# Patient Record
Sex: MALE | Race: WHITE | NOT HISPANIC OR LATINO
[De-identification: names, ages, dates, MRNs, and addresses within clinical notes are randomized per-mention and may not be internally consistent; named-entity substitution may affect disease eponyms.]

---

## 2017-01-31 ENCOUNTER — APPOINTMENT (OUTPATIENT)
Dept: CARDIOLOGY | Facility: CLINIC | Age: 82
End: 2017-01-31

## 2017-02-03 ENCOUNTER — APPOINTMENT (OUTPATIENT)
Dept: CARDIOLOGY | Facility: CLINIC | Age: 82
End: 2017-02-03

## 2017-02-03 VITALS
SYSTOLIC BLOOD PRESSURE: 154 MMHG | HEIGHT: 70 IN | BODY MASS INDEX: 29.92 KG/M2 | WEIGHT: 209 LBS | DIASTOLIC BLOOD PRESSURE: 74 MMHG | HEART RATE: 51 BPM | OXYGEN SATURATION: 94 %

## 2017-02-15 ENCOUNTER — INPATIENT (INPATIENT)
Facility: HOSPITAL | Age: 82
LOS: 1 days | Discharge: HOME | End: 2017-02-17
Attending: INTERNAL MEDICINE | Admitting: INTERNAL MEDICINE

## 2017-02-23 ENCOUNTER — APPOINTMENT (OUTPATIENT)
Dept: CARDIOLOGY | Facility: CLINIC | Age: 82
End: 2017-02-23

## 2017-02-23 VITALS — HEIGHT: 70 IN | WEIGHT: 209 LBS | BODY MASS INDEX: 29.92 KG/M2

## 2017-03-03 ENCOUNTER — APPOINTMENT (OUTPATIENT)
Dept: CARDIOLOGY | Facility: CLINIC | Age: 82
End: 2017-03-03

## 2017-03-03 VITALS
HEART RATE: 44 BPM | HEIGHT: 70 IN | SYSTOLIC BLOOD PRESSURE: 119 MMHG | DIASTOLIC BLOOD PRESSURE: 61 MMHG | OXYGEN SATURATION: 95 % | WEIGHT: 209 LBS | BODY MASS INDEX: 29.92 KG/M2

## 2017-03-03 RX ORDER — METOPROLOL SUCCINATE 25 MG/1
25 TABLET, EXTENDED RELEASE ORAL DAILY
Refills: 0 | Status: DISCONTINUED | COMMUNITY
End: 2017-03-03

## 2017-04-04 ENCOUNTER — APPOINTMENT (OUTPATIENT)
Dept: CARDIOLOGY | Facility: CLINIC | Age: 82
End: 2017-04-04

## 2017-04-04 VITALS — HEART RATE: 66 BPM | DIASTOLIC BLOOD PRESSURE: 70 MMHG | SYSTOLIC BLOOD PRESSURE: 104 MMHG

## 2017-04-04 VITALS — HEIGHT: 70 IN | BODY MASS INDEX: 29.49 KG/M2 | WEIGHT: 206 LBS

## 2017-04-14 ENCOUNTER — APPOINTMENT (OUTPATIENT)
Dept: CARDIOLOGY | Facility: CLINIC | Age: 82
End: 2017-04-14

## 2017-04-14 VITALS
HEART RATE: 73 BPM | SYSTOLIC BLOOD PRESSURE: 117 MMHG | OXYGEN SATURATION: 96 % | BODY MASS INDEX: 29.49 KG/M2 | HEIGHT: 70 IN | WEIGHT: 206 LBS | DIASTOLIC BLOOD PRESSURE: 65 MMHG

## 2017-05-13 ENCOUNTER — APPOINTMENT (OUTPATIENT)
Dept: CARDIOLOGY | Facility: CLINIC | Age: 82
End: 2017-05-13

## 2017-06-27 DIAGNOSIS — Z87.891 PERSONAL HISTORY OF NICOTINE DEPENDENCE: ICD-10-CM

## 2017-06-27 DIAGNOSIS — Z79.01 LONG TERM (CURRENT) USE OF ANTICOAGULANTS: ICD-10-CM

## 2017-06-27 DIAGNOSIS — Z95.1 PRESENCE OF AORTOCORONARY BYPASS GRAFT: ICD-10-CM

## 2017-06-27 DIAGNOSIS — I12.9 HYPERTENSIVE CHRONIC KIDNEY DISEASE WITH STAGE 1 THROUGH STAGE 4 CHRONIC KIDNEY DISEASE, OR UNSPECIFIED CHRONIC KIDNEY DISEASE: ICD-10-CM

## 2017-06-27 DIAGNOSIS — Z85.51 PERSONAL HISTORY OF MALIGNANT NEOPLASM OF BLADDER: ICD-10-CM

## 2017-06-27 DIAGNOSIS — N18.9 CHRONIC KIDNEY DISEASE, UNSPECIFIED: ICD-10-CM

## 2017-06-27 DIAGNOSIS — I48.92 UNSPECIFIED ATRIAL FLUTTER: ICD-10-CM

## 2017-06-27 DIAGNOSIS — I25.10 ATHEROSCLEROTIC HEART DISEASE OF NATIVE CORONARY ARTERY WITHOUT ANGINA PECTORIS: ICD-10-CM

## 2017-06-27 DIAGNOSIS — E78.5 HYPERLIPIDEMIA, UNSPECIFIED: ICD-10-CM

## 2017-06-27 DIAGNOSIS — N40.0 BENIGN PROSTATIC HYPERPLASIA WITHOUT LOWER URINARY TRACT SYMPTOMS: ICD-10-CM

## 2017-09-08 ENCOUNTER — APPOINTMENT (OUTPATIENT)
Dept: CARDIOLOGY | Facility: CLINIC | Age: 82
End: 2017-09-08

## 2017-09-08 VITALS
SYSTOLIC BLOOD PRESSURE: 135 MMHG | HEIGHT: 70 IN | BODY MASS INDEX: 29.49 KG/M2 | WEIGHT: 206 LBS | OXYGEN SATURATION: 93 % | DIASTOLIC BLOOD PRESSURE: 67 MMHG | HEART RATE: 56 BPM

## 2017-09-15 ENCOUNTER — APPOINTMENT (OUTPATIENT)
Dept: CARDIOLOGY | Facility: CLINIC | Age: 82
End: 2017-09-15

## 2017-09-15 VITALS — WEIGHT: 202 LBS | BODY MASS INDEX: 28.92 KG/M2 | HEIGHT: 70 IN

## 2017-09-15 VITALS — SYSTOLIC BLOOD PRESSURE: 140 MMHG | HEART RATE: 54 BPM | DIASTOLIC BLOOD PRESSURE: 60 MMHG

## 2017-10-12 ENCOUNTER — RX RENEWAL (OUTPATIENT)
Age: 82
End: 2017-10-12

## 2017-12-05 ENCOUNTER — RX RENEWAL (OUTPATIENT)
Age: 82
End: 2017-12-05

## 2018-01-09 ENCOUNTER — APPOINTMENT (OUTPATIENT)
Dept: CARDIOLOGY | Facility: CLINIC | Age: 83
End: 2018-01-09

## 2018-01-09 VITALS
BODY MASS INDEX: 29.35 KG/M2 | OXYGEN SATURATION: 97 % | SYSTOLIC BLOOD PRESSURE: 122 MMHG | HEART RATE: 55 BPM | DIASTOLIC BLOOD PRESSURE: 64 MMHG | HEIGHT: 70 IN | WEIGHT: 205 LBS

## 2018-01-26 ENCOUNTER — RESULT CHARGE (OUTPATIENT)
Age: 83
End: 2018-01-26

## 2018-01-26 ENCOUNTER — APPOINTMENT (OUTPATIENT)
Dept: CARDIOLOGY | Facility: CLINIC | Age: 83
End: 2018-01-26

## 2018-01-26 VITALS — BODY MASS INDEX: 32.64 KG/M2 | WEIGHT: 228 LBS | HEIGHT: 70 IN

## 2018-01-26 VITALS — DIASTOLIC BLOOD PRESSURE: 64 MMHG | SYSTOLIC BLOOD PRESSURE: 110 MMHG

## 2018-04-12 ENCOUNTER — APPOINTMENT (OUTPATIENT)
Dept: CARDIOLOGY | Facility: CLINIC | Age: 83
End: 2018-04-12

## 2018-04-12 VITALS
WEIGHT: 204 LBS | SYSTOLIC BLOOD PRESSURE: 120 MMHG | HEIGHT: 70 IN | DIASTOLIC BLOOD PRESSURE: 66 MMHG | BODY MASS INDEX: 29.2 KG/M2 | HEART RATE: 56 BPM

## 2018-06-08 ENCOUNTER — APPOINTMENT (OUTPATIENT)
Dept: CARDIOLOGY | Facility: CLINIC | Age: 83
End: 2018-06-08

## 2018-06-20 ENCOUNTER — RX RENEWAL (OUTPATIENT)
Age: 83
End: 2018-06-20

## 2018-06-21 ENCOUNTER — RX RENEWAL (OUTPATIENT)
Age: 83
End: 2018-06-21

## 2018-08-08 ENCOUNTER — RX RENEWAL (OUTPATIENT)
Age: 83
End: 2018-08-08

## 2018-08-21 ENCOUNTER — APPOINTMENT (OUTPATIENT)
Dept: CARDIOLOGY | Facility: CLINIC | Age: 83
End: 2018-08-21

## 2018-08-21 VITALS
WEIGHT: 205 LBS | HEIGHT: 70 IN | DIASTOLIC BLOOD PRESSURE: 50 MMHG | HEART RATE: 55 BPM | SYSTOLIC BLOOD PRESSURE: 120 MMHG | BODY MASS INDEX: 29.35 KG/M2

## 2018-10-05 ENCOUNTER — APPOINTMENT (OUTPATIENT)
Dept: CARDIOLOGY | Facility: CLINIC | Age: 83
End: 2018-10-05

## 2018-10-05 VITALS
HEART RATE: 50 BPM | HEIGHT: 70 IN | DIASTOLIC BLOOD PRESSURE: 60 MMHG | SYSTOLIC BLOOD PRESSURE: 138 MMHG | BODY MASS INDEX: 29.35 KG/M2 | WEIGHT: 205 LBS | OXYGEN SATURATION: 95 %

## 2018-10-05 RX ORDER — AMIODARONE HYDROCHLORIDE 200 MG/1
200 TABLET ORAL
Refills: 6 | Status: DISCONTINUED | COMMUNITY
Start: 2017-02-23 | End: 2018-10-05

## 2018-11-06 ENCOUNTER — OUTPATIENT (OUTPATIENT)
Dept: OUTPATIENT SERVICES | Facility: HOSPITAL | Age: 83
LOS: 1 days | Discharge: HOME | End: 2018-11-06

## 2018-11-06 DIAGNOSIS — M54.16 RADICULOPATHY, LUMBAR REGION: ICD-10-CM

## 2018-11-06 DIAGNOSIS — M48.061 SPINAL STENOSIS, LUMBAR REGION WITHOUT NEUROGENIC CLAUDICATION: ICD-10-CM

## 2019-01-11 ENCOUNTER — APPOINTMENT (OUTPATIENT)
Dept: CARDIOLOGY | Facility: CLINIC | Age: 84
End: 2019-01-11

## 2019-01-11 VITALS — DIASTOLIC BLOOD PRESSURE: 58 MMHG | SYSTOLIC BLOOD PRESSURE: 124 MMHG

## 2019-01-11 VITALS — BODY MASS INDEX: 29.41 KG/M2 | WEIGHT: 205 LBS

## 2019-01-11 NOTE — HISTORY OF PRESENT ILLNESS
[FreeTextEntry1] : Patient with history of CAD, PCI, HTN, PAF s/p DCCV  2011 CHADVASC 5 and now on amiodarone 2016. Patient had been doing well. No recurrence. No AF . Pt had one episode of AFL 2/17 that spontaneously converted to NSR and no further recurrence.  metoprolol was stopped and HR improved . No SOB ro SMITH  \par \par Patient dc/o balance problem and leg weakness. Amio was stopped - balance did not get better.\par No palpitations, CP, syncope\par \par EKG 65 bpm \par \par Holter - No AF; min 38 bpm; max 93 bpm

## 2019-01-11 NOTE — ASSESSMENT
[FreeTextEntry1] : Patient has history of atrial fibrillation on amiodarone. Continue anticoagulation.  Patient reports no bleeding episodes. \par \par \par pt is asymptomatic; bradycardic improved (may be SSS, tacy-pratik sydrome)\par - con AC\par - Avoid  AVN blocking\par \par HTN - cont meds

## 2019-01-11 NOTE — PHYSICAL EXAM
[General Appearance - Well Developed] : well developed [Normal Appearance] : normal appearance [Well Groomed] : well groomed [General Appearance - Well Nourished] : well nourished [No Deformities] : no deformities [General Appearance - In No Acute Distress] : no acute distress [Normal Conjunctiva] : the conjunctiva exhibited no abnormalities [Eyelids - No Xanthelasma] : the eyelids demonstrated no xanthelasmas [Normal Oral Mucosa] : normal oral mucosa [No Oral Pallor] : no oral pallor [No Oral Cyanosis] : no oral cyanosis [Normal Jugular Venous A Waves Present] : normal jugular venous A waves present [Normal Jugular Venous V Waves Present] : normal jugular venous V waves present [No Jugular Venous Amin A Waves] : no jugular venous amin A waves [Respiration, Rhythm And Depth] : normal respiratory rhythm and effort [Exaggerated Use Of Accessory Muscles For Inspiration] : no accessory muscle use [Auscultation Breath Sounds / Voice Sounds] : lungs were clear to auscultation bilaterally [Heart Rate And Rhythm] : heart rate and rhythm were normal [Heart Sounds] : normal S1 and S2 [Murmurs] : no murmurs present [Abdomen Soft] : soft [Abdomen Tenderness] : non-tender [Abdomen Mass (___ Cm)] : no abdominal mass palpated [Abnormal Walk] : normal gait [Gait - Sufficient For Exercise Testing] : the gait was sufficient for exercise testing [Nail Clubbing] : no clubbing of the fingernails [Cyanosis, Localized] : no localized cyanosis [Petechial Hemorrhages (___cm)] : no petechial hemorrhages [] : no ischemic changes

## 2019-01-18 ENCOUNTER — APPOINTMENT (OUTPATIENT)
Dept: CARDIOLOGY | Facility: CLINIC | Age: 84
End: 2019-01-18

## 2019-01-18 VITALS
DIASTOLIC BLOOD PRESSURE: 64 MMHG | SYSTOLIC BLOOD PRESSURE: 126 MMHG | BODY MASS INDEX: 29.35 KG/M2 | HEART RATE: 62 BPM | WEIGHT: 205 LBS | HEIGHT: 70 IN

## 2019-01-18 DIAGNOSIS — K27.9 PEPTIC ULCER, SITE UNSPECIFIED, UNSPECIFIED AS ACUTE OR CHRONIC, W/OUT HEMORRHAGE OR PERFORATION: ICD-10-CM

## 2019-01-18 DIAGNOSIS — Z00.00 ENCOUNTER FOR GENERAL ADULT MEDICAL EXAMINATION W/OUT ABNORMAL FINDINGS: ICD-10-CM

## 2019-01-18 NOTE — REASON FOR VISIT
[Follow-Up - Clinic] : a clinic follow-up of [Atrial Fibrillation] : atrial fibrillation [Coronary Artery Disease] : coronary artery disease [Hyperlipidemia] : hyperlipidemia [Hypertension] : hypertension

## 2019-01-18 NOTE — ASSESSMENT
[FreeTextEntry1] : The patient had a mechanical fall. He has known spinal stenosis and neuropathy . He is takig low dose Amio and has been seen by pulmonary and opthalmology . Amio had been stopped by EP . HR is now in the 60's. He has not had recurrence of AF AFL

## 2019-01-18 NOTE — HISTORY OF PRESENT ILLNESS
[FreeTextEntry1] : The patient has had no AF . Off of Amio and neurological symptoms have not changed.

## 2019-01-18 NOTE — PHYSICAL EXAM
[General Appearance - Well Developed] : well developed [Normal Appearance] : normal appearance [Well Groomed] : well groomed [General Appearance - Well Nourished] : well nourished [No Deformities] : no deformities [General Appearance - In No Acute Distress] : no acute distress [Normal Conjunctiva] : the conjunctiva exhibited no abnormalities [Eyelids - No Xanthelasma] : the eyelids demonstrated no xanthelasmas [Normal Oral Mucosa] : normal oral mucosa [No Oral Pallor] : no oral pallor [No Oral Cyanosis] : no oral cyanosis [Respiration, Rhythm And Depth] : normal respiratory rhythm and effort [Exaggerated Use Of Accessory Muscles For Inspiration] : no accessory muscle use [Auscultation Breath Sounds / Voice Sounds] : lungs were clear to auscultation bilaterally [Abdomen Soft] : soft [Abdomen Tenderness] : non-tender [Abdomen Mass (___ Cm)] : no abdominal mass palpated [Abnormal Walk] : normal gait [Gait - Sufficient For Exercise Testing] : the gait was sufficient for exercise testing [Nail Clubbing] : no clubbing of the fingernails [Cyanosis, Localized] : no localized cyanosis [Petechial Hemorrhages (___cm)] : no petechial hemorrhages [Skin Color & Pigmentation] : normal skin color and pigmentation [] : no rash [No Venous Stasis] : no venous stasis [Skin Lesions] : no skin lesions [No Skin Ulcers] : no skin ulcer [No Xanthoma] : no  xanthoma was observed [Oriented To Time, Place, And Person] : oriented to person, place, and time [Affect] : the affect was normal [Mood] : the mood was normal [No Anxiety] : not feeling anxious [Normal Rate] : normal [Rhythm Regular] : regular [II] : a grade 2 [1+] : left 1+ [No Pitting Edema] : no pitting edema present [FreeTextEntry1] : No JVD or bruits.  [Rt] : no varicose veins of the right leg [Lt] : no varicose veins of the left leg

## 2019-01-18 NOTE — REVIEW OF SYSTEMS
[Joint Pain] : joint pain [Joint Swelling] : joint swelling [Change In Color Of Skin] : change in skin color [Negative] : Heme/Lymph [Joint Stiffness] : no joint stiffness

## 2019-03-17 ENCOUNTER — RX RENEWAL (OUTPATIENT)
Age: 84
End: 2019-03-17

## 2019-04-01 ENCOUNTER — APPOINTMENT (OUTPATIENT)
Dept: CARDIOLOGY | Facility: CLINIC | Age: 84
End: 2019-04-01
Payer: MEDICARE

## 2019-04-01 PROCEDURE — 93880 EXTRACRANIAL BILAT STUDY: CPT

## 2019-04-09 ENCOUNTER — MEDICATION RENEWAL (OUTPATIENT)
Age: 84
End: 2019-04-09

## 2019-06-18 ENCOUNTER — APPOINTMENT (OUTPATIENT)
Dept: CARDIOLOGY | Facility: CLINIC | Age: 84
End: 2019-06-18
Payer: MEDICARE

## 2019-06-18 VITALS
SYSTOLIC BLOOD PRESSURE: 120 MMHG | DIASTOLIC BLOOD PRESSURE: 76 MMHG | HEIGHT: 70 IN | WEIGHT: 201 LBS | BODY MASS INDEX: 28.77 KG/M2 | HEART RATE: 83 BPM

## 2019-06-18 PROCEDURE — 93000 ELECTROCARDIOGRAM COMPLETE: CPT

## 2019-06-18 PROCEDURE — 99214 OFFICE O/P EST MOD 30 MIN: CPT

## 2019-06-18 NOTE — PHYSICAL EXAM
[General Appearance - Well Developed] : well developed [Normal Appearance] : normal appearance [Well Groomed] : well groomed [General Appearance - Well Nourished] : well nourished [General Appearance - In No Acute Distress] : no acute distress [No Deformities] : no deformities [Normal Conjunctiva] : the conjunctiva exhibited no abnormalities [Normal Oral Mucosa] : normal oral mucosa [Eyelids - No Xanthelasma] : the eyelids demonstrated no xanthelasmas [No Oral Cyanosis] : no oral cyanosis [No Oral Pallor] : no oral pallor [FreeTextEntry1] : No JVD or bruits.  [Respiration, Rhythm And Depth] : normal respiratory rhythm and effort [Abdomen Soft] : soft [Exaggerated Use Of Accessory Muscles For Inspiration] : no accessory muscle use [Auscultation Breath Sounds / Voice Sounds] : lungs were clear to auscultation bilaterally [Abdomen Tenderness] : non-tender [Abdomen Mass (___ Cm)] : no abdominal mass palpated [Abnormal Walk] : normal gait [Gait - Sufficient For Exercise Testing] : the gait was sufficient for exercise testing [Nail Clubbing] : no clubbing of the fingernails [Petechial Hemorrhages (___cm)] : no petechial hemorrhages [Cyanosis, Localized] : no localized cyanosis [] : no rash [Skin Color & Pigmentation] : normal skin color and pigmentation [No Skin Ulcers] : no skin ulcer [No Venous Stasis] : no venous stasis [Skin Lesions] : no skin lesions [No Xanthoma] : no  xanthoma was observed [Oriented To Time, Place, And Person] : oriented to person, place, and time [Affect] : the affect was normal [Mood] : the mood was normal [No Anxiety] : not feeling anxious [Normal Rate] : normal [Rhythm Regular] : regular [II] : a grade 2 [1+] : left 1+ [No Pitting Edema] : no pitting edema present [Rt] : no varicose veins of the right leg [Lt] : no varicose veins of the left leg

## 2019-06-18 NOTE — REVIEW OF SYSTEMS
[Joint Pain] : joint pain [Joint Swelling] : joint swelling [Joint Stiffness] : no joint stiffness [Change In Color Of Skin] : change in skin color [Negative] : Heme/Lymph

## 2019-06-18 NOTE — HISTORY OF PRESENT ILLNESS
[FreeTextEntry1] : The patient still has to walk with a walker. He has been off of Amio. Thought to have a neuropathy from his back issues . He is back in AF with rate control VR . . No shortness of breath and his exercise tolerance has been unchanged

## 2019-06-18 NOTE — ASSESSMENT
[FreeTextEntry1] : The patient is in AF with controlled VR at rest . The patient has had no SOB or chest pain . He does have a neuropathy from his his lower back, not thouht to be from AMio. He has had an enlarged LA in the past . His rate is controlled without being on AV huber blocking meds. This is indicative of AV huber disease as well.

## 2019-08-08 ENCOUNTER — APPOINTMENT (OUTPATIENT)
Dept: CARDIOLOGY | Facility: CLINIC | Age: 84
End: 2019-08-08
Payer: MEDICARE

## 2019-08-08 PROCEDURE — 93306 TTE W/DOPPLER COMPLETE: CPT

## 2019-08-08 PROCEDURE — 93224 XTRNL ECG REC UP TO 48 HRS: CPT

## 2019-09-29 ENCOUNTER — RX RENEWAL (OUTPATIENT)
Age: 84
End: 2019-09-29

## 2019-10-11 ENCOUNTER — APPOINTMENT (OUTPATIENT)
Dept: CARDIOLOGY | Facility: CLINIC | Age: 84
End: 2019-10-11
Payer: MEDICARE

## 2019-10-11 ENCOUNTER — APPOINTMENT (OUTPATIENT)
Dept: CARDIOLOGY | Facility: CLINIC | Age: 84
End: 2019-10-11

## 2019-10-11 VITALS
BODY MASS INDEX: 28.27 KG/M2 | WEIGHT: 197 LBS | HEART RATE: 86 BPM | SYSTOLIC BLOOD PRESSURE: 120 MMHG | DIASTOLIC BLOOD PRESSURE: 66 MMHG

## 2019-10-11 PROCEDURE — 93000 ELECTROCARDIOGRAM COMPLETE: CPT

## 2019-10-11 PROCEDURE — 99214 OFFICE O/P EST MOD 30 MIN: CPT

## 2019-10-11 RX ORDER — UBIDECARENONE/VIT E ACET 100MG-5
1000 CAPSULE ORAL
Refills: 0 | Status: ACTIVE | COMMUNITY

## 2019-10-11 NOTE — PHYSICAL EXAM
[General Appearance - Well Developed] : well developed [Normal Appearance] : normal appearance [Well Groomed] : well groomed [General Appearance - Well Nourished] : well nourished [No Deformities] : no deformities [General Appearance - In No Acute Distress] : no acute distress [Normal Conjunctiva] : the conjunctiva exhibited no abnormalities [Normal Oral Mucosa] : normal oral mucosa [Eyelids - No Xanthelasma] : the eyelids demonstrated no xanthelasmas [No Oral Cyanosis] : no oral cyanosis [No Oral Pallor] : no oral pallor [Normal Jugular Venous A Waves Present] : normal jugular venous A waves present [No Jugular Venous Amin A Waves] : no jugular venous amin A waves [Normal Jugular Venous V Waves Present] : normal jugular venous V waves present [Heart Rate And Rhythm] : heart rate and rhythm were normal [Auscultation Breath Sounds / Voice Sounds] : lungs were clear to auscultation bilaterally [Exaggerated Use Of Accessory Muscles For Inspiration] : no accessory muscle use [Respiration, Rhythm And Depth] : normal respiratory rhythm and effort [Heart Sounds] : normal S1 and S2 [Murmurs] : no murmurs present [Abdomen Tenderness] : non-tender [Abdomen Soft] : soft [Abdomen Mass (___ Cm)] : no abdominal mass palpated [Abnormal Walk] : normal gait [Gait - Sufficient For Exercise Testing] : the gait was sufficient for exercise testing [Nail Clubbing] : no clubbing of the fingernails [Petechial Hemorrhages (___cm)] : no petechial hemorrhages [Cyanosis, Localized] : no localized cyanosis [] : no ischemic changes

## 2019-10-11 NOTE — HISTORY OF PRESENT ILLNESS
[FreeTextEntry1] : Patient with history of CAD, PCI, HTN, PAF s/p DCCV  2011 CHADVASC 5 and now on amiodarone 2016. Patient had been doing well. No recurrence. No AF . Pt had one episode of AFL 2/17 that spontaneously converted to NSR and no further recurrence.  metoprolol was stopped and HR improved . No SOB ro SMITH  \par \par Patient dc/o balance problem and leg weakness. Amio was stopped - balance did not get better.\par \par Patient went back to AF but No palpitations, CP, syncope\par \par EKG AF 86 bpm\par \par Holter - No AF; min 38 bpm; max 93 bpm

## 2019-10-11 NOTE — ASSESSMENT
[FreeTextEntry1] : Patient has history of atrial fibrillation on amiodarone. Continue anticoagulation.  Patient reports no bleeding episodes. \par \par \par pt is asymptomatic; in AF now (may be SSS, tacy-pratik sydrome)\par - con AC\par - pt is rate controlled currently. No further meds required at this time\par \par HTN - cont meds

## 2019-12-04 ENCOUNTER — APPOINTMENT (OUTPATIENT)
Dept: CARDIOLOGY | Facility: CLINIC | Age: 84
End: 2019-12-04
Payer: MEDICARE

## 2019-12-04 VITALS
WEIGHT: 198 LBS | HEIGHT: 70 IN | BODY MASS INDEX: 28.35 KG/M2 | SYSTOLIC BLOOD PRESSURE: 120 MMHG | DIASTOLIC BLOOD PRESSURE: 76 MMHG | HEART RATE: 75 BPM

## 2019-12-04 PROCEDURE — 93000 ELECTROCARDIOGRAM COMPLETE: CPT

## 2019-12-04 PROCEDURE — 99214 OFFICE O/P EST MOD 30 MIN: CPT

## 2019-12-04 NOTE — REVIEW OF SYSTEMS
[Joint Swelling] : joint swelling [Joint Pain] : joint pain [Joint Stiffness] : no joint stiffness [Change In Color Of Skin] : change in skin color [Negative] : Heme/Lymph

## 2019-12-04 NOTE — ASSESSMENT
[FreeTextEntry1] : The patient has AF which is now thought to be permanent. He is not having symptoms as a result of this . His LA size is enlarged. His ventricular rate is controlled without AV huber blocking medications . This is c/w AV huber disease. VR is controlled on holter

## 2019-12-04 NOTE — PHYSICAL EXAM
[General Appearance - Well Developed] : well developed [Normal Appearance] : normal appearance [Well Groomed] : well groomed [General Appearance - Well Nourished] : well nourished [No Deformities] : no deformities [General Appearance - In No Acute Distress] : no acute distress [Normal Conjunctiva] : the conjunctiva exhibited no abnormalities [Eyelids - No Xanthelasma] : the eyelids demonstrated no xanthelasmas [Normal Oral Mucosa] : normal oral mucosa [No Oral Pallor] : no oral pallor [No Oral Cyanosis] : no oral cyanosis [FreeTextEntry1] : No JVD or bruits.  [Respiration, Rhythm And Depth] : normal respiratory rhythm and effort [Exaggerated Use Of Accessory Muscles For Inspiration] : no accessory muscle use [Auscultation Breath Sounds / Voice Sounds] : lungs were clear to auscultation bilaterally [Abdomen Soft] : soft [Abdomen Tenderness] : non-tender [Abdomen Mass (___ Cm)] : no abdominal mass palpated [Nail Clubbing] : no clubbing of the fingernails [Abnormal Walk] : normal gait [Gait - Sufficient For Exercise Testing] : the gait was sufficient for exercise testing [Petechial Hemorrhages (___cm)] : no petechial hemorrhages [Cyanosis, Localized] : no localized cyanosis [] : no rash [Skin Color & Pigmentation] : normal skin color and pigmentation [No Venous Stasis] : no venous stasis [Skin Lesions] : no skin lesions [No Skin Ulcers] : no skin ulcer [No Xanthoma] : no  xanthoma was observed [Oriented To Time, Place, And Person] : oriented to person, place, and time [Affect] : the affect was normal [Mood] : the mood was normal [No Anxiety] : not feeling anxious [Normal Rate] : normal [Rhythm Regular] : regular [II] : a grade 2 [1+] : left 1+ [Lt] : no varicose veins of the left leg [No Pitting Edema] : no pitting edema present [Rt] : no varicose veins of the right leg

## 2019-12-04 NOTE — HISTORY OF PRESENT ILLNESS
[FreeTextEntry1] : The patient is using a walker for balance issues . He has persisent AF and ventriular response is controlled. He has a neuropathy as well fro his lower back.

## 2020-03-25 ENCOUNTER — APPOINTMENT (OUTPATIENT)
Dept: CARDIOLOGY | Facility: CLINIC | Age: 85
End: 2020-03-25

## 2020-03-27 ENCOUNTER — APPOINTMENT (OUTPATIENT)
Dept: CARDIOLOGY | Facility: CLINIC | Age: 85
End: 2020-03-27

## 2020-07-23 ENCOUNTER — APPOINTMENT (OUTPATIENT)
Dept: CARDIOLOGY | Facility: CLINIC | Age: 85
End: 2020-07-23
Payer: MEDICARE

## 2020-07-23 VITALS
SYSTOLIC BLOOD PRESSURE: 122 MMHG | WEIGHT: 200 LBS | TEMPERATURE: 97.7 F | DIASTOLIC BLOOD PRESSURE: 60 MMHG | BODY MASS INDEX: 28.7 KG/M2

## 2020-07-23 DIAGNOSIS — M54.2 CERVICALGIA: ICD-10-CM

## 2020-07-23 PROCEDURE — 93000 ELECTROCARDIOGRAM COMPLETE: CPT

## 2020-07-23 PROCEDURE — 99214 OFFICE O/P EST MOD 30 MIN: CPT

## 2020-07-23 NOTE — HISTORY OF PRESENT ILLNESS
[FreeTextEntry1] : The patient is using a walker for balance issues . He has persistent AF and ventricular response is controlled. He has a neuropathy as well fro his lower back. .The patient has not had chest pain or SOB .

## 2020-07-23 NOTE — ASSESSMENT
[FreeTextEntry1] : The patient has AF which is now thought to be permanent. He is not having symptoms as a result of this . His LA size is enlarged. His ventricular rate is controlled without AV huber blocking medications . This is c/w AV huber disease. VR is controlled on holter. He hs been feeling well. No chest pain

## 2020-07-23 NOTE — REASON FOR VISIT
[Follow-Up - Clinic] : a clinic follow-up of [Atrial Fibrillation] : atrial fibrillation [Hyperlipidemia] : hyperlipidemia [Coronary Artery Disease] : coronary artery disease [Hypertension] : hypertension

## 2020-07-23 NOTE — REVIEW OF SYSTEMS
[Joint Pain] : joint pain [Joint Stiffness] : no joint stiffness [Joint Swelling] : joint swelling [Change In Color Of Skin] : change in skin color [Negative] : Endocrine

## 2020-07-23 NOTE — PHYSICAL EXAM
[General Appearance - Well Developed] : well developed [Normal Appearance] : normal appearance [Well Groomed] : well groomed [General Appearance - In No Acute Distress] : no acute distress [General Appearance - Well Nourished] : well nourished [No Deformities] : no deformities [Eyelids - No Xanthelasma] : the eyelids demonstrated no xanthelasmas [Normal Conjunctiva] : the conjunctiva exhibited no abnormalities [Normal Oral Mucosa] : normal oral mucosa [FreeTextEntry1] : No JVD or bruits.  [No Oral Pallor] : no oral pallor [No Oral Cyanosis] : no oral cyanosis [Respiration, Rhythm And Depth] : normal respiratory rhythm and effort [Exaggerated Use Of Accessory Muscles For Inspiration] : no accessory muscle use [Auscultation Breath Sounds / Voice Sounds] : lungs were clear to auscultation bilaterally [Abdomen Soft] : soft [Abnormal Walk] : normal gait [Abdomen Tenderness] : non-tender [Abdomen Mass (___ Cm)] : no abdominal mass palpated [Gait - Sufficient For Exercise Testing] : the gait was sufficient for exercise testing [Nail Clubbing] : no clubbing of the fingernails [Cyanosis, Localized] : no localized cyanosis [Petechial Hemorrhages (___cm)] : no petechial hemorrhages [Skin Color & Pigmentation] : normal skin color and pigmentation [] : no rash [No Venous Stasis] : no venous stasis [Skin Lesions] : no skin lesions [No Skin Ulcers] : no skin ulcer [No Xanthoma] : no  xanthoma was observed [Mood] : the mood was normal [Oriented To Time, Place, And Person] : oriented to person, place, and time [Affect] : the affect was normal [Normal Rate] : normal [No Anxiety] : not feeling anxious [Rhythm Regular] : regular [II] : a grade 2 [No Pitting Edema] : no pitting edema present [1+] : right 1+ [Lt] : no varicose veins of the left leg [Rt] : no varicose veins of the right leg

## 2020-12-03 ENCOUNTER — APPOINTMENT (OUTPATIENT)
Dept: CARDIOLOGY | Facility: CLINIC | Age: 85
End: 2020-12-03
Payer: MEDICARE

## 2020-12-03 PROCEDURE — 93306 TTE W/DOPPLER COMPLETE: CPT

## 2020-12-03 PROCEDURE — 99072 ADDL SUPL MATRL&STAF TM PHE: CPT

## 2020-12-10 ENCOUNTER — APPOINTMENT (OUTPATIENT)
Dept: CARDIOLOGY | Facility: CLINIC | Age: 85
End: 2020-12-10
Payer: MEDICARE

## 2020-12-10 VITALS — HEIGHT: 70 IN | TEMPERATURE: 97.2 F | WEIGHT: 159 LBS | BODY MASS INDEX: 22.76 KG/M2

## 2020-12-10 VITALS
BODY MASS INDEX: 28.06 KG/M2 | HEART RATE: 69 BPM | SYSTOLIC BLOOD PRESSURE: 120 MMHG | TEMPERATURE: 97.3 F | HEIGHT: 70 IN | DIASTOLIC BLOOD PRESSURE: 60 MMHG | WEIGHT: 196 LBS

## 2020-12-10 PROCEDURE — 99214 OFFICE O/P EST MOD 30 MIN: CPT

## 2020-12-10 PROCEDURE — 93000 ELECTROCARDIOGRAM COMPLETE: CPT

## 2020-12-10 PROCEDURE — 99072 ADDL SUPL MATRL&STAF TM PHE: CPT

## 2020-12-10 NOTE — ASSESSMENT
[FreeTextEntry1] : The patient has AF which is now thought to be permanent. He is not having symptoms as a result of this . His LA size is enlarged. His ventricular rate is controlled without AV huber blocking medications . This is c/w AV huber disease. VR is controlled on holter. He has been feeling well. No chest pain Echo shows normal LV systolic function . AS remains mild .

## 2021-03-06 ENCOUNTER — INPATIENT (INPATIENT)
Facility: HOSPITAL | Age: 86
LOS: 10 days | Discharge: HOME HEALTH | End: 2021-03-17
Attending: INTERNAL MEDICINE | Admitting: INTERNAL MEDICINE
Payer: MEDICARE

## 2021-03-06 VITALS
HEART RATE: 114 BPM | SYSTOLIC BLOOD PRESSURE: 110 MMHG | OXYGEN SATURATION: 100 % | WEIGHT: 190.04 LBS | DIASTOLIC BLOOD PRESSURE: 59 MMHG | TEMPERATURE: 98 F | HEIGHT: 70 IN | RESPIRATION RATE: 20 BRPM

## 2021-03-06 DIAGNOSIS — Z95.5 PRESENCE OF CORONARY ANGIOPLASTY IMPLANT AND GRAFT: Chronic | ICD-10-CM

## 2021-03-06 LAB
ABO RH CONFIRMATION: SIGNIFICANT CHANGE UP
ALBUMIN SERPL ELPH-MCNC: 3.7 G/DL — SIGNIFICANT CHANGE UP (ref 3.5–5.2)
ALP SERPL-CCNC: 44 U/L — SIGNIFICANT CHANGE UP (ref 30–115)
ALT FLD-CCNC: 14 U/L — SIGNIFICANT CHANGE UP (ref 0–41)
ANION GAP SERPL CALC-SCNC: 15 MMOL/L — HIGH (ref 7–14)
APPEARANCE UR: CLEAR — SIGNIFICANT CHANGE UP
APTT BLD: 49.9 SEC — HIGH (ref 27–39.2)
AST SERPL-CCNC: 16 U/L — SIGNIFICANT CHANGE UP (ref 0–41)
BASOPHILS # BLD AUTO: 0.03 K/UL — SIGNIFICANT CHANGE UP (ref 0–0.2)
BASOPHILS NFR BLD AUTO: 0.2 % — SIGNIFICANT CHANGE UP (ref 0–1)
BILIRUB SERPL-MCNC: 0.4 MG/DL — SIGNIFICANT CHANGE UP (ref 0.2–1.2)
BILIRUB UR-MCNC: NEGATIVE — SIGNIFICANT CHANGE UP
BLD GP AB SCN SERPL QL: SIGNIFICANT CHANGE UP
BUN SERPL-MCNC: 112 MG/DL — CRITICAL HIGH (ref 10–20)
CALCIUM SERPL-MCNC: 8.5 MG/DL — SIGNIFICANT CHANGE UP (ref 8.5–10.1)
CHLORIDE SERPL-SCNC: 111 MMOL/L — HIGH (ref 98–110)
CO2 SERPL-SCNC: 16 MMOL/L — LOW (ref 17–32)
COLOR SPEC: SIGNIFICANT CHANGE UP
CREAT SERPL-MCNC: 2.3 MG/DL — HIGH (ref 0.7–1.5)
DIFF PNL FLD: NEGATIVE — SIGNIFICANT CHANGE UP
EOSINOPHIL # BLD AUTO: 0 K/UL — SIGNIFICANT CHANGE UP (ref 0–0.7)
EOSINOPHIL NFR BLD AUTO: 0 % — SIGNIFICANT CHANGE UP (ref 0–8)
ETHANOL SERPL-MCNC: <10 MG/DL — SIGNIFICANT CHANGE UP
GLUCOSE SERPL-MCNC: 141 MG/DL — HIGH (ref 70–99)
GLUCOSE UR QL: NEGATIVE — SIGNIFICANT CHANGE UP
HCT VFR BLD CALC: 19.3 % — LOW (ref 42–52)
HCT VFR BLD CALC: 22.3 % — LOW (ref 42–52)
HGB BLD-MCNC: 6.5 G/DL — CRITICAL LOW (ref 14–18)
HGB BLD-MCNC: 7.8 G/DL — LOW (ref 14–18)
IMM GRANULOCYTES NFR BLD AUTO: 1 % — HIGH (ref 0.1–0.3)
INR BLD: 7.77 RATIO — CRITICAL HIGH (ref 0.65–1.3)
KETONES UR-MCNC: NEGATIVE — SIGNIFICANT CHANGE UP
LACTATE SERPL-SCNC: 2.7 MMOL/L — HIGH (ref 0.7–2)
LEUKOCYTE ESTERASE UR-ACNC: NEGATIVE — SIGNIFICANT CHANGE UP
LIDOCAIN IGE QN: 23 U/L — SIGNIFICANT CHANGE UP (ref 7–60)
LYMPHOCYTES # BLD AUTO: 0.55 K/UL — LOW (ref 1.2–3.4)
LYMPHOCYTES # BLD AUTO: 3.8 % — LOW (ref 20.5–51.1)
MAGNESIUM SERPL-MCNC: 2.1 MG/DL — SIGNIFICANT CHANGE UP (ref 1.8–2.4)
MCHC RBC-ENTMCNC: 29.3 PG — SIGNIFICANT CHANGE UP (ref 27–31)
MCHC RBC-ENTMCNC: 30.4 PG — SIGNIFICANT CHANGE UP (ref 27–31)
MCHC RBC-ENTMCNC: 33.7 G/DL — SIGNIFICANT CHANGE UP (ref 32–37)
MCHC RBC-ENTMCNC: 35 G/DL — SIGNIFICANT CHANGE UP (ref 32–37)
MCV RBC AUTO: 86.8 FL — SIGNIFICANT CHANGE UP (ref 80–94)
MCV RBC AUTO: 86.9 FL — SIGNIFICANT CHANGE UP (ref 80–94)
MONOCYTES # BLD AUTO: 0.94 K/UL — HIGH (ref 0.1–0.6)
MONOCYTES NFR BLD AUTO: 6.5 % — SIGNIFICANT CHANGE UP (ref 1.7–9.3)
NEUTROPHILS # BLD AUTO: 12.76 K/UL — HIGH (ref 1.4–6.5)
NEUTROPHILS NFR BLD AUTO: 88.5 % — HIGH (ref 42.2–75.2)
NITRITE UR-MCNC: NEGATIVE — SIGNIFICANT CHANGE UP
NRBC # BLD: 0 /100 WBCS — SIGNIFICANT CHANGE UP (ref 0–0)
NRBC # BLD: 0 /100 WBCS — SIGNIFICANT CHANGE UP (ref 0–0)
NT-PROBNP SERPL-SCNC: 783 PG/ML — HIGH (ref 0–300)
PH UR: 5.5 — SIGNIFICANT CHANGE UP (ref 5–8)
PLATELET # BLD AUTO: 201 K/UL — SIGNIFICANT CHANGE UP (ref 130–400)
PLATELET # BLD AUTO: 214 K/UL — SIGNIFICANT CHANGE UP (ref 130–400)
POTASSIUM SERPL-MCNC: 4.6 MMOL/L — SIGNIFICANT CHANGE UP (ref 3.5–5)
POTASSIUM SERPL-SCNC: 4.6 MMOL/L — SIGNIFICANT CHANGE UP (ref 3.5–5)
PROT SERPL-MCNC: 5.3 G/DL — LOW (ref 6–8)
PROT UR-MCNC: NEGATIVE — SIGNIFICANT CHANGE UP
PROTHROM AB SERPL-ACNC: >40 SEC — HIGH (ref 9.95–12.87)
RBC # BLD: 2.22 M/UL — LOW (ref 4.7–6.1)
RBC # BLD: 2.57 M/UL — LOW (ref 4.7–6.1)
RBC # FLD: 15.8 % — HIGH (ref 11.5–14.5)
RBC # FLD: 15.8 % — HIGH (ref 11.5–14.5)
SARS-COV-2 RNA SPEC QL NAA+PROBE: SIGNIFICANT CHANGE UP
SODIUM SERPL-SCNC: 142 MMOL/L — SIGNIFICANT CHANGE UP (ref 135–146)
SP GR SPEC: 1.03 — SIGNIFICANT CHANGE UP (ref 1.01–1.03)
TROPONIN T SERPL-MCNC: <0.01 NG/ML — SIGNIFICANT CHANGE UP
UROBILINOGEN FLD QL: SIGNIFICANT CHANGE UP
WBC # BLD: 14.42 K/UL — HIGH (ref 4.8–10.8)
WBC # BLD: 14.87 K/UL — HIGH (ref 4.8–10.8)
WBC # FLD AUTO: 14.42 K/UL — HIGH (ref 4.8–10.8)
WBC # FLD AUTO: 14.87 K/UL — HIGH (ref 4.8–10.8)

## 2021-03-06 PROCEDURE — 72170 X-RAY EXAM OF PELVIS: CPT | Mod: 26

## 2021-03-06 PROCEDURE — 74177 CT ABD & PELVIS W/CONTRAST: CPT | Mod: 26

## 2021-03-06 PROCEDURE — 71260 CT THORAX DX C+: CPT | Mod: 26

## 2021-03-06 PROCEDURE — 99285 EMERGENCY DEPT VISIT HI MDM: CPT

## 2021-03-06 PROCEDURE — 93010 ELECTROCARDIOGRAM REPORT: CPT | Mod: 76

## 2021-03-06 PROCEDURE — 71045 X-RAY EXAM CHEST 1 VIEW: CPT | Mod: 26

## 2021-03-06 PROCEDURE — 72125 CT NECK SPINE W/O DYE: CPT | Mod: 26

## 2021-03-06 PROCEDURE — 99223 1ST HOSP IP/OBS HIGH 75: CPT

## 2021-03-06 PROCEDURE — 70450 CT HEAD/BRAIN W/O DYE: CPT | Mod: 26

## 2021-03-06 RX ORDER — PANTOPRAZOLE SODIUM 20 MG/1
40 TABLET, DELAYED RELEASE ORAL EVERY 12 HOURS
Refills: 0 | Status: DISCONTINUED | OUTPATIENT
Start: 2021-03-06 | End: 2021-03-07

## 2021-03-06 RX ORDER — TIZANIDINE 4 MG/1
0 TABLET ORAL
Qty: 0 | Refills: 1 | DISCHARGE

## 2021-03-06 RX ORDER — PHYTONADIONE (VIT K1) 5 MG
2.5 TABLET ORAL ONCE
Refills: 0 | Status: COMPLETED | OUTPATIENT
Start: 2021-03-06 | End: 2021-03-06

## 2021-03-06 RX ORDER — FINASTERIDE 5 MG/1
5 TABLET, FILM COATED ORAL DAILY
Refills: 0 | Status: DISCONTINUED | OUTPATIENT
Start: 2021-03-06 | End: 2021-03-17

## 2021-03-06 RX ORDER — TAMSULOSIN HYDROCHLORIDE 0.4 MG/1
0 CAPSULE ORAL
Qty: 0 | Refills: 0 | DISCHARGE

## 2021-03-06 RX ORDER — SODIUM CHLORIDE 9 MG/ML
1000 INJECTION INTRAMUSCULAR; INTRAVENOUS; SUBCUTANEOUS ONCE
Refills: 0 | Status: COMPLETED | OUTPATIENT
Start: 2021-03-06 | End: 2021-03-06

## 2021-03-06 RX ORDER — NITROFURANTOIN MACROCRYSTAL 50 MG
0 CAPSULE ORAL
Qty: 0 | Refills: 0 | DISCHARGE

## 2021-03-06 RX ORDER — FINASTERIDE 5 MG/1
0 TABLET, FILM COATED ORAL
Qty: 0 | Refills: 3 | DISCHARGE

## 2021-03-06 RX ORDER — WARFARIN SODIUM 2.5 MG/1
0 TABLET ORAL
Qty: 0 | Refills: 0 | DISCHARGE

## 2021-03-06 RX ORDER — TIZANIDINE 4 MG/1
0 TABLET ORAL
Qty: 0 | Refills: 0 | DISCHARGE

## 2021-03-06 RX ORDER — TAMSULOSIN HYDROCHLORIDE 0.4 MG/1
0.4 CAPSULE ORAL AT BEDTIME
Refills: 0 | Status: DISCONTINUED | OUTPATIENT
Start: 2021-03-06 | End: 2021-03-06

## 2021-03-06 RX ORDER — CHOLECALCIFEROL (VITAMIN D3) 125 MCG
1000 CAPSULE ORAL DAILY
Refills: 0 | Status: DISCONTINUED | OUTPATIENT
Start: 2021-03-06 | End: 2021-03-17

## 2021-03-06 RX ADMIN — PANTOPRAZOLE SODIUM 40 MILLIGRAM(S): 20 TABLET, DELAYED RELEASE ORAL at 21:42

## 2021-03-06 RX ADMIN — SODIUM CHLORIDE 1000 MILLILITER(S): 9 INJECTION INTRAMUSCULAR; INTRAVENOUS; SUBCUTANEOUS at 13:05

## 2021-03-06 RX ADMIN — Medication 2.5 MILLIGRAM(S): at 21:41

## 2021-03-06 NOTE — ED PROVIDER NOTE - IV ALTEPLASE ADMIN OUTSIDE HIDDEN
Drug overdose, intentional self-harm, initial encounter Drug overdose, intentional self-harm, initial encounter show

## 2021-03-06 NOTE — H&P ADULT - NSHPPHYSICALEXAM_GEN_ALL_CORE
Vital Signs Last 24 Hrs  T(C): 36.7 (06 Mar 2021 15:42), Max: 36.7 (06 Mar 2021 15:42)  T(F): 98 (06 Mar 2021 15:42), Max: 98 (06 Mar 2021 15:42)  HR: 85 (06 Mar 2021 15:42) (85 - 120)  BP: 117/56 (06 Mar 2021 15:42) (110/59 - 126/63)  RR: 20 (06 Mar 2021 11:49) (20 - 20)  SpO2: 100% (06 Mar 2021 11:49) (100% - 100%)    PHYSICAL EXAM:  GENERAL: The patient is a well-developed, well-nourished in no apparent distress. AOX3.  HEENT: NCAT   NECK: Supple. No lymphadenopathy or thyromegaly.  LUNGS: No respiratory distress or accessory muscle use. CTAB  HEART: RRR, S1 S2 Audible  ABDOMEN: Soft, nontender, and nondistended.  No gaurding or rigidity.  No hepatosplenomegaly was noted.  EXTREMITIES: Without any cyanosis, clubbing, rash, lesions or edema.

## 2021-03-06 NOTE — CONSULT NOTE ADULT - SUBJECTIVE AND OBJECTIVE BOX
HPI:  89yo M with PMHx CAD/stents, AFib on coumadin, HTN, HLD, spinal stenosis, prostate CA, Vit D def presents for eval s/p fall this morning. Pt states at 8am was getting out of bed to go to bathroom, felt lightheaded and fell forwards, landed on chest. Pt remembers events, denies LOC. Currently is not c/o any pain. Since fall, pt states still feeling lightheaded. Also noted SMITH that began yesterday, pt states normally can ambulate with rollator to the store and ambulates without assistance in the home, but yesterday felt out of breath after 10 steps. Patient also notes that several days ago, he thinks he "pulled something" on his left side, did not fall at the time, has a large bruise on his left flank/ abdomen. INR last wk was 3.0, take 5mg coumadin daily. Denies orthopnea. Denies fever, headache, visual changes CP, cough, palpitations, nausea, vomiting, diarrhea, abd pain, leg swelling, neck pain, back pain. Endorses this morning having 1 episode of black stool and once in the ER in the camode. Pt states has had both doses of covid vaccine. In ER trauma pan scan negative, Xrays negative for traumatic injusry. Noted to have elevated INR with no bleeding, elevated LA and wbc. Admitted for further workup and eval.  (06 Mar 2021 17:41)      PAST MEDICAL & SURGICAL HISTORY  Afib    Spinal stenosis at L4-L5 level    H/O heart artery stent        FAMILY HISTORY:  FAMILY HISTORY:      SOCIAL HISTORY:  []smoker  []Alcohol  []Drug    ALLERGIES:  No Known Allergies      MEDICATIONS:  MEDICATIONS  (STANDING):  cholecalciferol 1000 Unit(s) Oral daily  finasteride 5 milliGRAM(s) Oral daily  pantoprazole  Injectable 40 milliGRAM(s) IV Push every 12 hours    MEDICATIONS  (PRN):      HOME MEDICATIONS:  Home Medications:  FINASTERIDE 5 MG TABLET:  (06 Mar 2021 17:50)  hydroCHLOROthiazide 25 mg oral tablet: 1 tab(s) orally once a day (06 Mar 2021 18:47)  losartan 100 mg oral tablet: 1 tab(s) orally once a day (06 Mar 2021 18:46)  NIFEdipine 60 mg oral tablet, extended release: 1 tab(s) orally once a day (06 Mar 2021 18:47)  Plavix 75 mg oral tablet: 1 tab(s) orally once a day (06 Mar 2021 18:46)  PRAVASTATIN 40MG TABLETS: TAKE 1 TABLET BY MOUTH EVERY DAY AT BEDTIME (06 Mar 2021 17:50)  TAMSULOSIN 0.4MG CAPSULES:  (06 Mar 2021 17:50)  Vitamin D3 1000 intl units (25 mcg) oral tablet: 1 tab(s) orally once a day (06 Mar 2021 18:47)  WARFARIN SODIUM 5 MG TABLET:  (06 Mar 2021 17:50)      VITALS:   T(F): 96.3 (03-06 @ 19:16), Max: 98 (03-06 @ 15:42)  HR: 101 (03-06 @ 19:16) (85 - 120)  BP: 128/65 (03-06 @ 19:16) (110/59 - 128/65)  BP(mean): --  RR: 18 (03-06 @ 19:16) (18 - 20)  SpO2: 99% (03-06 @ 19:49) (99% - 100%)    I&O's Summary      REVIEW OF SYSTEMS:  CONSTITUTIONAL: No weakness, fevers or chills  EYES: No visual changes  ENT: No vertigo or throat pain . (+) dizzines  NECK: No pain or stiffness  RESPIRATORY: No cough, wheezing, hemoptysis; No shortness of breath  CARDIOVASCULAR: No chest pain or palpitations  GASTROINTESTINAL: No abdominal or epigastric pain. No nausea, vomiting, or hematemesis; No diarrhea or constipation. (+) melena, no hematochezia.  GENITOURINARY: No dysuria, frequency or hematuria  NEUROLOGICAL: No numbness, (+) weakness  SKIN: No itching, no rashes  MSK: No pain    PHYSICAL EXAM:  NEURO: patient is awake , alert and oriented  GEN: Not in acute distress  NECK: no thyroid enlargement, no JVD  LUNGS: Clear to auscultation bilaterally   CARDIOVASCULAR: S1/S2 present, irregular, no murmurs or rubs, no carotid bruits,  + PP bilaterally  ABD: Soft, non-tender, non-distended, +BS  EXT: No FLORECITA  SKIN: large left hip area ecchymosis    LABS:                        6.5    14.87 )-----------( 201      ( 06 Mar 2021 21:07 )             19.3     03-06    142  |  111<H>  |  112<HH>  ----------------------------<  141<H>  4.6   |  16<L>  |  2.3<H>    Ca    8.5      06 Mar 2021 11:30  Mg     2.1     03-06    TPro  5.3<L>  /  Alb  3.7  /  TBili  0.4  /  DBili  x   /  AST  16  /  ALT  14  /  AlkPhos  44  03-06    PT/INR - ( 06 Mar 2021 11:30 )   PT: >40.00 sec;   INR: 7.77 ratio         PTT - ( 06 Mar 2021 11:30 )  PTT:49.9 sec  Lactate, Blood: 2.7 mmol/L <H> (03-06-21 @ 11:30)  Troponin T, Serum: <0.01 ng/mL (03-06-21 @ 11:30)    CARDIAC MARKERS ( 06 Mar 2021 11:30 )  x     / <0.01 ng/mL / x     / x     / x      Troponin trend:    Serum Pro-Brain Natriuretic Peptide: 783 pg/mL (03-06-21 @ 11:30)    RADIOLOGY:  -CXR: no acute pathology  -TTE 2017: Normal EF  -CCTA:  -STRESS TEST 2015: no ischemia  -CATHETERIZATION 2009: PCI to prox LAD    ECG:  AF with RVR to 110bpm    TELEMETRY EVENTS:

## 2021-03-06 NOTE — ED ADULT NURSE NOTE - CHIEF COMPLAINT QUOTE
BIBA from home s/p fall at 8 AM this morning. as per pt, he felt "dizzy", and fell down in his house. denies head injury, denies LOC. takes coumadin.   pt states he fell "onto his front side". pt reports he has been feeling shortness of breath on exertion since yesterday. denies fever/cough. states he has received both vaccines for COVID-19 in February. history of 4 stents placed

## 2021-03-06 NOTE — ED PROVIDER NOTE - PROGRESS NOTE DETAILS
shanon - attempted to contact wife at number listed, no answer FAST negative except finding of luq cystic structure, pt states is chronic, has been told has cyst there in the past shanon - attempted to contact wife, Mónica, at number listed to obtain additional history including PMHx and meds, no answer. Pt does not remember his son's number Most recent labs in [facility] is from 2017, Creatinine 1.8 -> 2.3 today, Hb 14 -> 7.8 today. Aside from left flank ecchymosis, no evidence of bleeding, pt denies BRBPR/ melena. Unclear how acute vs chronic these changes are. INR of 7.77 noted, no active bleeding, CTs negative for traumatic injury, reversal not indicated at present. spoke with patient's son, Kp ([phone]) to update on pt's condition Double O-Z Plasty Text: The defect edges were debeveled with a #15 scalpel blade.  Given the location of the defect, shape of the defect and the proximity to free margins a Double O-Z plasty (double transposition flap) was deemed most appropriate.  Using a sterile surgical marker, the appropriate transposition flaps were drawn incorporating the defect and placing the expected incisions within the relaxed skin tension lines where possible. The area thus outlined was incised deep to adipose tissue with a #15 scalpel blade.  The skin margins were undermined to an appropriate distance in all directions utilizing iris scissors.  Hemostasis was achieved with electrocautery.  The flaps were then transposed into place, one clockwise and the other counterclockwise, and anchored with interrupted buried subcutaneous sutures.

## 2021-03-06 NOTE — H&P ADULT - ATTENDING COMMENTS
I saw and evaluated the patient. I have reviewed and agree with the findings and plan of care as documented above in the resident’s note (unless indicated differently in my addendum). Any necessary changes were made in the body of the text.

## 2021-03-06 NOTE — ED ADULT NURSE NOTE - NSIMPLEMENTINTERV_GEN_ALL_ED
Implemented All Fall with Harm Risk Interventions:  [place] to call system. Call bell, personal items and telephone within reach. Instruct patient to call for assistance. Room bathroom lighting operational. Non-slip footwear when patient is off stretcher. Physically safe environment: no spills, clutter or unnecessary equipment. Stretcher in lowest position, wheels locked, appropriate side rails in place. Provide visual cue, wrist band, yellow gown, etc. Monitor gait and stability. Monitor for mental status changes and reorient to person, place, and time. Review medications for side effects contributing to fall risk. Reinforce activity limits and safety measures with patient and family. Provide visual clues: red socks.

## 2021-03-06 NOTE — ED PROVIDER NOTE - NS ED ROS FT
Constitutional: No fever, chills  Eyes:  No visual changes  ENMT:  No neck pain  Cardiac:  No chest pain  Respiratory:  No cough. +SOB  GI:  No nausea, vomiting, diarrhea, abdominal pain  :  No dysuria  MS:  No back pain.  Neuro:  No headache. +lightheadedness  Skin:  No skin rash. +bruising  Endocrine: No history of thyroid disease or diabetes  Except as documented in the HPI,  all other systems are negative

## 2021-03-06 NOTE — ED PROVIDER NOTE - CARE PLAN
Principal Discharge DX:	Near syncope  Secondary Diagnosis:	Fall  Secondary Diagnosis:	Atrial fibrillation with RVR  Secondary Diagnosis:	Supratherapeutic INR

## 2021-03-06 NOTE — CONSULT NOTE ADULT - ASSESSMENT
IMPRESSION:  - Dizziness, fall, no syncope -- likely from symptomatic anemia secondary to acute blood loss  - Supratherapeutic INR to 7.7  - Af with RVR - tachycardia secondary to anemia  - CAD - stable - no ACS no angina - last stent from 2009    PLAN:  - Keep telemetry  - Can hold plavix and coumadin for now  - Transfuse to keep Hg>8  - Will discuss risks and benefits of anticoagulation once source of bleeding is identified and treated; and decide on resuming AC or not IMPRESSION:  - Dizziness, fall, no syncope -- likely from symptomatic anemia secondary to acute blood loss  - Supratherapeutic INR to 7.7  - Af with RVR - tachycardia secondary to anemia  - CAD - stable - no ACS no angina - last stent from 2009    PLAN:  - Keep telemetry  - Can hold plavix and coumadin for now  - Start aspirin 81mg instead of plavix (patient needs to be on 1 antiplatelets agent; risk of stent thrombosis)  - Transfuse to keep Hg>8  - Will discuss risks and benefits of anticoagulation once source of bleeding is identified and treated; and decide on resuming AC or not or changing coumadin to NOAC IMPRESSION:  - Dizziness, fall, no syncope -- likely from symptomatic anemia secondary to acute blood loss  - Supratherapeutic INR to 7.7  - Af with RVR - tachycardia secondary to anemia  - CAD - stable - no ACS no angina - last stent from 2009    PLAN:  - Keep telemetry  - Can hold plavix and coumadin for now  - Start aspirin 81mg instead of plavix (patient needs to be on 1 antiplatelets agent; risk of very late stent thrombosis)  - Transfuse to keep Hg>8  - Will discuss risks and benefits of anticoagulation once source of bleeding is identified and treated; and decide on resuming AC or not or changing coumadin to NOAC

## 2021-03-06 NOTE — H&P ADULT - NSHPLABSRESULTS_GEN_ALL_CORE
66 year old here for preventive physical     Labs from her work screening reviewed. Nothing troublesome at all    MH:  1. Osteoporosis    2. Hyperthyroidism s/p treatment with methimazole  3. Hyperparathyroidism  4. Diverticular change and colon polyp in  Colonoscopy- GI advised recheck in 5 yrs  5. Bilateral cataracts    ALL: Augmentin     FH:  Father,  at 91 of COPD, CHF and C diff, melanoma in his 70's, had treated prostate cancer.   Mother  at 77 of osteoporosis.  Apparently took a lot of steroids for asthma, also had pulmonary fibrosis    Older brother, healthy     SH:  .    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   Twin sons, both with severe autism.    2 to 3 cups of decaf coffee daily.    5 pack year smoking history, quit in her mid 30s.    Very rare alcohol.      Walks for exercise, also gardening     Current Outpatient Medications   Medication Sig   • cholecalciferol (VITAMIN D3) 1000 UNITS tablet Take 1,000 Units by mouth daily.      ROS:   H: no chronic or severe headaches  E: has cataracts  E: no hearing problems, no ear pain  N: no nasal congestion, no runny nose  T: no sore throat  R: no cough, no shortness of breath  CV: no chest discomfort, has palpitations when she is anxious (such as at colonoscopy)  Gl: no abdominal pain, no nausea/vomiting, no heartburn, no diarrhea   : no dysuria, 1x nocturia, no trouble urinating  Endo: no polydipsia/polyuria  MS: no low back pain unless she over works, right hip joint pain  N: no syncope, no numbness or tingling  Const: weight is down slightly, no fevers/chills, denies fatigue, sleeps ~7 hours per night, no trouble with sleep   Depression Screening:  Over the past 2 weeks, has patient felt down, depressed or hopeless? No  Over the past 2 weeks, has patient felt little interest or pleasure in doing things? No  On the basis of the above screen, the following is initiated:  Normal screen, continue to monitor for symptoms over time        EXAM:  Blood pressure 120/80, pulse 52, weight 63 kg (139 lb). Body mass index is 19.12 kg/m².  Gen: not in physical distress  Head: NC/AT, no temporal wasting  Eyes: sclera anicteric, noninjected, PERRL, EOMI, right cataract noted  ENT: nares patent, right canal normal, right TM clear, left canal normal, left TM clear, posterior oropharynx clear, moist mucus membranes  Neck: supple, carotid pulses 2+ bilaterally, no bruits  Resp: effort unlabored, clear to auscultation bilaterally  CV: regular rate and rhythm, normal S1 and S2, no murmur  Abd: nondistended, soft, nontender, normal active bowel sounds  Ext: no pedal edema bilaterally  Psych: A & O x 3, euthymic mood and affect, good insight and judgement  Neuro: normal gait, CN 2-12 intact, DTR's 2+ throughout         A/P     # Normal preventive exam     #  Weight loss: stabilized. She is happy with her current weight     #  Anxiety state:  Resolved    # right cataract: see Dr. Batres     # Osteoporosis: Continue Prolia per Endocrinology      # Preventive: colonoscopy done 6/2019. mammogram done 8/2018    Electronically signed by: Lm Garcia MD PhD    7.8    14.42 )-----------( 214      ( 06 Mar 2021 11:30 )             22.3   03-06    142  |  111<H>  |  112<HH>  ----------------------------<  141<H>  4.6   |  16<L>  |  2.3<H>    Ca    8.5      06 Mar 2021 11:30  Mg     2.1     03-06    TPro  5.3<L>  /  Alb  3.7  /  TBili  0.4  /  DBili  x   /  AST  16  /  ALT  14  /  AlkPhos  44  03-06    c< from: CT Abdomen and Pelvis w/ IV Cont (03.06.21 @ 13:59) >      FINDINGS:    CHEST:    LUNGS, PLEURA, AIRWAYS: No lobar consolidation, mass, effusion, or pneumothorax. No evidence of central endobronchial obstruction. No bronchiectasis or honeycombing. Centrilobular emphysema.    THORACIC NODES: No mediastinal, hilar, supraclavicular, or axillary lymphadenopathy.    MEDIASTINUM/GREAT VESSELS: No pericardial effusion. Heart size is within normal limits. The aorta and main pulmonary artery are of normal caliber. Aortic and coronary artery calcifications.    ABDOMEN/PELVIS:    HEPATOBILIARY: No evidence of liver injury. Gallbladder unremarkable. Subcentimeter right hepatic lobe hypodensity, too small to characterize.    SPLEEN: Splenic 5.6 cm cystic lesion not fully characterize.    PANCREAS: Unremarkable.    ADRENAL GLANDS: Unremarkable.    KIDNEYS: Symmetric pattern of renal enhancement. No hydronephrosis bilaterally.    ABDOMINOPELVIC NODES: No lymphadenopathy.    PELVIC ORGANS: Multiple urinary bladder diverticula measuring up to 1.3 cm. Mild diffuse thickening of posterior urinary bladder wall; correlate for chronic urinary bladder outlet obstruction. Limited evaluation of prostate gland on CT.    PERITONEUM/MESENTERY/BOWEL: No bowel obstruction. No ascites or pneumoperitoneum.    BONES/SOFT TISSUES: Degenerative change of thoracolumbar spine. Osteopenia. No evidence of acute osseous abnormality.    OTHER: Vascular calcifications.      IMPRESSION:    1. No evidence of acute traumatic intrathoracic or intra-abdominal pathology.    2. Multiple urinary bladder diverticula measuring up to 1.3 cm. Mild diffuse thickening of posterior urinary bladder wall; correlate for chronic urinary bladder outlet obstruction.              AXEL KAUFMAN MD; Attending Radiologist  This document has been electronically signed. Mar  6 2021  3:11PM    < end of copied text >    < from: CT Head No Cont (03.06.21 @ 13:58) >    IMPRESSION:    CT HEAD:  No acute intracranial findings.    Mild chronic microvascular ischemic changes.    CT CERVICAL SPINE:  No acute fracture or dislocation.    Multilevel cervical spondylosis and spondylolisthesis.        < end of copied text >    < from: Xray Pelvis AP only (03.06.21 @ 12:53) >    IMPRESSION:    No acute fracture or dislocation.      < end of copied text >    < from: Xray Chest 1 View AP/PA (03.06.21 @ 12:53) >    Impression:    No acute abnormality on frontal chest radiograph. 7.8    14.42 )-----------( 214      ( 06 Mar 2021 11:30 )             22.3   03-06    142  |  111<H>  |  112<HH>  ----------------------------<  141<H>  4.6   |  16<L>  |  2.3<H>    Ca    8.5      06 Mar 2021 11:30  Mg     2.1     03-06    TPro  5.3<L>  /  Alb  3.7  /  TBili  0.4  /  DBili  x   /  AST  16  /  ALT  14  /  AlkPhos  44  03-06    c< from: CT Abdomen and Pelvis w/ IV Cont (03.06.21 @ 13:59) >      FINDINGS:    CHEST:    LUNGS, PLEURA, AIRWAYS: No lobar consolidation, mass, effusion, or pneumothorax. No evidence of central endobronchial obstruction. No bronchiectasis or honeycombing. Centrilobular emphysema.    THORACIC NODES: No mediastinal, hilar, supraclavicular, or axillary lymphadenopathy.    MEDIASTINUM/GREAT VESSELS: No pericardial effusion. Heart size is within normal limits. The aorta and main pulmonary artery are of normal caliber. Aortic and coronary artery calcifications.    ABDOMEN/PELVIS:    HEPATOBILIARY: No evidence of liver injury. Gallbladder unremarkable. Subcentimeter right hepatic lobe hypodensity, too small to characterize.    SPLEEN: Splenic 5.6 cm cystic lesion not fully characterize.    PANCREAS: Unremarkable.    ADRENAL GLANDS: Unremarkable.    KIDNEYS: Symmetric pattern of renal enhancement. No hydronephrosis bilaterally.    ABDOMINOPELVIC NODES: No lymphadenopathy.    PELVIC ORGANS: Multiple urinary bladder diverticula measuring up to 1.3 cm. Mild diffuse thickening of posterior urinary bladder wall; correlate for chronic urinary bladder outlet obstruction. Limited evaluation of prostate gland on CT.    PERITONEUM/MESENTERY/BOWEL: No bowel obstruction. No ascites or pneumoperitoneum.    BONES/SOFT TISSUES: Degenerative change of thoracolumbar spine. Osteopenia. No evidence of acute osseous abnormality.    OTHER: Vascular calcifications.      IMPRESSION:    1. No evidence of acute traumatic intrathoracic or intra-abdominal pathology.    2. Multiple urinary bladder diverticula measuring up to 1.3 cm. Mild diffuse thickening of posterior urinary bladder wall; correlate for chronic urinary bladder outlet obstruction.    JOYCE KAUFMAN MD; Attending Radiologist  This document has been electronically signed. Mar  6 2021  3:11PM    < end of copied text >    < from: CT Head No Cont (03.06.21 @ 13:58) >    IMPRESSION:    CT HEAD:  No acute intracranial findings.    Mild chronic microvascular ischemic changes.    CT CERVICAL SPINE:  No acute fracture or dislocation.    Multilevel cervical spondylosis and spondylolisthesis.        < end of copied text >    < from: Xray Pelvis AP only (03.06.21 @ 12:53) >    IMPRESSION:    No acute fracture or dislocation.      < end of copied text >    < from: Xray Chest 1 View AP/PA (03.06.21 @ 12:53) >    Impression:    No acute abnormality on frontal chest radiograph.    EKG: A-fib w/ RVR

## 2021-03-06 NOTE — H&P ADULT - ASSESSMENT
A/P: 89yo M with PMHx CAD/stents, AFib on coumadin, HTN, HLD, spinal stenosis, presents for eval s/p fall this morning, presyncope and elevated INR      # Fall secondary to vasovagal vs pre syncope - admit to tele. obtain 2D echo, TSH, serial EKG. Check orthostatics. PT rehab. Hold tamsulosin in case of vasovagal event. avoid bb. Tizanidine anticholingeric effect? hold for now.     # Elevated INR due to Coumadin Toxicity - hold coumadin. Daily INR. Vit k not indicated as no active bleeding identified.    # Anemia, hb 7.8, in 2015 was 14. no signs of bleeding, possible due CKD. Will send FOBT r/o GIB. IF any sign of bleeding will give vit K.     # LEYDI on CKD4 - Cr 2.3, noted in [facility] to be 1.7-2.0 in 2015. Possibly new baseline due to progression, will need PMD records.  check urine lytes    # Leukocytosis - no sign of infection, no fever, cough, urinary sx's. Rpt in AM, likely reactive stress induced. s/p 1Liter in ER, avoid volume overload for now     # LA 2.7, likely dehydration, rpt in AM s/p 1L NS in ER    # Afib on Coumadin - not on rate control, monitor HR for now as rate is controlled. hold coumadin  as above.     # HTN - BP wnl not on any meds. monitor    # HLD - Lipitor for pravastatin     # H/o spinal stenosis - PT eval. op f.u     # DVT PPx - SCDs, hold all chemical ppx until INR wnl     # Activity - Increase as Tolerated w/ assist      # Dispo - Patient to be discharged when medically optimized.    # Code Status - FULL           A/P: 89yo M with PMHx CAD/stents, AFib on coumadin, HTN, HLD, spinal stenosis, presents for eval s/p fall this morning, presyncope and elevated INR now with melena?     # Fall secondary to vasovagal vs presyncope vs blood loss anemia due GIB? less likely neuro event  - admit to tele.   - obtain 2D echo, TSH, serial EKG.   - Check orthostatics.   - Hold tamsulosin in case of vasovagal event. avoid bb. hold antihypertensives  - check FOBT. GI eval. Serial CBC. 2 18g IV. Protonix 40gmg IV q12.   - PT rehab.     # Elevated INR due to Coumadin Toxicity - hold coumadin. Daily INR. Vit k 2.5 PO given possible acute blood loss anemia     # Anemia, hb 7.8, in 2015 was 14.  possible due CKD but given hb 7.8 will treat as possible GIB for now. Will send FOBT r/o GIB.     # LEYDI on CKD4 - Cr 2.3, noted in [facility] to be 1.7-2.0 in 2015. Possibly new baseline due to progression vs pre renal due to GIB? will need PMD records.  check urine lytes. C/s renal Dr. Lee    # Leukocytosis - no sign of infection, no fever, cough, urinary sx's. Rpt in AM, likely reactive stress induced.     # LA 2.7, likely dehydration, rpt in AM s/p 1L NS in ER, s/p 1Liter in ER, avoid volume overload for now     # Afib on Coumadin - not on rate control, monitor HR for now as rate is controlled. hold coumadin  as above.     # CAD s/p PCI w/ 3 stents - Hold Plavix. C/s Dr. eHrman.     # Prostate CA - c/w finasteride. hold tamsulosin until orthostatic negative.     # HTN - BP wnl. Hold Losartan 100mg, HCTZ 25mg. Hold Nifidipine 60mg.     # HLD - Lipitor for pravastatin     # H/o spinal stenosis - PT eval. op f.u     # DVT PPx - SCDs, hold all chemical ppx until INR wnl     # Activity - Increase as Tolerated w/ assist      # Dispo - Patient to be discharged when medically optimized.    # Code Status - FULL           A/P: 89yo M with PMHx CAD/stents, AFib on coumadin, HTN, HLD, spinal stenosis, presents for eval s/p fall this morning, presyncope and elevated INR now with melena?     # Fall secondary vasovagal symptomatic blood loss anemia due GIB?   # less likely cardiac event causing pre syncope, less likely neuro event  - admit to tele.   - obtain 2D echo, TSH, serial EKG.   - Check orthostatics.   - Hold tamsulosin. avoid bb. hold antihypertensives  - check FOBT. GI eval. Serial CBC. 2 18g IV. Protonix 40gmg IV q12. 1pRBC given hb 7.8, high suspicion of bleed with melena, elevated BUN and INR>7.0    # Elevated INR due to Coumadin Toxicity - hold coumadin. Daily INR. Vit k 2.5 PO given possible acute blood loss anemia. May rpt vit k dominick pending INR    # Anemia, hb 7.8, in 2015 was 14.  possible due CKD but given hb 7.8 will treat as possible GIB for now. Will send FOBT r/o GIB.     # LEYDI on CKD4 - Cr 2.3, noted in [facility] to be 1.7-2.0 in 2015. Possibly new baseline due to progression vs pre renal due to GIB? will need PMD records.  check urine lytes. C/s renal Dr. Lee. Should improve with 1L NS and pRBC.     # Leukocytosis - no sign of infection, no fever, cough, urinary sx's. Rpt in AM, likely reactive stress induced.     # LA 2.7, likely dehydration, rpt in AM s/p 1L NS in ER, s/p 1Liter in ER, avoid volume overload for now     # Afib on Coumadin - not on rate control, monitor HR for now as rate is controlled. hold coumadin  as above.     # CAD s/p PCI w/ 3 stents - Hold Plavix. C/s Dr. Herman.     # Prostate CA - c/w finasteride. hold tamsulosin until orthostatic negative.     # HTN - BP wnl. Hold Losartan 100mg, HCTZ 25mg. Hold Nifidipine 60mg.     # HLD - Lipitor for pravastatin     # H/o spinal stenosis - PT eval. op f.u     # DVT PPx - SCDs, hold all chemical ppx until INR wnl     # Activity - Increase as Tolerated w/ assist      # Dispo - Patient to be discharged when medically optimized.    # Code Status - FULL           A/P: 87yo M with PMHx CAD/stents, AFib on coumadin, HTN, HLD, spinal stenosis, presents for eval s/p fall this morning, presyncope and elevated INR now with melena?     # Fall secondary vasovagal symptomatic blood loss anemia due GIB?   # less likely cardiac event causing pre syncope, less likely neuro event  - admit to tele.   - obtain 2D echo, TSH, serial EKG.   - Check orthostatics.   - Hold tamsulosin. avoid bb. hold antihypertensives  - check FOBT. GI eval. Serial CBC. 2 18g IV. Protonix 40gmg IV q12. 1pRBC given hb 7.8, high suspicion of bleed with melena, elevated BUN and INR>7.0    # Elevated INR due to Coumadin Toxicity - hold coumadin. Daily INR. Vit k 2.5 PO given possible acute blood loss anemia. May rpt vit k dominick pending INR    # Anemia, hb 7.8, in 2015 was 14.  possible due CKD but given hb 7.8 will treat as possible GIB for now. Will send FOBT r/o GIB.     # LEYDI on CKD4 - Cr 2.3, noted in [facility] to be 1.7-2.0 in 2015. Possibly new baseline due to progression vs pre renal due to GIB? will need PMD records.  check urine lytes. C/s renal Dr. Lee. Should improve with 1L NS and pRBC.     # Leukocytosis - no sign of infection, no fever, cough, urinary sx's. Rpt in AM, likely reactive stress induced.     # LA 2.7, likely dehydration, rpt in AM s/p 1L NS in ER, s/p 1Liter in ER, avoid volume overload for now     # Afib on Coumadin - not on rate control, monitor HR for now as rate is controlled. hold coumadin  as above.     # CAD s/p PCI w/ 3 stents - Hold Plavix. C/s Dr. Herman.     # Prostate CA - c/w finasteride. hold tamsulosin until orthostatic negative.     # HTN - BP wnl. Hold Losartan 100mg, HCTZ 25mg. Hold Nifidipine 60mg.     # HLD - Lipitor for pravastatin     # H/o spinal stenosis - PT eval. op f.u     # Vit d defeciency - c/w supplement     # DVT PPx - SCDs, hold all chemical ppx until INR wnl     # Activity - Increase as Tolerated w/ assist      # Dispo - Patient to be discharged when medically optimized.    # Code Status - FULL

## 2021-03-06 NOTE — ED PROVIDER NOTE - OBJECTIVE STATEMENT
89yo M with PMHx CAD/stents, AFib on coumadin, HTN, HLD, presents for eval s/p fall. 89yo M with PMHx CAD/stents, AFib on coumadin, HTN, HLD, spinal stenosis, presents for eval s/p fall this morning. Pt states at 8am was getting out of bed to go to bathroom, felt lightheaded and fell forwards, landed on chest. Pt remembers events, denies LOC. Currently is not c/o any pain. Since fall, pt states still feeling lightheaded. Also noted SMITH that began yesterday, pt states normally can ambulate with rollator to the store and ambulates without assistance in the home, but yesterday felt out of breath after 10 steps. Patient also notes that several days ago, he thinks he "pulled something" on his left side, did not fall at the time, has a large bruise on his left flank/ abdomen. Denies orthopnea. Denies fever, headache, visual changes CP, cough, palpitations, nausea, vomiting, diarrhea, abd pain, leg swelling, neck pain, back pain. Pt states has had both doses of covid vaccine.

## 2021-03-06 NOTE — ED PROVIDER NOTE - CLINICAL SUMMARY MEDICAL DECISION MAKING FREE TEXT BOX
89yo M with PMHx CAD/stents, AFib on coumadin, HTN, HLD, presents for eval s/p fall and near syncope. Also endorsing SMITH since yesterday. On arrival in Afib RVR, improved with fluids only, no rate control required in ED. FAST negative. CTs negative for traumatic injury. Found to have supratherapeutic INR. Pt states still lightheaded/ unsteady. Will admit for continued monitoring.

## 2021-03-06 NOTE — ED ADULT NURSE NOTE - OBJECTIVE STATEMENT
88 year old male s/p fall at home, states he felt dizzy prior to fall. states he felt forward denies hitting his head or loc. patient states he hit his chest. denies any pain. patient states hes been short of breath on exertion for one day 88 year old male s/p fall at home, states he felt dizzy and weak prior to fall. states he felt forward denies hitting his head or loc. patient states he hit his chest. denies any pain. patient states he's been short of breath on exertion for one day. States sob is better with sitting or laying down. On arrival patient in Afib , known hx of Afib.

## 2021-03-06 NOTE — H&P ADULT - HISTORY OF PRESENT ILLNESS
87yo M with PMHx CAD/stents, AFib on coumadin, HTN, HLD, spinal stenosis, presents for eval s/p fall this morning. Pt states at 8am was getting out of bed to go to bathroom, felt lightheaded and fell forwards, landed on chest. Pt remembers events, denies LOC. Currently is not c/o any pain. Since fall, pt states still feeling lightheaded. Also noted SMITH that began yesterday, pt states normally can ambulate with rollator to the store and ambulates without assistance in the home, but yesterday felt out of breath after 10 steps. Patient also notes that several days ago, he thinks he "pulled something" on his left side, did not fall at the time, has a large bruise on his left flank/ abdomen. Denies orthopnea. Denies fever, headache, visual changes CP, cough, palpitations, nausea, vomiting, diarrhea, abd pain, leg swelling, neck pain, back pain. Pt states has had both doses of covid vaccine. In ER trauma pan scan negative, Xrays negative for traumatic injusry. Noted to have elevated INR with no bleeding, elevated LA and wbc. Admitted for further workup and eval.  87yo M with PMHx CAD/stents, AFib on coumadin, HTN, HLD, spinal stenosis, prostate CA, Vit D def presents for eval s/p fall this morning. Pt states at 8am was getting out of bed to go to bathroom, felt lightheaded and fell forwards, landed on chest. Pt remembers events, denies LOC. Currently is not c/o any pain. Since fall, pt states still feeling lightheaded. Also noted SMITH that began yesterday, pt states normally can ambulate with rollator to the store and ambulates without assistance in the home, but yesterday felt out of breath after 10 steps. Patient also notes that several days ago, he thinks he "pulled something" on his left side, did not fall at the time, has a large bruise on his left flank/ abdomen. INR last wk was 3.0, take 5mg coumadin daily. Denies orthopnea. Denies fever, headache, visual changes CP, cough, palpitations, nausea, vomiting, diarrhea, abd pain, leg swelling, neck pain, back pain. Endorses this morning having 1 episode of black stool and once in the ER in the camode. Pt states has had both doses of covid vaccine. In ER trauma pan scan negative, Xrays negative for traumatic injusry. Noted to have elevated INR with no bleeding, elevated LA and wbc. Admitted for further workup and eval.

## 2021-03-06 NOTE — ED PROVIDER NOTE - PHYSICAL EXAMINATION
Vital Signs: I have reviewed the initial vital signs.  Constitutional: WDWN in nad.  HEAD: No signs of basilar skull fracture.  Integumentary: Large left flank ecchymosis, appears subacute   EYES: No periorbial swelling/ecchymoses. PERRL, EOM intact.   ENT: MMM. No septal hematoma. No mastoid ecchymoses.  NECK: Supple, non-tender, no spinous tenderness to neck. No palpable shelves or step-offs.  BACK: No spinous tenderness. No palpable shelves or step-offs.  Cardiovascular: RRR, radial pulses 2/4 b/l. No pain to palpation to chest wall.  Respiratory: BS present b/l, ctabl, no wheezing or crackles, good air exchange, good resp effort and excursion, no accessory muscle use, no stridor.   Gastrointestinal: Soft, nd, nt  Musculoskeletal: FROM, brisk cap refill  Neurologic: AAOx3. GCS 15. Speech clear and coherent. Answering questions appropriately. Face symmetric, no facial droop. Strength 5/5 x4, no drift. No gross FND.

## 2021-03-07 LAB
ALBUMIN SERPL ELPH-MCNC: 3.3 G/DL — LOW (ref 3.5–5.2)
ALP SERPL-CCNC: 29 U/L — LOW (ref 30–115)
ALT FLD-CCNC: 14 U/L — SIGNIFICANT CHANGE UP (ref 0–41)
ANION GAP SERPL CALC-SCNC: 11 MMOL/L — SIGNIFICANT CHANGE UP (ref 7–14)
APTT BLD: 46.2 SEC — HIGH (ref 27–39.2)
AST SERPL-CCNC: 23 U/L — SIGNIFICANT CHANGE UP (ref 0–41)
BASOPHILS # BLD AUTO: 0.03 K/UL — SIGNIFICANT CHANGE UP (ref 0–0.2)
BASOPHILS # BLD AUTO: 0.03 K/UL — SIGNIFICANT CHANGE UP (ref 0–0.2)
BASOPHILS NFR BLD AUTO: 0.2 % — SIGNIFICANT CHANGE UP (ref 0–1)
BASOPHILS NFR BLD AUTO: 0.2 % — SIGNIFICANT CHANGE UP (ref 0–1)
BILIRUB SERPL-MCNC: 0.5 MG/DL — SIGNIFICANT CHANGE UP (ref 0.2–1.2)
BUN SERPL-MCNC: 102 MG/DL — CRITICAL HIGH (ref 10–20)
CALCIUM SERPL-MCNC: 8.2 MG/DL — LOW (ref 8.5–10.1)
CHLORIDE SERPL-SCNC: 110 MMOL/L — SIGNIFICANT CHANGE UP (ref 98–110)
CO2 SERPL-SCNC: 17 MMOL/L — SIGNIFICANT CHANGE UP (ref 17–32)
CREAT SERPL-MCNC: 2 MG/DL — HIGH (ref 0.7–1.5)
EOSINOPHIL # BLD AUTO: 0 K/UL — SIGNIFICANT CHANGE UP (ref 0–0.7)
EOSINOPHIL # BLD AUTO: 0.01 K/UL — SIGNIFICANT CHANGE UP (ref 0–0.7)
EOSINOPHIL NFR BLD AUTO: 0 % — SIGNIFICANT CHANGE UP (ref 0–8)
EOSINOPHIL NFR BLD AUTO: 0.1 % — SIGNIFICANT CHANGE UP (ref 0–8)
GLUCOSE SERPL-MCNC: 132 MG/DL — HIGH (ref 70–99)
HCT VFR BLD CALC: 20 % — LOW (ref 42–52)
HCT VFR BLD CALC: 22.4 % — LOW (ref 42–52)
HGB BLD-MCNC: 6.7 G/DL — CRITICAL LOW (ref 14–18)
HGB BLD-MCNC: 7.4 G/DL — LOW (ref 14–18)
IMM GRANULOCYTES NFR BLD AUTO: 1.2 % — HIGH (ref 0.1–0.3)
IMM GRANULOCYTES NFR BLD AUTO: 2.9 % — HIGH (ref 0.1–0.3)
INR BLD: 6.47 RATIO — CRITICAL HIGH (ref 0.65–1.3)
LACTATE SERPL-SCNC: 2.6 MMOL/L — HIGH (ref 0.7–2)
LYMPHOCYTES # BLD AUTO: 0.7 K/UL — LOW (ref 1.2–3.4)
LYMPHOCYTES # BLD AUTO: 0.7 K/UL — LOW (ref 1.2–3.4)
LYMPHOCYTES # BLD AUTO: 4.6 % — LOW (ref 20.5–51.1)
LYMPHOCYTES # BLD AUTO: 5 % — LOW (ref 20.5–51.1)
MAGNESIUM SERPL-MCNC: 2.1 MG/DL — SIGNIFICANT CHANGE UP (ref 1.8–2.4)
MCHC RBC-ENTMCNC: 29.6 PG — SIGNIFICANT CHANGE UP (ref 27–31)
MCHC RBC-ENTMCNC: 29.8 PG — SIGNIFICANT CHANGE UP (ref 27–31)
MCHC RBC-ENTMCNC: 33 G/DL — SIGNIFICANT CHANGE UP (ref 32–37)
MCHC RBC-ENTMCNC: 33.5 G/DL — SIGNIFICANT CHANGE UP (ref 32–37)
MCV RBC AUTO: 88.5 FL — SIGNIFICANT CHANGE UP (ref 80–94)
MCV RBC AUTO: 90.3 FL — SIGNIFICANT CHANGE UP (ref 80–94)
MONOCYTES # BLD AUTO: 0.92 K/UL — HIGH (ref 0.1–0.6)
MONOCYTES # BLD AUTO: 0.95 K/UL — HIGH (ref 0.1–0.6)
MONOCYTES NFR BLD AUTO: 6.2 % — SIGNIFICANT CHANGE UP (ref 1.7–9.3)
MONOCYTES NFR BLD AUTO: 6.6 % — SIGNIFICANT CHANGE UP (ref 1.7–9.3)
NEUTROPHILS # BLD AUTO: 12.14 K/UL — HIGH (ref 1.4–6.5)
NEUTROPHILS # BLD AUTO: 13.13 K/UL — HIGH (ref 1.4–6.5)
NEUTROPHILS NFR BLD AUTO: 86.1 % — HIGH (ref 42.2–75.2)
NEUTROPHILS NFR BLD AUTO: 86.9 % — HIGH (ref 42.2–75.2)
NRBC # BLD: 0 /100 WBCS — SIGNIFICANT CHANGE UP (ref 0–0)
NRBC # BLD: 0 /100 WBCS — SIGNIFICANT CHANGE UP (ref 0–0)
PHOSPHATE SERPL-MCNC: 2.6 MG/DL — SIGNIFICANT CHANGE UP (ref 2.1–4.9)
PLATELET # BLD AUTO: 184 K/UL — SIGNIFICANT CHANGE UP (ref 130–400)
PLATELET # BLD AUTO: 194 K/UL — SIGNIFICANT CHANGE UP (ref 130–400)
POTASSIUM SERPL-MCNC: 4.6 MMOL/L — SIGNIFICANT CHANGE UP (ref 3.5–5)
POTASSIUM SERPL-SCNC: 4.6 MMOL/L — SIGNIFICANT CHANGE UP (ref 3.5–5)
PROT SERPL-MCNC: 4.8 G/DL — LOW (ref 6–8)
PROTHROM AB SERPL-ACNC: >40 SEC — HIGH (ref 9.95–12.87)
RBC # BLD: 2.26 M/UL — LOW (ref 4.7–6.1)
RBC # BLD: 2.48 M/UL — LOW (ref 4.7–6.1)
RBC # FLD: 15.9 % — HIGH (ref 11.5–14.5)
RBC # FLD: 16.3 % — HIGH (ref 11.5–14.5)
SODIUM SERPL-SCNC: 138 MMOL/L — SIGNIFICANT CHANGE UP (ref 135–146)
TROPONIN T SERPL-MCNC: 0.01 NG/ML — SIGNIFICANT CHANGE UP
WBC # BLD: 13.97 K/UL — HIGH (ref 4.8–10.8)
WBC # BLD: 15.26 K/UL — HIGH (ref 4.8–10.8)
WBC # FLD AUTO: 13.97 K/UL — HIGH (ref 4.8–10.8)
WBC # FLD AUTO: 15.26 K/UL — HIGH (ref 4.8–10.8)

## 2021-03-07 PROCEDURE — 99222 1ST HOSP IP/OBS MODERATE 55: CPT

## 2021-03-07 PROCEDURE — 99233 SBSQ HOSP IP/OBS HIGH 50: CPT

## 2021-03-07 RX ORDER — PANTOPRAZOLE SODIUM 20 MG/1
8 TABLET, DELAYED RELEASE ORAL
Qty: 80 | Refills: 0 | Status: DISCONTINUED | OUTPATIENT
Start: 2021-03-07 | End: 2021-03-10

## 2021-03-07 RX ORDER — PHYTONADIONE (VIT K1) 5 MG
2.5 TABLET ORAL ONCE
Refills: 0 | Status: COMPLETED | OUTPATIENT
Start: 2021-03-07 | End: 2021-03-07

## 2021-03-07 RX ORDER — PHYTONADIONE (VIT K1) 5 MG
5 TABLET ORAL ONCE
Refills: 0 | Status: COMPLETED | OUTPATIENT
Start: 2021-03-07 | End: 2021-03-07

## 2021-03-07 RX ADMIN — Medication 1000 UNIT(S): at 13:37

## 2021-03-07 RX ADMIN — PANTOPRAZOLE SODIUM 40 MILLIGRAM(S): 20 TABLET, DELAYED RELEASE ORAL at 05:19

## 2021-03-07 RX ADMIN — FINASTERIDE 5 MILLIGRAM(S): 5 TABLET, FILM COATED ORAL at 13:38

## 2021-03-07 RX ADMIN — PANTOPRAZOLE SODIUM 10 MG/HR: 20 TABLET, DELAYED RELEASE ORAL at 13:38

## 2021-03-07 RX ADMIN — Medication 2.5 MILLIGRAM(S): at 06:05

## 2021-03-07 RX ADMIN — Medication 101 MILLIGRAM(S): at 15:03

## 2021-03-07 NOTE — CONSULT NOTE ADULT - ASSESSMENT
89yo M with PMHx CAD/stents, AFib on coumadin, HTN, HLD, spinal stenosis, bladder CA ( no chemo or radiation as per patient), Vit D def presents for eval s/p fall this morning. Pt states at 8am was getting out of bed to go to bathroom, felt lightheaded and fell forwards, landed on chest. Pt remembers events, denies LOC. Currently is not c/o any pain. Since fall, pt states still feeling lightheaded.       # Acute drop of hgb:  - Patient had one episode of melanotic stool in ER yesterday  - hgb 7.8--> 6.7 ( received 1 UPRBC) had hgb 14 on 2017  - INR7.7 on admission s/p Vit K 2.5 mg twice  - VS stable  - No abdominal pain    Rec:  - Give 2 UPRBC  - Vit K IV to correct INR  - Monitor CBC  - keep hgb>8  - PPI drip  - cardiac eval noted( hold plavix and AC)--> non-urgent risk stratification for possible EGD next week when INR stabilized  (3) slightly limited

## 2021-03-07 NOTE — CONSULT NOTE ADULT - SUBJECTIVE AND OBJECTIVE BOX
Gastroenterology Consultation:    Patient is a 88y old  Male who presents with a chief complaint of presyncope  elevated INR (07 Mar 2021 02:30)      Admitted on: 03-06-21  HPI:  87yo M with PMHx CAD/stents, AFib on coumadin, HTN, HLD, spinal stenosis, bladder CA ( no chemo or radiation as per patient), Vit D def presents for eval s/p fall this morning. Pt states at 8am was getting out of bed to go to bathroom, felt lightheaded and fell forwards, landed on chest. Pt remembers events, denies LOC. Currently is not c/o any pain. Since fall, pt states still feeling lightheaded. Also noted SMITH that began yesterday, pt states normally can ambulate with rollator to the store and ambulates without assistance in the home, but yesterday felt out of breath after 10 steps. Patient also notes that several days ago, he thinks he "pulled something" on his left side, did not fall at the time, has a large bruise on his left flank/ abdomen. INR last wk was 3.0, take 5mg coumadin daily. Denies orthopnea. Denies fever, headache, visual changes CP, cough, palpitations, nausea, vomiting, diarrhea, abd pain, leg swelling, neck pain, back pain. Endorses this morning having 1 episode of black stool and once in the ER in the camode. Pt states has had both doses of covid vaccine. In ER trauma pan scan negative, Xrays negative for traumatic injusry. Noted to have elevated INR with no bleeding, elevated LA and wbc. Admitted for further workup and eval.       Prior records Reviewed (Y/N): Y  History obtained from person other than patient (Y/N): N    Prior EGD: as per patient , 2 years ago, unremarkable   Prior Colonoscopy: as per patient , 2 years ago, unremarkable       PAST MEDICAL & SURGICAL HISTORY:  Afib    Spinal stenosis at L4-L5 level    H/O heart artery stent        FAMILY HISTORY:  FH: type 2 diabetes  Both parents    FHx: kidney disease  Mother        Social History:  Tobacco: stopped > 60 yrs ago  Alcohol: socially  Drugs: N    Home Medications:  FINASTERIDE 5 MG TABLET:  (06 Mar 2021 17:50)  hydroCHLOROthiazide 25 mg oral tablet: 1 tab(s) orally once a day (06 Mar 2021 18:47)  losartan 100 mg oral tablet: 1 tab(s) orally once a day (06 Mar 2021 18:46)  NIFEdipine 60 mg oral tablet, extended release: 1 tab(s) orally once a day (06 Mar 2021 18:47)  Plavix 75 mg oral tablet: 1 tab(s) orally once a day (06 Mar 2021 18:46)  PRAVASTATIN 40MG TABLETS: TAKE 1 TABLET BY MOUTH EVERY DAY AT BEDTIME (06 Mar 2021 17:50)  TAMSULOSIN 0.4MG CAPSULES:  (06 Mar 2021 17:50)  Vitamin D3 1000 intl units (25 mcg) oral tablet: 1 tab(s) orally once a day (06 Mar 2021 18:47)  WARFARIN SODIUM 5 MG TABLET:  (06 Mar 2021 17:50)    MEDICATIONS  (STANDING):  cholecalciferol 1000 Unit(s) Oral daily  finasteride 5 milliGRAM(s) Oral daily  pantoprazole  Injectable 40 milliGRAM(s) IV Push every 12 hours  phytonadione  IVPB 5 milliGRAM(s) IV Intermittent once    MEDICATIONS  (PRN):      Allergies  No Known Allergies      Review of Systems:   Constitutional:  No Fever, No Chills  ENT/Mouth:  No Hearing Changes,  No Difficulty Swallowing  Eyes:  No Eye Pain, No Vision Changes  Cardiovascular:  No Chest Pain, No Palpitations  Respiratory:  No Cough, No Dyspnea  Gastrointestinal:  As described in HPI  Musculoskeletal:  No Joint Swelling, No Back Pain  Skin:  No Skin Lesions, No Jaundice  Neuro:  No Syncope, No Dizziness  Heme/Lymph:  No Bruising, No Bleeding.          Physical Examination:  T(C): 36.4 (03-07-21 @ 05:41), Max: 36.7 (03-06-21 @ 15:42)  HR: 98 (03-07-21 @ 05:41) (85 - 101)  BP: 109/60 (03-07-21 @ 05:41) (109/60 - 128/65)  RR: 18 (03-07-21 @ 05:41) (18 - 18)  SpO2: 98% (03-07-21 @ 08:09) (98% - 99%)      03-06-21 @ 07:01  -  03-07-21 @ 07:00  --------------------------------------------------------  IN: 427 mL / OUT: 300 mL / NET: 127 mL    03-07-21 @ 07:01  -  03-07-21 @ 12:21  --------------------------------------------------------  IN: 995 mL / OUT: 450 mL / NET: 545 mL        Constitutional: No acute distress.  Eyes:. Conjunctivae are clear, Sclera is non-icteric.  Ears Nose and Throat: The external ears are normal appearing,  Oral mucosa is pink and moist.  Respiratory:  No signs of respiratory distress. Lung sounds are clear bilaterally.  Cardiovascular:  S1 S2, Regular rate and rhythm.  GI: Abdomen is soft, symmetric, and non-tender without distention. There are no visible lesions. Bowel sounds are present and normoactive in all four quadrants. No masses, hepatomegaly, or splenomegaly are noted.   Neuro: No Tremor, No involuntary movements  Skin: No rashes, No Jaundice.          Data: (reviewed by attending)                        6.7    13.97 )-----------( 194      ( 07 Mar 2021 08:17 )             20.0     Hgb Trend:  6.7  03-07-21 @ 08:17  6.5  03-06-21 @ 21:07  7.8  03-06-21 @ 11:30      03-06-21 @ 07:01  -  03-07-21 @ 07:00  --------------------------------------------------------  IN: 307 mL      03-07    138  |  110  |  102<HH>  ----------------------------<  132<H>  4.6   |  17  |  2.0<H>    Ca    8.2<L>      07 Mar 2021 08:17  Phos  2.6     03-07  Mg     2.1     03-07    TPro  4.8<L>  /  Alb  3.3<L>  /  TBili  0.5  /  DBili  x   /  AST  23  /  ALT  14  /  AlkPhos  29<L>  03-07    Liver panel trend:  TBili 0.5   /   AST 23   /   ALT 14   /   AlkP 29   /   Tptn 4.8   /   Alb 3.3    /   DBili --      03-07  TBili 0.4   /   AST 16   /   ALT 14   /   AlkP 44   /   Tptn 5.3   /   Alb 3.7    /   DBili --      03-06      PT/INR - ( 07 Mar 2021 08:17 )   PT: >40.00 sec;   INR: 6.47 ratio         PTT - ( 07 Mar 2021 08:17 )  PTT:46.2 sec        Radiology:(reviewed by attending)  CT Abdomen and Pelvis w/ IV Cont:   EXAM:  CT ABDOMEN AND PELVIS IC        EXAM:  CT CHEST IC            PROCEDURE DATE:  03/06/2021            INTERPRETATION:  CLINICAL HISTORY/REASON FOR EXAM: Trauma to chest, abdomen and pelvis. Fall. Coumadin.    TECHNIQUE: Contiguous axial CT images were obtained from the thoracic inlet to the pubic symphysis following administration of 100 cc Optiray 320 intravenous contrast. 0 cc contrast discarded.  Oral contrast was not administered. Reformatted images in the coronal and sagittal planes were acquired. 3D (MIP) images obtained.    COMPARISON: None.    FINDINGS:    CHEST:    LUNGS, PLEURA, AIRWAYS: No lobar consolidation, mass, effusion, or pneumothorax. No evidence of central endobronchial obstruction. No bronchiectasis or honeycombing. Centrilobular emphysema.    THORACIC NODES: No mediastinal, hilar, supraclavicular, or axillary lymphadenopathy.    MEDIASTINUM/GREAT VESSELS: No pericardial effusion. Heart size is within normal limits. The aorta and main pulmonary artery are of normal caliber. Aortic and coronary artery calcifications.    ABDOMEN/PELVIS:    HEPATOBILIARY: No evidence of liver injury. Gallbladder unremarkable. Subcentimeter right hepatic lobe hypodensity, too small to characterize.    SPLEEN: Splenic 5.6 cm cystic lesion not fully characterize.    PANCREAS: Unremarkable.    ADRENAL GLANDS: Unremarkable.    KIDNEYS: Symmetric pattern of renal enhancement. No hydronephrosis bilaterally.    ABDOMINOPELVIC NODES: No lymphadenopathy.    PELVIC ORGANS: Multiple urinary bladder diverticula measuring up to 1.3 cm. Mild diffuse thickening of posterior urinary bladder wall; correlate for chronic urinary bladder outlet obstruction. Limited evaluation of prostate gland on CT.    PERITONEUM/MESENTERY/BOWEL: No bowel obstruction. No ascites or pneumoperitoneum.    BONES/SOFT TISSUES: Degenerative change of thoracolumbar spine. Osteopenia. No evidence of acute osseous abnormality.    OTHER: Vascular calcifications.      IMPRESSION:    1. No evidence of acute traumatic intrathoracic or intra-abdominal pathology.    2. Multiple urinary bladder diverticula measuring up to 1.3 cm. Mild diffuse thickening of posterior urinary bladder wall; correlate for chronic urinary bladder outlet obstruction.              AXEL KAUFMAN MD; Attending Radiologist  This document has been electronically signed. Mar  6 2021  3:11PM (03-06-21 @ 13:59)

## 2021-03-07 NOTE — PROGRESS NOTE ADULT - ATTENDING COMMENTS
#Fall, in setting of anemia, concern for gib +melena  supratherapeutic inr, s/p vit k 2.5  give another vit k 5  s/p one unit prbc with poor #Fall, in setting of anemia, concern for gib +melena  supratherapeutic inr, s/p vit k 2.5  give another vit k 5  s/p one unit prbc with poor response  give another unit prbc  f/u post transfusion cbc  if further episodes bleeding noted, to give kcentra  f/u gi

## 2021-03-07 NOTE — PROGRESS NOTE ADULT - SUBJECTIVE AND OBJECTIVE BOX
ISHAANERNESTINA ORTEGAAN  88y  Male      Patient is a 88y old  Male who presents with a chief complaint of presyncope  elevated INR (06 Mar 2021 22:42)      INTERVAL HPI/OVERNIGHT EVENTS: No events overnight      REVIEW OF SYSTEMS:  CONSTITUTIONAL: No fever, weight loss, or fatigue  EYES: No eye pain, visual disturbances, or discharge  ENMT:  No difficulty hearing, tinnitus, vertigo; No sinus or throat pain  NECK: No pain or stiffness  BREASTS: No pain, masses, or nipple discharge  RESPIRATORY: No cough, wheezing, chills or hemoptysis; No shortness of breath  CARDIOVASCULAR: No chest pain, palpitations, dizziness, or leg swelling  GASTROINTESTINAL: No abdominal or epigastric pain. No nausea, vomiting, or hematemesis; No diarrhea or constipation. No melena or hematochezia.  GENITOURINARY: No dysuria, frequency, hematuria, or incontinence  NEUROLOGICAL: No headaches, memory loss, loss of strength, numbness, or tremors  SKIN: No itching, burning, rashes, or lesions   LYMPH NODES: No enlarged glands  ENDOCRINE: No heat or cold intolerance; No hair loss  MUSCULOSKELETAL: No joint pain or swelling; No muscle, back, or extremity pain  PSYCHIATRIC: No depression, anxiety, mood swings, or difficulty sleeping  HEME/LYMPH: No easy bruising, or bleeding gums  ALLERY AND IMMUNOLOGIC: No hives or eczema  FAMILY HISTORY:  FH: type 2 diabetes  Both parents    FHx: kidney disease  Mother      T(C): 35.7 (03-06-21 @ 19:16), Max: 36.7 (03-06-21 @ 15:42)  HR: 101 (03-06-21 @ 19:16) (85 - 120)  BP: 128/65 (03-06-21 @ 19:16) (110/59 - 128/65)  RR: 18 (03-06-21 @ 19:16) (18 - 20)  SpO2: 99% (03-06-21 @ 19:49) (99% - 100%)  Wt(kg): --Vital Signs Last 24 Hrs  T(C): 35.7 (06 Mar 2021 19:16), Max: 36.7 (06 Mar 2021 15:42)  T(F): 96.3 (06 Mar 2021 19:16), Max: 98 (06 Mar 2021 15:42)  HR: 101 (06 Mar 2021 19:16) (85 - 120)  BP: 128/65 (06 Mar 2021 19:16) (110/59 - 128/65)  BP(mean): --  RR: 18 (06 Mar 2021 19:16) (18 - 20)  SpO2: 99% (06 Mar 2021 19:49) (99% - 100%)  No Known Allergies      PHYSICAL EXAM:  GENERAL: NAD, well-groomed, well-developed  HEAD:  Atraumatic, Normocephalic  EYES: EOMI, PERRLA, conjunctiva and sclera clear  ENMT: No tonsillar erythema, exudates, or enlargement; Moist mucous membranes, Good dentition, No lesions  NECK: Supple, No JVD, Normal thyroid  NERVOUS SYSTEM:  Alert & Oriented X3, Good concentration; Motor Strength 5/5 B/L upper and lower extremities; DTRs 2+ intact and symmetric  CHEST/LUNG: Clear to percussion bilaterally; No rales, rhonchi, wheezing, or rubs  HEART: Regular rate and rhythm; No murmurs, rubs, or gallops  ABDOMEN: Soft, Nontender, Nondistended; Bowel sounds present  EXTREMITIES:  2+ Peripheral Pulses, No clubbing, cyanosis, or edema  LYMPH: No lymphadenopathy noted  SKIN: No rashes or lesions    Consultant(s) Notes Reviewed:  [x ] YES  [ ] NO  Care Discussed with Consultants/Other Providers [ x] YES  [ ] NO    LABS:      RADIOLOGY & ADDITIONAL TESTS:    Imaging Personally Reviewed:  [ ] YES  [ ] NO  cholecalciferol 1000 Unit(s) Oral daily  finasteride 5 milliGRAM(s) Oral daily  pantoprazole  Injectable 40 milliGRAM(s) IV Push every 12 hours      HEALTH ISSUES - PROBLEM Dx:           TISHA GRANDA  88y  Male      Patient is a 88y old  Male who presents with a chief complaint of presyncope  elevated INR (06 Mar 2021 22:42)      INTERVAL HPI/OVERNIGHT EVENTS: No events overnight  no cp, sob, abd pain, fever  no abd pain, nausea, vomiting, diarrhea    REVIEW OF SYSTEMS:  CONSTITUTIONAL: No fever, weight loss, or fatigue  EYES: No eye pain, visual disturbances, or discharge  ENMT:  No difficulty hearing, tinnitus, vertigo; No sinus or throat pain  NECK: No pain or stiffness  BREASTS: No pain, masses, or nipple discharge  RESPIRATORY: No cough, wheezing, chills or hemoptysis; No shortness of breath  CARDIOVASCULAR: No chest pain, palpitations, dizziness, or leg swelling  GASTROINTESTINAL: No abdominal or epigastric pain. No nausea, vomiting, or hematemesis; No diarrhea or constipation. No melena or hematochezia.  GENITOURINARY: No dysuria, frequency, hematuria, or incontinence  NEUROLOGICAL: No headaches, memory loss, loss of strength, numbness, or tremors  SKIN: No itching, burning, rashes, or lesions   LYMPH NODES: No enlarged glands  ENDOCRINE: No heat or cold intolerance; No hair loss  MUSCULOSKELETAL: No joint pain or swelling; No muscle, back, or extremity pain  PSYCHIATRIC: No depression, anxiety, mood swings, or difficulty sleeping  HEME/LYMPH: No easy bruising, or bleeding gums  ALLERY AND IMMUNOLOGIC: No hives or eczema  FAMILY HISTORY:  FH: type 2 diabetes  Both parents    FHx: kidney disease  Mother      T(C): 35.7 (03-06-21 @ 19:16), Max: 36.7 (03-06-21 @ 15:42)  HR: 101 (03-06-21 @ 19:16) (85 - 120)  BP: 128/65 (03-06-21 @ 19:16) (110/59 - 128/65)  RR: 18 (03-06-21 @ 19:16) (18 - 20)  SpO2: 99% (03-06-21 @ 19:49) (99% - 100%)  Wt(kg): --Vital Signs Last 24 Hrs  T(C): 35.7 (06 Mar 2021 19:16), Max: 36.7 (06 Mar 2021 15:42)  T(F): 96.3 (06 Mar 2021 19:16), Max: 98 (06 Mar 2021 15:42)  HR: 101 (06 Mar 2021 19:16) (85 - 120)  BP: 128/65 (06 Mar 2021 19:16) (110/59 - 128/65)  BP(mean): --  RR: 18 (06 Mar 2021 19:16) (18 - 20)  SpO2: 99% (06 Mar 2021 19:49) (99% - 100%)  No Known Allergies      PHYSICAL EXAM:  GENERAL: NAD, well-groomed, well-developed  HEAD:  Atraumatic, Normocephalic  EYES: EOMI, PERRLA, conjunctiva and sclera clear  ENMT: No tonsillar erythema, exudates, or enlargement; Moist mucous membranes, Good dentition, No lesions  NECK: Supple, No JVD, Normal thyroid  NERVOUS SYSTEM:  Alert & Oriented X3, Good concentration; Motor Strength 5/5 B/L upper and lower extremities; DTRs 2+ intact and symmetric  CHEST/LUNG: Clear to percussion bilaterally; No rales, rhonchi, wheezing, or rubs  HEART: Regular rate and rhythm; No murmurs, rubs, or gallops  ABDOMEN: Soft, Nontender, Nondistended; Bowel sounds present  EXTREMITIES:  2+ Peripheral Pulses, No clubbing, cyanosis, or edema  LYMPH: No lymphadenopathy noted  SKIN: No rashes or lesions    Consultant(s) Notes Reviewed:  [x ] YES  [ ] NO  Care Discussed with Consultants/Other Providers [ x] YES  [ ] NO    LABS:      RADIOLOGY & ADDITIONAL TESTS:    Imaging Personally Reviewed:  [ ] YES  [ ] NO  cholecalciferol 1000 Unit(s) Oral daily  finasteride 5 milliGRAM(s) Oral daily  pantoprazole  Injectable 40 milliGRAM(s) IV Push every 12 hours      HEALTH ISSUES - PROBLEM Dx:

## 2021-03-07 NOTE — PROGRESS NOTE ADULT - ASSESSMENT
89yo M with PMHx CAD/stents, AFib on coumadin, HTN, HLD, spinal stenosis, presents for eval s/p fall this morning, presyncope and elevated INR now with melena?     # Fall secondary vasovagal symptomatic blood loss anemia due GIB?   # less likely cardiac event causing pre syncope, less likely neuro event  - tele monitoring  - f/u 2D echo, TSH, serial EKG  - f/u orthostatics vs  - Hold tamsulosin. avoid bb. hold antihypertensives  - GI eval. Serial CBC. 2 18g IV. Protonix 40gmg IV q12. 1pRBC given hb 7.8->6.5, f/u repeat cbc, high suspicion of bleed with melena, elevated BUN and INR>7.0, send FOBT    # Elevated INR due to Coumadin Toxicity - hold coumadin. Daily INR. Vit k 2.5 PO given possible acute blood loss anemia. May rpt vit k dominick pending INR    # Anemia, hb 7.8, in 2015 was 14.  possible due CKD but given hb 7.8 will treat as possible GIB for now. send FOBT r/o GIB.     # LEYDI on CKD4 - Cr 2.3, noted in [facility] to be 1.7-2.0 in 2015. Possibly new baseline due to progression vs pre renal due to GIB? will need PMD records.  check urine lytes. C/s renal Dr. Lee. Should improve with 1L NS and pRBC.     # Leukocytosis - no sign of infection, no fever, cough, urinary sx's. Rpt in AM, likely reactive stress induced.     # LA 2.7, likely dehydration, rpt in AM s/p 1L NS in ER, s/p 1Liter in ER, avoid volume overload for now     # Afib on Coumadin - not on rate control, monitor HR for now as rate is controlled. hold coumadin  as above.     # CAD s/p PCI w/ 3 stents - Hold Plavix. C/s Dr. Herman.     # Prostate CA - c/w finasteride. hold tamsulosin until orthostatic negative.     # HTN - BP wnl. Hold Losartan 100mg, HCTZ 25mg. Hold Nifidipine 60mg.     # HLD - Lipitor for pravastatin     # H/o spinal stenosis - PT eval. op f.u     # Vit d defeciency - c/w supplement     # DVT PPx - SCDs, hold all chemical ppx until INR wnl     # Activity - Increase as Tolerated w/ assist      # Dispo - Patient to be discharged when medically optimized.    # Code Status - FULL

## 2021-03-08 LAB
ALBUMIN SERPL ELPH-MCNC: 3.5 G/DL — SIGNIFICANT CHANGE UP (ref 3.5–5.2)
ALP SERPL-CCNC: 43 U/L — SIGNIFICANT CHANGE UP (ref 30–115)
ALT FLD-CCNC: 18 U/L — SIGNIFICANT CHANGE UP (ref 0–41)
ANION GAP SERPL CALC-SCNC: 8 MMOL/L — SIGNIFICANT CHANGE UP (ref 7–14)
APTT BLD: 24.1 SEC — LOW (ref 27–39.2)
AST SERPL-CCNC: 21 U/L — SIGNIFICANT CHANGE UP (ref 0–41)
BILIRUB SERPL-MCNC: 0.4 MG/DL — SIGNIFICANT CHANGE UP (ref 0.2–1.2)
BUN SERPL-MCNC: 76 MG/DL — CRITICAL HIGH (ref 10–20)
CALCIUM SERPL-MCNC: 8.9 MG/DL — SIGNIFICANT CHANGE UP (ref 8.5–10.1)
CHLORIDE SERPL-SCNC: 110 MMOL/L — SIGNIFICANT CHANGE UP (ref 98–110)
CO2 SERPL-SCNC: 21 MMOL/L — SIGNIFICANT CHANGE UP (ref 17–32)
CREAT SERPL-MCNC: 1.9 MG/DL — HIGH (ref 0.7–1.5)
GLUCOSE SERPL-MCNC: 116 MG/DL — HIGH (ref 70–99)
HCT VFR BLD CALC: 26.2 % — LOW (ref 42–52)
HGB BLD-MCNC: 8.7 G/DL — LOW (ref 14–18)
INR BLD: 1.05 RATIO — SIGNIFICANT CHANGE UP (ref 0.65–1.3)
INR BLD: 1.1 RATIO — SIGNIFICANT CHANGE UP (ref 0.65–1.3)
MAGNESIUM SERPL-MCNC: 2.3 MG/DL — SIGNIFICANT CHANGE UP (ref 1.8–2.4)
MCHC RBC-ENTMCNC: 29.7 PG — SIGNIFICANT CHANGE UP (ref 27–31)
MCHC RBC-ENTMCNC: 33.2 G/DL — SIGNIFICANT CHANGE UP (ref 32–37)
MCV RBC AUTO: 89.4 FL — SIGNIFICANT CHANGE UP (ref 80–94)
NRBC # BLD: 0 /100 WBCS — SIGNIFICANT CHANGE UP (ref 0–0)
PLATELET # BLD AUTO: 223 K/UL — SIGNIFICANT CHANGE UP (ref 130–400)
POTASSIUM SERPL-MCNC: 4.6 MMOL/L — SIGNIFICANT CHANGE UP (ref 3.5–5)
POTASSIUM SERPL-SCNC: 4.6 MMOL/L — SIGNIFICANT CHANGE UP (ref 3.5–5)
PROT SERPL-MCNC: 5.1 G/DL — LOW (ref 6–8)
PROTHROM AB SERPL-ACNC: 12.1 SEC — SIGNIFICANT CHANGE UP (ref 9.95–12.87)
PROTHROM AB SERPL-ACNC: 12.7 SEC — SIGNIFICANT CHANGE UP (ref 9.95–12.87)
RBC # BLD: 2.93 M/UL — LOW (ref 4.7–6.1)
RBC # FLD: 16.4 % — HIGH (ref 11.5–14.5)
SODIUM SERPL-SCNC: 139 MMOL/L — SIGNIFICANT CHANGE UP (ref 135–146)
TSH SERPL-MCNC: 1.51 UIU/ML — SIGNIFICANT CHANGE UP (ref 0.27–4.2)
WBC # BLD: 18.36 K/UL — HIGH (ref 4.8–10.8)
WBC # FLD AUTO: 18.36 K/UL — HIGH (ref 4.8–10.8)

## 2021-03-08 PROCEDURE — 99233 SBSQ HOSP IP/OBS HIGH 50: CPT

## 2021-03-08 PROCEDURE — 99223 1ST HOSP IP/OBS HIGH 75: CPT

## 2021-03-08 RX ADMIN — FINASTERIDE 5 MILLIGRAM(S): 5 TABLET, FILM COATED ORAL at 11:16

## 2021-03-08 RX ADMIN — Medication 1000 UNIT(S): at 11:16

## 2021-03-08 RX ADMIN — PANTOPRAZOLE SODIUM 10 MG/HR: 20 TABLET, DELAYED RELEASE ORAL at 10:33

## 2021-03-08 RX ADMIN — PANTOPRAZOLE SODIUM 10 MG/HR: 20 TABLET, DELAYED RELEASE ORAL at 21:14

## 2021-03-08 NOTE — PROGRESS NOTE ADULT - ATTENDING COMMENTS
#Anemia, concerning for ugib, +melena  supratherapeutic inr, s/p vit k 7.5 total  s/p 2 units prbc  h/h improved, trend  f/u inr  protonix gtt  f/u gi.

## 2021-03-08 NOTE — CONSULT NOTE ADULT - SUBJECTIVE AND OBJECTIVE BOX
NEPHROLOGY CONSULTATION NOTE    THIS CONSULT IS INCOMPLETE / FULL CONSULT TO FOLLOW    Patient is a 88y Male whom presented to the hospital with     PAST MEDICAL & SURGICAL HISTORY:  Afib    Spinal stenosis at L4-L5 level    H/O heart artery stent      Allergies:  No Known Allergies    Home Medications Reviewed  Hospital Medications:   MEDICATIONS  (STANDING):  cholecalciferol 1000 Unit(s) Oral daily  finasteride 5 milliGRAM(s) Oral daily  pantoprazole Infusion 8 mG/Hr (10 mL/Hr) IV Continuous <Continuous>      SOCIAL HISTORY:  Denies ETOH,Smoking,   FAMILY HISTORY:  FH: type 2 diabetes  Both parents    FHx: kidney disease  Mother          REVIEW OF SYSTEMS:  CONSTITUTIONAL: No weakness, fevers or chills  EYES/ENT: No visual changes;  No vertigo or throat pain   NECK: No pain or stiffness  RESPIRATORY: No cough, wheezing, hemoptysis; No shortness of breath  CARDIOVASCULAR: No chest pain or palpitations.  GASTROINTESTINAL: No abdominal or epigastric pain. No nausea, vomiting, or hematemesis; No diarrhea or constipation. No melena or hematochezia.  GENITOURINARY: No dysuria, frequency, foamy urine, urinary urgency, incontinence or hematuria  NEUROLOGICAL: No numbness or weakness  SKIN: No itching, burning, rashes, or lesions   VASCULAR: No bilateral lower extremity edema.   All other review of systems is negative unless indicated above.    VITALS:  T(F): 96.9 (21 @ 05:20), Max: 97.9 (21 @ 20:51)  HR: 81 (21 @ 05:20)  BP: 90/51 (21 @ 05:20)  RR: 18 (21 @ 05:20)  SpO2: 99% (21 @ 20:15)     @ 07:01  -   @ 07:00  --------------------------------------------------------  IN: 1176 mL / OUT: 1950 mL / NET: -774 mL     07:01  -   @ 09:48  --------------------------------------------------------  IN: 10 mL / OUT: 0 mL / NET: 10 mL            I&O's Detail    07 Mar 2021 07:01  -  08 Mar 2021 07:00  --------------------------------------------------------  IN:    Oral Fluid: 995 mL    Pantoprazole: 180 mL    PRBCs (Packed Red Blood Cells): 1 mL  Total IN: 1176 mL    OUT:    Voided (mL): 1950 mL  Total OUT: 1950 mL    Total NET: -774 mL      08 Mar 2021 07:01  -  08 Mar 2021 09:48  --------------------------------------------------------  IN:    Pantoprazole: 10 mL  Total IN: 10 mL    OUT:  Total OUT: 0 mL    Total NET: 10 mL            PHYSICAL EXAM:  Constitutional: NAD  HEENT: anicteric sclera, oropharynx clear, MMM  Neck: No JVD  Respiratory: CTAB, no wheezes, rales or rhonchi  Cardiovascular: S1, S2, RRR  Gastrointestinal: BS+, soft, NT/ND  Extremities: No cyanosis or clubbing. No peripheral edema  Neurological: A/O x 3, no focal deficits  Psychiatric: Normal mood, normal affect  : No CVA tenderness. No lott.   Skin: No rashes  Vascular Access:    LABS:      138  |  110  |  102<HH>  ----------------------------<  132<H>  4.6   |  17  |  2.0<H>    Ca    8.2<L>      07 Mar 2021 08:17  Phos  2.6     03-07  Mg     2.1     03-07    TPro  4.8<L>  /  Alb  3.3<L>  /  TBili  0.5  /  DBili      /  AST  23  /  ALT  14  /  AlkPhos  29<L>  03-07    Creatinine Trend: 2.0 <--, 2.3 <--                        7.4    15.26 )-----------( 184      ( 07 Mar 2021 16:00 )             22.4     Urine Studies:  Urinalysis Basic - ( 06 Mar 2021 15:28 )    Color: Light Yellow / Appearance: Clear / S.030 / pH:   Gluc:  / Ketone: Negative  / Bili: Negative / Urobili: <2 mg/dL   Blood:  / Protein: Negative / Nitrite: Negative   Leuk Esterase: Negative / RBC:  / WBC    Sq Epi:  / Non Sq Epi:  / Bacteria:                 RADIOLOGY & ADDITIONAL STUDIES:                 NEPHROLOGY CONSULTATION NOTE    87yo M with PMHx CAD/stents, AFib on coumadin, HTN, HLD, spinal stenosis, prostate CA, Vit D def presents for eval s/p fall on 3/6/2020.  Patient states that he stood that morning felt lightheaded and fell to the floor in a sitting position, denied LOC no abdominal pain no diarrhea , no chest pain, has been feeling lightheaded and SOB recently on mild to moderate exertion .  PAtient admitted sp left buttock hematoma and anemia . To note that patient is on coumadin for A fib   Renal called because of history of CKD   Today denied chest pain no SOB no cough no sputum production no focal motor sensory deficit no other events     PAST MEDICAL & SURGICAL HISTORY:  Afib  Spinal stenosis at L4-L5 level  H/O heart artery stent      Allergies:  No Known Allergies    Home Medications Reviewed  Hospital Medications:   MEDICATIONS  (STANDING):  cholecalciferol 1000 Unit(s) Oral daily  finasteride 5 milliGRAM(s) Oral daily  pantoprazole Infusion 8 mG/Hr (10 mL/Hr) IV Continuous <Continuous>      SOCIAL HISTORY:  Denies ETOH,Smoking,   FAMILY HISTORY:  FH: type 2 diabetes  Both parents    FHx: kidney disease  Mother          REVIEW OF SYSTEMS:  All other review of systems is negative unless indicated above.    VITALS:  T(F): 96.9 (21 @ 05:20), Max: 97.9 (21 @ 20:51)  HR: 81 (21 @ 05:20)  BP: 90/51 (21 @ 05:20)  RR: 18 (21 @ 05:20)  SpO2: 99% (21 @ 20:15)     @ 07:01  -   @ 07:00  --------------------------------------------------------  IN: 1176 mL / OUT: 1950 mL / NET: -774 mL     07:01  -   @ 09:48  --------------------------------------------------------  IN: 10 mL / OUT: 0 mL / NET: 10 mL            I&O's Detail    07 Mar 2021 07:  -  08 Mar 2021 07:00  --------------------------------------------------------  IN:    Oral Fluid: 995 mL    Pantoprazole: 180 mL    PRBCs (Packed Red Blood Cells): 1 mL  Total IN: 1176 mL    OUT:    Voided (mL): 1950 mL  Total OUT: 1950 mL    Total NET: -774 mL      08 Mar 2021 07:  -  08 Mar 2021 09:48  --------------------------------------------------------  IN:    Pantoprazole: 10 mL  Total IN: 10 mL    OUT:  Total OUT: 0 mL    Total NET: 10 mL            PHYSICAL EXAM:  Constitutional: NAD  Neck: No JVD  Respiratory: CTAB, no wheezes, rales or rhonchi  Cardiovascular: S1, S2, RRR  Gastrointestinal: BS+, soft, NT/ND  Extremities: No cyanosis or clubbing. No peripheral edema/ left buttock hematoma   :  No lott.   Skin: No rashes  Vascular Access:    LABS:      138  |  110  |  102<HH>  ----------------------------<  132<H>  4.6   |  17  |  2.0<H>    Ca    8.2<L>      07 Mar 2021 08:17  Phos  2.6       Mg     2.1         TPro  4.8<L>  /  Alb  3.3<L>  /  TBili  0.5  /  DBili      /  AST  23  /  ALT  14  /  AlkPhos  29<L>      Creatinine Trend: 2.0 <--, 2.3 <--                        7.4    15.26 )-----------( 184      ( 07 Mar 2021 16:00 )             22.4   Hemoglobin: 7.4 g/dL ( @ 16:00)  Hemoglobin: 6.7 g/dL ( @ 08:17)  Hemoglobin: 6.5 g/dL ( @ 21:07)  Hemoglobin: 7.8 g/dL ( @ 11:30)    Urine Studies:  Urinalysis Basic - ( 06 Mar 2021 15:28 )    Color: Light Yellow / Appearance: Clear / S.030 / pH:   Gluc:  / Ketone: Negative  / Bili: Negative / Urobili: <2 mg/dL   Blood:  / Protein: Negative / Nitrite: Negative   Leuk Esterase: Negative / RBC:  / WBC    Sq Epi:  / Non Sq Epi:  / Bacteria:                 RADIOLOGY & ADDITIONAL STUDIES:

## 2021-03-08 NOTE — PROGRESS NOTE ADULT - SUBJECTIVE AND OBJECTIVE BOX
TISHA GRANDA 88y Male  MRN#: [id number]   Hospital Day: 2d    SUBJECTIVE  Patient is a 88y old Male who presents with a chief complaint of presyncope  elevated INR (08 Mar 2021 09:48)  Currently admitted to medicine with the primary diagnosis of Near syncope      INTERVAL HPI AND OVERNIGHT EVENTS:  Patient was examined and seen at bedside. This morning he is resting comfortably in bed and reports no issues or overnight events.    REVIEW OF SYMPTOMS:  CONSTITUTIONAL: No weakness, fevers or chills; No headaches  EYES: No visual changes, eye pain, or discharge  ENT: No vertigo; No ear pain or change in hearing; No sore throat or difficulty swallowing  NECK: No pain or stiffness  RESPIRATORY: No cough, wheezing, or hemoptysis; No shortness of breath  CARDIOVASCULAR: No chest pain or palpitations  GASTROINTESTINAL: No abdominal or epigastric pain; No nausea, vomiting, or hematemesis; No diarrhea or constipation; No melena or hematochezia  GENITOURINARY: No dysuria, frequency or hematuria  MUSCULOSKELETAL: No joint pain, no muscle pain, no weakness  NEUROLOGICAL: No numbness or weakness  SKIN: No itching or rashes    OBJECTIVE  PAST MEDICAL & SURGICAL HISTORY  Afib    Spinal stenosis at L4-L5 level    H/O heart artery stent      ALLERGIES:  No Known Allergies    MEDICATIONS:  STANDING MEDICATIONS  cholecalciferol 1000 Unit(s) Oral daily  finasteride 5 milliGRAM(s) Oral daily  pantoprazole Infusion 8 mG/Hr IV Continuous <Continuous>    PRN MEDICATIONS      VITAL SIGNS: Last 24 Hours  T(C): 36.1 (08 Mar 2021 05:20), Max: 36.6 (07 Mar 2021 20:51)  T(F): 96.9 (08 Mar 2021 05:20), Max: 97.9 (07 Mar 2021 20:51)  HR: 81 (08 Mar 2021 05:20) (81 - 100)  BP: 90/51 (08 Mar 2021 05:20) (90/51 - 135/67)  BP(mean): --  RR: 18 (08 Mar 2021 05:20) (18 - 19)  SpO2: 99% (07 Mar 2021 20:15) (98% - 99%)    LABS:                        7.4    15.26 )-----------( 184      ( 07 Mar 2021 16:00 )             22.4     03-07    138  |  110  |  102<HH>  ----------------------------<  132<H>  4.6   |  17  |  2.0<H>    Ca    8.2<L>      07 Mar 2021 08:17  Phos  2.6     03-  Mg     2.1     03-    TPro  4.8<L>  /  Alb  3.3<L>  /  TBili  0.5  /  DBili  x   /  AST  23  /  ALT  14  /  AlkPhos  29<L>  03-07    PT/INR - ( 07 Mar 2021 08:17 )   PT: >40.00 sec;   INR: 6.47 ratio         PTT - ( 07 Mar 2021 08:17 )  PTT:46.2 sec  Urinalysis Basic - ( 06 Mar 2021 15:28 )    Color: Light Yellow / Appearance: Clear / S.030 / pH: x  Gluc: x / Ketone: Negative  / Bili: Negative / Urobili: <2 mg/dL   Blood: x / Protein: Negative / Nitrite: Negative   Leuk Esterase: Negative / RBC: x / WBC x   Sq Epi: x / Non Sq Epi: x / Bacteria: x            CARDIAC MARKERS ( 07 Mar 2021 08:17 )  x     / 0.01 ng/mL / x     / x     / x      CARDIAC MARKERS ( 06 Mar 2021 11:30 )  x     / <0.01 ng/mL / x     / x     / x          RADIOLOGY:      PHYSICAL EXAM:  CONSTITUTIONAL: No acute distress, well-developed, well-groomed, AAOx3  HEAD: Atraumatic, normocephalic  EYES: EOM intact, PERRLA, conjunctiva and sclera clear  ENT: Supple, no masses, no thyromegaly, no bruits, no JVD; moist mucous membranes  PULMONARY: Clear to auscultation bilaterally; no wheezes, rales, or rhonchi  CARDIOVASCULAR: Regular rate and rhythm; no murmurs, rubs, or gallops  GASTROINTESTINAL: Soft, non-tender, non-distended; bowel sounds present  MUSCULOSKELETAL: 2+ peripheral pulses; no clubbing, no cyanosis, no edema  NEUROLOGY: non-focal  SKIN: No rashes or lesions; warm and dry     TISHA GRANDA 88y Male  MRN#: [id number]   Hospital Day: 2d    SUBJECTIVE  Patient is a 88y old Male who presents with a chief complaint of presyncope  elevated INR (08 Mar 2021 09:48)  Currently admitted to medicine with the primary diagnosis of Near syncope      INTERVAL HPI AND OVERNIGHT EVENTS:  Patient was examined and seen at bedside. This morning he is resting comfortably in bed and reports no issues or overnight events.  no cp, sob, abd pain, fever  no abd pain, nausea, vomiting, +melena    REVIEW OF SYMPTOMS:  CONSTITUTIONAL: No weakness, fevers or chills; No headaches  EYES: No visual changes, eye pain, or discharge  ENT: No vertigo; No ear pain or change in hearing; No sore throat or difficulty swallowing  NECK: No pain or stiffness  RESPIRATORY: No cough, wheezing, or hemoptysis; No shortness of breath  CARDIOVASCULAR: No chest pain or palpitations  GASTROINTESTINAL: No abdominal or epigastric pain; No nausea, vomiting, or hematemesis; No diarrhea or constipation; No melena or hematochezia  GENITOURINARY: No dysuria, frequency or hematuria  MUSCULOSKELETAL: No joint pain, no muscle pain, no weakness  NEUROLOGICAL: No numbness or weakness  SKIN: No itching or rashes    OBJECTIVE  PAST MEDICAL & SURGICAL HISTORY  Afib    Spinal stenosis at L4-L5 level    H/O heart artery stent      ALLERGIES:  No Known Allergies    MEDICATIONS:  STANDING MEDICATIONS  cholecalciferol 1000 Unit(s) Oral daily  finasteride 5 milliGRAM(s) Oral daily  pantoprazole Infusion 8 mG/Hr IV Continuous <Continuous>    PRN MEDICATIONS      VITAL SIGNS: Last 24 Hours  T(C): 36.1 (08 Mar 2021 05:20), Max: 36.6 (07 Mar 2021 20:51)  T(F): 96.9 (08 Mar 2021 05:20), Max: 97.9 (07 Mar 2021 20:51)  HR: 81 (08 Mar 2021 05:20) (81 - 100)  BP: 90/51 (08 Mar 2021 05:20) (90/51 - 135/67)  BP(mean): --  RR: 18 (08 Mar 2021 05:20) (18 - 19)  SpO2: 99% (07 Mar 2021 20:15) (98% - 99%)    LABS:                        7.4    15.26 )-----------( 184      ( 07 Mar 2021 16:00 )             22.4     03-07    138  |  110  |  102<HH>  ----------------------------<  132<H>  4.6   |  17  |  2.0<H>    Ca    8.2<L>      07 Mar 2021 08:17  Phos  2.6     03-07  Mg     2.1     03-07    TPro  4.8<L>  /  Alb  3.3<L>  /  TBili  0.5  /  DBili  x   /  AST  23  /  ALT  14  /  AlkPhos  29<L>  03-07    PT/INR - ( 07 Mar 2021 08:17 )   PT: >40.00 sec;   INR: 6.47 ratio         PTT - ( 07 Mar 2021 08:17 )  PTT:46.2 sec  Urinalysis Basic - ( 06 Mar 2021 15:28 )    Color: Light Yellow / Appearance: Clear / S.030 / pH: x  Gluc: x / Ketone: Negative  / Bili: Negative / Urobili: <2 mg/dL   Blood: x / Protein: Negative / Nitrite: Negative   Leuk Esterase: Negative / RBC: x / WBC x   Sq Epi: x / Non Sq Epi: x / Bacteria: x            CARDIAC MARKERS ( 07 Mar 2021 08:17 )  x     / 0.01 ng/mL / x     / x     / x      CARDIAC MARKERS ( 06 Mar 2021 11:30 )  x     / <0.01 ng/mL / x     / x     / x          RADIOLOGY:      PHYSICAL EXAM:  CONSTITUTIONAL: No acute distress, well-developed, well-groomed, AAOx3  HEAD: Atraumatic, normocephalic  EYES: EOM intact, PERRLA, conjunctiva and sclera clear  ENT: Supple, no masses, no thyromegaly, no bruits, no JVD; moist mucous membranes  PULMONARY: Clear to auscultation bilaterally; no wheezes, rales, or rhonchi  CARDIOVASCULAR: Regular rate and rhythm; no murmurs, rubs, or gallops  GASTROINTESTINAL: Soft, non-tender, non-distended; bowel sounds present  MUSCULOSKELETAL: 2+ peripheral pulses; no clubbing, no cyanosis, no edema  NEUROLOGY: non-focal  SKIN: No rashes or lesions; warm and dry

## 2021-03-08 NOTE — PHYSICAL THERAPY INITIAL EVALUATION ADULT - SPECIFY REASON(S)
Pt currently has no activity orders. Also, pt still has low high INR (6.47) and low Hb (7.4). Will hold PT for today and will f/u when appropriate.
Pt's current INR is 7.77 and Hgb is 6.5, recommend to hold PT Initial Evaluation.  Will f/u when medically appropriate.

## 2021-03-08 NOTE — PROGRESS NOTE ADULT - ASSESSMENT
89yo M with PMHx CAD/stents, AFib on coumadin, HTN, HLD, spinal stenosis, presents for eval s/p fall this morning, presyncope and elevated INR now with melena?     #Fall likely due to orthostatic hypotension secondary to GIB  - symptomatic blood loss anemia due GIB  - BUN >100  - less likely cardiac event causing pre syncope, less likely neuro event  - tele monitoring  - f/u 2D echo, TSH, serial EKG  - f/u orthostatics   - Hold tamsulosin. avoid bb. hold antihypertensives  - GI eval. Serial CBC. 2 18g IV. Protonix 40gmg IV q12. 1pRBC given hb 7.8->6.5, f/u repeat cbc, high suspicion of bleed with melena, elevated BUN and INR>7.0, send FOBT    # Elevated INR due to Coumadin Toxicity   - hold coumadin. Daily INR.  - s/p Vit k 2x 2.5 + 5 (total 10mg)      # Anemia, normocytic    hb 7.8, in 2015 was 14.  possible due CKD but given hb 7.8 will treat as possible GIB for now. send FOBT r/o GIB.     # LEYDI on CKD4 - Cr 2.3, noted in [facility] to be 1.7-2.0 in 2015. Possibly new baseline due to progression vs pre renal due to GIB? will need PMD records.  check urine lytes. C/s renal Dr. Lee. Should improve with 1L NS and pRBC.     # Leukocytosis   - no sign of infection, no fever, cough, urinary sx's.  - Trend WBC, likely reactive stress induced    #Elevated lactate   - LA 2.7  - likely dehydration  - s/p 1L NS in ER, s/p 1Liter in ER, avoid volume overload for now     # Afib on Coumadin  - not on rate control  - monitor HR for now as rate is controlled  - hold coumadin  as above.     # CAD s/p PCI w/ 3 stents - Hold Plavix. C/s Dr. Herman.     # Prostate CA - c/w finasteride. hold tamsulosin until orthostatic negative.     # HTN   - BP wnl. Hold Losartan 100mg, HCTZ 25mg. Hold Nifidipine 60mg.     # HLD   - Lipitor for pravastatin     # H/o spinal stenosis   - PT eval. op f.u     # Vit d defeciency   - c/w supplement     # DVT PPx - SCDs, hold all chemical ppx until INR wnl     # Activity - Increase as Tolerated w/ assist      # Dispo - Patient to be discharged when medically optimized.    # Code Status - FULL

## 2021-03-08 NOTE — PROGRESS NOTE ADULT - ATTENDING COMMENTS
pt presents for fall, found to be anemic. INR very elevated, reporting melena. Hgb stable.     Rec correct INR. Plan for EGD +/- colonoscopy when INR is corrected.

## 2021-03-08 NOTE — PROGRESS NOTE ADULT - ASSESSMENT
89yo M with PMHx CAD/stents, AFib on coumadin, HTN, HLD, spinal stenosis, bladder CA ( no chemo or radiation as per patient), Vit D def presents for eval s/p fall this morning. Pt states at 8am was getting out of bed to go to bathroom, felt lightheaded and fell forwards, landed on chest. Pt remembers events, denies LOC. Currently is not c/o any pain. Since fall, pt states still feeling lightheaded.       # Acute drop of hgb with melena: UGIB in the context of supratheraputic INR   - Patient had one episode of melanotic stool  yesterday  - hgb 7.8 on admission (had hgb 14 on 2017)--> 6.7 s/p 1UPRBC--> 7.4 received 2UPRBC-->No repeat CBC  - INR 7.7 on admission s/p Vit K 2.5 mg twice --> INR 6.47--> 5mg Vit K--> No repeat INR  - VS stable  - No abdominal pain    Rec:  - Please do STAT CBC/ PT/INR/BMP  - Monitor CBC  - keep hgb>8  - PPI BID IV  - cardiac eval noted( hold plavix and AC)--> non-urgent risk stratification for possible EGD next week when INR stabilized  89yo M with PMHx CAD/stents, AFib on coumadin, HTN, HLD, spinal stenosis, bladder CA ( no chemo or radiation as per patient), Vit D def presents for eval s/p fall this morning. Pt states at 8am was getting out of bed to go to bathroom, felt lightheaded and fell forwards, landed on chest. Pt remembers events, denies LOC. Currently is not c/o any pain. Since fall, pt states still feeling lightheaded.       # Acute drop of hgb with melena: UGIB in the context of supratheraputic INR   - Patient had one episode of melanotic stool  yesterday  - hgb 7.8 on admission (had hgb 14 on 2017)--> 6.7 s/p 1UPRBC--> 7.4 received 2UPRBC-->No repeat CBC  - INR 7.7 on admission s/p Vit K 2.5 mg twice --> INR 6.47--> 5mg Vit K--> No repeat INR  - VS stable  - No abdominal pain    Rec:  - Please do STAT CBC/ PT/INR/BMP  - Monitor CBC  - keep hgb>8  - PPI BID IV  - cardiac eval noted( hold plavix and AC)--> non-urgent risk stratification for possible EGD when INR stabilized

## 2021-03-08 NOTE — PROGRESS NOTE ADULT - SUBJECTIVE AND OBJECTIVE BOX
Gastroenterology progress note:     Patient is a 88y old  Male who presents with a chief complaint of presyncope  elevated INR (08 Mar 2021 09:49)       Admitted on: 03-06-21    We are following the patient for: drop of hgb with melena     Interval History:  Patient had one episode of small amount of melena yesterday, no abdominal pain, can tolerate po intake      PAST MEDICAL & SURGICAL HISTORY:  Afib    Spinal stenosis at L4-L5 level    H/O heart artery stent        MEDICATIONS  (STANDING):  cholecalciferol 1000 Unit(s) Oral daily  finasteride 5 milliGRAM(s) Oral daily  pantoprazole Infusion 8 mG/Hr (10 mL/Hr) IV Continuous <Continuous>    MEDICATIONS  (PRN):      Allergies  No Known Allergies      Review of Systems:   Constitutional: Ne Fever, No chills, No weakness  ENT: No visual changes, No throat pain  Cardiovascular:  No Chest Pain, No Palpitations  Respiratory:  No Cough, No Dyspnea  Gastrointestinal:  As described in HPI  Neurological: No numbness or weakness  Skin: No rash, no itching    Physical Examination:  T(C): 36.1 (03-08-21 @ 05:20), Max: 36.6 (03-07-21 @ 20:51)  HR: 81 (03-08-21 @ 05:20) (81 - 100)  BP: 90/51 (03-08-21 @ 05:20) (90/51 - 135/67)  RR: 18 (03-08-21 @ 05:20) (18 - 19)  SpO2: 99% (03-07-21 @ 20:15) (98% - 99%)      03-07-21 @ 07:01  -  03-08-21 @ 07:00  --------------------------------------------------------  IN: 1176 mL / OUT: 1950 mL / NET: -774 mL    03-08-21 @ 07:01  -  03-08-21 @ 11:59  --------------------------------------------------------  IN: 730 mL / OUT: 0 mL / NET: 730 mL      Constitutional: No acute distress.  Respiratory:  No signs of respiratory distress. Lung sounds are clear bilaterally.  Cardiovascular:  S1 S2, Regular rate and rhythm.  Abdominal: Abdomen is soft, symmetric, and non-tender without distention. There are no visible lesions. Bowel sounds are present and normoactive in all four quadrants. No masses, hepatomegaly, or splenomegaly are noted.   Skin: No rashes, No Jaundice.  Neurology: AAOX3, Non-focal  Skin: No rash, No excoriation  Vascular: No varicose vein, No cyanosis, No edema        Data: (reviewed by attending)                        7.4    15.26 )-----------( 184      ( 07 Mar 2021 16:00 )             22.4     Hgb trend:  7.4  03-07-21 @ 16:00  6.7  03-07-21 @ 08:17  6.5  03-06-21 @ 21:07  7.8  03-06-21 @ 11:30      03-06-21 @ 07:01  -  03-07-21 @ 07:00  --------------------------------------------------------  IN: 307 mL    03-07-21 @ 07:01  -  03-08-21 @ 07:00  --------------------------------------------------------  IN: 1 mL      03-07    138  |  110  |  102<HH>  ----------------------------<  132<H>  4.6   |  17  |  2.0<H>    Ca    8.2<L>      07 Mar 2021 08:17  Phos  2.6     03-07  Mg     2.1     03-07    TPro  4.8<L>  /  Alb  3.3<L>  /  TBili  0.5  /  DBili  x   /  AST  23  /  ALT  14  /  AlkPhos  29<L>  03-07    Liver panel trend:  TBili 0.5   /   AST 23   /   ALT 14   /   AlkP 29   /   Tptn 4.8   /   Alb 3.3    /   DBili --      03-07  TBili 0.4   /   AST 16   /   ALT 14   /   AlkP 44   /   Tptn 5.3   /   Alb 3.7    /   DBili --      03-06      PT/INR - ( 07 Mar 2021 08:17 )   PT: >40.00 sec;   INR: 6.47 ratio         PTT - ( 07 Mar 2021 08:17 )  PTT:46.2 sec

## 2021-03-08 NOTE — CONSULT NOTE ADULT - ASSESSMENT
87yo M with PMHx CAD/stents, AFib on coumadin, HTN, HLD, spinal stenosis, prostate CA, Vit D def presents for eval s/p fall on 3/6/2020 with LEYDI on CKD    ·	CKD / unknown baseline ( will call OP office for baseline ) sp fall with buttock hematoma with anemia acute   ·	creat slightly better  ·	check UA and urine lytes   ·	BP noted / no anti hypertensives   ·	Hb noted / check Fe studies / GI on case/ off anticoagulation   ·	start sodium bicarb 650 po q8h  ·	no need for sono    will follow

## 2021-03-09 LAB
ALBUMIN SERPL ELPH-MCNC: 3 G/DL — LOW (ref 3.5–5.2)
ALBUMIN SERPL ELPH-MCNC: 3.4 G/DL — LOW (ref 3.5–5.2)
ALP SERPL-CCNC: 38 U/L — SIGNIFICANT CHANGE UP (ref 30–115)
ALP SERPL-CCNC: 42 U/L — SIGNIFICANT CHANGE UP (ref 30–115)
ALT FLD-CCNC: 16 U/L — SIGNIFICANT CHANGE UP (ref 0–41)
ALT FLD-CCNC: 30 U/L — SIGNIFICANT CHANGE UP (ref 0–41)
ANION GAP SERPL CALC-SCNC: 8 MMOL/L — SIGNIFICANT CHANGE UP (ref 7–14)
ANION GAP SERPL CALC-SCNC: 9 MMOL/L — SIGNIFICANT CHANGE UP (ref 7–14)
APTT BLD: 24.9 SEC — LOW (ref 27–39.2)
AST SERPL-CCNC: 17 U/L — SIGNIFICANT CHANGE UP (ref 0–41)
AST SERPL-CCNC: 30 U/L — SIGNIFICANT CHANGE UP (ref 0–41)
BILIRUB SERPL-MCNC: 0.6 MG/DL — SIGNIFICANT CHANGE UP (ref 0.2–1.2)
BILIRUB SERPL-MCNC: 1 MG/DL — SIGNIFICANT CHANGE UP (ref 0.2–1.2)
BUN SERPL-MCNC: 47 MG/DL — HIGH (ref 10–20)
BUN SERPL-MCNC: 63 MG/DL — CRITICAL HIGH (ref 10–20)
CALCIUM SERPL-MCNC: 8.2 MG/DL — LOW (ref 8.5–10.1)
CALCIUM SERPL-MCNC: 8.6 MG/DL — SIGNIFICANT CHANGE UP (ref 8.5–10.1)
CHLORIDE SERPL-SCNC: 107 MMOL/L — SIGNIFICANT CHANGE UP (ref 98–110)
CHLORIDE SERPL-SCNC: 115 MMOL/L — HIGH (ref 98–110)
CO2 SERPL-SCNC: 21 MMOL/L — SIGNIFICANT CHANGE UP (ref 17–32)
CO2 SERPL-SCNC: 23 MMOL/L — SIGNIFICANT CHANGE UP (ref 17–32)
CREAT SERPL-MCNC: 1.5 MG/DL — SIGNIFICANT CHANGE UP (ref 0.7–1.5)
CREAT SERPL-MCNC: 1.9 MG/DL — HIGH (ref 0.7–1.5)
GLUCOSE SERPL-MCNC: 101 MG/DL — HIGH (ref 70–99)
GLUCOSE SERPL-MCNC: 114 MG/DL — HIGH (ref 70–99)
HCT VFR BLD CALC: 22.6 % — LOW (ref 42–52)
HCT VFR BLD CALC: 26.4 % — LOW (ref 42–52)
HGB BLD-MCNC: 7.6 G/DL — LOW (ref 14–18)
HGB BLD-MCNC: 8.8 G/DL — LOW (ref 14–18)
INR BLD: 0.98 RATIO — SIGNIFICANT CHANGE UP (ref 0.65–1.3)
INR BLD: 1.08 RATIO — SIGNIFICANT CHANGE UP (ref 0.65–1.3)
LACTATE SERPL-SCNC: 1.1 MMOL/L — SIGNIFICANT CHANGE UP (ref 0.7–2)
MAGNESIUM SERPL-MCNC: 2 MG/DL — SIGNIFICANT CHANGE UP (ref 1.8–2.4)
MAGNESIUM SERPL-MCNC: 2.2 MG/DL — SIGNIFICANT CHANGE UP (ref 1.8–2.4)
MCHC RBC-ENTMCNC: 29.9 PG — SIGNIFICANT CHANGE UP (ref 27–31)
MCHC RBC-ENTMCNC: 30.1 PG — SIGNIFICANT CHANGE UP (ref 27–31)
MCHC RBC-ENTMCNC: 33.3 G/DL — SIGNIFICANT CHANGE UP (ref 32–37)
MCHC RBC-ENTMCNC: 33.6 G/DL — SIGNIFICANT CHANGE UP (ref 32–37)
MCV RBC AUTO: 89 FL — SIGNIFICANT CHANGE UP (ref 80–94)
MCV RBC AUTO: 90.4 FL — SIGNIFICANT CHANGE UP (ref 80–94)
NRBC # BLD: 0 /100 WBCS — SIGNIFICANT CHANGE UP (ref 0–0)
NRBC # BLD: 1 /100 WBCS — HIGH (ref 0–0)
PLATELET # BLD AUTO: 213 K/UL — SIGNIFICANT CHANGE UP (ref 130–400)
PLATELET # BLD AUTO: 233 K/UL — SIGNIFICANT CHANGE UP (ref 130–400)
POTASSIUM SERPL-MCNC: 4.1 MMOL/L — SIGNIFICANT CHANGE UP (ref 3.5–5)
POTASSIUM SERPL-MCNC: 4.3 MMOL/L — SIGNIFICANT CHANGE UP (ref 3.5–5)
POTASSIUM SERPL-SCNC: 4.1 MMOL/L — SIGNIFICANT CHANGE UP (ref 3.5–5)
POTASSIUM SERPL-SCNC: 4.3 MMOL/L — SIGNIFICANT CHANGE UP (ref 3.5–5)
PROT SERPL-MCNC: 4.4 G/DL — LOW (ref 6–8)
PROT SERPL-MCNC: 4.9 G/DL — LOW (ref 6–8)
PROTHROM AB SERPL-ACNC: 11.3 SEC — SIGNIFICANT CHANGE UP (ref 9.95–12.87)
PROTHROM AB SERPL-ACNC: 12.4 SEC — SIGNIFICANT CHANGE UP (ref 9.95–12.87)
RBC # BLD: 2.54 M/UL — LOW (ref 4.7–6.1)
RBC # BLD: 2.92 M/UL — LOW (ref 4.7–6.1)
RBC # FLD: 16 % — HIGH (ref 11.5–14.5)
RBC # FLD: 16.3 % — HIGH (ref 11.5–14.5)
SARS-COV-2 RNA SPEC QL NAA+PROBE: SIGNIFICANT CHANGE UP
SODIUM SERPL-SCNC: 139 MMOL/L — SIGNIFICANT CHANGE UP (ref 135–146)
SODIUM SERPL-SCNC: 144 MMOL/L — SIGNIFICANT CHANGE UP (ref 135–146)
WBC # BLD: 12.77 K/UL — HIGH (ref 4.8–10.8)
WBC # BLD: 14.36 K/UL — HIGH (ref 4.8–10.8)
WBC # FLD AUTO: 12.77 K/UL — HIGH (ref 4.8–10.8)
WBC # FLD AUTO: 14.36 K/UL — HIGH (ref 4.8–10.8)

## 2021-03-09 PROCEDURE — 99233 SBSQ HOSP IP/OBS HIGH 50: CPT

## 2021-03-09 PROCEDURE — 99232 SBSQ HOSP IP/OBS MODERATE 35: CPT

## 2021-03-09 RX ORDER — SOD SULF/SODIUM/NAHCO3/KCL/PEG
4000 SOLUTION, RECONSTITUTED, ORAL ORAL ONCE
Refills: 0 | Status: COMPLETED | OUTPATIENT
Start: 2021-03-09 | End: 2021-03-09

## 2021-03-09 RX ADMIN — Medication 4000 MILLILITER(S): at 15:15

## 2021-03-09 RX ADMIN — FINASTERIDE 5 MILLIGRAM(S): 5 TABLET, FILM COATED ORAL at 12:46

## 2021-03-09 RX ADMIN — Medication 20 MILLIGRAM(S): at 21:44

## 2021-03-09 RX ADMIN — Medication 1000 UNIT(S): at 12:46

## 2021-03-09 NOTE — PHYSICAL THERAPY INITIAL EVALUATION ADULT - PERTINENT HX OF CURRENT PROBLEM, REHAB EVAL
87yo M with PMHx CAD/stents, AFib on coumadin, HTN, HLD, spinal stenosis, prostate CA, Vit D def presents for eval s/p fall

## 2021-03-09 NOTE — PROGRESS NOTE ADULT - SUBJECTIVE AND OBJECTIVE BOX
CHIEF COMPLAINT:  Patient is a 88y old  Male who presents with a chief complaint of presyncope  elevated INR (09 Mar 2021 13:25)      INTERVAL HISTORY/OVERNIGHT EVENTS:  sp 3 prbc since admission  Hg dropping still 7.6 today from 8.7 yesterday  (from 6.7 on admission)  planned for egd/colono tomorrow pending cardiac clearance.   plavix and coumadin still on hold  no cp but still nagel when ambulating today (lungs clear, no edema, likely frm symptomatic anemia)  priro to current status,  ambulates with rolator, mets > 4.     ======================  MEDICATIONS:  bisacodyl 20 milliGRAM(s) Oral at bedtime  cholecalciferol 1000 Unit(s) Oral daily  finasteride 5 milliGRAM(s) Oral daily    DRIPS:  pantoprazole Infusion 8 mG/Hr (10 mL/Hr) IV Continuous <Continuous>    PRN:       ======================  PHYSICAL EXAMINATION:  GEN:  nad.   HEENT:  eomi. ncat  PULM:  b/l bs.  clear.  no wheezing. no crackles or rales.   CARD: regular. s1, s2.  no murmurs.   ABD: +bs. ntnd  EXT:  no new rashes.  no pitting edema b/l .   NEURO:  no new focal deficits.   ======================  OBJECTIVE:        VS:  T(F): 96.7 (03-09 @ 12:26), Max: 99.2 (03-08 @ 20:33)  HR: 85 (03-09 @ 12:26) (85 - 90)  BP: 113/58 (03-09 @ 12:26) (112/72 - 113/58)  RR: 18 (03-09 @ 12:26) (18 - 18)  SpO2: 99% (03-09 @ 08:12) (97% - 99%)  CVP(mm Hg): --  CO: --  CI: --  PA: --  PCWP: --    I/O:      03-06 @ 07:01  -  03-07 @ 07:00  --------------------------------------------------------  IN: 427 mL / OUT: 300 mL / NET: 127 mL    03-07 @ 07:01  -  03-08 @ 07:00  --------------------------------------------------------  IN: 1176 mL / OUT: 1950 mL / NET: -774 mL    03-08 @ 07:01  -  03-09 @ 07:00  --------------------------------------------------------  IN: 930 mL / OUT: 0 mL / NET: 930 mL    03-09 @ 07:01  -  03-09 @ 15:55  --------------------------------------------------------  IN: 30 mL / OUT: 1100 mL / NET: -1070 mL        Weight trend:  Weight (kg): 86.2 (03-06)    ======================    LABS:                          7.6    12.77 )-----------( 213      ( 09 Mar 2021 06:06 )             22.6     03-09    144  |  115<H>  |  63<HH>  ----------------------------<  101<H>  4.1   |  21  |  1.9<H>    Ca    8.6      09 Mar 2021 06:06  Mg     2.2     03-09    TPro  4.4<L>  /  Alb  3.0<L>  /  TBili  0.6  /  DBili  x   /  AST  17  /  ALT  16  /  AlkPhos  38  03-09    LIVER FUNCTIONS - ( 09 Mar 2021 06:06 )  Alb: 3.0 g/dL / Pro: 4.4 g/dL / ALK PHOS: 38 U/L / ALT: 16 U/L / AST: 17 U/L / GGT: x           PT/INR - ( 09 Mar 2021 11:30 )   PT: 11.30 sec;   INR: 0.98 ratio         PTT - ( 08 Mar 2021 12:47 )  PTT:24.1 sec        Serum Pro-Brain Natriuretic Peptide: 783 pg/mL (03-06)        Cultures:

## 2021-03-09 NOTE — PHYSICAL THERAPY INITIAL EVALUATION ADULT - LIVES WITH, PROFILE
in 2 family Home. Pt lives on 2nd floor, has no steps to enter and 1 flight of stairs to go up to his floor, that has stair lift./spouse

## 2021-03-09 NOTE — PROGRESS NOTE ADULT - ASSESSMENT
87yo M with PMHx CAD/stents, AFib on coumadin, HTN, HLD, spinal stenosis, presents for eval s/p fall this morning, presyncope and elevated INR now with melena?     #Fall likely due to orthostatic hypotension secondary to GIB  - symptomatic blood loss anemia due GIB  - BUN >100  - less likely cardiac event causing pre syncope, less likely neuro event  - tele monitoring  - f/u 2D echo, TSH, serial EKG  - f/u orthostatics   - Hold tamsulosin. avoid bb. hold antihypertensives  - GI eval. Serial CBC. 2 18g IV. Protonix 40gmg IV q12. 1pRBC given hb 7.8->6.5, f/u repeat cbc, high suspicion of bleed with melena, elevated BUN and INR>7.0, send FOBT    # Elevated INR due to Coumadin Toxicity   - hold coumadin. Daily INR.  - s/p Vit k 2x 2.5 + 5 (total 10mg)      # Anemia, normocytic    hb 7.8, in 2015 was 14.  possible due CKD but given hb 7.8 will treat as possible GIB for now. send FOBT r/o GIB.     # LEYDI on CKD4 - Cr 2.3, noted in [facility] to be 1.7-2.0 in 2015. Possibly new baseline due to progression vs pre renal due to GIB? will need PMD records.  check urine lytes. C/s renal Dr. Lee. Should improve with 1L NS and pRBC.     # Leukocytosis   - no sign of infection, no fever, cough, urinary sx's.  - Trend WBC, likely reactive stress induced    #Elevated lactate   - LA 2.7  - likely dehydration  - s/p 1L NS in ER, s/p 1Liter in ER, avoid volume overload for now     # Afib on Coumadin  - not on rate control  - monitor HR for now as rate is controlled  - hold coumadin  as above.     # CAD s/p PCI w/ 3 stents - Hold Plavix. C/s Dr. Herman.     # Prostate CA - c/w finasteride. hold tamsulosin until orthostatic negative.     # HTN   - BP wnl. Hold Losartan 100mg, HCTZ 25mg. Hold Nifidipine 60mg.     # HLD   - Lipitor for pravastatin     # H/o spinal stenosis   - PT eval. op f.u     # Vit d defeciency   - c/w supplement     # DVT PPx - SCDs, hold all chemical ppx until INR wnl     # Activity - Increase as Tolerated w/ assist      # Dispo - Patient to be discharged when medically optimized.    # Code Status - FULL   87yo M with PMHx CAD/stents, AFib on coumadin, HTN, HLD, spinal stenosis, presents for eval s/p fall this morning, presyncope and elevated INR now with melena?     #Fall likely due to orthostatic hypotension secondary to GIB  - Hg 8.7-->7.6   - symptomatic blood loss anemia due GIB  - BUN >100  - less likely cardiac event causing pre syncope, less likely neuro event  - dc tele monitoring  - f/u 2D echo, TSH, serial EKG  - f/u orthostatics   - Hold tamsulosin. avoid bb. hold antihypertensives  - GI eval:     - Monitor CBC    - keep hgb>8    - PPI BID IV    - cardiac eval noted( hold plavix and AC)--> non-urgent risk stratification for possible EGD next week when INR stabilized     # Elevated INR due to Coumadin Toxicity   - INR 1.05  - s/p Vit k 2x 2.5 + 5 (total 10mg)      # Anemia, normocytic   - hg dropping   - hb 7.8, in 2015 was 14  - possibly d/t CKD but given hb 7.8 will treat as possible GIB for now  - GI following pt    # LEYDI on CKD4   - Cr 2.3, noted in [facility] to be 1.7-2.0 in 2015  - Possibly new baseline due to progression vs pre renal due to GIB? will need PMD records.  - f/u urine lytes      # Leukocytosis   - no sign of infection, no fever, cough, urinary sx's.  - Trend WBC, likely reactive stress induced    #Elevated lactate   - LA 2.7  - likely dehydration  - s/p 1L NS in ER, s/p 1Liter in ER, avoid volume overload for now     # Afib on Coumadin  - not on rate control  - monitor HR for now as rate is controlled  - hold coumadin  as above.     # CAD s/p PCI w/ 3 stents - Hold Plavix. C/s Dr. Herman.     # Prostate CA - c/w finasteride. hold tamsulosin until orthostatic negative.     # HTN   - BP wnl. Hold Losartan 100mg, HCTZ 25mg. Hold Nifidipine 60mg.     # HLD   - Lipitor for pravastatin     # H/o spinal stenosis   - PT eval. op f.u     # Vit d defeciency   - c/w supplement     # DVT PPx - SCDs, hold all chemical ppx until INR wnl     # Activity - Increase as Tolerated w/ assist      # Dispo - Patient to be discharged when medically optimized.    # Code Status - FULL

## 2021-03-09 NOTE — PHYSICAL THERAPY INITIAL EVALUATION ADULT - GENERAL OBSERVATIONS, REHAB EVAL
Pt was seen from 9:05-9:40 for PT IE. Pt was rec'd in supine in bed, NAD, +tele, agreeable to participate in PT.

## 2021-03-09 NOTE — PROGRESS NOTE ADULT - SUBJECTIVE AND OBJECTIVE BOX
TISHA GRANDA 88y Male  MRN#: [id number]   Hospital Day: 3d    SUBJECTIVE  Patient is a 88y old Male who presents with a chief complaint of presyncope  elevated INR (08 Mar 2021 11:58)  Currently admitted to medicine with the primary diagnosis of Near syncope      INTERVAL HPI AND OVERNIGHT EVENTS:  Patient was examined and seen at bedside. This morning he is resting comfortably in bed and reports no issues or overnight events.    REVIEW OF SYMPTOMS:  CONSTITUTIONAL: No weakness, fevers or chills; No headaches  EYES: No visual changes, eye pain, or discharge  ENT: No vertigo; No ear pain or change in hearing; No sore throat or difficulty swallowing  NECK: No pain or stiffness  RESPIRATORY: No cough, wheezing, or hemoptysis; No shortness of breath  CARDIOVASCULAR: No chest pain or palpitations  GASTROINTESTINAL: No abdominal or epigastric pain; No nausea, vomiting, or hematemesis; No diarrhea or constipation; No melena or hematochezia  GENITOURINARY: No dysuria, frequency or hematuria  MUSCULOSKELETAL: No joint pain, no muscle pain, no weakness  NEUROLOGICAL: No numbness or weakness  SKIN: No itching or rashes    OBJECTIVE  PAST MEDICAL & SURGICAL HISTORY  Afib    Spinal stenosis at L4-L5 level    H/O heart artery stent      ALLERGIES:  No Known Allergies    MEDICATIONS:  STANDING MEDICATIONS  cholecalciferol 1000 Unit(s) Oral daily  finasteride 5 milliGRAM(s) Oral daily  pantoprazole Infusion 8 mG/Hr IV Continuous <Continuous>    PRN MEDICATIONS      VITAL SIGNS: Last 24 Hours  T(C): 36.2 (09 Mar 2021 05:46), Max: 37.3 (08 Mar 2021 20:33)  T(F): 97.2 (09 Mar 2021 05:46), Max: 99.2 (08 Mar 2021 20:33)  HR: 90 (08 Mar 2021 20:33) (90 - 110)  BP: 112/72 (08 Mar 2021 20:33) (112/72 - 126/72)  BP(mean): --  RR: 18 (08 Mar 2021 20:33) (18 - 18)  SpO2: 99% (09 Mar 2021 08:12) (97% - 99%)    LABS:                        7.6    12.77 )-----------( 213      ( 09 Mar 2021 06:06 )             22.6     03-08    139  |  110  |  76<HH>  ----------------------------<  116<H>  4.6   |  21  |  1.9<H>    Ca    8.9      08 Mar 2021 17:01  Mg     2.3     03-08    TPro  5.1<L>  /  Alb  3.5  /  TBili  0.4  /  DBili  x   /  AST  21  /  ALT  18  /  AlkPhos  43  03-08    PT/INR - ( 08 Mar 2021 17:01 )   PT: 12.10 sec;   INR: 1.05 ratio         PTT - ( 08 Mar 2021 12:47 )  PTT:24.1 sec      Lactate, Blood: 1.1 mmol/L (03-09-21 @ 06:06)          RADIOLOGY:      PHYSICAL EXAM:  CONSTITUTIONAL: No acute distress, well-developed, well-groomed, AAOx3  HEAD: Atraumatic, normocephalic  EYES: EOM intact, PERRLA, conjunctiva and sclera clear  ENT: Supple, no masses, no thyromegaly, no bruits, no JVD; moist mucous membranes  PULMONARY: Clear to auscultation bilaterally; no wheezes, rales, or rhonchi  CARDIOVASCULAR: Regular rate and rhythm; no murmurs, rubs, or gallops  GASTROINTESTINAL: Soft, non-tender, non-distended; bowel sounds present  MUSCULOSKELETAL: 2+ peripheral pulses; no clubbing, no cyanosis, no edema  NEUROLOGY: non-focal  SKIN: No rashes or lesions; warm and dry     TISHA GRANDA 88y Male  MRN#: [id number]   Hospital Day: 3d    SUBJECTIVE  Patient is a 88y old Male who presents with a chief complaint of presyncope  elevated INR (08 Mar 2021 11:58)  Currently admitted to medicine with the primary diagnosis of Near syncope      INTERVAL HPI AND OVERNIGHT EVENTS:  Patient was examined and seen at bedside. This morning he is resting comfortably in bed and reports no issues or overnight events.  no cp, sob, abd pain, fever  no abd pain, nausea, vomiting, diarrhea    REVIEW OF SYMPTOMS:  CONSTITUTIONAL: No weakness, fevers or chills; No headaches  EYES: No visual changes, eye pain, or discharge  ENT: No vertigo; No ear pain or change in hearing; No sore throat or difficulty swallowing  NECK: No pain or stiffness  RESPIRATORY: No cough, wheezing, or hemoptysis; No shortness of breath  CARDIOVASCULAR: No chest pain or palpitations  GASTROINTESTINAL: No abdominal or epigastric pain; No nausea, vomiting, or hematemesis; No diarrhea or constipation; No melena or hematochezia  GENITOURINARY: No dysuria, frequency or hematuria  MUSCULOSKELETAL: No joint pain, no muscle pain, no weakness  NEUROLOGICAL: No numbness or weakness  SKIN: No itching or rashes    OBJECTIVE  PAST MEDICAL & SURGICAL HISTORY  Afib    Spinal stenosis at L4-L5 level    H/O heart artery stent      ALLERGIES:  No Known Allergies    MEDICATIONS:  STANDING MEDICATIONS  cholecalciferol 1000 Unit(s) Oral daily  finasteride 5 milliGRAM(s) Oral daily  pantoprazole Infusion 8 mG/Hr IV Continuous <Continuous>    PRN MEDICATIONS      VITAL SIGNS: Last 24 Hours  T(C): 36.2 (09 Mar 2021 05:46), Max: 37.3 (08 Mar 2021 20:33)  T(F): 97.2 (09 Mar 2021 05:46), Max: 99.2 (08 Mar 2021 20:33)  HR: 90 (08 Mar 2021 20:33) (90 - 110)  BP: 112/72 (08 Mar 2021 20:33) (112/72 - 126/72)  BP(mean): --  RR: 18 (08 Mar 2021 20:33) (18 - 18)  SpO2: 99% (09 Mar 2021 08:12) (97% - 99%)    LABS:                        7.6    12.77 )-----------( 213      ( 09 Mar 2021 06:06 )             22.6     03-08    139  |  110  |  76<HH>  ----------------------------<  116<H>  4.6   |  21  |  1.9<H>    Ca    8.9      08 Mar 2021 17:01  Mg     2.3     03-08    TPro  5.1<L>  /  Alb  3.5  /  TBili  0.4  /  DBili  x   /  AST  21  /  ALT  18  /  AlkPhos  43  03-08    PT/INR - ( 08 Mar 2021 17:01 )   PT: 12.10 sec;   INR: 1.05 ratio         PTT - ( 08 Mar 2021 12:47 )  PTT:24.1 sec      Lactate, Blood: 1.1 mmol/L (03-09-21 @ 06:06)          RADIOLOGY:      PHYSICAL EXAM:  CONSTITUTIONAL: No acute distress, well-developed, well-groomed, AAOx3  HEAD: Atraumatic, normocephalic  EYES: EOM intact, PERRLA, conjunctiva and sclera clear  ENT: Supple, no masses, no thyromegaly, no bruits, no JVD; moist mucous membranes  PULMONARY: Clear to auscultation bilaterally; no wheezes, rales, or rhonchi  CARDIOVASCULAR: Regular rate and rhythm; no murmurs, rubs, or gallops  GASTROINTESTINAL: Soft, non-tender, non-distended; bowel sounds present  MUSCULOSKELETAL: 2+ peripheral pulses; no clubbing, no cyanosis, no edema  NEUROLOGY: non-focal  SKIN: No rashes or lesions; warm and dry

## 2021-03-09 NOTE — PROGRESS NOTE ADULT - ASSESSMENT
87yo M with PMHx CAD/stents, AFib on coumadin, HTN, HLD, spinal stenosis, prostate CA, Vit D def presents for eval s/p fall on 3/6/2020 with LEYDI on CKD    ·	CKD - creat 1.73 mg% from Dec 2020  (office records);  sp fall with buttock hematoma with anemia acute   ·	creat slightly better  ·	check UA and urine lytes, phos  ·	BP noted / no anti hypertensives   ·	Hb noted / check Fe studies / GI on case/ off anticoagulation ; tx if Hb <7  ·	start sodium bicarb 650 po q8h  ·	no need for sono - CT noted - bladder diverticula    will follow

## 2021-03-09 NOTE — PROGRESS NOTE ADULT - ASSESSMENT
IMPRESSION  - Symptomatic anemia s/p 3 prbc but H/h still dropping from yesterday to today - planned for egd/colono tomorrow.   - periop cardiac risk stratification  for egd/colono  - Hx cad, AF,  - Hx htn, dl, spinal stenosis, bladder ca  ----  - Revised Cardiac Risk Index (RCRI)  Class III risk (Two factors) --> 10.1% risk  - METS > 4      * IMPRESSION & RECOMMENDATIONS:  -  Patient clinically not in decompensated HF, no recent or active acs / significant pratik/tachyarrthmias / severe vhd.    -  METS > 4  -  Patient is at mod risk of chad-op MACE undergoing a Low risk surgery/procedure  -  No further cardiac work-up is needed at the moment. There are no current cardiac contraindications to prevent from proceeding with the scheduled surgery/procedure.    This consult serves only as a chad-operative cardiac risk stratification and evaluation to predict 30-days cardiac complications risk and mortality. The decision to proceed with the surgery/procedure is made by the performing physician and the patient -

## 2021-03-09 NOTE — PROGRESS NOTE ADULT - SUBJECTIVE AND OBJECTIVE BOX
Gastroenterology progress note:     Patient is a 88y old  Male who presents with a chief complaint of presyncope  elevated INR (09 Mar 2021 08:34)       Admitted on: 03-06-21    We are following the patient for melena     Interval History: patient had 1 melena last night, no abdominal pain, no nausea no vomiting     Patient's medical problems are stable    Prior records reviewed (Y/N): Y  History obtained from someone other than patient (Y/N): N      PAST MEDICAL & SURGICAL HISTORY:  Afib    Spinal stenosis at L4-L5 level    H/O heart artery stent        MEDICATIONS  (STANDING):  cholecalciferol 1000 Unit(s) Oral daily  finasteride 5 milliGRAM(s) Oral daily  pantoprazole Infusion 8 mG/Hr (10 mL/Hr) IV Continuous <Continuous>    MEDICATIONS  (PRN):      Allergies  No Known Allergies      Review of Systems:   General: no fever  HEENT: no hemoptysis  Cardiovascular:  No Chest Pain, No Palpitations  Respiratory:  No Cough, No Dyspnea  Gastrointestinal:  As described in HPI  Hematology: no bruising or hematoma   Neurology: no new motor deficit  Skin: no new rash    Physical Examination:  T(C): 35.9 (03-09-21 @ 12:26), Max: 37.3 (03-08-21 @ 20:33)  HR: 85 (03-09-21 @ 12:26) (85 - 90)  BP: 113/58 (03-09-21 @ 12:26) (112/72 - 113/58)  RR: 18 (03-09-21 @ 12:26) (18 - 18)  SpO2: 99% (03-09-21 @ 08:12) (97% - 99%)       Constitutional: No acute distress.  Head: normocephalic  Eyes: EOMI  Respiratory:  No signs of respiratory distress. Lung sounds are clear bilaterally.  Cardiovascular:  S1 S2, Regular rate and rhythm.  Abdominal: Abdomen is soft, symmetric, and non-tender without distention.    Extremities: no pitting edema  Skin: ecchymosis left hip      Data: (reviewed by attending)                        7.6    12.77 )-----------( 213      ( 09 Mar 2021 06:06 )             22.6     Hgb trend:  7.6  03-09-21 @ 06:06  8.7  03-08-21 @ 17:01  7.4  03-07-21 @ 16:00  6.7  03-07-21 @ 08:17  6.5  03-06-21 @ 21:07      03-06-21 @ 07:01  -  03-07-21 @ 07:00  --------------------------------------------------------  IN: 307 mL    03-07-21 @ 07:01  -  03-08-21 @ 07:00  --------------------------------------------------------  IN: 1 mL      03-09    144  |  115<H>  |  63<HH>  ----------------------------<  101<H>  4.1   |  21  |  1.9<H>    Ca    8.6      09 Mar 2021 06:06  Mg     2.2     03-09    TPro  4.4<L>  /  Alb  3.0<L>  /  TBili  0.6  /  DBili  x   /  AST  17  /  ALT  16  /  AlkPhos  38  03-09    Liver panel trend:  TBili 0.6   /   AST 17   /   ALT 16   /   AlkP 38   /   Tptn 4.4   /   Alb 3.0    /   DBili --      03-09  TBili 0.4   /   AST 21   /   ALT 18   /   AlkP 43   /   Tptn 5.1   /   Alb 3.5    /   DBili --      03-08  TBili 0.5   /   AST 23   /   ALT 14   /   AlkP 29   /   Tptn 4.8   /   Alb 3.3    /   DBili --      03-07  TBili 0.4   /   AST 16   /   ALT 14   /   AlkP 44   /   Tptn 5.3   /   Alb 3.7    /   DBili --      03-06      PT/INR - ( 08 Mar 2021 17:01 )   PT: 12.10 sec;   INR: 1.05 ratio         PTT - ( 08 Mar 2021 12:47 )  PTT:24.1 sec       Radiology: (reviewed by attending)    < from: CT Abdomen and Pelvis w/ IV Cont (03.06.21 @ 13:59) >    EXAM:  CT ABDOMEN AND PELVIS IC        EXAM:  CT CHEST IC            PROCEDURE DATE:  03/06/2021            INTERPRETATION:  CLINICAL HISTORY/REASON FOR EXAM: Trauma to chest, abdomen and pelvis. Fall. Coumadin.    TECHNIQUE: Contiguous axial CT images were obtained from the thoracic inlet to the pubic symphysis following administration of 100 cc Optiray 320 intravenous contrast. 0 cc contrast discarded.  Oral contrast was not administered. Reformatted images in the coronal and sagittal planes were acquired. 3D (MIP) images obtained.    COMPARISON: None.    FINDINGS:    CHEST:    LUNGS, PLEURA, AIRWAYS: No lobar consolidation, mass, effusion, or pneumothorax. No evidence of central endobronchial obstruction. No bronchiectasis or honeycombing. Centrilobular emphysema.    THORACIC NODES: No mediastinal, hilar, supraclavicular, or axillary lymphadenopathy.    MEDIASTINUM/GREAT VESSELS: No pericardial effusion. Heart size is within normal limits. The aorta and main pulmonary artery are of normal caliber. Aortic and coronary artery calcifications.    ABDOMEN/PELVIS:    HEPATOBILIARY: No evidence of liver injury. Gallbladder unremarkable. Subcentimeter right hepatic lobe hypodensity, too small to characterize.    SPLEEN: Splenic 5.6 cm cystic lesion not fully characterize.    PANCREAS: Unremarkable.    ADRENAL GLANDS: Unremarkable.    KIDNEYS: Symmetric pattern of renal enhancement. No hydronephrosis bilaterally.    ABDOMINOPELVIC NODES: No lymphadenopathy.    PELVIC ORGANS: Multiple urinary bladder diverticula measuring up to 1.3 cm. Mild diffuse thickening of posterior urinary bladder wall; correlate for chronic urinary bladder outlet obstruction. Limited evaluation of prostate gland on CT.    PERITONEUM/MESENTERY/BOWEL: No bowel obstruction. No ascites or pneumoperitoneum.    BONES/SOFT TISSUES: Degenerative change of thoracolumbar spine. Osteopenia. No evidence of acute osseous abnormality.    OTHER: Vascular calcifications.      IMPRESSION:    1. No evidence of acute traumatic intrathoracic or intra-abdominal pathology.    2. Multiple urinary bladder diverticula measuring up to 1.3 cm. Mild diffuse thickening of posterior urinary bladder wall; correlate     < end of copied text >

## 2021-03-09 NOTE — PROGRESS NOTE ADULT - ATTENDING COMMENTS
#Fall in setting of melena, concern for GIB  supratherapeutic inr, now resolved s/p vit k 7.5 total  anemia, s/p 2 units prbc  h/h improved, trend  protonix gtt  f/u gi for endoscopy  coumadin on hold, chadsvasc 4  #LEYDI vs. CKD, unknown baseline scr  f/u renal  trend scr  no hydro on ct

## 2021-03-09 NOTE — PHYSICAL THERAPY INITIAL EVALUATION ADULT - ADDITIONAL COMMENTS
Pt reported that he was Independent with ADLs and  transfer PTA. Pt continued that he ambulated Independently w/o AD in the house and used rollator walker outdoor.

## 2021-03-09 NOTE — PROGRESS NOTE ADULT - SUBJECTIVE AND OBJECTIVE BOX
Nephrology progress note    Patient is seen and examined, events over the last 24 h noted .  No complaints  Allergies:  No Known Allergies    Hospital Medications:   MEDICATIONS  (STANDING):  bisacodyl 20 milliGRAM(s) Oral at bedtime  cholecalciferol 1000 Unit(s) Oral daily  finasteride 5 milliGRAM(s) Oral daily  pantoprazole Infusion 8 mG/Hr (10 mL/Hr) IV Continuous <Continuous>  polyethylene glycol/electrolyte Solution. 4000 milliLiter(s) Oral once        VITALS:  T(F): 96.7 (21 @ 12:26), Max: 99.2 (21 @ 20:33)  HR: 85 (21 @ 12:26)  BP: 113/58 (21 @ 12:26)  RR: 18 (21 @ 12:26)  SpO2: 99% (21 @ 08:12)  Wt(kg): --     @ 07:01  -   @ 07:00  --------------------------------------------------------  IN: 1176 mL / OUT: 1950 mL / NET: -774 mL     @ 07:01  -   @ 07:00  --------------------------------------------------------  IN: 930 mL / OUT: 0 mL / NET: 930 mL     @ 07:01  -   @ 13:26  --------------------------------------------------------  IN: 30 mL / OUT: 1100 mL / NET: -1070 mL          PHYSICAL EXAM:  Constitutional: NAD  HEENT: anicteric sclera, oropharynx clear, MMM  Neck: No JVD  Respiratory: CTA  Cardiovascular: S1, S2, RRR  Gastrointestinal: BS+, soft, NT/ND  Extremities:  No peripheral edema  Neurological: A/O x 3  : No CVA tenderness. No lott.   Skin: No rashes  Vascular Access:    LABS:      144  |  115<H>  |  63<HH>  ----------------------------<  101<H>  4.1   |  21  |  1.9<H>    Ca    8.6      09 Mar 2021 06:06  Mg     2.2         TPro  4.4<L>  /  Alb  3.0<L>  /  TBili  0.6  /  DBili      /  AST  17  /  ALT  16  /  AlkPhos  38                            7.6    12.77 )-----------( 213      ( 09 Mar 2021 06:06 )             22.6       Urine Studies:  Urinalysis Basic - ( 06 Mar 2021 15:28 )    Color: Light Yellow / Appearance: Clear / S.030 / pH:   Gluc:  / Ketone: Negative  / Bili: Negative / Urobili: <2 mg/dL   Blood:  / Protein: Negative / Nitrite: Negative   Leuk Esterase: Negative / RBC:  / WBC    Sq Epi:  / Non Sq Epi:  / Bacteria:         RADIOLOGY & ADDITIONAL STUDIES:  < from: CT Abdomen and Pelvis w/ IV Cont (21 @ 13:59) >    KIDNEYS: Symmetric pattern of renal enhancement. No hydronephrosis bilaterally.    ABDOMINOPELVIC NODES: No lymphadenopathy.    PELVIC ORGANS: Multiple urinary bladder diverticula measuring up to 1.3 cm. Mild diffuse thickening of posterior urinary bladder wall; correlate for chronic urinary bladder outlet obstruction. Limited evaluation of prostate gland on CT.    PERITONEUM/MESENTERY/BOWEL: No bowel obstruction. No ascites or pneumoperitoneum.    BONES/SOFT TISSUES: Degenerative change of thoracolumbar spine. Osteopenia. No evidence of acute osseous abnormality.    OTHER: Vascular calcifications.      IMPRESSION:    1. No evidence of acute traumatic intrathoracic or intra-abdominal pathology.    2. Multiple urinary bladder diverticula measuring up to 1.3 cm. Mild diffuse thickening of posterior urinary bladder wall; correlate for chronic urinary bladder outlet obstruction.    < end of copied text >

## 2021-03-09 NOTE — PROGRESS NOTE ADULT - ASSESSMENT
89yo M with PMHx CAD/stents, AFib on coumadin, HTN, HLD, spinal stenosis, bladder CA ( no chemo or radiation as per patient), Vit D def presents for eval s/p fall. Pt states at 8am was getting out of bed to go to bathroom, felt lightheaded and fell forwards, landed on chest. Pt remembers events, denies LOC. Currently is not c/o any pain. Since fall, pt states still feeling lightheaded. in the ED Hb dropped from 7.6 to 6.5. INR 7.77      # Acute drop of hgb with melena: UGIB in the context of supratheraputic INR   - Patient had one episode of melanotic stool  yesterday  - hgb 7.8 on admission (had hgb 14 on 2017)--> 6.7 s/p 1UPRBC--> 7.4 received 2UPRBC--> 8.7 -->7.6  - INR 7.7 on admission s/p Vit K 2.5 mg twice --> INR 6.47--> 5mg Vit K--> 1.05  - VS stable  - No abdominal pain    Rec:  - Trend CBC, INR  - keep hgb > 8  - can switch PPI drip to PPI BID IV  - 2 large bore IV  - keep active type and screen  - patient declined endoscopic procedure at this time      -for questions call [phone] during weekdays till 5pm and call GI service after 5pm and on weekends [phone] 87yo M with PMHx CAD/stents, AFib on coumadin, HTN, HLD, spinal stenosis, bladder CA ( no chemo or radiation as per patient), Vit D def presents for eval s/p fall. Pt states at 8am was getting out of bed to go to bathroom, felt lightheaded and fell forwards, landed on chest. Pt remembers events, denies LOC. Currently is not c/o any pain. Since fall, pt states still feeling lightheaded. in the ED Hb dropped from 7.6 to 6.5. INR 7.77      # Acute drop of hgb with melena: UGIB in the context of supratheraputic INR   - Patient had one episode of melanotic stool  yesterday  - hgb 7.8 on admission (had hgb 14 on 2017)--> 6.7 s/p 1UPRBC--> 7.4 received 2UPRBC--> 8.7 -->7.6  - INR 7.7 on admission s/p Vit K 2.5 mg twice --> INR 6.47--> 5mg Vit K--> 1.05  - VS stable  - No abdominal pain    Rec:  - Trend CBC, INR  - keep hgb > 8 for endoscopic procedure  - can switch PPI drip to PPI BID IV  - 2 large bore IV  - keep active type and screen  - patient agreeable for endoscopic procedure at this time   - EGD and colonoscopy in am  -clear liquids today   -Golytely 4L at 4pm, Dulcolax 20mg at bedtime, NPO after midnight  -cardiac risk stratification     -for questions call [phone] during weekdays till 5pm and call GI service after 5pm and on weekends [phone]

## 2021-03-10 ENCOUNTER — RESULT REVIEW (OUTPATIENT)
Age: 86
End: 2021-03-10

## 2021-03-10 ENCOUNTER — TRANSCRIPTION ENCOUNTER (OUTPATIENT)
Age: 86
End: 2021-03-10

## 2021-03-10 LAB
ALBUMIN SERPL ELPH-MCNC: 3.1 G/DL — LOW (ref 3.5–5.2)
ALP SERPL-CCNC: 41 U/L — SIGNIFICANT CHANGE UP (ref 30–115)
ALT FLD-CCNC: 26 U/L — SIGNIFICANT CHANGE UP (ref 0–41)
ANION GAP SERPL CALC-SCNC: 9 MMOL/L — SIGNIFICANT CHANGE UP (ref 7–14)
AST SERPL-CCNC: 24 U/L — SIGNIFICANT CHANGE UP (ref 0–41)
BILIRUB SERPL-MCNC: 0.7 MG/DL — SIGNIFICANT CHANGE UP (ref 0.2–1.2)
BUN SERPL-MCNC: 39 MG/DL — HIGH (ref 10–20)
CALCIUM SERPL-MCNC: 8 MG/DL — LOW (ref 8.5–10.1)
CHLORIDE SERPL-SCNC: 111 MMOL/L — HIGH (ref 98–110)
CO2 SERPL-SCNC: 23 MMOL/L — SIGNIFICANT CHANGE UP (ref 17–32)
CREAT SERPL-MCNC: 1.5 MG/DL — SIGNIFICANT CHANGE UP (ref 0.7–1.5)
GLUCOSE SERPL-MCNC: 106 MG/DL — HIGH (ref 70–99)
HCT VFR BLD CALC: 24.8 % — LOW (ref 42–52)
HGB BLD-MCNC: 8.3 G/DL — LOW (ref 14–18)
INR BLD: 1.06 RATIO — SIGNIFICANT CHANGE UP (ref 0.65–1.3)
MAGNESIUM SERPL-MCNC: 2 MG/DL — SIGNIFICANT CHANGE UP (ref 1.8–2.4)
MCHC RBC-ENTMCNC: 30.3 PG — SIGNIFICANT CHANGE UP (ref 27–31)
MCHC RBC-ENTMCNC: 33.5 G/DL — SIGNIFICANT CHANGE UP (ref 32–37)
MCV RBC AUTO: 90.5 FL — SIGNIFICANT CHANGE UP (ref 80–94)
NRBC # BLD: 0 /100 WBCS — SIGNIFICANT CHANGE UP (ref 0–0)
PLATELET # BLD AUTO: 223 K/UL — SIGNIFICANT CHANGE UP (ref 130–400)
POTASSIUM SERPL-MCNC: 4.2 MMOL/L — SIGNIFICANT CHANGE UP (ref 3.5–5)
POTASSIUM SERPL-SCNC: 4.2 MMOL/L — SIGNIFICANT CHANGE UP (ref 3.5–5)
PROT SERPL-MCNC: 4.4 G/DL — LOW (ref 6–8)
PROTHROM AB SERPL-ACNC: 12.2 SEC — SIGNIFICANT CHANGE UP (ref 9.95–12.87)
RBC # BLD: 2.74 M/UL — LOW (ref 4.7–6.1)
RBC # FLD: 17.2 % — HIGH (ref 11.5–14.5)
SODIUM SERPL-SCNC: 143 MMOL/L — SIGNIFICANT CHANGE UP (ref 135–146)
WBC # BLD: 11.86 K/UL — HIGH (ref 4.8–10.8)
WBC # FLD AUTO: 11.86 K/UL — HIGH (ref 4.8–10.8)

## 2021-03-10 PROCEDURE — 93306 TTE W/DOPPLER COMPLETE: CPT | Mod: 26

## 2021-03-10 PROCEDURE — 88305 TISSUE EXAM BY PATHOLOGIST: CPT | Mod: 26

## 2021-03-10 PROCEDURE — 99232 SBSQ HOSP IP/OBS MODERATE 35: CPT

## 2021-03-10 PROCEDURE — 45385 COLONOSCOPY W/LESION REMOVAL: CPT | Mod: XS

## 2021-03-10 PROCEDURE — 99233 SBSQ HOSP IP/OBS HIGH 50: CPT

## 2021-03-10 PROCEDURE — 43239 EGD BIOPSY SINGLE/MULTIPLE: CPT

## 2021-03-10 PROCEDURE — 88312 SPECIAL STAINS GROUP 1: CPT | Mod: 26

## 2021-03-10 RX ORDER — SODIUM BICARBONATE 1 MEQ/ML
650 SYRINGE (ML) INTRAVENOUS EVERY 8 HOURS
Refills: 0 | Status: DISCONTINUED | OUTPATIENT
Start: 2021-03-10 | End: 2021-03-17

## 2021-03-10 RX ADMIN — Medication 1000 UNIT(S): at 14:04

## 2021-03-10 RX ADMIN — Medication 650 MILLIGRAM(S): at 14:05

## 2021-03-10 RX ADMIN — Medication 650 MILLIGRAM(S): at 21:41

## 2021-03-10 RX ADMIN — FINASTERIDE 5 MILLIGRAM(S): 5 TABLET, FILM COATED ORAL at 14:04

## 2021-03-10 RX ADMIN — PANTOPRAZOLE SODIUM 10 MG/HR: 20 TABLET, DELAYED RELEASE ORAL at 09:29

## 2021-03-10 NOTE — PROGRESS NOTE ADULT - ASSESSMENT
89yo M with PMHx CAD/stents, AFib on coumadin, HTN, HLD, spinal stenosis, presents for eval s/p fall this morning, presyncope and elevated INR now with melena?     #Fall likely due to orthostatic hypotension secondary to GIB  - Hg 8.7-->7.6   - symptomatic blood loss anemia due GIB  - BUN >100  - less likely cardiac event causing pre syncope, less likely neuro event  - dc tele monitoring  - f/u 2D echo, TSH, serial EKG  - f/u orthostatics   - Hold tamsulosin. avoid bb. hold antihypertensives  - GI eval:     - Monitor CBC    - keep hgb>8    - PPI BID IV    - cardiac eval noted( hold plavix and AC)--> non-urgent risk stratification for possible EGD next week when INR stabilized     # Elevated INR due to Coumadin Toxicity   - INR 1.05  - s/p Vit k 2x 2.5 + 5 (total 10mg)      # Anemia, normocytic   - hg dropping   - hb 7.8, in 2015 was 14  - possibly d/t CKD but given hb 7.8 will treat as possible GIB for now  - GI following pt    # LEYDI on CKD4   - Cr 2.3, noted in [facility] to be 1.7-2.0 in 2015  - Possibly new baseline due to progression vs pre renal due to GIB? will need PMD records.  - f/u urine lytes      # Leukocytosis   - no sign of infection, no fever, cough, urinary sx's.  - Trend WBC, likely reactive stress induced    #Elevated lactate   - LA 2.7  - likely dehydration  - s/p 1L NS in ER, s/p 1Liter in ER, avoid volume overload for now     # Afib on Coumadin  - not on rate control  - monitor HR for now as rate is controlled  - hold coumadin  as above.     # CAD s/p PCI w/ 3 stents - Hold Plavix. C/s Dr. Herman.     # Prostate CA - c/w finasteride. hold tamsulosin until orthostatic negative.     # HTN   - BP wnl. Hold Losartan 100mg, HCTZ 25mg. Hold Nifidipine 60mg.     # HLD   - Lipitor for pravastatin     # H/o spinal stenosis   - PT eval. op f.u     # Vit d defeciency   - c/w supplement     # DVT PPx - SCDs, hold all chemical ppx until INR wnl     # Activity - Increase as Tolerated w/ assist      # Dispo - Patient to be discharged when medically optimized.    # Code Status - FULL     87yo M with PMHx CAD/stents, AFib on coumadin, HTN, HLD, spinal stenosis, presents for eval s/p fall this morning, presyncope and elevated INR now with melena?     #Fall likely due to orthostatic hypotension secondary to GIB  - Hg 8.7-->7.6   - BUN >100  - less likely cardiac event causing pre syncope, less likely neuro event  - f/u 2D echo, TSH, serial EKG  - s/p 4 U PRBC during the admission   - Hold tamsulosin. avoid bb. hold antihypertensives  - GI eval:     - Monitor CBC    - keep hgb>8    - PPI BID IV    - cardiac eval noted( hold plavix and AC)--> non-urgent risk stratification for possible EGD next week when INR stabilized   - going for EGD and colonoscopy today    # Elevated INR due to Coumadin Toxicity   - INR 1.05  - s/p Vit k 2x 2.5 + 5 (total 10mg)      # Anemia, normocytic   - hg dropping   - hb 7.8, in 2015 was 14  - possibly d/t CKD but given hb 7.8 will treat as possible GIB for now  - GI following pt    # LEYDI on CKD4   - Cr 1.5, noted in [facility] to be 1.7-2.0 in 2015  - Possibly new baseline due to progression vs pre renal due to GIB? will need PMD records.  - f/u urine lytes      # Leukocytosis   - no sign of infection, no fever, cough, urinary sx's.  - Trend WBC, likely reactive stress induced    #Elevated lactate   - LA 2.7  - likely dehydration  - s/p 1L NS in ER, s/p 1Liter in ER, avoid volume overload for now     # Afib on Coumadin  - not on rate control  - monitor HR for now as rate is controlled  - hold coumadin  as above.     # CAD s/p PCI w/ 3 stents - Hold Plavix. C/s Dr. Herman.     # Prostate CA - c/w finasteride. hold tamsulosin until orthostatic negative.     # HTN   - BP wnl. Hold Losartan 100mg, HCTZ 25mg. Hold Nifidipine 60mg.     # HLD   - Lipitor for pravastatin     # H/o spinal stenosis   - PT eval. op f.u     # Vit d defeciency   - c/w supplement     # DVT PPx - SCDs, hold all chemical ppx until INR wnl     # Activity - Increase as Tolerated w/ assist      # Dispo - Patient to be discharged when medically optimized.    # Code Status - FULL

## 2021-03-10 NOTE — PRE-ANESTHESIA EVALUATION ADULT - NSANTHOSAYNRD_GEN_A_CORE
No. ZACK screening performed.  STOP BANG Legend: 0-2 = LOW Risk; 3-4 = INTERMEDIATE Risk; 5-8 = HIGH Risk

## 2021-03-10 NOTE — PRE-ANESTHESIA EVALUATION ADULT - RESPIRATORY RATE (BREATHS/MIN)
Patient given discharge instructions and removed from monitor. Patient's wife at bedside talking to PCP who would like him admitted, Dr. Hodges aware.   18

## 2021-03-10 NOTE — PROGRESS NOTE ADULT - SUBJECTIVE AND OBJECTIVE BOX
TISHA GRANDA 88y Male  MRN#: [id number]   Hospital Day: 4d    SUBJECTIVE  Patient is a 88y old Male who presents with a chief complaint of presyncope  elevated INR (09 Mar 2021 15:54)  Currently admitted to medicine with the primary diagnosis of Near syncope      INTERVAL HPI AND OVERNIGHT EVENTS:  Patient was examined and seen at bedside. This morning he is resting comfortably in bed and reports no issues or overnight events.    REVIEW OF SYMPTOMS:  CONSTITUTIONAL: No weakness, fevers or chills; No headaches  EYES: No visual changes, eye pain, or discharge  ENT: No vertigo; No ear pain or change in hearing; No sore throat or difficulty swallowing  NECK: No pain or stiffness  RESPIRATORY: No cough, wheezing, or hemoptysis; No shortness of breath  CARDIOVASCULAR: No chest pain or palpitations  GASTROINTESTINAL: No abdominal or epigastric pain; No nausea, vomiting, or hematemesis; No diarrhea or constipation; No melena or hematochezia  GENITOURINARY: No dysuria, frequency or hematuria  MUSCULOSKELETAL: No joint pain, no muscle pain, no weakness  NEUROLOGICAL: No numbness or weakness  SKIN: No itching or rashes    OBJECTIVE  PAST MEDICAL & SURGICAL HISTORY  Afib    Spinal stenosis at L4-L5 level    H/O heart artery stent      ALLERGIES:  No Known Allergies    MEDICATIONS:  STANDING MEDICATIONS  cholecalciferol 1000 Unit(s) Oral daily  finasteride 5 milliGRAM(s) Oral daily  pantoprazole Infusion 8 mG/Hr IV Continuous <Continuous>    PRN MEDICATIONS      VITAL SIGNS: Last 24 Hours  T(C): 36.4 (10 Mar 2021 05:11), Max: 36.4 (10 Mar 2021 05:11)  T(F): 97.6 (10 Mar 2021 05:11), Max: 97.6 (10 Mar 2021 05:11)  HR: 98 (09 Mar 2021 20:01) (85 - 98)  BP: 111/76 (09 Mar 2021 20:01) (111/76 - 113/58)  BP(mean): --  RR: 17 (09 Mar 2021 20:01) (17 - 18)  SpO2: 98% (09 Mar 2021 20:01) (98% - 99%)    LABS:                        8.8    14.36 )-----------( 233      ( 09 Mar 2021 21:51 )             26.4     03-09    139  |  107  |  47<H>  ----------------------------<  114<H>  4.3   |  23  |  1.5    Ca    8.2<L>      09 Mar 2021 21:51  Mg     2.0     03-09    TPro  4.9<L>  /  Alb  3.4<L>  /  TBili  1.0  /  DBili  x   /  AST  30  /  ALT  30  /  AlkPhos  42  03-09    PT/INR - ( 09 Mar 2021 21:51 )   PT: 12.40 sec;   INR: 1.08 ratio         PTT - ( 09 Mar 2021 21:51 )  PTT:24.9 sec              RADIOLOGY:      PHYSICAL EXAM:  CONSTITUTIONAL: No acute distress, well-developed, well-groomed, AAOx3  HEAD: Atraumatic, normocephalic  EYES: EOM intact, PERRLA, conjunctiva and sclera clear  ENT: Supple, no masses, no thyromegaly, no bruits, no JVD; moist mucous membranes  PULMONARY: Clear to auscultation bilaterally; no wheezes, rales, or rhonchi  CARDIOVASCULAR: Regular rate and rhythm; no murmurs, rubs, or gallops  GASTROINTESTINAL: Soft, non-tender, non-distended; bowel sounds present  MUSCULOSKELETAL: 2+ peripheral pulses; no clubbing, no cyanosis, no edema  NEUROLOGY: non-focal  SKIN: No rashes or lesions; warm and dry

## 2021-03-10 NOTE — PROGRESS NOTE ADULT - ATTENDING COMMENTS
Patient seen and examined    #Presyncope secondary to Symptomatic Anemia secondary to suspected GIB: Awaiting EGD and colonoscopy today, Hgb stable, s/p 2 units PRBCs transfusion     #AFIB on Coumadin, Coumadin Toxicity on admission, resolved, monitor INR, will discuss with Cardiology benefits/risk of continuing anticoagulation once bleeding source identified      #LEYDI on CKD III, improved, creatinine stable, Nephrology recommendations noted, f/u urine studies    #Leukocytosis likely reactive, downtrending, UA negative, patient remains afebrile     #CAD s/p PCI: Plavix on hold     #Prostate Cancer on finasteride    #HTN: antihypertensives on hold    #HLD: on statin     Disposition: Remains acute, Awaiting EGD and Colonoscopy Patient seen and examined    #Presyncope secondary to Symptomatic Anemia secondary to suspected GIB: Awaiting EGD and colonoscopy today, Hgb stable, s/p 2 units PRBCs transfusion     #Chronic AFIB on Coumadin, Coumadin Toxicity on admission, resolved, monitor INR, will discuss with Cardiology benefits/risk of continuing anticoagulation once bleeding source identified      #LEYDI on CKD III, improved, creatinine stable, Nephrology recommendations noted, f/u urine studies    #Leukocytosis likely reactive, downtrending, UA negative, patient remains afebrile     #CAD s/p PCI: Plavix on hold     #Prostate Cancer on finasteride    #HTN: antihypertensives on hold    #HLD: on statin     Disposition: Remains acute, Awaiting EGD and Colonoscopy

## 2021-03-11 LAB
ALBUMIN SERPL ELPH-MCNC: 3.1 G/DL — LOW (ref 3.5–5.2)
ALP SERPL-CCNC: 45 U/L — SIGNIFICANT CHANGE UP (ref 30–115)
ALT FLD-CCNC: 23 U/L — SIGNIFICANT CHANGE UP (ref 0–41)
ANION GAP SERPL CALC-SCNC: 9 MMOL/L — SIGNIFICANT CHANGE UP (ref 7–14)
AST SERPL-CCNC: 19 U/L — SIGNIFICANT CHANGE UP (ref 0–41)
BILIRUB SERPL-MCNC: 0.6 MG/DL — SIGNIFICANT CHANGE UP (ref 0.2–1.2)
BUN SERPL-MCNC: 31 MG/DL — HIGH (ref 10–20)
CALCIUM SERPL-MCNC: 7.7 MG/DL — LOW (ref 8.5–10.1)
CHLORIDE SERPL-SCNC: 111 MMOL/L — HIGH (ref 98–110)
CO2 SERPL-SCNC: 23 MMOL/L — SIGNIFICANT CHANGE UP (ref 17–32)
CREAT SERPL-MCNC: 1.5 MG/DL — SIGNIFICANT CHANGE UP (ref 0.7–1.5)
GLUCOSE SERPL-MCNC: 94 MG/DL — SIGNIFICANT CHANGE UP (ref 70–99)
HCT VFR BLD CALC: 23.7 % — LOW (ref 42–52)
HCT VFR BLD CALC: 24.5 % — LOW (ref 42–52)
HGB BLD-MCNC: 7.8 G/DL — LOW (ref 14–18)
HGB BLD-MCNC: 7.9 G/DL — LOW (ref 14–18)
MAGNESIUM SERPL-MCNC: 1.9 MG/DL — SIGNIFICANT CHANGE UP (ref 1.8–2.4)
MCHC RBC-ENTMCNC: 29.8 PG — SIGNIFICANT CHANGE UP (ref 27–31)
MCHC RBC-ENTMCNC: 30.2 PG — SIGNIFICANT CHANGE UP (ref 27–31)
MCHC RBC-ENTMCNC: 32.2 G/DL — SIGNIFICANT CHANGE UP (ref 32–37)
MCHC RBC-ENTMCNC: 32.9 G/DL — SIGNIFICANT CHANGE UP (ref 32–37)
MCV RBC AUTO: 91.9 FL — SIGNIFICANT CHANGE UP (ref 80–94)
MCV RBC AUTO: 92.5 FL — SIGNIFICANT CHANGE UP (ref 80–94)
NRBC # BLD: 0 /100 WBCS — SIGNIFICANT CHANGE UP (ref 0–0)
NRBC # BLD: 0 /100 WBCS — SIGNIFICANT CHANGE UP (ref 0–0)
PLATELET # BLD AUTO: 241 K/UL — SIGNIFICANT CHANGE UP (ref 130–400)
PLATELET # BLD AUTO: 258 K/UL — SIGNIFICANT CHANGE UP (ref 130–400)
POTASSIUM SERPL-MCNC: 3.9 MMOL/L — SIGNIFICANT CHANGE UP (ref 3.5–5)
POTASSIUM SERPL-SCNC: 3.9 MMOL/L — SIGNIFICANT CHANGE UP (ref 3.5–5)
PROT SERPL-MCNC: 4.3 G/DL — LOW (ref 6–8)
RBC # BLD: 2.58 M/UL — LOW (ref 4.7–6.1)
RBC # BLD: 2.65 M/UL — LOW (ref 4.7–6.1)
RBC # FLD: 17.5 % — HIGH (ref 11.5–14.5)
RBC # FLD: 17.7 % — HIGH (ref 11.5–14.5)
SODIUM SERPL-SCNC: 143 MMOL/L — SIGNIFICANT CHANGE UP (ref 135–146)
WBC # BLD: 12.2 K/UL — HIGH (ref 4.8–10.8)
WBC # BLD: 13.7 K/UL — HIGH (ref 4.8–10.8)
WBC # FLD AUTO: 12.2 K/UL — HIGH (ref 4.8–10.8)
WBC # FLD AUTO: 13.7 K/UL — HIGH (ref 4.8–10.8)

## 2021-03-11 PROCEDURE — 99233 SBSQ HOSP IP/OBS HIGH 50: CPT

## 2021-03-11 RX ORDER — ATORVASTATIN CALCIUM 80 MG/1
10 TABLET, FILM COATED ORAL AT BEDTIME
Refills: 0 | Status: DISCONTINUED | OUTPATIENT
Start: 2021-03-11 | End: 2021-03-17

## 2021-03-11 RX ORDER — ASPIRIN/CALCIUM CARB/MAGNESIUM 324 MG
81 TABLET ORAL DAILY
Refills: 0 | Status: DISCONTINUED | OUTPATIENT
Start: 2021-03-11 | End: 2021-03-17

## 2021-03-11 RX ADMIN — Medication 650 MILLIGRAM(S): at 22:01

## 2021-03-11 RX ADMIN — Medication 81 MILLIGRAM(S): at 12:25

## 2021-03-11 RX ADMIN — Medication 650 MILLIGRAM(S): at 05:03

## 2021-03-11 RX ADMIN — Medication 1000 UNIT(S): at 11:18

## 2021-03-11 RX ADMIN — FINASTERIDE 5 MILLIGRAM(S): 5 TABLET, FILM COATED ORAL at 11:18

## 2021-03-11 RX ADMIN — ATORVASTATIN CALCIUM 10 MILLIGRAM(S): 80 TABLET, FILM COATED ORAL at 22:01

## 2021-03-11 RX ADMIN — Medication 650 MILLIGRAM(S): at 14:47

## 2021-03-11 NOTE — PROGRESS NOTE ADULT - ASSESSMENT
87yo M with PMHx CAD/stents, AFib on coumadin, HTN, HLD, spinal stenosis, presents for eval s/p fall this morning, presyncope and elevated INR now with melena?     #Fall likely due to orthostatic hypotension secondary to GIB  - Hg 8.7-->7.6   - BUN >100  - less likely cardiac event causing pre syncope, less likely neuro event  - f/u 2D echo, TSH, serial EKG  - s/p 4 U PRBC during the admission   - Hold tamsulosin. avoid bb. hold antihypertensives  - GI eval:     - Monitor CBC    - keep hgb>8    - PPI BID IV  - s/p EGD and colonoscopy on 3/10: +gastritis and a polyp that was removed.       # Elevated INR due to Coumadin Toxicity   - INR 1.05  - s/p Vit k 2x 2.5 + 5 (total 10mg)      # Anemia, normocytic   - hg dropping   - hb 7.8, in 2015 was 14  - possibly d/t CKD but given hb 7.8 will treat as possible GIB for now  - GI following pt: EGD/colonoscopy showed gastritis and a polyp  - Hg continues to drop, need to find the source, will continue to monitor Hg, will follow up with GI    # LEYDI on CKD4   - Cr 1.5, noted in [facility] to be 1.7-2.0 in 2015  - Possibly new baseline due to progression vs pre renal due to GIB? will need PMD records.  - f/u urine lytes      # Leukocytosis   - no sign of infection, no fever, cough, urinary sx's.  - Trend WBC, likely reactive stress induced    #Elevated lactate (resolved)  - LA 2.7  - likely dehydration  - s/p 1L NS in ER, s/p 1Liter in ER  - avoid volume overload     # Afib on Coumadin  - not on rate control  - monitor HR for now as rate is controlled  - hold coumadin  as above.     # CAD s/p PCI w/ 3 stents - Hold Plavix. C/s Dr. Herman.     # Prostate CA - c/w finasteride. hold tamsulosin until orthostatic negative.     # HTN   - BP wnl. Hold Losartan 100mg, HCTZ 25mg. Hold Nifidipine 60mg.     # HLD   - Lipitor for pravastatin     # H/o spinal stenosis   - PT eval. op f.u     # Vit d defeciency   - c/w supplement     # DVT PPx - SCDs, hold all chemical ppx until INR wnl     # Activity - Increase as Tolerated w/ assist      # Dispo - Patient to be discharged when medically optimized.    # Code Status - FULL

## 2021-03-11 NOTE — PROGRESS NOTE ADULT - ATTENDING COMMENTS
89yo M with PMHx CAD/stents, AFib on coumadin, HTN, HLD, spinal stenosis, presents for eval s/p fall, presyncope and elevated INR now with c/f melanotic stools.    #Fall likely 2/2 orthostatic hypotension   #c/f GIB   #Acute Normocytic Anemia   - TTE w/ EF 62%, mild AS   - EKG Afib w/ RVR on adm- now rate controlled  - Trop neg, TSH WNL  - CTH and Cspine w/ chronic microvascular changes and multilevel cervical spondylosis   - s/p 4u PRBC this adm  - GI following:   EGD/Colonoscopy w/ gastritis - final report pending   - Holding anti-htn for now   - Will monitor hgb PM and AM, if not downtrending, then can consider outpt follow up. However if hgb continues to downtrend, may need to consider VCE/ Double Balloon Enteroscopy     #Supratherapeutic INR  #Chronic Atrial Fibrillation   -INR 7.77 on adm now 1.06  -s/p Vit k 2x 2.5 + 5 (total 10mg)    -Cardiology on consult: Will discuss risks and benefits of anticoagulation once source of bleeding is identified and treated; and decide on resuming AC or not or changing coumadin to NOAC    # LEYDI on CKD4- improved   - Cr 1.5, noted in [facility] to be 1.7-2.0 in 2015    # Leukocytosis - no s/s infection  # CAD s/p PCI w/ 3 stents - restart ASA 81mg, holding plavix. Cardiology following  # Prostate CA - c/w finasteride. Hold tamsulosin for now  # HTN - BP wnl. Hold Losartan/HCTZ/Nifedipine  # HLD- restart Lipitor   # H/o spinal stenosis- PT following  # Vit d defeciency- c/w cholecalciferol      # DVT PPx - holding coumadin for now due to c/f GIB    FULL CODE       I have reviewed the above Resident Documentation, my edits included in this addendum, remainder as above.       Elin Penny,

## 2021-03-11 NOTE — PROGRESS NOTE ADULT - SUBJECTIVE AND OBJECTIVE BOX
TISHA GRANDA 88y Male  MRN#: [id number]   Hospital Day: 5d    SUBJECTIVE  Patient is a 88y old Male who presents with a chief complaint of presyncope  elevated INR (10 Mar 2021 06:32)  Currently admitted to medicine with the primary diagnosis of Near syncope      INTERVAL HPI AND OVERNIGHT EVENTS:  Patient was examined and seen at bedside. This morning he is resting comfortably in bed and reports no issues or overnight events.    REVIEW OF SYMPTOMS:  CONSTITUTIONAL: No weakness, fevers or chills; No headaches  EYES: No visual changes, eye pain, or discharge  ENT: No vertigo; No ear pain or change in hearing; No sore throat or difficulty swallowing  NECK: No pain or stiffness  RESPIRATORY: No cough, wheezing, or hemoptysis; No shortness of breath  CARDIOVASCULAR: No chest pain or palpitations  GASTROINTESTINAL: No abdominal or epigastric pain; No nausea, vomiting, or hematemesis; No diarrhea or constipation; No melena or hematochezia  GENITOURINARY: No dysuria, frequency or hematuria  MUSCULOSKELETAL: No joint pain, no muscle pain, no weakness  NEUROLOGICAL: No numbness or weakness  SKIN: No itching or rashes    OBJECTIVE  PAST MEDICAL & SURGICAL HISTORY  Afib    Spinal stenosis at L4-L5 level    H/O heart artery stent      ALLERGIES:  No Known Allergies    MEDICATIONS:  STANDING MEDICATIONS  cholecalciferol 1000 Unit(s) Oral daily  finasteride 5 milliGRAM(s) Oral daily  sodium bicarbonate 650 milliGRAM(s) Oral every 8 hours    PRN MEDICATIONS      VITAL SIGNS: Last 24 Hours  T(C): 36.6 (11 Mar 2021 05:12), Max: 36.8 (10 Mar 2021 10:58)  T(F): 97.8 (11 Mar 2021 05:12), Max: 98 (10 Mar 2021 12:35)  HR: 85 (11 Mar 2021 05:12) (74 - 90)  BP: 103/59 (11 Mar 2021 05:12) (103/59 - 129/87)  BP(mean): --  RR: 18 (11 Mar 2021 05:12) (18 - 20)  SpO2: 100% (10 Mar 2021 12:50) (99% - 100%)    LABS:                        7.9    12.20 )-----------( 241      ( 11 Mar 2021 06:06 )             24.5     03-11    143  |  111<H>  |  31<H>  ----------------------------<  94  3.9   |  23  |  1.5    Ca    7.7<L>      11 Mar 2021 06:06  Mg     1.9     03-11    TPro  4.3<L>  /  Alb  3.1<L>  /  TBili  0.6  /  DBili  x   /  AST  19  /  ALT  23  /  AlkPhos  45  03-11    PT/INR - ( 10 Mar 2021 06:38 )   PT: 12.20 sec;   INR: 1.06 ratio         PTT - ( 09 Mar 2021 21:51 )  PTT:24.9 sec              RADIOLOGY:      PHYSICAL EXAM:  CONSTITUTIONAL: No acute distress, well-developed, well-groomed, AAOx3  HEAD: Atraumatic, normocephalic  EYES: EOM intact, PERRLA, conjunctiva and sclera clear  ENT: Supple, no masses, no thyromegaly, no bruits, no JVD; moist mucous membranes  PULMONARY: Clear to auscultation bilaterally; no wheezes, rales, or rhonchi  CARDIOVASCULAR: Regular rate and rhythm; no murmurs, rubs, or gallops  GASTROINTESTINAL: Soft, non-tender, non-distended; bowel sounds present  MUSCULOSKELETAL: 2+ peripheral pulses; no clubbing, no cyanosis, no edema  NEUROLOGY: non-focal  SKIN: No rashes or lesions; warm and dry     TISHA GRANDA 88y Male  MRN#: [id number]   Hospital Day: 5d    SUBJECTIVE  Patient is a 88y old Male who presents with a chief complaint of presyncope  elevated INR (10 Mar 2021 06:32)  Currently admitted to medicine with the primary diagnosis of Near syncope      INTERVAL HPI AND OVERNIGHT EVENTS:  Patient was examined and seen at bedside. This morning he is resting comfortably in bed and reports no issues or overnight events.    REVIEW OF SYMPTOMS:  CONSTITUTIONAL: No weakness, fevers or chills; No headaches  EYES: No visual changes, eye pain, or discharge  ENT: No vertigo; No ear pain or change in hearing; No sore throat or difficulty swallowing  NECK: No pain or stiffness  RESPIRATORY: No cough, wheezing, or hemoptysis; No shortness of breath  CARDIOVASCULAR: No chest pain or palpitations  GASTROINTESTINAL: No abdominal or epigastric pain; No nausea, vomiting, or hematemesis; No diarrhea or constipation; No melena or hematochezia  GENITOURINARY: No dysuria, frequency or hematuria  MUSCULOSKELETAL: No joint pain, no muscle pain, no weakness  NEUROLOGICAL: No numbness or weakness  SKIN: No itching or rashes    OBJECTIVE  PAST MEDICAL & SURGICAL HISTORY  Afib    Spinal stenosis at L4-L5 level    H/O heart artery stent      ALLERGIES:  No Known Allergies    MEDICATIONS:  STANDING MEDICATIONS  cholecalciferol 1000 Unit(s) Oral daily  finasteride 5 milliGRAM(s) Oral daily  sodium bicarbonate 650 milliGRAM(s) Oral every 8 hours    PRN MEDICATIONS      VITAL SIGNS: Last 24 Hours  T(C): 36.6 (11 Mar 2021 05:12), Max: 36.8 (10 Mar 2021 10:58)  T(F): 97.8 (11 Mar 2021 05:12), Max: 98 (10 Mar 2021 12:35)  HR: 85 (11 Mar 2021 05:12) (74 - 90)  BP: 103/59 (11 Mar 2021 05:12) (103/59 - 129/87)  BP(mean): --  RR: 18 (11 Mar 2021 05:12) (18 - 20)  SpO2: 100% (10 Mar 2021 12:50) (99% - 100%)    LABS:                        7.9    12.20 )-----------( 241      ( 11 Mar 2021 06:06 )             24.5     03-11    143  |  111<H>  |  31<H>  ----------------------------<  94  3.9   |  23  |  1.5    Ca    7.7<L>      11 Mar 2021 06:06  Mg     1.9     03-11    TPro  4.3<L>  /  Alb  3.1<L>  /  TBili  0.6  /  DBili  x   /  AST  19  /  ALT  23  /  AlkPhos  45  03-11    PT/INR - ( 10 Mar 2021 06:38 )   PT: 12.20 sec;   INR: 1.06 ratio         PTT - ( 09 Mar 2021 21:51 )  PTT:24.9 sec              RADIOLOGY:      PHYSICAL EXAM:  CONSTITUTIONAL: No acute distress, well-developed  HEAD: Atraumatic, normocephalic  EYES: EOM intact, PERRLA, conjunctiva and sclera clear  ENT: Supple, no masses  PULMONARY: Clear to auscultation bilaterally; no wheezes, rales, or rhonchi  CARDIOVASCULAR: Regular rate and rhythm; no murmurs, rubs, or gallops  GASTROINTESTINAL: Soft, non-tender, non-distended; bowel sounds present  MUSCULOSKELETAL: 2+ peripheral pulses; no clubbing, no cyanosis, no edema  NEUROLOGY: non-focal  SKIN: No rashes or lesions; warm and dry

## 2021-03-12 LAB
ALBUMIN SERPL ELPH-MCNC: 2.8 G/DL — LOW (ref 3.5–5.2)
ALP SERPL-CCNC: 43 U/L — SIGNIFICANT CHANGE UP (ref 30–115)
ALT FLD-CCNC: 17 U/L — SIGNIFICANT CHANGE UP (ref 0–41)
ANION GAP SERPL CALC-SCNC: 7 MMOL/L — SIGNIFICANT CHANGE UP (ref 7–14)
APTT BLD: 25.8 SEC — LOW (ref 27–39.2)
AST SERPL-CCNC: 15 U/L — SIGNIFICANT CHANGE UP (ref 0–41)
BILIRUB SERPL-MCNC: 0.6 MG/DL — SIGNIFICANT CHANGE UP (ref 0.2–1.2)
BLD GP AB SCN SERPL QL: SIGNIFICANT CHANGE UP
BLD GP AB SCN SERPL QL: SIGNIFICANT CHANGE UP
BUN SERPL-MCNC: 29 MG/DL — HIGH (ref 10–20)
CALCIUM SERPL-MCNC: 8 MG/DL — LOW (ref 8.5–10.1)
CHLORIDE SERPL-SCNC: 111 MMOL/L — HIGH (ref 98–110)
CO2 SERPL-SCNC: 24 MMOL/L — SIGNIFICANT CHANGE UP (ref 17–32)
CREAT SERPL-MCNC: 1.5 MG/DL — SIGNIFICANT CHANGE UP (ref 0.7–1.5)
GLUCOSE SERPL-MCNC: 100 MG/DL — HIGH (ref 70–99)
HCT VFR BLD CALC: 23.7 % — LOW (ref 42–52)
HCT VFR BLD CALC: 23.9 % — LOW (ref 42–52)
HCT VFR BLD CALC: 26.6 % — LOW (ref 42–52)
HGB BLD-MCNC: 7.7 G/DL — LOW (ref 14–18)
HGB BLD-MCNC: 7.7 G/DL — LOW (ref 14–18)
HGB BLD-MCNC: 8.9 G/DL — LOW (ref 14–18)
INR BLD: 0.98 RATIO — SIGNIFICANT CHANGE UP (ref 0.65–1.3)
INR BLD: 1.04 RATIO — SIGNIFICANT CHANGE UP (ref 0.65–1.3)
MAGNESIUM SERPL-MCNC: 2 MG/DL — SIGNIFICANT CHANGE UP (ref 1.8–2.4)
MCHC RBC-ENTMCNC: 30 PG — SIGNIFICANT CHANGE UP (ref 27–31)
MCHC RBC-ENTMCNC: 30.3 PG — SIGNIFICANT CHANGE UP (ref 27–31)
MCHC RBC-ENTMCNC: 31.1 PG — HIGH (ref 27–31)
MCHC RBC-ENTMCNC: 32.2 G/DL — SIGNIFICANT CHANGE UP (ref 32–37)
MCHC RBC-ENTMCNC: 32.5 G/DL — SIGNIFICANT CHANGE UP (ref 32–37)
MCHC RBC-ENTMCNC: 33.5 G/DL — SIGNIFICANT CHANGE UP (ref 32–37)
MCV RBC AUTO: 93 FL — SIGNIFICANT CHANGE UP (ref 80–94)
MCV RBC AUTO: 93 FL — SIGNIFICANT CHANGE UP (ref 80–94)
MCV RBC AUTO: 93.3 FL — SIGNIFICANT CHANGE UP (ref 80–94)
NRBC # BLD: 0 /100 WBCS — SIGNIFICANT CHANGE UP (ref 0–0)
PLATELET # BLD AUTO: 239 K/UL — SIGNIFICANT CHANGE UP (ref 130–400)
PLATELET # BLD AUTO: 246 K/UL — SIGNIFICANT CHANGE UP (ref 130–400)
PLATELET # BLD AUTO: 272 K/UL — SIGNIFICANT CHANGE UP (ref 130–400)
POTASSIUM SERPL-MCNC: 3.8 MMOL/L — SIGNIFICANT CHANGE UP (ref 3.5–5)
POTASSIUM SERPL-SCNC: 3.8 MMOL/L — SIGNIFICANT CHANGE UP (ref 3.5–5)
PROT SERPL-MCNC: 4.3 G/DL — LOW (ref 6–8)
PROTHROM AB SERPL-ACNC: 11.3 SEC — SIGNIFICANT CHANGE UP (ref 9.95–12.87)
PROTHROM AB SERPL-ACNC: 12 SEC — SIGNIFICANT CHANGE UP (ref 9.95–12.87)
RBC # BLD: 2.54 M/UL — LOW (ref 4.7–6.1)
RBC # BLD: 2.57 M/UL — LOW (ref 4.7–6.1)
RBC # BLD: 2.86 M/UL — LOW (ref 4.7–6.1)
RBC # FLD: 16.7 % — HIGH (ref 11.5–14.5)
RBC # FLD: 17.3 % — HIGH (ref 11.5–14.5)
RBC # FLD: 17.4 % — HIGH (ref 11.5–14.5)
SODIUM SERPL-SCNC: 142 MMOL/L — SIGNIFICANT CHANGE UP (ref 135–146)
SURGICAL PATHOLOGY STUDY: SIGNIFICANT CHANGE UP
SURGICAL PATHOLOGY STUDY: SIGNIFICANT CHANGE UP
WBC # BLD: 11.25 K/UL — HIGH (ref 4.8–10.8)
WBC # BLD: 11.27 K/UL — HIGH (ref 4.8–10.8)
WBC # BLD: 13.07 K/UL — HIGH (ref 4.8–10.8)
WBC # FLD AUTO: 11.25 K/UL — HIGH (ref 4.8–10.8)
WBC # FLD AUTO: 11.27 K/UL — HIGH (ref 4.8–10.8)
WBC # FLD AUTO: 13.07 K/UL — HIGH (ref 4.8–10.8)

## 2021-03-12 PROCEDURE — 99232 SBSQ HOSP IP/OBS MODERATE 35: CPT

## 2021-03-12 PROCEDURE — 99233 SBSQ HOSP IP/OBS HIGH 50: CPT

## 2021-03-12 RX ORDER — WARFARIN SODIUM 2.5 MG/1
5 TABLET ORAL ONCE
Refills: 0 | Status: DISCONTINUED | OUTPATIENT
Start: 2021-03-12 | End: 2021-03-12

## 2021-03-12 RX ORDER — WARFARIN SODIUM 2.5 MG/1
2.5 TABLET ORAL ONCE
Refills: 0 | Status: COMPLETED | OUTPATIENT
Start: 2021-03-12 | End: 2021-03-12

## 2021-03-12 RX ADMIN — FINASTERIDE 5 MILLIGRAM(S): 5 TABLET, FILM COATED ORAL at 11:27

## 2021-03-12 RX ADMIN — Medication 1000 UNIT(S): at 11:27

## 2021-03-12 RX ADMIN — Medication 650 MILLIGRAM(S): at 22:33

## 2021-03-12 RX ADMIN — Medication 650 MILLIGRAM(S): at 13:11

## 2021-03-12 RX ADMIN — ATORVASTATIN CALCIUM 10 MILLIGRAM(S): 80 TABLET, FILM COATED ORAL at 22:33

## 2021-03-12 RX ADMIN — Medication 81 MILLIGRAM(S): at 11:27

## 2021-03-12 RX ADMIN — Medication 650 MILLIGRAM(S): at 06:06

## 2021-03-12 RX ADMIN — WARFARIN SODIUM 2.5 MILLIGRAM(S): 2.5 TABLET ORAL at 22:33

## 2021-03-12 NOTE — PROGRESS NOTE ADULT - ATTENDING COMMENTS
Patient seen and examined independently. Agree with resident note.  # Supratherapeutic INR--now 1.04 s/p vit K  # A fib on coumadin  # Anemia with GI bleed s/p 5units of PRBC-- egd showed-- nonerosive gastritis and colonoscopy showed polyps 1cm and 5 mm removed and biopsied  currently GI wants to resume her coumadin and if he rebleeds will get capsule endoscopy on monday.   Remains here on weekend.

## 2021-03-12 NOTE — PROGRESS NOTE ADULT - ATTENDING COMMENTS
please resume coumadin. as pt is planned to be in the hospital over the weekend awaiting INR to be therapeutic, will plan for VCE on Monday

## 2021-03-12 NOTE — PROGRESS NOTE ADULT - ASSESSMENT
89yo M with PMHx CAD/stents, AFib on coumadin, HTN, HLD, spinal stenosis, bladder CA ( no chemo or radiation as per patient), Vit D def presents for eval s/p fall. Pt states at 8am was getting out of bed to go to bathroom, felt lightheaded and fell forwards, landed on chest. Pt remembers events, denies LOC. Currently is not c/o any pain. Since fall, pt states still feeling lightheaded. in the ED Hb dropped from 7.6 to 6.5. INR 7.77      # Acute drop of hgb with melena: UGIB in the context of supratheraputic INR   - s/p EGD/colonoscopy on 3/10--> (see above for full results)        EGD: Non-erosive gastritis       colonoscopy: diverticulosis, 2 polyps s/p polypectomy + internal hemorrhoid  - hgb 7.8 on admission (had hgb 14 on 2017)--> hgb: 7.7 for the past 2 days  - INR 7.7 on admission s/p Vit K 2.5 mg twice --> INR 6.47--> 5mg Vit K--> 1.06  - patient was not started on coumadin   - VS stable  - No abdominal pain  - No signs of gross GI bleed    Rec:  - Advance diet as tolerated  - Await pathology results  - Monitor CBC  - transfuse as needed  - Can resume coumadin from GI standpoint with close monitoring of INR and Coumadin dose   - Patient can not be discharged over the weekend because of nontherapeutic INR, so will plan for VCE (Video capsule endoscopy) inpatient on Monday  - Please clear liquid diet on Sunday  - 2L golytely start at 4PM on Sunday and finish by midnight  - dulcolax 20 mg PO once on 10 pm on Sunday  - NPO after midnight on Sunday for Monday VCE  - COVID PCR was sent on Friday  - Please notify GI if any signs of gross GI bleed    #Personal history of colonic polyp on recent colonoscopy:  Recommend repeat colonoscopy in 5 years for screening purposes, pending health condition at that time      -call as needed, [phone] during weekdays till 5pm and call GI service after 5pm and on weekends [phone]  -Follow up with our [facility] located at [address] [place] [address]. Phone Number: [phone]

## 2021-03-12 NOTE — PROGRESS NOTE ADULT - SUBJECTIVE AND OBJECTIVE BOX
TISHA GRANDA 88y Male  MRN#: [id number]   Hospital Day: 6d    SUBJECTIVE  Patient is a 88y old Male who presents with a chief complaint of presyncope  elevated INR (11 Mar 2021 10:27)  Currently admitted to medicine with the primary diagnosis of Near syncope      INTERVAL HPI AND OVERNIGHT EVENTS:  Patient was examined and seen at bedside. This morning he is resting comfortably in bed and reports no issues or overnight events.    REVIEW OF SYMPTOMS:  CONSTITUTIONAL: No weakness, fevers or chills; No headaches  EYES: No visual changes, eye pain, or discharge  ENT: No vertigo; No ear pain or change in hearing; No sore throat or difficulty swallowing  NECK: No pain or stiffness  RESPIRATORY: No cough, wheezing, or hemoptysis; No shortness of breath  CARDIOVASCULAR: No chest pain or palpitations  GASTROINTESTINAL: No abdominal or epigastric pain; No nausea, vomiting, or hematemesis; No diarrhea or constipation; No melena or hematochezia  GENITOURINARY: No dysuria, frequency or hematuria  MUSCULOSKELETAL: No joint pain, no muscle pain, no weakness  NEUROLOGICAL: No numbness or weakness  SKIN: No itching or rashes    OBJECTIVE  PAST MEDICAL & SURGICAL HISTORY  Afib    Spinal stenosis at L4-L5 level    H/O heart artery stent      ALLERGIES:  No Known Allergies    MEDICATIONS:  STANDING MEDICATIONS  aspirin  chewable 81 milliGRAM(s) Oral daily  atorvastatin 10 milliGRAM(s) Oral at bedtime  cholecalciferol 1000 Unit(s) Oral daily  finasteride 5 milliGRAM(s) Oral daily  sodium bicarbonate 650 milliGRAM(s) Oral every 8 hours    PRN MEDICATIONS      VITAL SIGNS: Last 24 Hours  T(C): 36.4 (12 Mar 2021 05:14), Max: 37.3 (11 Mar 2021 21:19)  T(F): 97.6 (12 Mar 2021 05:14), Max: 99.1 (11 Mar 2021 21:19)  HR: 82 (12 Mar 2021 05:14) (81 - 87)  BP: 108/63 (12 Mar 2021 05:14) (108/58 - 117/69)  BP(mean): --  RR: 18 (12 Mar 2021 05:14) (16 - 18)  SpO2: 98% (12 Mar 2021 05:14) (97% - 98%)    LABS:                        7.7    11.25 )-----------( 246      ( 12 Mar 2021 05:49 )             23.7     03-12    142  |  111<H>  |  29<H>  ----------------------------<  100<H>  3.8   |  24  |  1.5    Ca    8.0<L>      12 Mar 2021 05:49  Mg     2.0     03-12    TPro  4.3<L>  /  Alb  2.8<L>  /  TBili  0.6  /  DBili  x   /  AST  15  /  ALT  17  /  AlkPhos  43  03-12                  RADIOLOGY:      PHYSICAL EXAM:  CONSTITUTIONAL: No acute distress, well-developed, well-groomed, AAOx3  HEAD: Atraumatic, normocephalic  EYES: EOM intact, PERRLA, conjunctiva and sclera clear  ENT: Supple, no masses, no thyromegaly, no bruits, no JVD; moist mucous membranes  PULMONARY: Clear to auscultation bilaterally; no wheezes, rales, or rhonchi  CARDIOVASCULAR: Regular rate and rhythm; no murmurs, rubs, or gallops  GASTROINTESTINAL: Soft, non-tender, non-distended; bowel sounds present  MUSCULOSKELETAL: 2+ peripheral pulses; no clubbing, no cyanosis, no edema  NEUROLOGY: non-focal  SKIN: No rashes or lesions; warm and dry

## 2021-03-12 NOTE — PROGRESS NOTE ADULT - SUBJECTIVE AND OBJECTIVE BOX
Gastroenterology progress note:     Patient is a 88y old  Male who presents with a chief complaint of presyncope  elevated INR (12 Mar 2021 10:38)       Admitted on: 03-06-21    We are following the patient for: patient denies abdominal pain     Interval History:  Can tolerate PO intake, No abdominal pain, 1 BM the day after endoscopy which was brown. No Bm after that      PAST MEDICAL & SURGICAL HISTORY:  Afib    Spinal stenosis at L4-L5 level    H/O heart artery stent        MEDICATIONS  (STANDING):  aspirin  chewable 81 milliGRAM(s) Oral daily  atorvastatin 10 milliGRAM(s) Oral at bedtime  cholecalciferol 1000 Unit(s) Oral daily  finasteride 5 milliGRAM(s) Oral daily  sodium bicarbonate 650 milliGRAM(s) Oral every 8 hours  warfarin 2.5 milliGRAM(s) Oral once    MEDICATIONS  (PRN):      Allergies  No Known Allergies      Review of Systems:   Constitutional: Ne Fever, No chills, No weakness  ENT: No visual changes, No throat pain  Cardiovascular:  No Chest Pain, No Palpitations  Respiratory:  No Cough, No Dyspnea  Gastrointestinal:  As described in HPI  Neurological: No numbness or weakness  Skin: No rash, no itching    Physical Examination:  T(C): 36.4 (03-12-21 @ 05:14), Max: 37.3 (03-11-21 @ 21:19)  HR: 82 (03-12-21 @ 05:14) (81 - 87)  BP: 108/63 (03-12-21 @ 05:14) (108/58 - 117/69)  RR: 18 (03-12-21 @ 05:14) (16 - 18)  SpO2: 98% (03-12-21 @ 05:14) (97% - 98%)      03-11-21 @ 07:01  -  03-12-21 @ 07:00  --------------------------------------------------------  IN: 240 mL / OUT: 2000 mL / NET: -1760 mL    03-12-21 @ 07:01  -  03-12-21 @ 12:17  --------------------------------------------------------  IN: 656 mL / OUT: 0 mL / NET: 656 mL      Constitutional: No acute distress.  Respiratory:  No signs of respiratory distress. Lung sounds are clear bilaterally.  Cardiovascular:  S1 S2, Regular rate and rhythm.  Abdominal: Abdomen is soft, symmetric, and non-tender without distention. There are no visible lesions. Bowel sounds are present and normoactive in all four quadrants. No masses, hepatomegaly, or splenomegaly are noted.   Skin: No rashes, No Jaundice.  Neurology: AAOX3, Non-focal  Skin: No rash, No excoriation  Vascular: No varicose vein, No cyanosis, No edema        Data: (reviewed by attending)                        7.7    11.25 )-----------( 246      ( 12 Mar 2021 05:49 )             23.7     Hgb trend:  7.7  03-12-21 @ 05:49  7.7  03-11-21 @ 23:30  7.8  03-11-21 @ 20:00  7.9  03-11-21 @ 06:06  8.3  03-10-21 @ 06:38  8.8  03-09-21 @ 21:51        03-12    142  |  111<H>  |  29<H>  ----------------------------<  100<H>  3.8   |  24  |  1.5    Ca    8.0<L>      12 Mar 2021 05:49  Mg     2.0     03-12    TPro  4.3<L>  /  Alb  2.8<L>  /  TBili  0.6  /  DBili  x   /  AST  15  /  ALT  17  /  AlkPhos  43  03-12    Liver panel trend:  TBili 0.6   /   AST 15   /   ALT 17   /   AlkP 43   /   Tptn 4.3   /   Alb 2.8    /   DBili --      03-12  TBili 0.6   /   AST 19   /   ALT 23   /   AlkP 45   /   Tptn 4.3   /   Alb 3.1    /   DBili --      03-11  TBili 0.7   /   AST 24   /   ALT 26   /   AlkP 41   /   Tptn 4.4   /   Alb 3.1    /   DBili --      03-10  TBili 1.0   /   AST 30   /   ALT 30   /   AlkP 42   /   Tptn 4.9   /   Alb 3.4    /   DBili --      03-09  TBili 0.6   /   AST 17   /   ALT 16   /   AlkP 38   /   Tptn 4.4   /   Alb 3.0    /   DBili --      03-09  TBili 0.4   /   AST 21   /   ALT 18   /   AlkP 43   /   Tptn 5.1   /   Alb 3.5    /   DBili --      03-08  TBili 0.5   /   AST 23   /   ALT 14   /   AlkP 29   /   Tptn 4.8   /   Alb 3.3    /   DBili --      03-07  TBili 0.4   /   AST 16   /   ALT 14   /   AlkP 44   /   Tptn 5.3   /   Alb 3.7    /   DBili --      03-06        < from: EGD-Colonoscopy (03.10.21 @ 13:30) >  EGD Impressions:    Normal mucosa in the whole esophagus.    Ring in the gastroesophageal junction.    Erythemain the stomach compatible with non-erosive gastritis. (Biopsy).    Erythema in the duodenum compatible with duodenitis. (Biopsy).    Prominent fold noted at pylorus. (Biopsy).       Colonoscopy Impressions:    Moderate diverticulosis of the whole colon.    Polyp (1 cm) in the transverse colon. (Polypectomy).    Polyp (5 mm) in the transverse colon. (Polypectomy).    Internal hemorrhoids.    Lipoma in the ascending colon.           < end of copied text >

## 2021-03-13 LAB
ALBUMIN SERPL ELPH-MCNC: 3.4 G/DL — LOW (ref 3.5–5.2)
ALP SERPL-CCNC: 54 U/L — SIGNIFICANT CHANGE UP (ref 30–115)
ALT FLD-CCNC: 20 U/L — SIGNIFICANT CHANGE UP (ref 0–41)
ANION GAP SERPL CALC-SCNC: 11 MMOL/L — SIGNIFICANT CHANGE UP (ref 7–14)
APTT BLD: 27 SEC — SIGNIFICANT CHANGE UP (ref 27–39.2)
AST SERPL-CCNC: 16 U/L — SIGNIFICANT CHANGE UP (ref 0–41)
BILIRUB SERPL-MCNC: 0.9 MG/DL — SIGNIFICANT CHANGE UP (ref 0.2–1.2)
BUN SERPL-MCNC: 33 MG/DL — HIGH (ref 10–20)
CALCIUM SERPL-MCNC: 8.3 MG/DL — LOW (ref 8.5–10.1)
CHLORIDE SERPL-SCNC: 109 MMOL/L — SIGNIFICANT CHANGE UP (ref 98–110)
CO2 SERPL-SCNC: 22 MMOL/L — SIGNIFICANT CHANGE UP (ref 17–32)
CREAT SERPL-MCNC: 1.6 MG/DL — HIGH (ref 0.7–1.5)
GLUCOSE SERPL-MCNC: 96 MG/DL — SIGNIFICANT CHANGE UP (ref 70–99)
HCT VFR BLD CALC: 30.5 % — LOW (ref 42–52)
HGB BLD-MCNC: 9.9 G/DL — LOW (ref 14–18)
INR BLD: 1.03 RATIO — SIGNIFICANT CHANGE UP (ref 0.65–1.3)
MAGNESIUM SERPL-MCNC: 2 MG/DL — SIGNIFICANT CHANGE UP (ref 1.8–2.4)
MCHC RBC-ENTMCNC: 30.5 PG — SIGNIFICANT CHANGE UP (ref 27–31)
MCHC RBC-ENTMCNC: 32.5 G/DL — SIGNIFICANT CHANGE UP (ref 32–37)
MCV RBC AUTO: 93.8 FL — SIGNIFICANT CHANGE UP (ref 80–94)
NRBC # BLD: 0 /100 WBCS — SIGNIFICANT CHANGE UP (ref 0–0)
PLATELET # BLD AUTO: 299 K/UL — SIGNIFICANT CHANGE UP (ref 130–400)
POTASSIUM SERPL-MCNC: 4.4 MMOL/L — SIGNIFICANT CHANGE UP (ref 3.5–5)
POTASSIUM SERPL-SCNC: 4.4 MMOL/L — SIGNIFICANT CHANGE UP (ref 3.5–5)
PROT SERPL-MCNC: 5.1 G/DL — LOW (ref 6–8)
PROTHROM AB SERPL-ACNC: 11.8 SEC — SIGNIFICANT CHANGE UP (ref 9.95–12.87)
RBC # BLD: 3.25 M/UL — LOW (ref 4.7–6.1)
RBC # FLD: 16.9 % — HIGH (ref 11.5–14.5)
SARS-COV-2 RNA SPEC QL NAA+PROBE: SIGNIFICANT CHANGE UP
SODIUM SERPL-SCNC: 142 MMOL/L — SIGNIFICANT CHANGE UP (ref 135–146)
WBC # BLD: 11.56 K/UL — HIGH (ref 4.8–10.8)
WBC # FLD AUTO: 11.56 K/UL — HIGH (ref 4.8–10.8)

## 2021-03-13 PROCEDURE — 99233 SBSQ HOSP IP/OBS HIGH 50: CPT

## 2021-03-13 RX ORDER — WARFARIN SODIUM 2.5 MG/1
2.5 TABLET ORAL ONCE
Refills: 0 | Status: COMPLETED | OUTPATIENT
Start: 2021-03-13 | End: 2021-03-13

## 2021-03-13 RX ADMIN — ATORVASTATIN CALCIUM 10 MILLIGRAM(S): 80 TABLET, FILM COATED ORAL at 21:56

## 2021-03-13 RX ADMIN — WARFARIN SODIUM 2.5 MILLIGRAM(S): 2.5 TABLET ORAL at 21:56

## 2021-03-13 RX ADMIN — Medication 1000 UNIT(S): at 11:02

## 2021-03-13 RX ADMIN — Medication 650 MILLIGRAM(S): at 14:13

## 2021-03-13 RX ADMIN — Medication 81 MILLIGRAM(S): at 11:02

## 2021-03-13 RX ADMIN — Medication 650 MILLIGRAM(S): at 21:56

## 2021-03-13 RX ADMIN — FINASTERIDE 5 MILLIGRAM(S): 5 TABLET, FILM COATED ORAL at 11:02

## 2021-03-13 RX ADMIN — Medication 650 MILLIGRAM(S): at 05:07

## 2021-03-13 NOTE — DIETITIAN INITIAL EVALUATION ADULT. - OTHER INFO
Dx: 89y/o male with pmhx noted above presented with dizziness, fall, presyncope and elevated INR. Found to have melena, orthostatic hypotension from acute anemia, acute blood loss anemia with possible GI bleed (Hgb was 6.7 s/p total 5 RBCs, today Hgb is now 9.9). S/p EGD and colonoscopy (3/10), results show gastritis and a polyp that was removed. A capsule camera is scheduled for (3/15). Noted to have LEYDI on Stage III CKD. Skin is intact (Ac Score-21).

## 2021-03-13 NOTE — PROGRESS NOTE ADULT - ASSESSMENT
87yo M with PMHx CAD/stents, AFib on coumadin, HTN, HLD, spinal stenosis, presents for eval s/p fall this morning, presyncope and elevated INR now with melena?     #Fall likely due to orthostatic hypotension secondary to GIB  - Hg 8.7-->7.6   - BUN >100  - less likely cardiac event causing pre syncope, less likely neuro event  - f/u 2D echo, TSH, serial EKG  - s/p 4 U PRBC during the admission   - Hold tamsulosin. avoid bb. hold antihypertensives  - GI eval:     - Monitor CBC    - keep hgb>8    - PPI BID IV  - s/p EGD and colonoscopy on 3/10: +gastritis and a polyp that was removed.       # Elevated INR due to Coumadin Toxicity   - INR 1.05  - s/p Vit k 2x 2.5 + 5 (total 10mg)      # Anemia, normocytic   - hg dropping   - hb 7.8, in 2015 was 14  - possibly d/t CKD but given hb 7.8 will treat as possible GIB for now  - GI following pt: EGD/colonoscopy showed gastritis and a polyp  - Hg continues to drop, need to find the source, will continue to monitor Hg, will follow up with GI    # LEYDI on CKD4   - Cr 1.5, noted in [facility] to be 1.7-2.0 in 2015  - Possibly new baseline due to progression vs pre renal due to GIB? will need PMD records.  - f/u urine lytes      # Leukocytosis   - no sign of infection, no fever, cough, urinary sx's.  - Trend WBC, likely reactive stress induced    #Elevated lactate (resolved)  - LA 2.7  - likely dehydration  - s/p 1L NS in ER, s/p 1Liter in ER  - avoid volume overload     # Afib on Coumadin  - not on rate control  - monitor HR for now as rate is controlled  - hold coumadin  as above.     # CAD s/p PCI w/ 3 stents - Hold Plavix. C/s Dr. Herman.     # Prostate CA - c/w finasteride. hold tamsulosin until orthostatic negative.     # HTN   - BP wnl. Hold Losartan 100mg, HCTZ 25mg. Hold Nifidipine 60mg.     # HLD   - Lipitor for pravastatin     # H/o spinal stenosis   - PT eval. op f.u     # Vit d defeciency   - c/w supplement     # DVT PPx - SCDs, hold all chemical ppx until INR wnl     # Activity - Increase as Tolerated w/ assist      # Dispo - Patient to be discharged when medically optimized.    # Code Status - FULL    87yo M with PMHx CAD/stents, AFib on coumadin, HTN, HLD, spinal stenosis, presents for eval s/p fall this morning, presyncope and elevated INR now with melena?     #Fall likely due to orthostatic hypotension secondary to GIB  - Hg 9.9  - s/p 4 U PRBC during the admission   - Hold tamsulosin. avoid bb. hold antihypertensives  - GI eval:     - Monitor CBC    - keep hgb>8    - PPI BID IV    - s/p EGD and colonoscopy on 3/10: +gastritis and a polyp that was removed.     - VCE on monday: NPO after midnight Sunday night,     - Can resume coumadin from GI standpoint with close monitoring of INR and Coumadin dose (2.5 for now)    - Patient can not be discharged over the weekend because of nontherapeutic INR, so will plan for VCE (Video capsule endoscopy) inpatient on Monday    - Please clear liquid diet on Sunday    - 2L golytely start at 4PM on Sunday and finish by midnight    - dulcolax 20 mg PO once on 10 pm on Sunday    - NPO after midnight on Sunday for Monday VCE    - COVID PCR was sent on Friday    - Please notify GI if any signs of gross GI bleed    # Elevated INR due to Coumadin Toxicity   - INR 1.05  - s/p Vit k 2x 2.5 + 5 (total 10mg)      # Anemia, normocytic   - hg dropping   - hb 9.9, in 2015 was 14  - possibly d/t CKD but given hb 7.8 will treat as possible GIB for now  - GI following pt: EGD/colonoscopy showed gastritis and a polyp  - Hg continues to drop, need to find the source, will continue to monitor Hg, will follow up with GI    # LEYDI on CKD4   - Cr 1.5, noted in [facility] to be 1.7-2.0 in 2015  - Possibly new baseline due to progression vs pre renal due to GIB? will need PMD records.  - f/u urine lytes      # Leukocytosis   - no sign of infection, no fever, cough, urinary sx's.  - Trend WBC, likely reactive stress induced    #Elevated lactate (resolved)  - LA 2.7  - likely dehydration  - s/p 1L NS in ER, s/p 1Liter in ER  - avoid volume overload     # Afib on Coumadin  - not on rate control  - monitor HR for now as rate is controlled  - hold coumadin  as above.     # CAD s/p PCI w/ 3 stents - Hold Plavix. C/s Dr. Herman.     # Prostate CA - c/w finasteride. hold tamsulosin until orthostatic negative.     # HTN   - BP wnl. Hold Losartan 100mg, HCTZ 25mg. Hold Nifidipine 60mg.     # HLD   - Lipitor for pravastatin     # H/o spinal stenosis   - PT eval. op f.u     # Vit d defeciency   - c/w supplement     # DVT PPx - SCDs, hold all chemical ppx until INR wnl     # Activity - Increase as Tolerated w/ assist      # Dispo - Patient to be discharged when medically optimized.    # Code Status - FULL

## 2021-03-13 NOTE — DIETITIAN INITIAL EVALUATION ADULT. - FACTORS AFF FOOD INTAKE
The patient reports consuming >75% of meals w/o chewing and swallowing difficulty. The patient c/o constipation for >3 days.

## 2021-03-13 NOTE — PROGRESS NOTE ADULT - ASSESSMENT
87yo M with PMHx CAD/stents, AFib on coumadin, HTN, HLD, spinal stenosis, presents for eval s/p fall this morning, presyncope and elevated INR now with melena?     A/P:   1. Dizziness and fall  2. Orthostatic Hypotension from acute anemia. Holding HCTZ and BP meds.   3. Acute blood loss anemia: possible GI bleeding.   Hb was 6.7 s/p total 5 RBCs, today 9.9  EGD and colonoscopy on 3/10: +gastritis and a polyp that was removed.   Plan for Capsule camera on Monday.     Chronic Atrial Fibrillation:   Rate is controlled, GI is ok to resume Coumadin, close monitoring for any active bleeding.     LEYDI on CKD stage 3:   Cr improved 1.6 from 2.3 (around baseline).   Holding Losartan and HCTZ    CAD s/p PCI w/ 3 stents - Hold Plavix. C/s Dr. Herman.     Prostate CA: Continue finasteride. Hold tamsulosin until orthostatic negative.     HTN: BP medications on hold (Losartan 100mg, HCTZ 25mg and Nifedipine 60mg).     Vit d defeciency: Continue Supplement.     Code Status - FULL

## 2021-03-13 NOTE — PROGRESS NOTE ADULT - SUBJECTIVE AND OBJECTIVE BOX
TISHA GRANDA  88y  Male      Patient is a 88y old  Male who presents with a chief complaint of presyncope  elevated INR (13 Mar 2021 10:37)      INTERVAL HPI/OVERNIGHT EVENTS:  He feels ok, no new complaints.   Vital Signs Last 24 Hrs  T(C): 36.1 (13 Mar 2021 04:15), Max: 36.8 (12 Mar 2021 20:12)  T(F): 97 (13 Mar 2021 04:15), Max: 98.2 (12 Mar 2021 20:12)  HR: 75 (13 Mar 2021 04:15) (69 - 75)  BP: 134/71 (13 Mar 2021 04:15) (107/56 - 134/71)  BP(mean): --  RR: 18 (12 Mar 2021 20:12) (18 - 18)  SpO2: --      03-12-21 @ 07:01  -  03-13-21 @ 07:00  --------------------------------------------------------  IN: 1660 mL / OUT: 750 mL / NET: 910 mL    03-13-21 @ 06:01  -  03-13-21 @ 13:10  --------------------------------------------------------  IN: 300 mL / OUT: 0 mL / NET: 300 mL            Consultant(s) Notes Reviewed:  [x ] YES  [ ] NO          MEDICATIONS  (STANDING):  aspirin  chewable 81 milliGRAM(s) Oral daily  atorvastatin 10 milliGRAM(s) Oral at bedtime  cholecalciferol 1000 Unit(s) Oral daily  finasteride 5 milliGRAM(s) Oral daily  sodium bicarbonate 650 milliGRAM(s) Oral every 8 hours  warfarin 2.5 milliGRAM(s) Oral once    MEDICATIONS  (PRN):      LABS                          9.9    11.56 )-----------( 299      ( 13 Mar 2021 06:32 )             30.5     03-13    142  |  109  |  33<H>  ----------------------------<  96  4.4   |  22  |  1.6<H>    Ca    8.3<L>      13 Mar 2021 06:32  Mg     2.0     03-13    TPro  5.1<L>  /  Alb  3.4<L>  /  TBili  0.9  /  DBili  x   /  AST  16  /  ALT  20  /  AlkPhos  54  03-13        PT/INR - ( 13 Mar 2021 06:32 )   PT: 11.80 sec;   INR: 1.03 ratio         PTT - ( 13 Mar 2021 06:32 )  PTT:27.0 sec  Lactate Trend  03-09 @ 06:06 Lactate:1.1         CAPILLARY BLOOD GLUCOSE            RADIOLOGY & ADDITIONAL TESTS:    Imaging Personally Reviewed:  [ ] YES  [ ] NO    HEALTH ISSUES - PROBLEM Dx:          PHYSICAL EXAM:  GENERAL: NAD, well-developed.  HEAD:  Atraumatic, Normocephalic.  EYES: EOMI, PERRLA, conjunctiva and sclera clear.  NECK: Supple, No JVD.  CHEST/LUNG: Clear to auscultation bilaterally; No wheeze.  HEART: Irregular rate and rhythm; S1 S2.   ABDOMEN: Soft, Nontender, Nondistended; Bowel sounds present.  EXTREMITIES:  2+ Peripheral Pulses, No clubbing, cyanosis, or edema.  PSYCH: AAOx3.  NEUROLOGY: non-focal.  SKIN: No rashes or lesions.

## 2021-03-13 NOTE — PROGRESS NOTE ADULT - SUBJECTIVE AND OBJECTIVE BOX
TISHA GRANDA 88y Male  MRN#: [id number]   Hospital Day: 7d    SUBJECTIVE  Patient is a 88y old Male who presents with a chief complaint of presyncope  elevated INR (12 Mar 2021 12:17)  Currently admitted to medicine with the primary diagnosis of Near syncope      INTERVAL HPI AND OVERNIGHT EVENTS:  Patient was examined and seen at bedside. This morning he is resting comfortably in bed and reports no issues or overnight events.    REVIEW OF SYMPTOMS:  CONSTITUTIONAL: No weakness, fevers or chills; No headaches  EYES: No visual changes, eye pain, or discharge  ENT: No vertigo; No ear pain or change in hearing; No sore throat or difficulty swallowing  NECK: No pain or stiffness  RESPIRATORY: No cough, wheezing, or hemoptysis; No shortness of breath  CARDIOVASCULAR: No chest pain or palpitations  GASTROINTESTINAL: No abdominal or epigastric pain; No nausea, vomiting, or hematemesis; No diarrhea or constipation; No melena or hematochezia  GENITOURINARY: No dysuria, frequency or hematuria  MUSCULOSKELETAL: No joint pain, no muscle pain, no weakness  NEUROLOGICAL: No numbness or weakness  SKIN: No itching or rashes    OBJECTIVE  PAST MEDICAL & SURGICAL HISTORY  Afib    Spinal stenosis at L4-L5 level    H/O heart artery stent      ALLERGIES:  No Known Allergies    MEDICATIONS:  STANDING MEDICATIONS  aspirin  chewable 81 milliGRAM(s) Oral daily  atorvastatin 10 milliGRAM(s) Oral at bedtime  cholecalciferol 1000 Unit(s) Oral daily  finasteride 5 milliGRAM(s) Oral daily  sodium bicarbonate 650 milliGRAM(s) Oral every 8 hours    PRN MEDICATIONS      VITAL SIGNS: Last 24 Hours  T(C): 36.1 (13 Mar 2021 04:15), Max: 36.9 (12 Mar 2021 12:10)  T(F): 97 (13 Mar 2021 04:15), Max: 98.5 (12 Mar 2021 12:10)  HR: 75 (13 Mar 2021 04:15) (69 - 99)  BP: 134/71 (13 Mar 2021 04:15) (107/56 - 134/71)  BP(mean): --  RR: 18 (12 Mar 2021 20:12) (16 - 18)  SpO2: 98% (12 Mar 2021 12:25) (98% - 99%)    LABS:                        9.9    11.56 )-----------( 299      ( 13 Mar 2021 06:32 )             30.5     03-13    142  |  109  |  33<H>  ----------------------------<  96  4.4   |  22  |  1.6<H>    Ca    8.3<L>      13 Mar 2021 06:32  Mg     2.0     03-13    TPro  5.1<L>  /  Alb  3.4<L>  /  TBili  0.9  /  DBili  x   /  AST  16  /  ALT  20  /  AlkPhos  54  03-13    PT/INR - ( 13 Mar 2021 06:32 )   PT: 11.80 sec;   INR: 1.03 ratio         PTT - ( 13 Mar 2021 06:32 )  PTT:27.0 sec              RADIOLOGY:      PHYSICAL EXAM:  CONSTITUTIONAL: No acute distress, well-developed, well-groomed, AAOx3  HEAD: Atraumatic, normocephalic  EYES: EOM intact, PERRLA, conjunctiva and sclera clear  ENT: Supple, no masses, no thyromegaly, no bruits, no JVD; moist mucous membranes  PULMONARY: Clear to auscultation bilaterally; no wheezes, rales, or rhonchi  CARDIOVASCULAR: Regular rate and rhythm; no murmurs, rubs, or gallops  GASTROINTESTINAL: Soft, non-tender, non-distended; bowel sounds present  MUSCULOSKELETAL: 2+ peripheral pulses; no clubbing, no cyanosis, no edema  NEUROLOGY: non-focal  SKIN: No rashes or lesions; warm and dry

## 2021-03-13 NOTE — DIETITIAN INITIAL EVALUATION ADULT. - OTHER CALCULATIONS
Estimated Calorie Needs: [id number] x AF 1-1.7=5100=7919isrq/day -Due to obesity;  Estimated Protein Needs: 58-73grams/day (0.8-1grams/kg of IBW-73kg) -Due to CKD;  Estimated Fluid Needs: 1575-1733mL/day (1mL/kcal)

## 2021-03-13 NOTE — DIETITIAN INITIAL EVALUATION ADULT. - RD TO REMAIN AVAILABLE
Intervention: 1.Meals and Snacks    Monitor/Evaluate: Diet order, energy intake, nutrition focused physical findings, renal and anemia profile    Goals: 1.Continue to consume/tolerate 75%-100% of meals in 7 days/yes

## 2021-03-13 NOTE — DIETITIAN INITIAL EVALUATION ADULT. - PERTINENT LABORATORY DATA
(3/13) Na-142, K-4.4, CL-109, BUN-33, Cr-1.6, GFR-38, Glucose-96mg/dL, Corrected Ca-8.8 (WNL), H/H-9.9/30.5, WBC-11.56

## 2021-03-13 NOTE — DIETITIAN INITIAL EVALUATION ADULT. - ORAL INTAKE PTA/DIET HISTORY
The patient reports following a regular diet at home; consume two meal at 100%; took Vitamin D3 supplement daily.

## 2021-03-14 LAB
ALBUMIN SERPL ELPH-MCNC: 3.1 G/DL — LOW (ref 3.5–5.2)
ALP SERPL-CCNC: 48 U/L — SIGNIFICANT CHANGE UP (ref 30–115)
ALT FLD-CCNC: 17 U/L — SIGNIFICANT CHANGE UP (ref 0–41)
ANION GAP SERPL CALC-SCNC: 8 MMOL/L — SIGNIFICANT CHANGE UP (ref 7–14)
AST SERPL-CCNC: 15 U/L — SIGNIFICANT CHANGE UP (ref 0–41)
BILIRUB SERPL-MCNC: 0.6 MG/DL — SIGNIFICANT CHANGE UP (ref 0.2–1.2)
BUN SERPL-MCNC: 30 MG/DL — HIGH (ref 10–20)
CALCIUM SERPL-MCNC: 8.6 MG/DL — SIGNIFICANT CHANGE UP (ref 8.5–10.1)
CHLORIDE SERPL-SCNC: 108 MMOL/L — SIGNIFICANT CHANGE UP (ref 98–110)
CO2 SERPL-SCNC: 25 MMOL/L — SIGNIFICANT CHANGE UP (ref 17–32)
CREAT SERPL-MCNC: 1.5 MG/DL — SIGNIFICANT CHANGE UP (ref 0.7–1.5)
GLUCOSE SERPL-MCNC: 93 MG/DL — SIGNIFICANT CHANGE UP (ref 70–99)
HCT VFR BLD CALC: 27.7 % — LOW (ref 42–52)
HGB BLD-MCNC: 8.9 G/DL — LOW (ref 14–18)
INR BLD: 1.05 RATIO — SIGNIFICANT CHANGE UP (ref 0.65–1.3)
MAGNESIUM SERPL-MCNC: 2 MG/DL — SIGNIFICANT CHANGE UP (ref 1.8–2.4)
MCHC RBC-ENTMCNC: 30.5 PG — SIGNIFICANT CHANGE UP (ref 27–31)
MCHC RBC-ENTMCNC: 32.1 G/DL — SIGNIFICANT CHANGE UP (ref 32–37)
MCV RBC AUTO: 94.9 FL — HIGH (ref 80–94)
NRBC # BLD: 0 /100 WBCS — SIGNIFICANT CHANGE UP (ref 0–0)
PLATELET # BLD AUTO: 298 K/UL — SIGNIFICANT CHANGE UP (ref 130–400)
POTASSIUM SERPL-MCNC: 4.7 MMOL/L — SIGNIFICANT CHANGE UP (ref 3.5–5)
POTASSIUM SERPL-SCNC: 4.7 MMOL/L — SIGNIFICANT CHANGE UP (ref 3.5–5)
PROT SERPL-MCNC: 4.6 G/DL — LOW (ref 6–8)
PROTHROM AB SERPL-ACNC: 12.1 SEC — SIGNIFICANT CHANGE UP (ref 9.95–12.87)
RBC # BLD: 2.92 M/UL — LOW (ref 4.7–6.1)
RBC # FLD: 16.9 % — HIGH (ref 11.5–14.5)
SODIUM SERPL-SCNC: 141 MMOL/L — SIGNIFICANT CHANGE UP (ref 135–146)
WBC # BLD: 9.34 K/UL — SIGNIFICANT CHANGE UP (ref 4.8–10.8)
WBC # FLD AUTO: 9.34 K/UL — SIGNIFICANT CHANGE UP (ref 4.8–10.8)

## 2021-03-14 PROCEDURE — 99233 SBSQ HOSP IP/OBS HIGH 50: CPT

## 2021-03-14 RX ORDER — SOD SULF/SODIUM/NAHCO3/KCL/PEG
4000 SOLUTION, RECONSTITUTED, ORAL ORAL ONCE
Refills: 0 | Status: COMPLETED | OUTPATIENT
Start: 2021-03-14 | End: 2021-03-14

## 2021-03-14 RX ORDER — WARFARIN SODIUM 2.5 MG/1
2.5 TABLET ORAL AT BEDTIME
Refills: 0 | Status: DISCONTINUED | OUTPATIENT
Start: 2021-03-14 | End: 2021-03-15

## 2021-03-14 RX ADMIN — Medication 650 MILLIGRAM(S): at 21:43

## 2021-03-14 RX ADMIN — WARFARIN SODIUM 2.5 MILLIGRAM(S): 2.5 TABLET ORAL at 21:44

## 2021-03-14 RX ADMIN — Medication 1000 UNIT(S): at 12:26

## 2021-03-14 RX ADMIN — FINASTERIDE 5 MILLIGRAM(S): 5 TABLET, FILM COATED ORAL at 12:26

## 2021-03-14 RX ADMIN — ATORVASTATIN CALCIUM 10 MILLIGRAM(S): 80 TABLET, FILM COATED ORAL at 21:44

## 2021-03-14 RX ADMIN — Medication 81 MILLIGRAM(S): at 12:26

## 2021-03-14 RX ADMIN — Medication 650 MILLIGRAM(S): at 06:26

## 2021-03-14 RX ADMIN — Medication 20 MILLIGRAM(S): at 21:43

## 2021-03-14 RX ADMIN — Medication 650 MILLIGRAM(S): at 13:12

## 2021-03-14 RX ADMIN — Medication 4000 MILLILITER(S): at 16:16

## 2021-03-14 NOTE — PROGRESS NOTE ADULT - SUBJECTIVE AND OBJECTIVE BOX
ERNESTINA GRANDAAN  88y  Male      Patient is a 88y old  Male who presents with a chief complaint of presyncope  elevated INR (13 Mar 2021 22:38)      INTERVAL HPI/OVERNIGHT EVENTS:  He feels ok, no melena or hematochezia.   Vital Signs Last 24 Hrs  T(C): 35.9 (14 Mar 2021 08:54), Max: 36.8 (13 Mar 2021 20:36)  T(F): 96.7 (14 Mar 2021 08:54), Max: 98.3 (13 Mar 2021 20:36)  HR: 86 (14 Mar 2021 08:54) (62 - 86)  BP: 133/77 (14 Mar 2021 08:54) (133/77 - 145/79)  BP(mean): --  RR: 18 (14 Mar 2021 08:54) (18 - 18)  SpO2: 96% (13 Mar 2021 20:36) (96% - 97%)      03-13-21 @ 06:01  -  03-14-21 @ 07:00  --------------------------------------------------------  IN: 820 mL / OUT: 0 mL / NET: 820 mL            Consultant(s) Notes Reviewed:  [x ] YES  [ ] NO          MEDICATIONS  (STANDING):  aspirin  chewable 81 milliGRAM(s) Oral daily  atorvastatin 10 milliGRAM(s) Oral at bedtime  bisacodyl 20 milliGRAM(s) Oral once  cholecalciferol 1000 Unit(s) Oral daily  finasteride 5 milliGRAM(s) Oral daily  polyethylene glycol/electrolyte Solution. 4000 milliLiter(s) Oral once  sodium bicarbonate 650 milliGRAM(s) Oral every 8 hours  warfarin 2.5 milliGRAM(s) Oral at bedtime    MEDICATIONS  (PRN):      LABS                          8.9    9.34  )-----------( 298      ( 14 Mar 2021 06:07 )             27.7     03-14    141  |  108  |  30<H>  ----------------------------<  93  4.7   |  25  |  1.5    Ca    8.6      14 Mar 2021 06:07  Mg     2.0     03-14    TPro  4.6<L>  /  Alb  3.1<L>  /  TBili  0.6  /  DBili  x   /  AST  15  /  ALT  17  /  AlkPhos  48  03-14        PT/INR - ( 14 Mar 2021 06:07 )   PT: 12.10 sec;   INR: 1.05 ratio         PTT - ( 13 Mar 2021 06:32 )  PTT:27.0 sec  Lactate Trend        CAPILLARY BLOOD GLUCOSE            RADIOLOGY & ADDITIONAL TESTS:    Imaging Personally Reviewed:  [ ] YES  [ ] NO    HEALTH ISSUES - PROBLEM Dx:          PHYSICAL EXAM:  GENERAL: NAD, well-developed.  HEAD:  Atraumatic, Normocephalic.  EYES: EOMI, PERRLA, conjunctiva and sclera clear.  NECK: Supple, No JVD.  CHEST/LUNG: Clear to auscultation bilaterally; No wheeze.  HEART: Irregular rate and rhythm; S1 S2.   ABDOMEN: Soft, Nontender, Nondistended; Bowel sounds present.  EXTREMITIES:  2+ Peripheral Pulses, No clubbing, cyanosis, or edema.  PSYCH: AAOx3.  NEUROLOGY: non-focal.  SKIN: No rashes or lesions.

## 2021-03-14 NOTE — PROGRESS NOTE ADULT - ASSESSMENT
89yo M with PMHx CAD/stents, AFib on coumadin, HTN, HLD, spinal stenosis, presents for eval s/p fall this morning, presyncope and elevated INR now with melena?     A/P:   1. Dizziness and fall  2. Orthostatic Hypotension from acute anemia. Holding HCTZ and BP meds.   3. Acute blood loss anemia: possible GI bleeding.   Hb was 6.7 s/p total 5 RBCs, today 8.9  EGD and colonoscopy on 3/10: +gastritis and a polyp that was removed.   Plan for Capsule camera on Monday.     Chronic Atrial Fibrillation:   Rate is controlled, GI is ok to resume Coumadin, close monitoring for any active bleeding.     LEYDI on CKD stage 3:   Cr improved 1.5 from 2.3 (around baseline).   Holding Losartan and HCTZ    CAD s/p PCI w/ 3 stents - Hold Plavix. C/s Dr. Herman.     Prostate CA: Continue finasteride. Hold tamsulosin until orthostatic negative.     HTN: BP medications on hold (Losartan 100mg, HCTZ 25mg and Nifedipine 60mg).     Vit D deficiency: Continue Supplement.     Code Status - FULL     #Progress Note Handoff:  Pending (specify):  capsule camera on Monday.   Family discussion: with patient.   Disposition: Home.

## 2021-03-15 LAB
ALBUMIN SERPL ELPH-MCNC: 3.3 G/DL — LOW (ref 3.5–5.2)
ALP SERPL-CCNC: 46 U/L — SIGNIFICANT CHANGE UP (ref 30–115)
ALT FLD-CCNC: 15 U/L — SIGNIFICANT CHANGE UP (ref 0–41)
ANION GAP SERPL CALC-SCNC: 8 MMOL/L — SIGNIFICANT CHANGE UP (ref 7–14)
AST SERPL-CCNC: 15 U/L — SIGNIFICANT CHANGE UP (ref 0–41)
BILIRUB SERPL-MCNC: 0.6 MG/DL — SIGNIFICANT CHANGE UP (ref 0.2–1.2)
BUN SERPL-MCNC: 22 MG/DL — HIGH (ref 10–20)
CALCIUM SERPL-MCNC: 8.4 MG/DL — LOW (ref 8.5–10.1)
CHLORIDE SERPL-SCNC: 107 MMOL/L — SIGNIFICANT CHANGE UP (ref 98–110)
CO2 SERPL-SCNC: 26 MMOL/L — SIGNIFICANT CHANGE UP (ref 17–32)
CREAT SERPL-MCNC: 1.4 MG/DL — SIGNIFICANT CHANGE UP (ref 0.7–1.5)
GLUCOSE SERPL-MCNC: 92 MG/DL — SIGNIFICANT CHANGE UP (ref 70–99)
HCT VFR BLD CALC: 29.2 % — LOW (ref 42–52)
HGB BLD-MCNC: 9.3 G/DL — LOW (ref 14–18)
INR BLD: 1.1 RATIO — SIGNIFICANT CHANGE UP (ref 0.65–1.3)
MAGNESIUM SERPL-MCNC: 1.9 MG/DL — SIGNIFICANT CHANGE UP (ref 1.8–2.4)
MCHC RBC-ENTMCNC: 30.2 PG — SIGNIFICANT CHANGE UP (ref 27–31)
MCHC RBC-ENTMCNC: 31.8 G/DL — LOW (ref 32–37)
MCV RBC AUTO: 94.8 FL — HIGH (ref 80–94)
NRBC # BLD: 0 /100 WBCS — SIGNIFICANT CHANGE UP (ref 0–0)
PLATELET # BLD AUTO: 323 K/UL — SIGNIFICANT CHANGE UP (ref 130–400)
POTASSIUM SERPL-MCNC: 5 MMOL/L — SIGNIFICANT CHANGE UP (ref 3.5–5)
POTASSIUM SERPL-SCNC: 5 MMOL/L — SIGNIFICANT CHANGE UP (ref 3.5–5)
PROT SERPL-MCNC: 4.7 G/DL — LOW (ref 6–8)
PROTHROM AB SERPL-ACNC: 12.6 SEC — SIGNIFICANT CHANGE UP (ref 9.95–12.87)
RBC # BLD: 3.08 M/UL — LOW (ref 4.7–6.1)
RBC # FLD: 16.9 % — HIGH (ref 11.5–14.5)
SODIUM SERPL-SCNC: 141 MMOL/L — SIGNIFICANT CHANGE UP (ref 135–146)
WBC # BLD: 8.83 K/UL — SIGNIFICANT CHANGE UP (ref 4.8–10.8)
WBC # FLD AUTO: 8.83 K/UL — SIGNIFICANT CHANGE UP (ref 4.8–10.8)

## 2021-03-15 PROCEDURE — 99232 SBSQ HOSP IP/OBS MODERATE 35: CPT

## 2021-03-15 PROCEDURE — 91110 GI TRC IMG INTRAL ESOPH-ILE: CPT | Mod: 26

## 2021-03-15 RX ORDER — WARFARIN SODIUM 2.5 MG/1
10 TABLET ORAL AT BEDTIME
Refills: 0 | Status: COMPLETED | OUTPATIENT
Start: 2021-03-15 | End: 2021-03-15

## 2021-03-15 RX ORDER — WARFARIN SODIUM 2.5 MG/1
5 TABLET ORAL DAILY
Refills: 0 | Status: DISCONTINUED | OUTPATIENT
Start: 2021-03-15 | End: 2021-03-15

## 2021-03-15 RX ADMIN — Medication 81 MILLIGRAM(S): at 11:33

## 2021-03-15 RX ADMIN — Medication 650 MILLIGRAM(S): at 21:30

## 2021-03-15 RX ADMIN — WARFARIN SODIUM 10 MILLIGRAM(S): 2.5 TABLET ORAL at 21:30

## 2021-03-15 RX ADMIN — Medication 650 MILLIGRAM(S): at 14:28

## 2021-03-15 RX ADMIN — Medication 650 MILLIGRAM(S): at 05:01

## 2021-03-15 RX ADMIN — ATORVASTATIN CALCIUM 10 MILLIGRAM(S): 80 TABLET, FILM COATED ORAL at 21:30

## 2021-03-15 RX ADMIN — Medication 1000 UNIT(S): at 11:33

## 2021-03-15 RX ADMIN — FINASTERIDE 5 MILLIGRAM(S): 5 TABLET, FILM COATED ORAL at 11:33

## 2021-03-15 NOTE — PROGRESS NOTE ADULT - ATTENDING COMMENTS
patient seen and examined earlier today.  discussed with resident .  patient today had capsule endoscopy earlier today.  no s/s bleeding.  no chest pain. no SOB.  on p/e Vital Signs Last 24 Hrs  T(C): 35.9 (15 Mar 2021 08:18), Max: 36.6 (15 Mar 2021 00:00)  T(F): 96.6 (15 Mar 2021 08:18), Max: 97.8 (15 Mar 2021 00:00)  HR: 71 (15 Mar 2021 08:18) (71 - 85)  BP: 129/62 (15 Mar 2021 08:18) (129/62 - 174/84)  RR: 18 (15 Mar 2021 08:18) (18 - 18)  conj pale, no jaundice  neck no JVD no carotid bruit  lungs clear to auscultation  heart irregular rhythm.  abd. soft nontender  extremities good skin turgor. no edema noted                        9.3    8.83  )-----------( 323      ( 15 Mar 2021 06:23 )             29.2   03-15    141  |  107  |  22<H>  ----------------------------<  92  5.0   |  26  |  1.4    Ca    8.4<L>      15 Mar 2021 06:23  Mg     1.9     03-15    TPro  4.7<L>  /  Alb  3.3<L>  /  TBili  0.6  /  DBili  x   /  AST  15  /  ALT  15  /  AlkPhos  46  03-15    a: GI bleed patient with supratherapeutic INR - esophagitis/gastritis noted on EGD, 2 polyps removed on colonoscopy, hg/hct stable  Acute blood loss anemia - stable  atrial fibrillation   supratherapeutic warfarin level  s/o CAD - s/p PCI with stents in the past    p:  warfarin being resumed - per algorithm with 1.1 on Day # 3 patien tot get dose 10 mg today and check INR in am.  capsule endoscopy under way  need to discuss with cardiology - indication for clopidogrel since patient is s/p Stent and when we can resume

## 2021-03-15 NOTE — PROGRESS NOTE ADULT - SUBJECTIVE AND OBJECTIVE BOX
TISHA GRANDA 88y Male  MRN#: [id number]   Hospital Day: 9d    SUBJECTIVE  Patient is a 88y old Male who presents with a chief complaint of presyncope  elevated INR (14 Mar 2021 14:20)  Currently admitted to medicine with the primary diagnosis of Near syncope      INTERVAL HPI AND OVERNIGHT EVENTS:  Patient was examined and seen at bedside. This morning he is resting comfortably in bed and reports no issues or overnight events.    REVIEW OF SYMPTOMS:  CONSTITUTIONAL: No weakness, fevers or chills; No headaches  EYES: No visual changes, eye pain, or discharge  ENT: No vertigo; No ear pain or change in hearing; No sore throat or difficulty swallowing  NECK: No pain or stiffness  RESPIRATORY: No cough, wheezing, or hemoptysis; No shortness of breath  CARDIOVASCULAR: No chest pain or palpitations  GASTROINTESTINAL: No abdominal or epigastric pain; No nausea, vomiting, or hematemesis; No diarrhea or constipation; No melena or hematochezia  GENITOURINARY: No dysuria, frequency or hematuria  MUSCULOSKELETAL: No joint pain, no muscle pain, no weakness  NEUROLOGICAL: No numbness or weakness  SKIN: No itching or rashes    OBJECTIVE  PAST MEDICAL & SURGICAL HISTORY  Afib    Spinal stenosis at L4-L5 level    H/O heart artery stent      ALLERGIES:  No Known Allergies    MEDICATIONS:  STANDING MEDICATIONS  aspirin  chewable 81 milliGRAM(s) Oral daily  atorvastatin 10 milliGRAM(s) Oral at bedtime  cholecalciferol 1000 Unit(s) Oral daily  finasteride 5 milliGRAM(s) Oral daily  sodium bicarbonate 650 milliGRAM(s) Oral every 8 hours  warfarin 10 milliGRAM(s) Oral at bedtime    PRN MEDICATIONS      VITAL SIGNS: Last 24 Hours  T(C): 35.9 (15 Mar 2021 08:18), Max: 36.6 (15 Mar 2021 00:00)  T(F): 96.6 (15 Mar 2021 08:18), Max: 97.8 (15 Mar 2021 00:00)  HR: 71 (15 Mar 2021 08:18) (71 - 85)  BP: 129/62 (15 Mar 2021 08:18) (129/62 - 174/84)  BP(mean): --  RR: 18 (15 Mar 2021 08:18) (18 - 18)  SpO2: --    LABS:                        9.3    8.83  )-----------( 323      ( 15 Mar 2021 06:23 )             29.2     03-15    141  |  107  |  22<H>  ----------------------------<  92  5.0   |  26  |  1.4    Ca    8.4<L>      15 Mar 2021 06:23  Mg     1.9     03-15    TPro  4.7<L>  /  Alb  3.3<L>  /  TBili  0.6  /  DBili  x   /  AST  15  /  ALT  15  /  AlkPhos  46  03-15    PT/INR - ( 15 Mar 2021 06:23 )   PT: 12.60 sec;   INR: 1.10 ratio            PHYSICAL EXAM:  CONSTITUTIONAL: No acute distress, well-developed, well-groomed, AAOx3  HEAD: Atraumatic, normocephalic  EYES: EOM intact, PERRLA, conjunctiva and sclera clear  ENT: Supple, no masses, no thyromegaly, no bruits, no JVD; moist mucous membranes  PULMONARY: Clear to auscultation bilaterally; no wheezes, rales, or rhonchi  CARDIOVASCULAR: Regular rate and rhythm; no murmurs, rubs, or gallops  GASTROINTESTINAL: Soft, non-tender, non-distended; bowel sounds present  MUSCULOSKELETAL: 2+ peripheral pulses; no clubbing, no cyanosis, no edema  NEUROLOGY: non-focal  SKIN: No rashes or lesions    ASSESSMENT & PLAN  87yo M with PMHx CAD/stents, AFib on coumadin, HTN, HLD, spinal stenosis, presents for eval s/p fall this morning, presyncope and elevated INR now with melena?     #Fall likely due to orthostatic hypotension secondary to GIB  - Hg 9.9  - s/p 4 U PRBC during the admission   - Hold tamsulosin. avoid bb. hold antihypertensives  - GI eval:     - Monitor CBC    - keep hgb>8    - PPI BID IV    - s/p EGD and colonoscopy on 3/10: +gastritis and a polyp that was removed.     - VCE on monday: NPO after midnight Sunday night,     - Can resume coumadin from GI standpoint with close monitoring of INR and Coumadin dose (2.5 for now)    - Patient can not be discharged over the weekend because of nontherapeutic INR, so will plan for VCE (Video capsule endoscopy) inpatient on Monday    - Please clear liquid diet on Sunday    - 2L golytely start at 4PM on Sunday and finish by midnight    - dulcolax 20 mg PO once on 10 pm on Sunday    - NPO after midnight on Sunday for Monday VCE    - COVID PCR was sent on Friday    - Please notify GI if any signs of gross GI bleed    # Elevated INR due to Coumadin Toxicity - resolved   - INR 1.3  - s/p Vit k 2x 2.5 + 5 (total 10mg)      # Anemia, normocytic   - hg dropping   - hb 9.3, in 2015 was 14  - GI following pt: EGD/colonoscopy showed gastritis and a polyp  - Capsul endoscopy today with GI, okay to start on CLD, advance to normal diet for dinner     # LEYDI on CKD4   - Cr 1.4, noted in [facility] to be 1.7-2.0 in 2015  - Possibly new baseline due to progression vs pre renal due to GIB? will need PMD records.  - f/u urine lytes    # Leukocytosis - resolved   - no sign of infection, no fever, cough, urinary sx's.  - Trend WBC, likely reactive stress induced    #Elevated lactate (resolved)  - LA 2.7  - likely dehydration  - s/p 1L NS in ER, s/p 1Liter in ER  - avoid volume overload     # Afib on Coumadin  - not on rate control  - monitor HR for now as rate is controlled  - Per Dr. Roth, 10 mg coumadin today, recheck INR daily     # CAD s/p PCI w/ 3 stents   - Hold Plavix. C/s Dr. Herman.     # Prostate CA   - c/w finasteride. hold tamsulosin until orthostatic negative.     # HTN   - BP wnl.  - Hold Losartan 100mg, HCTZ 25mg. Hold Nifidipine 60mg.     # HLD   - Lipitor for pravastatin     # H/o spinal stenosis   - PT eval. op f.u     # Vit d defeciency   - c/w supplement     # DVT PPx - warfarin (A-fib)   # Activity - Increase as Tolerated w/ assist    # Dispo - Patient to be discharged when medically optimized.  # Code Status - FULL

## 2021-03-16 ENCOUNTER — TRANSCRIPTION ENCOUNTER (OUTPATIENT)
Age: 86
End: 2021-03-16

## 2021-03-16 LAB
ALBUMIN SERPL ELPH-MCNC: 3.2 G/DL — LOW (ref 3.5–5.2)
ALP SERPL-CCNC: 46 U/L — SIGNIFICANT CHANGE UP (ref 30–115)
ALT FLD-CCNC: 14 U/L — SIGNIFICANT CHANGE UP (ref 0–41)
ANION GAP SERPL CALC-SCNC: 9 MMOL/L — SIGNIFICANT CHANGE UP (ref 7–14)
APTT BLD: 29.4 SEC — SIGNIFICANT CHANGE UP (ref 27–39.2)
AST SERPL-CCNC: 15 U/L — SIGNIFICANT CHANGE UP (ref 0–41)
BASOPHILS # BLD AUTO: 0.05 K/UL — SIGNIFICANT CHANGE UP (ref 0–0.2)
BASOPHILS NFR BLD AUTO: 0.6 % — SIGNIFICANT CHANGE UP (ref 0–1)
BILIRUB SERPL-MCNC: 0.5 MG/DL — SIGNIFICANT CHANGE UP (ref 0.2–1.2)
BUN SERPL-MCNC: 22 MG/DL — HIGH (ref 10–20)
CALCIUM SERPL-MCNC: 8.4 MG/DL — LOW (ref 8.5–10.1)
CHLORIDE SERPL-SCNC: 107 MMOL/L — SIGNIFICANT CHANGE UP (ref 98–110)
CO2 SERPL-SCNC: 27 MMOL/L — SIGNIFICANT CHANGE UP (ref 17–32)
CREAT SERPL-MCNC: 1.7 MG/DL — HIGH (ref 0.7–1.5)
EOSINOPHIL # BLD AUTO: 0.44 K/UL — SIGNIFICANT CHANGE UP (ref 0–0.7)
EOSINOPHIL NFR BLD AUTO: 5 % — SIGNIFICANT CHANGE UP (ref 0–8)
GLUCOSE SERPL-MCNC: 85 MG/DL — SIGNIFICANT CHANGE UP (ref 70–99)
HCT VFR BLD CALC: 29.7 % — LOW (ref 42–52)
HGB BLD-MCNC: 9.4 G/DL — LOW (ref 14–18)
IMM GRANULOCYTES NFR BLD AUTO: 0.7 % — HIGH (ref 0.1–0.3)
INR BLD: 1.23 RATIO — SIGNIFICANT CHANGE UP (ref 0.65–1.3)
LYMPHOCYTES # BLD AUTO: 0.91 K/UL — LOW (ref 1.2–3.4)
LYMPHOCYTES # BLD AUTO: 10.4 % — LOW (ref 20.5–51.1)
MAGNESIUM SERPL-MCNC: 2 MG/DL — SIGNIFICANT CHANGE UP (ref 1.8–2.4)
MCHC RBC-ENTMCNC: 30.3 PG — SIGNIFICANT CHANGE UP (ref 27–31)
MCHC RBC-ENTMCNC: 31.6 G/DL — LOW (ref 32–37)
MCV RBC AUTO: 95.8 FL — HIGH (ref 80–94)
MONOCYTES # BLD AUTO: 1.09 K/UL — HIGH (ref 0.1–0.6)
MONOCYTES NFR BLD AUTO: 12.4 % — HIGH (ref 1.7–9.3)
NEUTROPHILS # BLD AUTO: 6.22 K/UL — SIGNIFICANT CHANGE UP (ref 1.4–6.5)
NEUTROPHILS NFR BLD AUTO: 70.9 % — SIGNIFICANT CHANGE UP (ref 42.2–75.2)
NRBC # BLD: 0 /100 WBCS — SIGNIFICANT CHANGE UP (ref 0–0)
PLATELET # BLD AUTO: 320 K/UL — SIGNIFICANT CHANGE UP (ref 130–400)
POTASSIUM SERPL-MCNC: 4.6 MMOL/L — SIGNIFICANT CHANGE UP (ref 3.5–5)
POTASSIUM SERPL-SCNC: 4.6 MMOL/L — SIGNIFICANT CHANGE UP (ref 3.5–5)
PROT SERPL-MCNC: 4.7 G/DL — LOW (ref 6–8)
PROTHROM AB SERPL-ACNC: 14.1 SEC — HIGH (ref 9.95–12.87)
RBC # BLD: 3.1 M/UL — LOW (ref 4.7–6.1)
RBC # FLD: 16.9 % — HIGH (ref 11.5–14.5)
SODIUM SERPL-SCNC: 143 MMOL/L — SIGNIFICANT CHANGE UP (ref 135–146)
WBC # BLD: 8.77 K/UL — SIGNIFICANT CHANGE UP (ref 4.8–10.8)
WBC # FLD AUTO: 8.77 K/UL — SIGNIFICANT CHANGE UP (ref 4.8–10.8)

## 2021-03-16 PROCEDURE — 99232 SBSQ HOSP IP/OBS MODERATE 35: CPT

## 2021-03-16 PROCEDURE — 99233 SBSQ HOSP IP/OBS HIGH 50: CPT

## 2021-03-16 RX ORDER — WARFARIN SODIUM 2.5 MG/1
5 TABLET ORAL AT BEDTIME
Refills: 0 | Status: COMPLETED | OUTPATIENT
Start: 2021-03-16 | End: 2021-03-16

## 2021-03-16 RX ORDER — LOSARTAN POTASSIUM 100 MG/1
1 TABLET, FILM COATED ORAL
Qty: 0 | Refills: 0 | DISCHARGE

## 2021-03-16 RX ORDER — CHLORHEXIDINE GLUCONATE 213 G/1000ML
1 SOLUTION TOPICAL
Refills: 0 | Status: DISCONTINUED | OUTPATIENT
Start: 2021-03-16 | End: 2021-03-17

## 2021-03-16 RX ADMIN — Medication 650 MILLIGRAM(S): at 14:26

## 2021-03-16 RX ADMIN — Medication 650 MILLIGRAM(S): at 21:13

## 2021-03-16 RX ADMIN — FINASTERIDE 5 MILLIGRAM(S): 5 TABLET, FILM COATED ORAL at 11:24

## 2021-03-16 RX ADMIN — ATORVASTATIN CALCIUM 10 MILLIGRAM(S): 80 TABLET, FILM COATED ORAL at 21:13

## 2021-03-16 RX ADMIN — Medication 81 MILLIGRAM(S): at 11:24

## 2021-03-16 RX ADMIN — Medication 650 MILLIGRAM(S): at 05:10

## 2021-03-16 RX ADMIN — WARFARIN SODIUM 5 MILLIGRAM(S): 2.5 TABLET ORAL at 21:13

## 2021-03-16 RX ADMIN — Medication 1000 UNIT(S): at 11:24

## 2021-03-16 NOTE — DISCHARGE NOTE PROVIDER - NSDCCPCAREPLAN_GEN_ALL_CORE_FT
PRINCIPAL DISCHARGE DIAGNOSIS  Diagnosis: Near syncope  Assessment and Plan of Treatment: You were admitted to the hospital due to a fall at home. You hemoglobin was found to be very low and you were given a total of 4 units of red blood cells. Your hemoglobin is now currently stable, 9.3. You had and endoscopy and colonscopy with the gastroenterologists which did not identify and source of bleeding. You also had a capsule endoscopy which did not identify and sourse of bleeding. Please follow up in the outpatient gastroenterology clinic in 2-4 weeks from discharge, [address] [place] [address]. Phone Number: [phone] .   If you have any symptoms of unexplained weakness, fatigue, dizziness, fainting, blood in your stool, black stools, abdominal pain, nausea, vomitting, or diarrhea, please return the hospital.      SECONDARY DISCHARGE DIAGNOSES  Diagnosis: Supratherapeutic INR  Assessment and Plan of Treatment: Initially on presentation to the hospital your INR was elevated. You were treated with a medication to reverse this. Your INR today is 1.83. Please continue to take your home dose of 5 mg daily. Please have your INR checked within 1-3 days of discharge.   If you experience any signs of bleeding, please come to the emergency room for evaluation.     PRINCIPAL DISCHARGE DIAGNOSIS  Diagnosis: Near syncope  Assessment and Plan of Treatment: You were admitted to the hospital due to a fall at home. You hemoglobin was found to be very low and you were given a total of 4 units of red blood cells. Your hemoglobin is now currently stable, 9.3. You had and endoscopy and colonscopy with the gastroenterologists which did not identify and source of bleeding. You also had a capsule endoscopy which did not identify and sourse of bleeding. Please follow up in the outpatient gastroenterology clinic in 2-4 weeks from discharge, [address] [place] [address]. Phone Number: [phone] .   If you have any symptoms of unexplained weakness, fatigue, dizziness, fainting, blood in your stool, black stools, abdominal pain, nausea, vomitting, or diarrhea, please return the hospital.  Also, during your hospital stay your blood pressure has been well controlled. Please continue taking Nifedipine only and follow up with your primary care doctor within 1 week to recheck blood pressure and your blood work before begining losartan and HCTZ.      SECONDARY DISCHARGE DIAGNOSES  Diagnosis: Supratherapeutic INR  Assessment and Plan of Treatment: Initially on presentation to the hospital your INR was elevated. You were treated with a medication to reverse this. Your INR today is 1.83. Please continue to take your home dose of 5 mg daily. Please have your INR checked within 1-3 days of discharge.   If you experience any signs of bleeding, please come to the emergency room for evaluation.

## 2021-03-16 NOTE — DISCHARGE NOTE NURSING/CASE MANAGEMENT/SOCIAL WORK - PATIENT PORTAL LINK FT
You can access the FollowMyHealth Patient Portal offered by [facility] by registering at the following website: [email or web address]. By joining [email or web address]’s FollowMyHealth portal, you will also be able to view your health information using other applications (apps) compatible with our system.

## 2021-03-16 NOTE — PROGRESS NOTE ADULT - SUBJECTIVE AND OBJECTIVE BOX
TISHA GRANDA 88y Male  MRN#: [id number]   Hospital Day: 10d    SUBJECTIVE  Patient is a 88y old Male who presents with a chief complaint of presyncope  elevated INR (15 Mar 2021 10:12)  Currently admitted to medicine with the primary diagnosis of Near syncope      INTERVAL HPI AND OVERNIGHT EVENTS:  Patient was examined and seen at bedside. This morning he is resting comfortably in bed and reports no issues or overnight events.    OBJECTIVE  PAST MEDICAL & SURGICAL HISTORY  Afib    Spinal stenosis at L4-L5 level    H/O heart artery stent      ALLERGIES:  No Known Allergies    MEDICATIONS:  STANDING MEDICATIONS  aspirin  chewable 81 milliGRAM(s) Oral daily  atorvastatin 10 milliGRAM(s) Oral at bedtime  cholecalciferol 1000 Unit(s) Oral daily  finasteride 5 milliGRAM(s) Oral daily  sodium bicarbonate 650 milliGRAM(s) Oral every 8 hours    PRN MEDICATIONS      VITAL SIGNS: Last 24 Hours  T(C): 35.7 (16 Mar 2021 00:00), Max: 36.5 (15 Mar 2021 16:10)  T(F): 96.2 (16 Mar 2021 00:00), Max: 97.7 (15 Mar 2021 16:10)  HR: 90 (16 Mar 2021 00:00) (68 - 90)  BP: 149/78 (16 Mar 2021 00:00) (123/63 - 154/78)  BP(mean): --  RR: 16 (16 Mar 2021 00:00) (16 - 18)  SpO2: --    LABS:                        9.3    8.83  )-----------( 323      ( 15 Mar 2021 06:23 )             29.2     03-15    141  |  107  |  22<H>  ----------------------------<  92  5.0   |  26  |  1.4    Ca    8.4<L>      15 Mar 2021 06:23  Mg     1.9     03-15    TPro  4.7<L>  /  Alb  3.3<L>  /  TBili  0.6  /  DBili  x   /  AST  15  /  ALT  15  /  AlkPhos  46  03-15    PT/INR - ( 15 Mar 2021 06:23 )   PT: 12.60 sec;   INR: 1.10 ratio      PHYSICAL EXAM:  CONSTITUTIONAL: No acute distress, well-developed, well-groomed, AAOx3  HEAD: Atraumatic, normocephalic  EYES: EOM intact, PERRLA, conjunctiva and sclera clear  PULMONARY: Clear to auscultation bilaterally  CARDIOVASCULAR: Regular rate and rhythm  GASTROINTESTINAL: Soft, non-tender, non-distended  MUSCULOSKELETAL: no edema  NEUROLOGY: non-focal  SKIN: No rashes or lesions  ASSESSMENT & PLAN  89yo M with PMHx CAD/stents, AFib on coumadin, HTN, HLD, spinal stenosis, presents for eval s/p fall this morning, presyncope and elevated INR now with melena?     #Fall likely due to orthostatic hypotension secondary to GIB  - Hg 9.9  - s/p 4 U PRBC during the admission   - Hold tamsulosin. avoid bb. hold antihypertensives  - GI eval:     - Monitor CBC    - keep hgb>8    - PPI BID IV    - s/p EGD and colonoscopy on 3/10: +gastritis and a polyp that was removed.     - VCE on monday: NPO after midnight Sunday night,     - Can resume coumadin from GI standpoint with close monitoring of INR and Coumadin dose     - s/p capsule endoscopy on Monday 3/15, follow up GI recs    - Please notify GI if any signs of gross GI bleed    # Elevated INR due to Coumadin Toxicity - resolved   - INR 1.3  - s/p Vit k 2x 2.5 + 5 (total 10mg)      # Anemia, normocytic   - hg now stabilizing   - hb 9.3, in 2015 was 14  - GI following pt: EGD/colonoscopy showed gastritis and a polyp  - Capsul endoscopy today with GI, okay to start on CLD, advance to normal diet for dinner     # LEYDI on CKD4   - Cr 1.4, [facility] in [facility] to be 1.7-2.0 in 2015  - Possibly new baseline due to progression vs pre renal due to GIB? will need PMD records.  - f/u urine lytes    # Leukocytosis - resolved   - no sign of infection, no fever, cough, urinary sx's.  - Trend WBC, likely reactive stress induced    #Elevated lactate (resolved)  - LA 2.7  - likely dehydration  - s/p 1L NS in ER, s/p 1Liter in ER  - avoid volume overload     # Afib on Coumadin  - not on rate control  - monitor HR for now as rate is controlled  - Pending AM INR, dose coumadin according to protocol    # CAD s/p PCI w/ 3 stents   - Hold Plavix. C/s Dr. Herman.     # Prostate CA   - c/w finasteride. hold tamsulosin until orthostatic negative.     # HTN   - BP wnl.  - Hold Losartan 100mg, HCTZ 25mg. Hold Nifidipine 60mg.     # HLD   - Lipitor for pravastatin     # H/o spinal stenosis   - PT eval. op f.u     # Vit d defeciency   - c/w supplement     # DVT PPx - warfarin (A-fib)   # Activity - Increase as Tolerated w/ assist    # Dispo - Patient to be discharged when medically optimized.  # Code Status - FULL  TISHA GRANDA 88y Male  MRN#: [id number]   Hospital Day: 10d    SUBJECTIVE  Patient is a 88y old Male who presents with a chief complaint of presyncope  elevated INR (15 Mar 2021 10:12)  Currently admitted to medicine with the primary diagnosis of Near syncope      INTERVAL HPI AND OVERNIGHT EVENTS:  Patient was examined and seen at bedside. This morning he is resting comfortably in bed and reports no issues or overnight events.    OBJECTIVE  PAST MEDICAL & SURGICAL HISTORY  Afib  Spinal stenosis at L4-L5 level  H/O heart artery stent    ALLERGIES:  No Known Allergies    MEDICATIONS:  STANDING MEDICATIONS  aspirin  chewable 81 milliGRAM(s) Oral daily  atorvastatin 10 milliGRAM(s) Oral at bedtime  cholecalciferol 1000 Unit(s) Oral daily  finasteride 5 milliGRAM(s) Oral daily  sodium bicarbonate 650 milliGRAM(s) Oral every 8 hours    PRN MEDICATIONS      VITAL SIGNS: Last 24 Hours  T(C): 35.7 (16 Mar 2021 00:00), Max: 36.5 (15 Mar 2021 16:10)  T(F): 96.2 (16 Mar 2021 00:00), Max: 97.7 (15 Mar 2021 16:10)  HR: 90 (16 Mar 2021 00:00) (68 - 90)  BP: 149/78 (16 Mar 2021 00:00) (123/63 - 154/78)  BP(mean): --  RR: 16 (16 Mar 2021 00:00) (16 - 18)  SpO2: --    LABS:                        9.3    8.83  )-----------( 323      ( 15 Mar 2021 06:23 )             29.2     03-15    141  |  107  |  22<H>  ----------------------------<  92  5.0   |  26  |  1.4    Ca    8.4<L>      15 Mar 2021 06:23  Mg     1.9     03-15    TPro  4.7<L>  /  Alb  3.3<L>  /  TBili  0.6  /  DBili  x   /  AST  15  /  ALT  15  /  AlkPhos  46  03-15    PT/INR - ( 15 Mar 2021 06:23 )   PT: 12.60 sec;   INR: 1.10 ratio      PHYSICAL EXAM:  CONSTITUTIONAL: No acute distress, well-developed, well-groomed, AAOx3  HEAD: Atraumatic, normocephalic  EYES: EOM intact, PERRLA, conjunctiva and sclera clear  PULMONARY: Clear to auscultation bilaterally  CARDIOVASCULAR: Regular rate and rhythm  GASTROINTESTINAL: Soft, non-tender, non-distended  MUSCULOSKELETAL: no edema  NEUROLOGY: non-focal  SKIN: No rashes or lesions  ASSESSMENT & PLAN  89yo M with PMHx CAD/stents, AFib on coumadin, HTN, HLD, spinal stenosis, presents for eval s/p fall this morning, presyncope and elevated INR now with melena?     #Fall likely due to orthostatic hypotension secondary to GIB  - Hg 9.9  - s/p 4 U PRBC during the admission   - Hold tamsulosin. avoid bb. hold antihypertensives  - GI eval:     - Monitor CBC    - keep hgb>8    - PPI BID IV    - s/p EGD and colonoscopy on 3/10: +gastritis and a polyp that was removed.     - VCE on monday: NPO after midnight Sunday night,     - Can resume coumadin from GI standpoint with close monitoring of INR and Coumadin dose     - s/p capsule endoscopy on Monday 3/15, follow up GI recs    - Please notify GI if any signs of gross GI bleed    # Elevated INR due to Coumadin Toxicity - resolved   - INR 1.3  - s/p Vit k 2x 2.5 + 5 (total 10mg)      # Anemia, normocytic   - hg now stabilizing   - hb 9.3, in 2015 was 14  - GI following pt: EGD/colonoscopy showed gastritis and a polyp  - Capsul endoscopy today with GI, okay to start on CLD, advance to normal diet for dinner     # LEYDI on CKD4   - Cr 1.4, noted in [facility] to be 1.7-2.0 in 2015  - Possibly new baseline due to progression vs pre renal due to GIB? will need PMD records.  - f/u urine lytes    # Leukocytosis - resolved   - no sign of infection, no fever, cough, urinary sx's.  - Trend WBC, likely reactive stress induced    #Elevated lactate (resolved)  - LA 2.7  - likely dehydration  - s/p 1L NS in ER, s/p 1Liter in ER  - avoid volume overload     # Afib on Coumadin  - not on rate control  - monitor HR for now as rate is controlled  - Pending AM INR, dose coumadin according to protocol    # CAD s/p PCI w/ 3 stents   - Hold Plavix. C/s Dr. Herman.     # Prostate CA   - c/w finasteride. hold tamsulosin until orthostatic negative.     # HTN   - BP wnl.  - Hold Losartan 100mg, HCTZ 25mg. Hold Nifidipine 60mg.     # HLD   - Lipitor for pravastatin     # H/o spinal stenosis   - PT eval. op f.u     # Vit d defeciency   - c/w supplement     # DVT PPx - warfarin (A-fib)   # Activity - Increase as Tolerated w/ assist    # Dispo - Patient to be discharged when medically optimized.  # Code Status - FULL

## 2021-03-16 NOTE — PROGRESS NOTE ADULT - SUBJECTIVE AND OBJECTIVE BOX
Gastroenterology progress note:     Patient is a 88y old  Male who presents with a chief complaint of presyncope  elevated INR (16 Mar 2021 14:59)       Admitted on: 03-06-21    We are following the patient for: anemia     Interval History:  patient had capsule endoscopy yesterday, denies abdominal pain, No melena or hematochezia, can tolerate PO intake      PAST MEDICAL & SURGICAL HISTORY:  Afib    Spinal stenosis at L4-L5 level    H/O heart artery stent        MEDICATIONS  (STANDING):  aspirin  chewable 81 milliGRAM(s) Oral daily  atorvastatin 10 milliGRAM(s) Oral at bedtime  chlorhexidine 4% Liquid 1 Application(s) Topical <User Schedule>  cholecalciferol 1000 Unit(s) Oral daily  finasteride 5 milliGRAM(s) Oral daily  sodium bicarbonate 650 milliGRAM(s) Oral every 8 hours  warfarin 5 milliGRAM(s) Oral at bedtime    MEDICATIONS  (PRN):      Allergies  No Known Allergies      Review of Systems:   Constitutional: Ne Fever, No chills, No weakness  ENT: No visual changes, No throat pain  Cardiovascular:  No Chest Pain, No Palpitations  Respiratory:  No Cough, No Dyspnea  Gastrointestinal:  As described in HPI  Neurological: No numbness or weakness  Skin: No rash, no itching    Physical Examination:  T(C): 36.1 (03-16-21 @ 15:08), Max: 36.4 (03-15-21 @ 20:56)  HR: 74 (03-16-21 @ 15:08) (74 - 90)  BP: 134/77 (03-16-21 @ 15:08) (114/56 - 154/78)  RR: 18 (03-16-21 @ 15:08) (16 - 18)  SpO2: --      03-16-21 @ 07:01  -  03-16-21 @ 18:13  --------------------------------------------------------  IN: 690 mL / OUT: 0 mL / NET: 690 mL      Constitutional: No acute distress.  Respiratory:  No signs of respiratory distress. Lung sounds are clear bilaterally.  Cardiovascular:  S1 S2, Regular rate and rhythm.  Abdominal: Abdomen is soft, symmetric, and non-tender without distention. There are no visible lesions. Bowel sounds are present and normoactive in all four quadrants. No masses, hepatomegaly, or splenomegaly are noted.   Skin: No rashes, No Jaundice.  Neurology: AAOX3, Non-focal  Skin: No rash, No excoriation  Vascular: No varicose vein, No cyanosis, No edema        Data: (reviewed by attending)                        9.4    8.77  )-----------( 320      ( 16 Mar 2021 06:18 )             29.7     Hgb trend:  9.4  03-16-21 @ 06:18  9.3  03-15-21 @ 06:23  8.9  03-14-21 @ 06:07        03-16    143  |  107  |  22<H>  ----------------------------<  85  4.6   |  27  |  1.7<H>    Ca    8.4<L>      16 Mar 2021 06:18  Mg     2.0     03-16    TPro  4.7<L>  /  Alb  3.2<L>  /  TBili  0.5  /  DBili  x   /  AST  15  /  ALT  14  /  AlkPhos  46  03-16    Liver panel trend:  TBili 0.5   /   AST 15   /   ALT 14   /   AlkP 46   /   Tptn 4.7   /   Alb 3.2    /   DBili --      03-16  TBili 0.6   /   AST 15   /   ALT 15   /   AlkP 46   /   Tptn 4.7   /   Alb 3.3    /   DBili --      03-15  TBili 0.6   /   AST 15   /   ALT 17   /   AlkP 48   /   Tptn 4.6   /   Alb 3.1    /   DBili --      03-14  TBili 0.9   /   AST 16   /   ALT 20   /   AlkP 54   /   Tptn 5.1   /   Alb 3.4    /   DBili --      03-13  TBili 0.6   /   AST 15   /   ALT 17   /   AlkP 43   /   Tptn 4.3   /   Alb 2.8    /   DBili --      03-12  TBili 0.6   /   AST 19   /   ALT 23   /   AlkP 45   /   Tptn 4.3   /   Alb 3.1    /   DBili --      03-11  TBili 0.7   /   AST 24   /   ALT 26   /   AlkP 41   /   Tptn 4.4   /   Alb 3.1    /   DBili --      03-10  TBili 1.0   /   AST 30   /   ALT 30   /   AlkP 42   /   Tptn 4.9   /   Alb 3.4    /   DBili --      03-09  TBili 0.6   /   AST 17   /   ALT 16   /   AlkP 38   /   Tptn 4.4   /   Alb 3.0    /   DBili --      03-09  TBili 0.4   /   AST 21   /   ALT 18   /   AlkP 43   /   Tptn 5.1   /   Alb 3.5    /   DBili --      03-08  TBili 0.5   /   AST 23   /   ALT 14   /   AlkP 29   /   Tptn 4.8   /   Alb 3.3    /   DBili --      03-07      PT/INR - ( 16 Mar 2021 06:18 )   PT: 14.10 sec;   INR: 1.23 ratio         PTT - ( 16 Mar 2021 06:18 )  PTT:29.4 sec

## 2021-03-16 NOTE — PROGRESS NOTE ADULT - ATTENDING COMMENTS
patient seen and examined earlier today.  discussed with resident no s/s bleeding.  no chest pain. no SOB.  on p/e   Vital Signs Last 24 Hrs  T(C): 36.3 (16 Mar 2021 07:24), Max: 36.5 (15 Mar 2021 16:10)  T(F): 97.3 (16 Mar 2021 07:24), Max: 97.7 (15 Mar 2021 16:10)  HR: 75 (16 Mar 2021 07:24) (68 - 90)  BP: 114/56 (16 Mar 2021 07:24) (114/56 - 154/78)  RR: 18 (16 Mar 2021 07:24) (16 - 18)  conj pale, no jaundice  neck no JVD no carotid bruit  lungs clear to auscultation  heart irregular rhythm.  abd. soft nontender  extremities good skin turgor. no edema noted                            9.4    8.77  )-----------( 320      ( 16 Mar 2021 06:18 )             29.7   03-16    143  |  107  |  22<H>  ----------------------------<  85  4.6   |  27  |  1.7<H>    Ca    8.4<L>      16 Mar 2021 06:18  Mg     2.0     03-16    TPro  4.7<L>  /  Alb  3.2<L>  /  TBili  0.5  /  DBili  x   /  AST  15  /  ALT  14  /  AlkPhos  46  03-16                   a: GI bleed patient with supratherapeutic INR - esophagitis/gastritis noted on EGD, 2 polyps removed on colonoscopy, hg/hct stable, await results of capsule endoscopy  Acute blood loss anemia - stable  atrial fibrillation   supratherapeutic warfarin level  s/o CAD - s/p PCI with stents in the past    p:  warfarin being resumed - will resume just 5 mg po daily since patient was stable on that dose before and he has no indication for bridging  capsule endoscopy under way  need to discuss with cardiology - indication for clopidogrel since patient is s/p Stent - we will discuss with cardiology today  anticipate DC tomorrow

## 2021-03-16 NOTE — DISCHARGE NOTE PROVIDER - HOSPITAL COURSE
87yo M with PMHx CAD/stents, AFib on coumadin, HTN, HLD, spinal stenosis, prostate CA, Vit D def presents for eval s/p fall this morning. Pt states at 8am was getting out of bed to go to bathroom, felt lightheaded and fell forwards, landed on chest. Pt remembers events, denies LOC. Currently is not c/o any pain. Since fall, pt states still feeling lightheaded. Also noted SMITH that began yesterday, pt states normally can ambulate with rollator to the store and ambulates without assistance in the home, but yesterday felt out of breath after 10 steps. Patient also notes that several days ago, he thinks he "pulled something" on his left side, did not fall at the time, has a large bruise on his left flank/ abdomen. INR last wk was 3.0, take 5mg coumadin daily. Denies orthopnea. Denies fever, headache, visual changes CP, cough, palpitations, nausea, vomiting, diarrhea, abd pain, leg swelling, neck pain, back pain. Endorses this morning having 1 episode of black stool and once in the ER in the camode. Pt states has had both doses of covid vaccine. In ER trauma pan scan negative, Xrays negative for traumatic injury. Noted to have elevated INR with no bleeding, elevated LA and wbc. Admitted for further workup and eval.   S/P 4 units PRBC, EGD/Colonscopy negative, s/p capsule endoscopy  negative, INR initially supra-therapeutic now 1.83. D/c on home dose coumadin    Med Rec and d/c planning discussed and approved with attending physician.

## 2021-03-16 NOTE — DISCHARGE NOTE PROVIDER - NSDCQMCOGNITION_NEU_ALL_CORE
No difficulties [As Noted in HPI] : as noted in HPI [Trauma] : no ~T physical trauma [Fracture] : no fracture [Kyphoscoliosis] : no kyphoscoliosis [Back Pain] : ~T back pain [Myalgias] : myalgias [Arthralgias] : arthralgias [Negative] : Pulmonary Hypertension [FreeTextEntry9] : HARMONY BLACKWELLD

## 2021-03-16 NOTE — DISCHARGE NOTE PROVIDER - CARE PROVIDER_API CALL
Cordell Haddad  Internal Medicine  [address] [place] [address]  Phone: [phone]  Fax: [phone]  Established Patient  Follow Up Time: 1 week

## 2021-03-16 NOTE — PROGRESS NOTE ADULT - ASSESSMENT
87yo M with PMHx CAD/stents, AFib on coumadin, HTN, HLD, spinal stenosis, bladder CA ( no chemo or radiation as per patient), Vit D def presents for eval s/p fall. Pt states at 8am was getting out of bed to go to bathroom, felt lightheaded and fell forwards, landed on chest. Pt remembers events, denies LOC. Currently is not c/o any pain. Since fall, pt states still feeling lightheaded. in the ED Hb dropped from 7.6 to 6.5. INR 7.77      # Acute drop of hgb with melena: UGIB in the context of supratheraputic INR   - s/p EGD/colonoscopy on 3/10--> (see above for full results)        EGD: Non-erosive gastritis       colonoscopy: diverticulosis, 2 polyps s/p polypectomy + internal hemorrhoid  - s/p VCE on 3/15: showed gastritis in antrum  - hgb stable  - INR 7.7 on admission s/p Vit K 2.5 mg twice --> INR 6.47--> 5mg Vit K--> 1.06--> 1.23  - patient was not started on coumadin   - VS stable  - No abdominal pain  - No signs of gross GI bleed    Rec:  - Advance diet as tolerated  - patient on coumadin--> Close monitoring of INR as outpatient  - follow up with [facility] in 2-4 weeks    #Personal history of colonic polyp on recent colonoscopy:  Recommend repeat colonoscopy in 5 years for screening purposes, pending health condition at that time      -call as needed, [phone] during weekdays till 5pm and call GI service after 5pm and on weekends [phone]  -Follow up with our [facility] located at [address] [place] [address]. Phone Number: [phone]   89yo M with PMHx CAD/stents, AFib on coumadin, HTN, HLD, spinal stenosis, bladder CA ( no chemo or radiation as per patient), Vit D def presents for eval s/p fall. Pt states at 8am was getting out of bed to go to bathroom, felt lightheaded and fell forwards, landed on chest. Pt remembers events, denies LOC. Currently is not c/o any pain. Since fall, pt states still feeling lightheaded. in the ED Hb dropped from 7.6 to 6.5. INR 7.77      # Acute drop of hgb with melena: UGIB in the context of supratheraputic INR   - s/p EGD/colonoscopy on 3/10--> (see above for full results)        EGD: Non-erosive gastritis       colonoscopy: diverticulosis, 2 polyps s/p polypectomy + internal hemorrhoid  - s/p VCE on 3/15: showed gastritis in antrum, no blood in small bowel  - hgb stable  - INR 7.7 on admission s/p Vit K 2.5 mg twice --> INR 6.47--> 5mg Vit K--> 1.06--> 1.23   - VS stable  - No abdominal pain  - No signs of gross GI bleed    Rec:  - Advance diet as tolerated  - patient on coumadin--> Close monitoring of INR as outpatient  - follow up with [facility] in 2-4 weeks    #Personal history of colonic polyp on recent colonoscopy:  Recommend repeat colonoscopy in 5 years for screening purposes, pending health condition at that time      -call as needed, [phone] during weekdays till 5pm and call GI service after 5pm and on weekends [phone]  -Follow up with our [facility] located at [address] [place] [address]. Phone Number: [phone]

## 2021-03-16 NOTE — DISCHARGE NOTE PROVIDER - NSDCMRMEDTOKEN_GEN_ALL_CORE_FT
FINASTERIDE 5 MG TABLET:   NIFEdipine 60 mg oral tablet, extended release: 1 tab(s) orally once a day  Plavix 75 mg oral tablet: 1 tab(s) orally once a day  PRAVASTATIN 40MG TABLETS: TAKE 1 TABLET BY MOUTH EVERY DAY AT BEDTIME  TAMSULOSIN 0.4MG CAPSULES:   Vitamin D3 1000 intl units (25 mcg) oral tablet: 1 tab(s) orally once a day  WARFARIN SODIUM 5 MG TABLET:

## 2021-03-17 VITALS — DIASTOLIC BLOOD PRESSURE: 68 MMHG | HEART RATE: 75 BPM | SYSTOLIC BLOOD PRESSURE: 123 MMHG

## 2021-03-17 LAB
ALBUMIN SERPL ELPH-MCNC: 3.2 G/DL — LOW (ref 3.5–5.2)
ALP SERPL-CCNC: 49 U/L — SIGNIFICANT CHANGE UP (ref 30–115)
ALT FLD-CCNC: 16 U/L — SIGNIFICANT CHANGE UP (ref 0–41)
ANION GAP SERPL CALC-SCNC: 7 MMOL/L — SIGNIFICANT CHANGE UP (ref 7–14)
APTT BLD: 31.9 SEC — SIGNIFICANT CHANGE UP (ref 27–39.2)
AST SERPL-CCNC: 17 U/L — SIGNIFICANT CHANGE UP (ref 0–41)
BILIRUB SERPL-MCNC: 0.4 MG/DL — SIGNIFICANT CHANGE UP (ref 0.2–1.2)
BUN SERPL-MCNC: 25 MG/DL — HIGH (ref 10–20)
CALCIUM SERPL-MCNC: 8.8 MG/DL — SIGNIFICANT CHANGE UP (ref 8.5–10.1)
CHLORIDE SERPL-SCNC: 107 MMOL/L — SIGNIFICANT CHANGE UP (ref 98–110)
CO2 SERPL-SCNC: 26 MMOL/L — SIGNIFICANT CHANGE UP (ref 17–32)
CREAT SERPL-MCNC: 1.6 MG/DL — HIGH (ref 0.7–1.5)
GLUCOSE SERPL-MCNC: 94 MG/DL — SIGNIFICANT CHANGE UP (ref 70–99)
HCT VFR BLD CALC: 29.5 % — LOW (ref 42–52)
HGB BLD-MCNC: 9.3 G/DL — LOW (ref 14–18)
INR BLD: 1.83 RATIO — HIGH (ref 0.65–1.3)
MCHC RBC-ENTMCNC: 29.9 PG — SIGNIFICANT CHANGE UP (ref 27–31)
MCHC RBC-ENTMCNC: 31.5 G/DL — LOW (ref 32–37)
MCV RBC AUTO: 94.9 FL — HIGH (ref 80–94)
NRBC # BLD: 0 /100 WBCS — SIGNIFICANT CHANGE UP (ref 0–0)
PLATELET # BLD AUTO: 321 K/UL — SIGNIFICANT CHANGE UP (ref 130–400)
POTASSIUM SERPL-MCNC: 4.7 MMOL/L — SIGNIFICANT CHANGE UP (ref 3.5–5)
POTASSIUM SERPL-SCNC: 4.7 MMOL/L — SIGNIFICANT CHANGE UP (ref 3.5–5)
PROT SERPL-MCNC: 4.9 G/DL — LOW (ref 6–8)
PROTHROM AB SERPL-ACNC: 21 SEC — HIGH (ref 9.95–12.87)
RBC # BLD: 3.11 M/UL — LOW (ref 4.7–6.1)
RBC # FLD: 16.8 % — HIGH (ref 11.5–14.5)
SODIUM SERPL-SCNC: 140 MMOL/L — SIGNIFICANT CHANGE UP (ref 135–146)
WBC # BLD: 8.64 K/UL — SIGNIFICANT CHANGE UP (ref 4.8–10.8)
WBC # FLD AUTO: 8.64 K/UL — SIGNIFICANT CHANGE UP (ref 4.8–10.8)

## 2021-03-17 PROCEDURE — 99238 HOSP IP/OBS DSCHRG MGMT 30/<: CPT

## 2021-03-17 RX ORDER — WARFARIN SODIUM 2.5 MG/1
5 TABLET ORAL AT BEDTIME
Refills: 0 | Status: DISCONTINUED | OUTPATIENT
Start: 2021-03-17 | End: 2021-03-17

## 2021-03-17 RX ORDER — NIFEDIPINE 30 MG
60 TABLET, EXTENDED RELEASE 24 HR ORAL DAILY
Refills: 0 | Status: DISCONTINUED | OUTPATIENT
Start: 2021-03-17 | End: 2021-03-17

## 2021-03-17 RX ADMIN — Medication 650 MILLIGRAM(S): at 14:19

## 2021-03-17 RX ADMIN — Medication 81 MILLIGRAM(S): at 11:10

## 2021-03-17 RX ADMIN — FINASTERIDE 5 MILLIGRAM(S): 5 TABLET, FILM COATED ORAL at 11:10

## 2021-03-17 RX ADMIN — Medication 650 MILLIGRAM(S): at 05:33

## 2021-03-17 RX ADMIN — Medication 1000 UNIT(S): at 11:10

## 2021-03-17 NOTE — PROGRESS NOTE ADULT - ATTENDING COMMENTS
patient seen and examined earlier today and discussed with medical resident.  patient medically stable for discharge.  medication reconciliation done.  patient to go on warfarin 5 mg po daily 4x/week and 2.5 mg 3x/week which is his outpatient regimen.  He will f/u with coumadin clinic.  medication reconciliation performed.  patient will follow up also with PMD.  GI results noted.  patient medically stable for DC.

## 2021-03-17 NOTE — PROGRESS NOTE ADULT - REASON FOR ADMISSION
presyncope  elevated INR

## 2021-03-17 NOTE — PROGRESS NOTE ADULT - SUBJECTIVE AND OBJECTIVE BOX
TISHA GRANDA 88y Male  MRN#: [id number]   Hospital Day: 11d    SUBJECTIVE  Patient is a 88y old Male who presents with a chief complaint of presyncope  elevated INR (16 Mar 2021 18:13)  Currently admitted to medicine with the primary diagnosis of Near syncope      INTERVAL HPI AND OVERNIGHT EVENTS:  Patient was examined and seen at bedside. This morning he is resting comfortably in bed and reports no issues or overnight events.    OBJECTIVE  PAST MEDICAL & SURGICAL HISTORY  Afib  Spinal stenosis at L4-L5 level  H/O heart artery stent      ALLERGIES:  No Known Allergies    MEDICATIONS:  STANDING MEDICATIONS  aspirin  chewable 81 milliGRAM(s) Oral daily  atorvastatin 10 milliGRAM(s) Oral at bedtime  chlorhexidine 4% Liquid 1 Application(s) Topical <User Schedule>  cholecalciferol 1000 Unit(s) Oral daily  finasteride 5 milliGRAM(s) Oral daily  sodium bicarbonate 650 milliGRAM(s) Oral every 8 hours    PRN MEDICATIONS      VITAL SIGNS: Last 24 Hours  T(C): 35.8 (17 Mar 2021 08:00), Max: 36.3 (16 Mar 2021 23:54)  T(F): 96.5 (17 Mar 2021 08:00), Max: 97.3 (16 Mar 2021 23:54)  HR: 77 (17 Mar 2021 08:00) (74 - 108)  BP: 167/89 (17 Mar 2021 08:00) (134/77 - 167/89)  BP(mean): --  RR: 18 (17 Mar 2021 08:00) (18 - 18)  SpO2: --    LABS:                        9.3    8.64  )-----------( 321      ( 17 Mar 2021 07:19 )             29.5     03-17    140  |  107  |  25<H>  ----------------------------<  94  4.7   |  26  |  1.6<H>    Ca    8.8      17 Mar 2021 07:19  Mg     2.0     03-16    TPro  4.9<L>  /  Alb  3.2<L>  /  TBili  0.4  /  DBili  x   /  AST  17  /  ALT  16  /  AlkPhos  49  03-17    PT/INR - ( 17 Mar 2021 07:19 )   PT: 21.00 sec;   INR: 1.83 ratio         PTT - ( 17 Mar 2021 07:19 )  PTT:31.9 sec      PHYSICAL EXAM:  CONSTITUTIONAL: No acute distress, well-developed, well-groomed, AAOx3  HEAD: Atraumatic, normocephalic  EYES: EOM intact, PERRLA, conjunctiva and sclera clear  PULMONARY: Clear to auscultation bilaterally  CARDIOVASCULAR: Regular rate and rhythm  GASTROINTESTINAL: Soft, non-tender, non-distended  MUSCULOSKELETAL: no edema  NEUROLOGY: non-focal  SKIN: No rashes or lesions; warm and dry    ASSESSMENT & PLAN  89yo M with PMHx CAD/stents, AFib on coumadin, HTN, HLD, spinal stenosis, presents for eval s/p fall this morning, presyncope and elevated INR now with melena?     #Fall likely due to orthostatic hypotension secondary to GIB  - Hg 9.3  - s/p 4 U PRBC during the admission   - Hold tamsulosin. avoid bb. hold antihypertensives  - GI eval:     - Monitor CBC    - keep hgb>8    - PPI BID IV    - s/p EGD and colonoscopy on 3/10: +gastritis and a polyp that was removed.     - VCE on monday: NPO after midnight Sunday night,     - Can resume coumadin from GI standpoint with close monitoring of INR and Coumadin dose     - s/p capsule endoscopy, cleared to d/c by GI consult     - Please notify GI if any signs of gross GI bleed    # Elevated INR due to Coumadin Toxicity - resolved   - INR 1.83  - s/p Vit k 2x 2.5 + 5 (total 10mg)      # Anemia, normocytic   - hg now stabilizing   - hb 9.3, in 2015 was 14  - GI following pt: EGD/colonoscopy showed gastritis and a polyp  - Capsul endoscopy today with GI, okay to start on CLD, advance to normal diet for dinner     # LEYDI on CKD4  - Cr 1.6, noted in [facility] to be 1.7-2.0 in 2015    # Leukocytosis - resolved   - no sign of infection, no fever, cough, urinary sx's.  - Trend WBC, likely reactive stress induced    #Elevated lactate (resolved)  - LA 2.7  - likely dehydration  - s/p 1L NS in ER, s/p 1Liter in ER  - avoid volume overload     # Afib on Coumadin  - not on rate control  - monitor HR for now as rate is controlled  - Pending AM INR, dose coumadin according to protocol    # CAD s/p PCI w/ 3 stents   - Hold Plavix. C/s Dr. Herman.     # Prostate CA   - c/w finasteride. hold tamsulosin until orthostatic negative.     # HTN  - BP wnl  - restart Nifedipine, hold losartan & HCTZ     # HLD   - Lipitor for pravastatin     # H/o spinal stenosis   - PT eval. op f.u     # Vit d deficiency   - c/w supplement     # DVT PPx - warfarin (A-fib)   # Activity - Increase as Tolerated w/ assist    # Dispo - Patient to be discharged when medically optimized.  # Code Status - FULL

## 2021-03-17 NOTE — PROGRESS NOTE ADULT - PROVIDER SPECIALTY LIST ADULT
Cardiology
Internal Medicine
Internal Medicine
Gastroenterology
Hospitalist
Internal Medicine
Gastroenterology
Gastroenterology
Hospitalist
Internal Medicine
Nephrology
Gastroenterology
Internal Medicine
Internal Medicine

## 2021-03-18 PROBLEM — M48.061 SPINAL STENOSIS, LUMBAR REGION WITHOUT NEUROGENIC CLAUDICATION: Chronic | Status: ACTIVE | Noted: 2021-03-06

## 2021-03-18 PROBLEM — I48.91 UNSPECIFIED ATRIAL FIBRILLATION: Chronic | Status: ACTIVE | Noted: 2021-03-06

## 2021-03-23 ENCOUNTER — APPOINTMENT (OUTPATIENT)
Dept: CARDIOLOGY | Facility: CLINIC | Age: 86
End: 2021-03-23
Payer: MEDICARE

## 2021-03-23 VITALS
BODY MASS INDEX: 27.92 KG/M2 | TEMPERATURE: 97.6 F | HEART RATE: 89 BPM | HEIGHT: 70 IN | SYSTOLIC BLOOD PRESSURE: 120 MMHG | DIASTOLIC BLOOD PRESSURE: 66 MMHG | WEIGHT: 195 LBS

## 2021-03-23 PROCEDURE — 99072 ADDL SUPL MATRL&STAF TM PHE: CPT

## 2021-03-23 PROCEDURE — 93000 ELECTROCARDIOGRAM COMPLETE: CPT

## 2021-03-23 PROCEDURE — 99214 OFFICE O/P EST MOD 30 MIN: CPT

## 2021-03-23 NOTE — PHYSICAL EXAM
[General Appearance - Well Developed] : well developed [Normal Appearance] : normal appearance [Well Groomed] : well groomed [General Appearance - Well Nourished] : well nourished [No Deformities] : no deformities [General Appearance - In No Acute Distress] : no acute distress [Normal Conjunctiva] : the conjunctiva exhibited no abnormalities [Eyelids - No Xanthelasma] : the eyelids demonstrated no xanthelasmas [Normal Oral Mucosa] : normal oral mucosa [No Oral Pallor] : no oral pallor [No Oral Cyanosis] : no oral cyanosis [FreeTextEntry1] : No JVD or bruits.  [Respiration, Rhythm And Depth] : normal respiratory rhythm and effort [Exaggerated Use Of Accessory Muscles For Inspiration] : no accessory muscle use [Auscultation Breath Sounds / Voice Sounds] : lungs were clear to auscultation bilaterally [Abdomen Soft] : soft [Abdomen Tenderness] : non-tender [Abdomen Mass (___ Cm)] : no abdominal mass palpated [Abnormal Walk] : normal gait [Gait - Sufficient For Exercise Testing] : the gait was sufficient for exercise testing [Nail Clubbing] : no clubbing of the fingernails [Cyanosis, Localized] : no localized cyanosis [Petechial Hemorrhages (___cm)] : no petechial hemorrhages [Skin Color & Pigmentation] : normal skin color and pigmentation [] : no rash [No Venous Stasis] : no venous stasis [Skin Lesions] : no skin lesions [No Skin Ulcers] : no skin ulcer [No Xanthoma] : no  xanthoma was observed [Oriented To Time, Place, And Person] : oriented to person, place, and time [Affect] : the affect was normal [Mood] : the mood was normal [No Anxiety] : not feeling anxious [Normal Rate] : normal [Rhythm Regular] : regular [II] : a grade 2 [1+] : left 1+ [No Pitting Edema] : no pitting edema present [Rt] : no varicose veins of the right leg [Lt] : no varicose veins of the left leg

## 2021-03-23 NOTE — HISTORY OF PRESENT ILLNESS
[FreeTextEntry1] : The patient was admitted with severe anemia. He had an increased INR after starting Antibiotics. The patient had panendoscopy . He had gastritis , colon Polyps and internal hemorrhoids . Echo showed normal LV systolic function .

## 2021-03-23 NOTE — ASSESSMENT
[FreeTextEntry1] : The patient had an apparent GI bleed which was made worse by coumadin toxicity . The patient had to have multiple transfusions . He was stable from the cardiac standpoint . He had gastriitis and colon polyp and internal hemorrhoids . The patient has known stents in his LAD . The patient is on Plavix and coumadin .

## 2021-03-26 ENCOUNTER — RX RENEWAL (OUTPATIENT)
Age: 86
End: 2021-03-26

## 2021-03-26 DIAGNOSIS — Z95.5 PRESENCE OF CORONARY ANGIOPLASTY IMPLANT AND GRAFT: ICD-10-CM

## 2021-03-26 DIAGNOSIS — S30.0XXA CONTUSION OF LOWER BACK AND PELVIS, INITIAL ENCOUNTER: ICD-10-CM

## 2021-03-26 DIAGNOSIS — D63.1 ANEMIA IN CHRONIC KIDNEY DISEASE: ICD-10-CM

## 2021-03-26 DIAGNOSIS — E78.5 HYPERLIPIDEMIA, UNSPECIFIED: ICD-10-CM

## 2021-03-26 DIAGNOSIS — K57.31 DIVERTICULOSIS OF LARGE INTESTINE WITHOUT PERFORATION OR ABSCESS WITH BLEEDING: ICD-10-CM

## 2021-03-26 DIAGNOSIS — K63.5 POLYP OF COLON: ICD-10-CM

## 2021-03-26 DIAGNOSIS — Z85.46 PERSONAL HISTORY OF MALIGNANT NEOPLASM OF PROSTATE: ICD-10-CM

## 2021-03-26 DIAGNOSIS — I48.20 CHRONIC ATRIAL FIBRILLATION, UNSPECIFIED: ICD-10-CM

## 2021-03-26 DIAGNOSIS — K64.8 OTHER HEMORRHOIDS: ICD-10-CM

## 2021-03-26 DIAGNOSIS — D62 ACUTE POSTHEMORRHAGIC ANEMIA: ICD-10-CM

## 2021-03-26 DIAGNOSIS — R55 SYNCOPE AND COLLAPSE: ICD-10-CM

## 2021-03-26 DIAGNOSIS — I25.10 ATHEROSCLEROTIC HEART DISEASE OF NATIVE CORONARY ARTERY WITHOUT ANGINA PECTORIS: ICD-10-CM

## 2021-03-26 DIAGNOSIS — I95.1 ORTHOSTATIC HYPOTENSION: ICD-10-CM

## 2021-03-26 DIAGNOSIS — N17.9 ACUTE KIDNEY FAILURE, UNSPECIFIED: ICD-10-CM

## 2021-03-26 DIAGNOSIS — N18.4 CHRONIC KIDNEY DISEASE, STAGE 4 (SEVERE): ICD-10-CM

## 2021-03-26 DIAGNOSIS — K29.81 DUODENITIS WITH BLEEDING: ICD-10-CM

## 2021-03-26 DIAGNOSIS — M48.061 SPINAL STENOSIS, LUMBAR REGION WITHOUT NEUROGENIC CLAUDICATION: ICD-10-CM

## 2021-03-26 DIAGNOSIS — Z51.81 ENCOUNTER FOR THERAPEUTIC DRUG LEVEL MONITORING: ICD-10-CM

## 2021-03-26 DIAGNOSIS — Z79.01 LONG TERM (CURRENT) USE OF ANTICOAGULANTS: ICD-10-CM
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
## 2021-03-26 DIAGNOSIS — E87.2 ACIDOSIS: ICD-10-CM

## 2021-03-26 DIAGNOSIS — I12.9 HYPERTENSIVE CHRONIC KIDNEY DISEASE WITH STAGE 1 THROUGH STAGE 4 CHRONIC KIDNEY DISEASE, OR UNSPECIFIED CHRONIC KIDNEY DISEASE: ICD-10-CM

## 2021-03-26 DIAGNOSIS — T45.515A ADVERSE EFFECT OF ANTICOAGULANTS, INITIAL ENCOUNTER: ICD-10-CM

## 2021-03-26 DIAGNOSIS — E55.9 VITAMIN D DEFICIENCY, UNSPECIFIED: ICD-10-CM
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
## 2021-03-26 DIAGNOSIS — K29.61 OTHER GASTRITIS WITH BLEEDING: ICD-10-CM

## 2021-04-29 ENCOUNTER — APPOINTMENT (OUTPATIENT)
Dept: CARDIOLOGY | Facility: CLINIC | Age: 86
End: 2021-04-29
Payer: MEDICARE

## 2021-04-29 VITALS
HEIGHT: 70 IN | SYSTOLIC BLOOD PRESSURE: 112 MMHG | HEART RATE: 75 BPM | TEMPERATURE: 97.2 F | DIASTOLIC BLOOD PRESSURE: 68 MMHG | WEIGHT: 195 LBS | BODY MASS INDEX: 27.92 KG/M2

## 2021-04-29 PROCEDURE — 99072 ADDL SUPL MATRL&STAF TM PHE: CPT

## 2021-04-29 PROCEDURE — 99214 OFFICE O/P EST MOD 30 MIN: CPT

## 2021-04-29 PROCEDURE — 93000 ELECTROCARDIOGRAM COMPLETE: CPT

## 2021-04-29 NOTE — ASSESSMENT
[FreeTextEntry1] : The patient had an apparent GI bleed which was made worse by coumadin toxicity . The patient had to have multiple transfusions . He was stable from the cardiac standpoint . He had gastritis and colon polyp and internal hemorrhoids . The patient has known multiple  stents in his LAD . The patient is on Plavix and Coumadin .

## 2021-04-29 NOTE — PHYSICAL EXAM
[General Appearance - Well Developed] : well developed [Normal Appearance] : normal appearance [Well Groomed] : well groomed [General Appearance - Well Nourished] : well nourished [No Deformities] : no deformities [General Appearance - In No Acute Distress] : no acute distress [Normal Conjunctiva] : the conjunctiva exhibited no abnormalities [Eyelids - No Xanthelasma] : the eyelids demonstrated no xanthelasmas [Normal Oral Mucosa] : normal oral mucosa [No Oral Pallor] : no oral pallor [No Oral Cyanosis] : no oral cyanosis [Respiration, Rhythm And Depth] : normal respiratory rhythm and effort [Exaggerated Use Of Accessory Muscles For Inspiration] : no accessory muscle use [Auscultation Breath Sounds / Voice Sounds] : lungs were clear to auscultation bilaterally [Abdomen Soft] : soft [Abdomen Tenderness] : non-tender [Abdomen Mass (___ Cm)] : no abdominal mass palpated [Gait - Sufficient For Exercise Testing] : the gait was sufficient for exercise testing [Abnormal Walk] : normal gait [Cyanosis, Localized] : no localized cyanosis [Nail Clubbing] : no clubbing of the fingernails [Petechial Hemorrhages (___cm)] : no petechial hemorrhages [Skin Color & Pigmentation] : normal skin color and pigmentation [] : no rash [No Venous Stasis] : no venous stasis [Skin Lesions] : no skin lesions [No Skin Ulcers] : no skin ulcer [No Xanthoma] : no  xanthoma was observed [Oriented To Time, Place, And Person] : oriented to person, place, and time [Affect] : the affect was normal [Mood] : the mood was normal [No Anxiety] : not feeling anxious [Normal Rate] : normal [Rhythm Regular] : regular [II] : a grade 2 [1+] : left 1+ [No Pitting Edema] : no pitting edema present [FreeTextEntry1] : No JVD or bruits.  [Rt] : no varicose veins of the right leg [Lt] : no varicose veins of the left leg

## 2021-04-29 NOTE — REVIEW OF SYSTEMS
[Joint Pain] : joint pain [Joint Swelling] : joint swelling [Numbness (Hypoesthesia)] : numbness [Negative] : Heme/Lymph

## 2021-05-21 ENCOUNTER — APPOINTMENT (OUTPATIENT)
Dept: GASTROENTEROLOGY | Facility: CLINIC | Age: 86
End: 2021-05-21

## 2021-06-18 ENCOUNTER — APPOINTMENT (OUTPATIENT)
Dept: CARDIOLOGY | Facility: CLINIC | Age: 86
End: 2021-06-18

## 2021-09-08 ENCOUNTER — APPOINTMENT (OUTPATIENT)
Dept: CARDIOLOGY | Facility: CLINIC | Age: 86
End: 2021-09-08
Payer: MEDICARE

## 2021-09-08 VITALS
WEIGHT: 195 LBS | BODY MASS INDEX: 27.92 KG/M2 | SYSTOLIC BLOOD PRESSURE: 122 MMHG | HEART RATE: 74 BPM | DIASTOLIC BLOOD PRESSURE: 80 MMHG | TEMPERATURE: 96.5 F | HEIGHT: 70 IN

## 2021-09-08 PROCEDURE — 93000 ELECTROCARDIOGRAM COMPLETE: CPT

## 2021-09-08 PROCEDURE — 99214 OFFICE O/P EST MOD 30 MIN: CPT

## 2021-09-08 NOTE — REASON FOR VISIT
[Arrhythmia/ECG Abnorrmalities] : arrhythmia/ECG abnormalities [Follow-Up - Clinic] : a clinic follow-up of [Atrial Fibrillation] : atrial fibrillation [Coronary Artery Disease] : coronary artery disease [Hyperlipidemia] : hyperlipidemia [Hypertension] : hypertension [FreeTextEntry3] : Dr. Haddad

## 2021-09-08 NOTE — PHYSICAL EXAM
[General Appearance - Well Developed] : well developed [Normal Appearance] : normal appearance [Well Groomed] : well groomed [No Deformities] : no deformities [General Appearance - Well Nourished] : well nourished [General Appearance - In No Acute Distress] : no acute distress [Normal Conjunctiva] : the conjunctiva exhibited no abnormalities [Normal Oral Mucosa] : normal oral mucosa [Eyelids - No Xanthelasma] : the eyelids demonstrated no xanthelasmas [No Oral Pallor] : no oral pallor [No Oral Cyanosis] : no oral cyanosis [Respiration, Rhythm And Depth] : normal respiratory rhythm and effort [Exaggerated Use Of Accessory Muscles For Inspiration] : no accessory muscle use [Auscultation Breath Sounds / Voice Sounds] : lungs were clear to auscultation bilaterally [Abdomen Soft] : soft [Abdomen Tenderness] : non-tender [Abdomen Mass (___ Cm)] : no abdominal mass palpated [Abnormal Walk] : normal gait [Gait - Sufficient For Exercise Testing] : the gait was sufficient for exercise testing [Nail Clubbing] : no clubbing of the fingernails [Cyanosis, Localized] : no localized cyanosis [Petechial Hemorrhages (___cm)] : no petechial hemorrhages [Skin Color & Pigmentation] : normal skin color and pigmentation [] : no rash [No Venous Stasis] : no venous stasis [No Skin Ulcers] : no skin ulcer [Skin Lesions] : no skin lesions [No Xanthoma] : no  xanthoma was observed [Oriented To Time, Place, And Person] : oriented to person, place, and time [Affect] : the affect was normal [Mood] : the mood was normal [No Anxiety] : not feeling anxious [Normal Rate] : normal [Rhythm Regular] : regular [II] : a grade 2 [1+] : left 1+ [No Pitting Edema] : no pitting edema present [FreeTextEntry1] : No JVD or bruits.  [Rt] : no varicose veins of the right leg [Lt] : no varicose veins of the left leg

## 2021-09-08 NOTE — ASSESSMENT
[FreeTextEntry1] : The patient is unchanged . He has had no chest pain or SOB . He has permanent AF and has ahd multiple stents in his LAD . The patient has AS which is thought to be mild . LDL is at goal. He has had GI bleedis in the past and his hemiglobin has been stable

## 2021-09-08 NOTE — HISTORY OF PRESENT ILLNESS
[FreeTextEntry1] : The patient has been feeling well. No chest pain . No chest pain or SOB .The patient is known to have permanent AF

## 2022-03-02 ENCOUNTER — APPOINTMENT (OUTPATIENT)
Dept: CARDIOLOGY | Facility: CLINIC | Age: 87
End: 2022-03-02

## 2022-04-05 ENCOUNTER — APPOINTMENT (OUTPATIENT)
Dept: CARDIOLOGY | Facility: CLINIC | Age: 87
End: 2022-04-05
Payer: MEDICARE

## 2022-04-05 VITALS
WEIGHT: 194 LBS | HEIGHT: 70 IN | SYSTOLIC BLOOD PRESSURE: 120 MMHG | BODY MASS INDEX: 27.77 KG/M2 | DIASTOLIC BLOOD PRESSURE: 70 MMHG | HEART RATE: 89 BPM

## 2022-04-05 PROCEDURE — 93000 ELECTROCARDIOGRAM COMPLETE: CPT

## 2022-04-05 PROCEDURE — 99214 OFFICE O/P EST MOD 30 MIN: CPT

## 2022-04-05 NOTE — HISTORY OF PRESENT ILLNESS
[FreeTextEntry1] : The patient has been feeling well. No chest pain . No chest pain or SOB .The patient is known to have permanent AF.  Pt is compliant with all his medications The patietn has had a PCI of the LAD and has multiple stentsi in his LAD . Hence he ramins on Plvix not on ASA . \par Chol 164 trig 116 LDL 70

## 2022-08-22 ENCOUNTER — APPOINTMENT (OUTPATIENT)
Dept: CARDIOLOGY | Facility: CLINIC | Age: 87
End: 2022-08-22

## 2022-08-22 VITALS
DIASTOLIC BLOOD PRESSURE: 60 MMHG | HEIGHT: 70 IN | HEART RATE: 77 BPM | WEIGHT: 195 LBS | SYSTOLIC BLOOD PRESSURE: 118 MMHG | TEMPERATURE: 97.2 F | BODY MASS INDEX: 27.92 KG/M2

## 2022-08-22 PROCEDURE — 93306 TTE W/DOPPLER COMPLETE: CPT

## 2022-08-22 PROCEDURE — 99214 OFFICE O/P EST MOD 30 MIN: CPT | Mod: 25

## 2022-08-22 PROCEDURE — 93000 ELECTROCARDIOGRAM COMPLETE: CPT

## 2022-08-22 NOTE — ASSESSMENT
[FreeTextEntry1] : The patient is unchanged . He has had no chest pain or SOB . He has permanent AF and has and multiple stents in his LAD . The patient has AS which is thought to be mild . LDL is at goal. He has had GI bleedis in the past and his hemiglobin has been stable . LDL and BP are at goal

## 2022-08-22 NOTE — HISTORY OF PRESENT ILLNESS
[FreeTextEntry1] : The patient has known multiple stents in the LAD and remains on Plavix . The patient is on Coumadin for permanent AF . Rate is controleed. The patient has not had chest pain and is feeling well. He walks with a walker secondary to orthopedic issues in his legs.

## 2022-10-06 ENCOUNTER — EMERGENCY (EMERGENCY)
Facility: HOSPITAL | Age: 87
LOS: 0 days | Discharge: HOME | End: 2022-10-06
Attending: EMERGENCY MEDICINE | Admitting: EMERGENCY MEDICINE

## 2022-10-06 VITALS
SYSTOLIC BLOOD PRESSURE: 128 MMHG | OXYGEN SATURATION: 99 % | RESPIRATION RATE: 18 BRPM | TEMPERATURE: 98 F | HEART RATE: 88 BPM | DIASTOLIC BLOOD PRESSURE: 72 MMHG

## 2022-10-06 VITALS
SYSTOLIC BLOOD PRESSURE: 134 MMHG | WEIGHT: 149.91 LBS | HEIGHT: 69 IN | DIASTOLIC BLOOD PRESSURE: 73 MMHG | RESPIRATION RATE: 16 BRPM | OXYGEN SATURATION: 96 % | HEART RATE: 95 BPM | TEMPERATURE: 98 F

## 2022-10-06 DIAGNOSIS — I48.91 UNSPECIFIED ATRIAL FIBRILLATION: ICD-10-CM

## 2022-10-06 DIAGNOSIS — E11.22 TYPE 2 DIABETES MELLITUS WITH DIABETIC CHRONIC KIDNEY DISEASE: ICD-10-CM

## 2022-10-06 DIAGNOSIS — N18.9 CHRONIC KIDNEY DISEASE, UNSPECIFIED: ICD-10-CM

## 2022-10-06 DIAGNOSIS — Z79.01 LONG TERM (CURRENT) USE OF ANTICOAGULANTS: ICD-10-CM

## 2022-10-06 DIAGNOSIS — J44.9 CHRONIC OBSTRUCTIVE PULMONARY DISEASE, UNSPECIFIED: ICD-10-CM

## 2022-10-06 DIAGNOSIS — Z79.02 LONG TERM (CURRENT) USE OF ANTITHROMBOTICS/ANTIPLATELETS: ICD-10-CM

## 2022-10-06 DIAGNOSIS — Z95.5 PRESENCE OF CORONARY ANGIOPLASTY IMPLANT AND GRAFT: ICD-10-CM

## 2022-10-06 DIAGNOSIS — Z95.5 PRESENCE OF CORONARY ANGIOPLASTY IMPLANT AND GRAFT: Chronic | ICD-10-CM

## 2022-10-06 DIAGNOSIS — L03.114 CELLULITIS OF LEFT UPPER LIMB: ICD-10-CM

## 2022-10-06 DIAGNOSIS — E11.51 TYPE 2 DIABETES MELLITUS WITH DIABETIC PERIPHERAL ANGIOPATHY WITHOUT GANGRENE: ICD-10-CM

## 2022-10-06 DIAGNOSIS — L53.9 ERYTHEMATOUS CONDITION, UNSPECIFIED: ICD-10-CM

## 2022-10-06 DIAGNOSIS — M79.89 OTHER SPECIFIED SOFT TISSUE DISORDERS: ICD-10-CM

## 2022-10-06 DIAGNOSIS — I73.9 PERIPHERAL VASCULAR DISEASE, UNSPECIFIED: ICD-10-CM

## 2022-10-06 DIAGNOSIS — Z88.2 ALLERGY STATUS TO SULFONAMIDES: ICD-10-CM

## 2022-10-06 DIAGNOSIS — M48.061 SPINAL STENOSIS, LUMBAR REGION WITHOUT NEUROGENIC CLAUDICATION: ICD-10-CM

## 2022-10-06 DIAGNOSIS — I12.9 HYPERTENSIVE CHRONIC KIDNEY DISEASE WITH STAGE 1 THROUGH STAGE 4 CHRONIC KIDNEY DISEASE, OR UNSPECIFIED CHRONIC KIDNEY DISEASE: ICD-10-CM

## 2022-10-06 DIAGNOSIS — I10 ESSENTIAL (PRIMARY) HYPERTENSION: ICD-10-CM

## 2022-10-06 LAB
ALBUMIN SERPL ELPH-MCNC: 4.4 G/DL — SIGNIFICANT CHANGE UP (ref 3.5–5.2)
ALP SERPL-CCNC: 63 U/L — SIGNIFICANT CHANGE UP (ref 30–115)
ALT FLD-CCNC: 14 U/L — SIGNIFICANT CHANGE UP (ref 0–41)
ANION GAP SERPL CALC-SCNC: 14 MMOL/L — SIGNIFICANT CHANGE UP (ref 7–14)
APTT BLD: 39.5 SEC — HIGH (ref 27–39.2)
AST SERPL-CCNC: 28 U/L — SIGNIFICANT CHANGE UP (ref 0–41)
BASOPHILS # BLD AUTO: 0.02 K/UL — SIGNIFICANT CHANGE UP (ref 0–0.2)
BASOPHILS NFR BLD AUTO: 0.2 % — SIGNIFICANT CHANGE UP (ref 0–1)
BILIRUB SERPL-MCNC: 0.3 MG/DL — SIGNIFICANT CHANGE UP (ref 0.2–1.2)
BUN SERPL-MCNC: 36 MG/DL — HIGH (ref 10–20)
CALCIUM SERPL-MCNC: 9.3 MG/DL — SIGNIFICANT CHANGE UP (ref 8.4–10.5)
CHLORIDE SERPL-SCNC: 106 MMOL/L — SIGNIFICANT CHANGE UP (ref 98–110)
CO2 SERPL-SCNC: 21 MMOL/L — SIGNIFICANT CHANGE UP (ref 17–32)
CREAT SERPL-MCNC: 1.7 MG/DL — HIGH (ref 0.7–1.5)
EGFR: 38 ML/MIN/1.73M2 — LOW
EOSINOPHIL # BLD AUTO: 0.17 K/UL — SIGNIFICANT CHANGE UP (ref 0–0.7)
EOSINOPHIL NFR BLD AUTO: 1.9 % — SIGNIFICANT CHANGE UP (ref 0–8)
GLUCOSE SERPL-MCNC: 123 MG/DL — HIGH (ref 70–99)
HCT VFR BLD CALC: 43.3 % — SIGNIFICANT CHANGE UP (ref 42–52)
HGB BLD-MCNC: 14.5 G/DL — SIGNIFICANT CHANGE UP (ref 14–18)
IMM GRANULOCYTES NFR BLD AUTO: 0.6 % — HIGH (ref 0.1–0.3)
INR BLD: 1.98 RATIO — HIGH (ref 0.65–1.3)
LYMPHOCYTES # BLD AUTO: 0.54 K/UL — LOW (ref 1.2–3.4)
LYMPHOCYTES # BLD AUTO: 6 % — LOW (ref 20.5–51.1)
MCHC RBC-ENTMCNC: 29.1 PG — SIGNIFICANT CHANGE UP (ref 27–31)
MCHC RBC-ENTMCNC: 33.5 G/DL — SIGNIFICANT CHANGE UP (ref 32–37)
MCV RBC AUTO: 86.9 FL — SIGNIFICANT CHANGE UP (ref 80–94)
MONOCYTES # BLD AUTO: 0.62 K/UL — HIGH (ref 0.1–0.6)
MONOCYTES NFR BLD AUTO: 6.9 % — SIGNIFICANT CHANGE UP (ref 1.7–9.3)
NEUTROPHILS # BLD AUTO: 7.53 K/UL — HIGH (ref 1.4–6.5)
NEUTROPHILS NFR BLD AUTO: 84.4 % — HIGH (ref 42.2–75.2)
NRBC # BLD: 0 /100 WBCS — SIGNIFICANT CHANGE UP (ref 0–0)
PLATELET # BLD AUTO: 236 K/UL — SIGNIFICANT CHANGE UP (ref 130–400)
POTASSIUM SERPL-MCNC: 5.1 MMOL/L — HIGH (ref 3.5–5)
POTASSIUM SERPL-SCNC: 5.1 MMOL/L — HIGH (ref 3.5–5)
PROT SERPL-MCNC: 6.8 G/DL — SIGNIFICANT CHANGE UP (ref 6–8)
PROTHROM AB SERPL-ACNC: 23.1 SEC — HIGH (ref 9.95–12.87)
RBC # BLD: 4.98 M/UL — SIGNIFICANT CHANGE UP (ref 4.7–6.1)
RBC # FLD: 15 % — HIGH (ref 11.5–14.5)
SODIUM SERPL-SCNC: 141 MMOL/L — SIGNIFICANT CHANGE UP (ref 135–146)
WBC # BLD: 8.93 K/UL — SIGNIFICANT CHANGE UP (ref 4.8–10.8)
WBC # FLD AUTO: 8.93 K/UL — SIGNIFICANT CHANGE UP (ref 4.8–10.8)

## 2022-10-06 PROCEDURE — 93971 EXTREMITY STUDY: CPT | Mod: 26,LT

## 2022-10-06 PROCEDURE — 73080 X-RAY EXAM OF ELBOW: CPT | Mod: 26,LT

## 2022-10-06 PROCEDURE — 73030 X-RAY EXAM OF SHOULDER: CPT | Mod: 26,LT

## 2022-10-06 PROCEDURE — 99285 EMERGENCY DEPT VISIT HI MDM: CPT

## 2022-10-06 NOTE — ED ADULT NURSE NOTE - NSICDXPASTMEDICALHX_GEN_ALL_CORE_FT
PAST MEDICAL HISTORY:  Afib     Chronic obstructive pulmonary disease     CKD (chronic kidney disease)     Diabetes     Hypertension     PVD (peripheral vascular disease)     Spinal stenosis at L4-L5 level

## 2022-10-06 NOTE — ED PROVIDER NOTE - ATTENDING APP SHARED VISIT CONTRIBUTION OF CARE
89 year-old male, past medical history of A. fib, hypertension, COPD, comes in complaining of left arm swelling and redness over 1 week.  Patient was started on Augmentin by PMD was improvement of redness, but he continues to have swelling and discomfort in left arm.  No fever/chills, streaking.  Patient is on anticoagulation.  No headache, neck pain, back pain, difficulty ambulating.  Patient states pain is worse with movement of left arm.  On exam, pt in NAD, AAOx3, head NC/AT, CN II-XII intact, neck (-) midline tenderness, lungs CTA B/L, CV S1S2 regular, abdomen soft/NT/ND/(+)BS, ext (+) erythema and warmth to left forearm, (-) streaking, (-) edema, pulses intact, motor 5/5x4, sensation intact.  Labs, x-ray, duplex done and reviewed. Will DC patient with Doxy.  Advised to follow-up with PMD.

## 2022-10-06 NOTE — ED ADULT TRIAGE NOTE - CHIEF COMPLAINT QUOTE
"i've had arm swelling for 1 week. I was getting checked for my coumadin and my arm has been swollen since. the hip center gave me augmentin for it, but my arm isn't getting better"   pt has pulses present, skin warm to touch  "my doctor wants me to get bloodwork and an ultrasound"

## 2022-10-06 NOTE — ED PROVIDER NOTE - PATIENT PORTAL LINK FT
You can access the FollowMyHealth Patient Portal offered by [facility] by registering at the following website: [email or web address]. By joining [facility]’s FollowMyHealth portal, you will also be able to view your health information using other applications (apps) compatible with our system.

## 2022-10-06 NOTE — ED PROVIDER NOTE - PHYSICAL EXAMINATION
Vital Signs: I have reviewed the initial vital signs.  Constitutional: well-nourished, no acute distress, normocephalic  Eyes: PERRLA, EOMI clear conjunctiva  ENT: MMM  Cardiovascular: regular rate, regular rhythm, no murmur appreciated  Respiratory: unlabored respiratory effort, clear to auscultation bilaterally  Gastrointestinal: soft, non-tender, non-distended  abdomen, no pulsatile mass  Musculoskeletal: supple neck, left upper extremity-, no bony tenderness, good peripheral pulse  Integumentary: left forearm +erythema and warmpth to left forearm   Neurologic: awake, alert, cranial nerves II-XII grossly intact, extremities’ motor and sensory functions grossly intact, no focal deficits

## 2022-10-06 NOTE — ED PROVIDER NOTE - NS ED ATTENDING STATEMENT MOD
This was a shared visit with the LISHA. I reviewed and verified the documentation and independently performed the documented:

## 2022-10-06 NOTE — ED ADULT NURSE NOTE - NSIMPLEMENTINTERV_GEN_ALL_ED
Implemented All Universal Safety Interventions:  [place] to call system. Call bell, personal items and telephone within reach. Instruct patient to call for assistance. Room bathroom lighting operational. Non-slip footwear when patient is off stretcher. Physically safe environment: no spills, clutter or unnecessary equipment. Stretcher in lowest position, wheels locked, appropriate side rails in place.

## 2022-10-06 NOTE — ED PROVIDER NOTE - OBJECTIVE STATEMENT
89 y/o male with hx of a.fib , HTN, COPD presents to the ED with swelling to left forearm with redness over past week. patient on po augmentin without any improvement. no fevers, streaking up arm. patient on anticoagulation. patient denies any headache, neck pain. no ambulation difficulty. patient c/o pain with movement of left arm.

## 2022-10-31 PROBLEM — I10 ESSENTIAL (PRIMARY) HYPERTENSION: Chronic | Status: ACTIVE | Noted: 2022-10-06

## 2022-10-31 PROBLEM — I73.9 PERIPHERAL VASCULAR DISEASE, UNSPECIFIED: Chronic | Status: ACTIVE | Noted: 2022-10-06

## 2022-11-09 ENCOUNTER — APPOINTMENT (OUTPATIENT)
Dept: UROLOGY | Facility: CLINIC | Age: 87
End: 2022-11-09

## 2022-11-09 VITALS
DIASTOLIC BLOOD PRESSURE: 79 MMHG | HEIGHT: 70 IN | HEART RATE: 64 BPM | BODY MASS INDEX: 27.2 KG/M2 | WEIGHT: 190 LBS | TEMPERATURE: 97.2 F | SYSTOLIC BLOOD PRESSURE: 122 MMHG

## 2022-11-09 PROCEDURE — 99203 OFFICE O/P NEW LOW 30 MIN: CPT

## 2022-11-14 LAB — BACTERIA UR CULT: ABNORMAL

## 2022-12-15 ENCOUNTER — APPOINTMENT (OUTPATIENT)
Dept: CARDIOLOGY | Facility: CLINIC | Age: 87
End: 2022-12-15

## 2022-12-15 VITALS
DIASTOLIC BLOOD PRESSURE: 70 MMHG | HEIGHT: 70 IN | WEIGHT: 190 LBS | BODY MASS INDEX: 27.2 KG/M2 | TEMPERATURE: 97.2 F | SYSTOLIC BLOOD PRESSURE: 116 MMHG | HEART RATE: 71 BPM

## 2022-12-15 PROCEDURE — 93000 ELECTROCARDIOGRAM COMPLETE: CPT

## 2022-12-15 PROCEDURE — 99214 OFFICE O/P EST MOD 30 MIN: CPT | Mod: 25

## 2022-12-15 RX ORDER — AMOXICILLIN AND CLAVULANATE POTASSIUM 875; 125 MG/1; MG/1
875-125 TABLET, COATED ORAL
Qty: 14 | Refills: 1 | Status: DISCONTINUED | COMMUNITY
Start: 2022-11-09 | End: 2022-12-15

## 2022-12-15 NOTE — CARDIOLOGY SUMMARY
[___] : [unfilled] [de-identified] : 4- AF  LAD \par 9-8-2021 AF LAFB \par 8- AF LAD  [de-identified] : 8- EF 53% mild AS mild MR mild TR LA volume increased .

## 2022-12-15 NOTE — ASSESSMENT
[FreeTextEntry1] : The patient is unchanged . He has had no chest pain or SOB . He has permanent AF and has and multiple stents in his LAD . The patient has AS which is thought to be mild . LDL is at goal. He has had GI bleedis in the past and his hemiglobin has been stable . LDL and BP are at goal . He has had bilateral leg pain some on exertion but it also occurs when he stretches He had an arterial doppler from [facility] which showed no obstructive arterial disease . He does have decreased pedal pulses however . Vascular to evaluate

## 2022-12-15 NOTE — HISTORY OF PRESENT ILLNESS
[FreeTextEntry1] : The patient has known multiple stents in the LAD and remains on Plavix . The patient is on Coumadin for permanent AF . Rate is controleed. The patient has not had chest pain and is feeling well. He walks with a walker secondary to orthopedic issues in his legs. HE has remained unchanged

## 2022-12-20 ENCOUNTER — APPOINTMENT (OUTPATIENT)
Dept: VASCULAR SURGERY | Facility: CLINIC | Age: 87
End: 2022-12-20

## 2022-12-20 VITALS
BODY MASS INDEX: 27.2 KG/M2 | WEIGHT: 190 LBS | SYSTOLIC BLOOD PRESSURE: 147 MMHG | HEIGHT: 70 IN | DIASTOLIC BLOOD PRESSURE: 87 MMHG

## 2022-12-20 DIAGNOSIS — R20.2 PARESTHESIA OF SKIN: ICD-10-CM

## 2022-12-20 PROCEDURE — 99203 OFFICE O/P NEW LOW 30 MIN: CPT

## 2022-12-20 NOTE — ASSESSMENT
[FreeTextEntry1] : 89 y/o gentleman with spinal stenosis and numbness to lower extremities.\par \par He has palpable pulses in the lower extremities bilaterally and arterial duplex showed no evidence of any significant arterial disease in the lower extremities bilaterally.\par \par His symptoms are referred from his lower back. No vascular surgical intervention is needed and he can follow up as needed.

## 2022-12-20 NOTE — HISTORY OF PRESENT ILLNESS
[FreeTextEntry1] : 89 y/o gentleman with h/o CAD s/p coroanry angioplasty and stent, atrial fibrillation on Coumadin, spinal stenosis, sent in for evaluation of PVD. He c/o numbness and tingling in the feet bilaterally for the past several years, c/o heaviness to thighs and kegs on ambulation. He had an arterial duplex at [facility] that showed no significant arterial disease in the lower extremities bilaterally.

## 2023-02-06 ENCOUNTER — APPOINTMENT (OUTPATIENT)
Dept: PAIN MANAGEMENT | Facility: CLINIC | Age: 88
End: 2023-02-06
Payer: MEDICARE

## 2023-02-06 PROCEDURE — 99204 OFFICE O/P NEW MOD 45 MIN: CPT

## 2023-02-07 NOTE — DISCUSSION/SUMMARY
[de-identified] : Recommendations\par \par 1. MRI lumbar spine w/o contrast to evaluate for anatomic changes of the lumbar discs, nerves, and surrounding tissue that will help provide information to accurately diagnose the patient's cause of pain and therefore treat said pain generator in the most effective way possible - whether that be specific physical therapy recommendations, medications, and/or interventional therapies.\par \par 2. Home exercise program: Patient given specific home exercises to do including but not limited to: pelvic tilt, knee to chest, and lower trunk rotation. \par \par 3. Treatment options were discussed. The patient has been having persistent, severe low back pain and lumbar radicular pain that has minimally improved with conservative therapy thus far (including physical therapy, home stretching and anti-inflammatory medications. The patient was given the option of proceeding with a lumbar epidural steroid injection to try and get the patient some pain relief. The risks and benefits were discussed, which included the associated problems with steroids, bleeding, nerve injury, lack of improvement with pain, and 0.5% chance of a post dural puncture headache.\par L3-4\par ****patient on warfarin. will get clearance from dr. schuster and then will reach out to the patient to instruct when to stop thinner. \par \par The documentation recorded by the kierstenibrio Ham, in my presence Marietta Philip DO, accurately reflects the service I personally performed, and the decisions made by me with my edits as appropriate.\par

## 2023-02-07 NOTE — PHYSICAL EXAM
[de-identified] : Lumbar Spine Exam:\par Inspection:\par erythema (-)\par ecchymosis (-)\par rashes (-)\par alignment: no scoliosis\par \par Palpation:\par Midline lumbar tenderness:             (-)\par paraspinal tenderness:                  L (-) ; R (-)\par sciatic nerve tenderness :             L (+) ; R (-)\par SI joint tenderness:                        L (-) ; R (-)\par GTB tenderness:                            L (-);  R (-)\par \par Limited ROM 2/2 to pain in lumbar extension\par \par Strength:\par 5/5 throughout all muscle groups of the lower extremity\par \par                                    Right       Left   \par Hip Flexion:                (5/5)       (5/5)\par Quadriceps:               (5/5)       (5/5)\par Hamstrings:                (5/5)       (5/5)\par Ankle Dorsiflexion:     (5/5)       (5/5)\par EHL:                           (5/5)       (5/5)\par Ankle Plantarflexion:  (5/5)       (5/5)\par \par Special Tests:\par SLR:                           R (-) ; L (+)\par Facet loading:            R (-) ; L (-)\par ALICE test:               R (-) ; L (-)\par \par Neurologic:\par Light touch intact throughout LE \par Reflexes normal and symmetric \par \par Gait:\par bent forward / flexed posture over walking rolling device

## 2023-02-07 NOTE — HISTORY OF PRESENT ILLNESS
[FreeTextEntry1] : Mr Collin Ku in a 89 year old man complaining of b/l  leg pain. He feels the pain more on left leg then right. This patient has had this pain for years but got worse in the last 6 months. The pain is b/l on the buttock area and radiates to the anterior lateral thigh down to the calfs both left and right. Pain & heaviness in the legs and describes the pain as dull aching that turns into stabbing with prolonged standing/walking.\par Pain only manifest when standing and walking up to 5 minutes and rates the pain 8/10 forcing the patient to sit down. After sitting the pain will be relieve after 3 minutes.  \par The pain is lowering his functional status and negatively affecting his quality of life.\par

## 2023-02-07 NOTE — ASSESSMENT
[FreeTextEntry1] : Lumbar spinal stenosis with neurogenic claudication of the b/l LE\par Lumbar facet arthropathy / pain\par Lumbar radiculopathy\par

## 2023-02-09 ENCOUNTER — APPOINTMENT (OUTPATIENT)
Dept: UROLOGY | Facility: CLINIC | Age: 88
End: 2023-02-09
Payer: MEDICARE

## 2023-02-09 VITALS
BODY MASS INDEX: 27.2 KG/M2 | TEMPERATURE: 97.3 F | WEIGHT: 190 LBS | HEIGHT: 70 IN | SYSTOLIC BLOOD PRESSURE: 135 MMHG | HEART RATE: 72 BPM | RESPIRATION RATE: 16 BRPM | OXYGEN SATURATION: 98 % | DIASTOLIC BLOOD PRESSURE: 78 MMHG

## 2023-02-09 PROCEDURE — 99213 OFFICE O/P EST LOW 20 MIN: CPT

## 2023-04-26 ENCOUNTER — APPOINTMENT (OUTPATIENT)
Dept: CARDIOLOGY | Facility: CLINIC | Age: 88
End: 2023-04-26
Payer: MEDICARE

## 2023-04-26 VITALS — TEMPERATURE: 97.6 F | HEIGHT: 70 IN

## 2023-04-26 VITALS — SYSTOLIC BLOOD PRESSURE: 120 MMHG | HEART RATE: 77 BPM | DIASTOLIC BLOOD PRESSURE: 60 MMHG

## 2023-04-26 DIAGNOSIS — I70.90 UNSPECIFIED ATHEROSCLEROSIS: ICD-10-CM

## 2023-04-26 PROCEDURE — 93000 ELECTROCARDIOGRAM COMPLETE: CPT

## 2023-04-26 PROCEDURE — 99214 OFFICE O/P EST MOD 30 MIN: CPT | Mod: 25

## 2023-04-26 RX ORDER — NITROFURANTOIN MACROCRYSTALS 100 MG/1
100 CAPSULE ORAL
Qty: 60 | Refills: 1 | Status: DISCONTINUED | COMMUNITY
Start: 2022-11-09 | End: 2023-04-26

## 2023-04-26 NOTE — PHYSICAL EXAM
[General Appearance - Well Developed] : well developed [Normal Appearance] : normal appearance [Well Groomed] : well groomed [General Appearance - Well Nourished] : well nourished [No Deformities] : no deformities [General Appearance - In No Acute Distress] : no acute distress [Normal Conjunctiva] : the conjunctiva exhibited no abnormalities [Eyelids - No Xanthelasma] : the eyelids demonstrated no xanthelasmas [Normal Oral Mucosa] : normal oral mucosa [No Oral Pallor] : no oral pallor [No Oral Cyanosis] : no oral cyanosis [Respiration, Rhythm And Depth] : normal respiratory rhythm and effort [Exaggerated Use Of Accessory Muscles For Inspiration] : no accessory muscle use [Auscultation Breath Sounds / Voice Sounds] : lungs were clear to auscultation bilaterally [Abdomen Soft] : soft [Abdomen Tenderness] : non-tender [Abdomen Mass (___ Cm)] : no abdominal mass palpated [Abnormal Walk] : normal gait [Gait - Sufficient For Exercise Testing] : the gait was sufficient for exercise testing [Nail Clubbing] : no clubbing of the fingernails [Cyanosis, Localized] : no localized cyanosis [Petechial Hemorrhages (___cm)] : no petechial hemorrhages [Skin Color & Pigmentation] : normal skin color and pigmentation [] : no rash [No Venous Stasis] : no venous stasis [Skin Lesions] : no skin lesions [No Skin Ulcers] : no skin ulcer [No Xanthoma] : no  xanthoma was observed [Oriented To Time, Place, And Person] : oriented to person, place, and time [Affect] : the affect was normal [Mood] : the mood was normal [No Anxiety] : not feeling anxious [Normal Rate] : normal [Rhythm Regular] : regular [II] : a grade 2 [1+] : left 1+ [No Pitting Edema] : no pitting edema present [FreeTextEntry1] : There appears to be gynecomastia of left breast .  [Rt] : no varicose veins of the right leg [Lt] : no varicose veins of the left leg

## 2023-04-26 NOTE — CARDIOLOGY SUMMARY
[___] : [unfilled] [de-identified] : 4- AF  LAD \par 9-8-2021 AF LAFB \par 8- AF LAD \par 4- AF  [de-identified] : 8- EF 53% mild AS mild MR mild TR LA volume increased .

## 2023-04-26 NOTE — HISTORY OF PRESENT ILLNESS
[FreeTextEntry1] : The patient has known multiple stents in the LAD and remains on Plavix . The patient is on Coumadin for permanent AF . Rate is controleed. The patient has not had chest pain and is feeling well. He walks with a walker secondary to orthopedic issues in his legs. HE has remained unchanged since last visit

## 2023-04-26 NOTE — ASSESSMENT
[FreeTextEntry1] : The patient is unchanged . He has had no chest pain or SOB . He has permanent AF and has and multiple stents in his LAD . The patient has AS which is thought to be mild . LDL is at goal. He has had GI bleedis in the past and his hemiglobin has been stable . LDL and BP are at goal . He has had bilateral leg pain some on exertion but it also occurs when he stretches He had an arterial doppler from [facility] which showed no obstructive arterial disease . He does have decreased pedal pulses however . He had seen vascular . The patient has left sided gynecomastia . Etiology of this is uncertain  He has been referred to his PCP . There is no mass palpated in his breast and no overt liver disease or known underlying CA .

## 2023-06-19 ENCOUNTER — APPOINTMENT (OUTPATIENT)
Dept: NEUROLOGY | Facility: CLINIC | Age: 88
End: 2023-06-19
Payer: MEDICARE

## 2023-06-19 ENCOUNTER — NON-APPOINTMENT (OUTPATIENT)
Age: 88
End: 2023-06-19

## 2023-06-19 VITALS
BODY MASS INDEX: 27.2 KG/M2 | DIASTOLIC BLOOD PRESSURE: 63 MMHG | HEIGHT: 70 IN | HEART RATE: 105 BPM | SYSTOLIC BLOOD PRESSURE: 116 MMHG | WEIGHT: 190 LBS

## 2023-06-19 PROCEDURE — 99203 OFFICE O/P NEW LOW 30 MIN: CPT

## 2023-06-19 RX ORDER — AMOXICILLIN 500 MG/1
500 CAPSULE ORAL TWICE DAILY
Qty: 14 | Refills: 0 | Status: DISCONTINUED | COMMUNITY
End: 2023-06-19

## 2023-06-19 RX ORDER — METHENAMINE HIPPURATE 1 G/1
1 TABLET ORAL DAILY
Qty: 60 | Refills: 5 | Status: DISCONTINUED | COMMUNITY
Start: 2023-02-09 | End: 2023-06-19

## 2023-06-19 NOTE — DATA REVIEWED
[de-identified] : reviewed prior notes and lumbar spine imaging showing severe spinal stenosis and multilevel mod/severe radiculopathy\par

## 2023-06-19 NOTE — HISTORY OF PRESENT ILLNESS
[FreeTextEntry1] : Mr. Ku is a 89 year old male patient with a PMHx of a-fib, on Plavix and Coumadin and spinal stenosis who presents today for an evaluation of LE weakness. \par Patient reports mobility issues that began many years ago. He is able to ambulate but only when he has something to hold on to. In the houses he uses a walker and a chair when outside. Denies dizziness, lightheadedness. Reports weakness in lower extremities. Numbness and tingling that begins in both feet and ascends to both sides. Pain in the legs gets worse with walking and gets better with rest. \par \par

## 2023-06-19 NOTE — PHYSICAL EXAM
[FreeTextEntry1] : Mental status: Awake, alert and oriented x3.  Recent and remote memory intact.  Naming, repetition and comprehension intact.  Attention/concentration intact.  No dysarthria, no aphasia.\par \par Cranial nerves: Pupils equally round and reactive to light, visual fields full, no nystagmus, extraocular muscles intact, V1 through V3 intact bilaterally and symmetric, face symmetric.\par \par Motor:  MRC grading 5/5 b/l UE. RLE 4/5, LLE 5/5.   strength 5/5.  Normal tone and bulk. Some tremor noted in B/L UE. No abnormal movements. \par \par Sensation: Intact to light touch and temperature intact. Sensation stronger on RLE. Vibration decreased on B/L LE.\par \par Coordination: Left hand dysmetria on finger-to-nose may secondary to weakness.\par \par Reflexes: 2+ in bilateral UE/LE\par \par Gait: Cannot assess due to safety concerns.\par

## 2023-06-19 NOTE — ASSESSMENT
[FreeTextEntry1] : Mr. Ku is a 89 year old male with a hx of spinal stenosis. He reports that he has progressive LE weakness that interferes with ambulation. Given his imaging from 2018 he is likely to continue to progress if his level of activity does not increase further.  He does not want to consider surgical options and will continue doing exercise to maintain what he currently has.\par \par PLAN:\par Advised to continue exercising.\par Follow up as needed.\par

## 2023-06-19 NOTE — REVIEW OF SYSTEMS
[Numbness] : numbness [Tingling] : tingling [Difficulty Walking] : difficulty walking [Negative] : Eyes [Confused or Disoriented] : no confusion [Memory Lapses or Loss] : no memory loss [Decr. Concentrating Ability] : no decrease in concentrating ability [Facial Weakness] : no facial weakness [Arm Weakness] : no arm weakness [Hand Weakness] : no hand weakness [Leg Weakness] : no leg weakness [Seizures] : no convulsions [Dizziness] : no dizziness [Fainting] : no fainting [Lightheadedness] : no lightheadedness [Vertigo] : no vertigo [Cluster Headache] : no cluster headache [Migraine Headache] : no migraine headache [Tension Headache] : no tension-type headache [Inability to Walk] : able to walk [Ataxia] : no ataxia [Frequent Falls] : not falling

## 2023-08-08 ENCOUNTER — APPOINTMENT (OUTPATIENT)
Dept: UROLOGY | Facility: CLINIC | Age: 88
End: 2023-08-08
Payer: MEDICARE

## 2023-08-08 VITALS
BODY MASS INDEX: 27.2 KG/M2 | WEIGHT: 190 LBS | HEIGHT: 70 IN | SYSTOLIC BLOOD PRESSURE: 121 MMHG | DIASTOLIC BLOOD PRESSURE: 66 MMHG | HEART RATE: 77 BPM

## 2023-08-08 PROCEDURE — 81003 URINALYSIS AUTO W/O SCOPE: CPT | Mod: QW

## 2023-08-08 PROCEDURE — 99214 OFFICE O/P EST MOD 30 MIN: CPT

## 2023-08-11 LAB
BILIRUB UR QL STRIP: NORMAL
COLLECTION METHOD: NORMAL
GLUCOSE UR-MCNC: NORMAL
HCG UR QL: 0.2 EU/DL
HGB UR QL STRIP.AUTO: NORMAL
KETONES UR-MCNC: NORMAL
LEUKOCYTE ESTERASE UR QL STRIP: NORMAL
NITRITE UR QL STRIP: NORMAL
PH UR STRIP: 5
PROT UR STRIP-MCNC: 30
SP GR UR STRIP: 1.02

## 2023-08-11 NOTE — PHYSICAL EXAM
[General Appearance - Well Developed] : well developed [General Appearance - Well Nourished] : well nourished [Normal Appearance] : normal appearance [Well Groomed] : well groomed [General Appearance - In No Acute Distress] : no acute distress [] : no respiratory distress [Respiration, Rhythm And Depth] : normal respiratory rhythm and effort [Exaggerated Use Of Accessory Muscles For Inspiration] : no accessory muscle use [Oriented To Time, Place, And Person] : oriented to person, place, and time [Affect] : the affect was normal [Mood] : the mood was normal [Not Anxious] : not anxious [No Focal Deficits] : no focal deficits [FreeTextEntry1] : Ambulates with rolling walker

## 2023-08-11 NOTE — HISTORY OF PRESENT ILLNESS
[FreeTextEntry1] : 89 yo male is here for evaluation of recurrent UTI  Burning with urination started 4 days ago. Previous to this episode had another UTI 2 months prior Started amoxicillin 1 day ago Endorses urinary frequency, q2H Denies fever, nausea, vomiting, gross hematuria, penile discharge  Takes Flomax and finasteride for BPH  U/A dip today shows small blood, and moderate leuk esterase  [facility] (7/2023) grew Klebsiella oxytoca; Cr 1.71 Prior UCx's grew E coli  01/29/2021= cystoscopy performed= normal anterior urethra, bilobar BPH, trabeculations, no stones, no tumors,  01/2021 Renal/Bladder US= bl renal cysts - right 1.5 cm, left 0.8 cm // bladder capacity= 154 cc, 122 cc pvr, bladder wall thickened and trabeculated // prostate 15 gm

## 2023-08-11 NOTE — ASSESSMENT
[FreeTextEntry1] : 89 yo male here for recurrent UTI.   Discussed risks of chronic methenamine use, especially with his elevated Cr Also discussed risk of lung damage with chronic nitrofurantoin use Suggested possible daily use of amoxicillin vs Keflex as prophylaxis, but will recommend patient for ID recommendations for now Suggested trial of OTC cranberry tablets Complete amoxicillin to completion  Follow up in 6 months with Uroflox and PVR Total time=30 min.

## 2023-08-25 ENCOUNTER — RESULT REVIEW (OUTPATIENT)
Age: 88
End: 2023-08-25

## 2023-08-28 ENCOUNTER — APPOINTMENT (OUTPATIENT)
Dept: CARDIOLOGY | Facility: CLINIC | Age: 88
End: 2023-08-28
Payer: MEDICARE

## 2023-08-28 VITALS — SYSTOLIC BLOOD PRESSURE: 124 MMHG | HEIGHT: 70 IN | DIASTOLIC BLOOD PRESSURE: 62 MMHG | HEART RATE: 72 BPM

## 2023-08-28 PROCEDURE — 99214 OFFICE O/P EST MOD 30 MIN: CPT | Mod: 25

## 2023-08-28 PROCEDURE — 93000 ELECTROCARDIOGRAM COMPLETE: CPT

## 2023-08-28 NOTE — ASSESSMENT
[FreeTextEntry1] : The patient has AF which is permanent , CAD ,with multiple stents in the LAD . He has not had chest pain or SOB . He has been on Coumadin   and plavix which is well tolerated to date . He has CKD and this is followed by renal as well. He has sinal stenosis and recurrent URI's . His mass on his breas t is followed by his PCP . LDL is at goal

## 2023-08-28 NOTE — REASON FOR VISIT
[Arrhythmia/ECG Abnorrmalities] : arrhythmia/ECG abnormalities [FreeTextEntry3] : Dr. Haddad  [Follow-Up - Clinic] : a clinic follow-up of [Atrial Fibrillation] : atrial fibrillation [Coronary Artery Disease] : coronary artery disease [Hyperlipidemia] : hyperlipidemia [Hypertension] : hypertension

## 2023-08-28 NOTE — CARDIOLOGY SUMMARY
[de-identified] : 4- AF  LAD  9-8-2021 AF LAFB  8- AF LAD  8- AF  4- AF  [de-identified] : 8- EF 53% mild AS mild MR mild TR LA volume increased .  [___] : [unfilled]

## 2023-08-28 NOTE — PHYSICAL EXAM
[General Appearance - Well Developed] : well developed [Normal Appearance] : normal appearance [Well Groomed] : well groomed [General Appearance - Well Nourished] : well nourished [No Deformities] : no deformities [General Appearance - In No Acute Distress] : no acute distress [Normal Conjunctiva] : the conjunctiva exhibited no abnormalities [Eyelids - No Xanthelasma] : the eyelids demonstrated no xanthelasmas [Normal Oral Mucosa] : normal oral mucosa [No Oral Pallor] : no oral pallor [No Oral Cyanosis] : no oral cyanosis [Respiration, Rhythm And Depth] : normal respiratory rhythm and effort [Exaggerated Use Of Accessory Muscles For Inspiration] : no accessory muscle use [Auscultation Breath Sounds / Voice Sounds] : lungs were clear to auscultation bilaterally [FreeTextEntry1] : There appears to be gynecomastia of left breast .  [Abdomen Soft] : soft [Abdomen Tenderness] : non-tender [Abdomen Mass (___ Cm)] : no abdominal mass palpated [Abnormal Walk] : normal gait [Gait - Sufficient For Exercise Testing] : the gait was sufficient for exercise testing [Nail Clubbing] : no clubbing of the fingernails [Cyanosis, Localized] : no localized cyanosis [Petechial Hemorrhages (___cm)] : no petechial hemorrhages [Skin Color & Pigmentation] : normal skin color and pigmentation [] : no rash [No Venous Stasis] : no venous stasis [Skin Lesions] : no skin lesions [No Skin Ulcers] : no skin ulcer [No Xanthoma] : no  xanthoma was observed [Oriented To Time, Place, And Person] : oriented to person, place, and time [Affect] : the affect was normal [Mood] : the mood was normal [No Anxiety] : not feeling anxious [Normal Rate] : normal [Rhythm Regular] : regular [II] : a grade 2 [1+] : left 1+ [No Pitting Edema] : no pitting edema present [Rt] : no varicose veins of the right leg [Lt] : no varicose veins of the left leg

## 2023-08-28 NOTE — DISCUSSION/SUMMARY
[FreeTextEntry1] : Continue current medications Follow up with urology  Blood work  follow up in 4-5 months  Echo after next visit .

## 2023-08-28 NOTE — HISTORY OF PRESENT ILLNESS
[FreeTextEntry1] : The patient has known multiple stents in the LAD and remains on Plavix . The patient is on Coumadin for permanent AF . Rate is controleed. The patient has not had chest pain . He has had recurrent UTI's and is seeing urology . . He walks with a walker secondary to orthopedic issues in his legs. HE has remained unchanged since last visit

## 2023-09-05 ENCOUNTER — NON-APPOINTMENT (OUTPATIENT)
Age: 88
End: 2023-09-05

## 2023-09-19 ENCOUNTER — APPOINTMENT (OUTPATIENT)
Dept: UROLOGY | Facility: CLINIC | Age: 88
End: 2023-09-19
Payer: MEDICARE

## 2023-09-19 DIAGNOSIS — M48.061 SPINAL STENOSIS, LUMBAR REGION WITHOUT NEUROGENIC CLAUDICATION: ICD-10-CM

## 2023-09-19 PROCEDURE — 52000 CYSTOURETHROSCOPY: CPT

## 2023-09-19 PROCEDURE — 81003 URINALYSIS AUTO W/O SCOPE: CPT | Mod: QW

## 2023-09-19 PROCEDURE — 99214 OFFICE O/P EST MOD 30 MIN: CPT | Mod: 24

## 2023-09-20 LAB
BILIRUB UR QL STRIP: NORMAL
COLLECTION METHOD: NORMAL
GLUCOSE UR-MCNC: NORMAL
HCG UR QL: 0.2 EU/DL
HGB UR QL STRIP.AUTO: NORMAL
KETONES UR-MCNC: NORMAL
LEUKOCYTE ESTERASE UR QL STRIP: NORMAL
NITRITE UR QL STRIP: NORMAL
PH UR STRIP: 5.5
PROT UR STRIP-MCNC: 100
SP GR UR STRIP: 1.02

## 2023-09-22 ENCOUNTER — APPOINTMENT (OUTPATIENT)
Dept: CARDIOLOGY | Facility: CLINIC | Age: 88
End: 2023-09-22
Payer: MEDICARE

## 2023-09-22 DIAGNOSIS — I49.5 SICK SINUS SYNDROME: ICD-10-CM

## 2023-09-22 PROCEDURE — 93306 TTE W/DOPPLER COMPLETE: CPT

## 2023-09-23 PROBLEM — I49.5 SICK SINUS SYNDROME: Status: ACTIVE | Noted: 2019-10-11

## 2023-09-25 PROBLEM — M48.061 SPINAL STENOSIS OF LUMBAR REGION, UNSPECIFIED WHETHER NEUROGENIC CLAUDICATION PRESENT: Status: ACTIVE | Noted: 2023-06-19

## 2023-10-05 ENCOUNTER — NON-APPOINTMENT (OUTPATIENT)
Age: 88
End: 2023-10-05

## 2023-10-12 ENCOUNTER — APPOINTMENT (OUTPATIENT)
Dept: CARDIOLOGY | Facility: CLINIC | Age: 88
End: 2023-10-12
Payer: MEDICARE

## 2023-10-12 VITALS
SYSTOLIC BLOOD PRESSURE: 128 MMHG | BODY MASS INDEX: 27.2 KG/M2 | DIASTOLIC BLOOD PRESSURE: 60 MMHG | WEIGHT: 190 LBS | HEIGHT: 70 IN

## 2023-10-12 PROCEDURE — 99214 OFFICE O/P EST MOD 30 MIN: CPT | Mod: 25

## 2023-10-12 PROCEDURE — 93000 ELECTROCARDIOGRAM COMPLETE: CPT

## 2023-10-18 ENCOUNTER — OUTPATIENT (OUTPATIENT)
Dept: OUTPATIENT SERVICES | Facility: HOSPITAL | Age: 88
LOS: 1 days | End: 2023-10-18
Payer: MEDICARE

## 2023-10-18 ENCOUNTER — RESULT REVIEW (OUTPATIENT)
Age: 88
End: 2023-10-18

## 2023-10-18 VITALS
WEIGHT: 184.97 LBS | TEMPERATURE: 98 F | SYSTOLIC BLOOD PRESSURE: 127 MMHG | RESPIRATION RATE: 16 BRPM | HEART RATE: 57 BPM | OXYGEN SATURATION: 94 % | HEIGHT: 70 IN | DIASTOLIC BLOOD PRESSURE: 68 MMHG

## 2023-10-18 DIAGNOSIS — Z01.818 ENCOUNTER FOR OTHER PREPROCEDURAL EXAMINATION: ICD-10-CM

## 2023-10-18 DIAGNOSIS — C67.2 MALIGNANT NEOPLASM OF LATERAL WALL OF BLADDER: ICD-10-CM

## 2023-10-18 DIAGNOSIS — Z95.5 PRESENCE OF CORONARY ANGIOPLASTY IMPLANT AND GRAFT: Chronic | ICD-10-CM

## 2023-10-18 LAB
ALBUMIN SERPL ELPH-MCNC: 4.4 G/DL — SIGNIFICANT CHANGE UP (ref 3.5–5.2)
ALBUMIN SERPL ELPH-MCNC: 4.4 G/DL — SIGNIFICANT CHANGE UP (ref 3.5–5.2)
ALP SERPL-CCNC: 59 U/L — SIGNIFICANT CHANGE UP (ref 30–115)
ALP SERPL-CCNC: 59 U/L — SIGNIFICANT CHANGE UP (ref 30–115)
ALT FLD-CCNC: 13 U/L — SIGNIFICANT CHANGE UP (ref 0–41)
ALT FLD-CCNC: 13 U/L — SIGNIFICANT CHANGE UP (ref 0–41)
ANION GAP SERPL CALC-SCNC: 12 MMOL/L — SIGNIFICANT CHANGE UP (ref 7–14)
ANION GAP SERPL CALC-SCNC: 12 MMOL/L — SIGNIFICANT CHANGE UP (ref 7–14)
APPEARANCE UR: CLEAR — SIGNIFICANT CHANGE UP
APPEARANCE UR: CLEAR — SIGNIFICANT CHANGE UP
APTT BLD: 38.5 SEC — SIGNIFICANT CHANGE UP (ref 27–39.2)
APTT BLD: 38.5 SEC — SIGNIFICANT CHANGE UP (ref 27–39.2)
AST SERPL-CCNC: 15 U/L — SIGNIFICANT CHANGE UP (ref 0–41)
AST SERPL-CCNC: 15 U/L — SIGNIFICANT CHANGE UP (ref 0–41)
BACTERIA # UR AUTO: NEGATIVE /HPF — SIGNIFICANT CHANGE UP
BACTERIA # UR AUTO: NEGATIVE /HPF — SIGNIFICANT CHANGE UP
BASOPHILS # BLD AUTO: 0.04 K/UL — SIGNIFICANT CHANGE UP (ref 0–0.2)
BASOPHILS # BLD AUTO: 0.04 K/UL — SIGNIFICANT CHANGE UP (ref 0–0.2)
BASOPHILS NFR BLD AUTO: 0.5 % — SIGNIFICANT CHANGE UP (ref 0–1)
BASOPHILS NFR BLD AUTO: 0.5 % — SIGNIFICANT CHANGE UP (ref 0–1)
BILIRUB SERPL-MCNC: 0.4 MG/DL — SIGNIFICANT CHANGE UP (ref 0.2–1.2)
BILIRUB SERPL-MCNC: 0.4 MG/DL — SIGNIFICANT CHANGE UP (ref 0.2–1.2)
BILIRUB UR-MCNC: NEGATIVE — SIGNIFICANT CHANGE UP
BILIRUB UR-MCNC: NEGATIVE — SIGNIFICANT CHANGE UP
BUN SERPL-MCNC: 38 MG/DL — HIGH (ref 10–20)
BUN SERPL-MCNC: 38 MG/DL — HIGH (ref 10–20)
CALCIUM SERPL-MCNC: 8.8 MG/DL — SIGNIFICANT CHANGE UP (ref 8.4–10.5)
CALCIUM SERPL-MCNC: 8.8 MG/DL — SIGNIFICANT CHANGE UP (ref 8.4–10.5)
CAST: 2 /LPF — SIGNIFICANT CHANGE UP (ref 0–4)
CAST: 2 /LPF — SIGNIFICANT CHANGE UP (ref 0–4)
CHLORIDE SERPL-SCNC: 106 MMOL/L — SIGNIFICANT CHANGE UP (ref 98–110)
CHLORIDE SERPL-SCNC: 106 MMOL/L — SIGNIFICANT CHANGE UP (ref 98–110)
CO2 SERPL-SCNC: 20 MMOL/L — SIGNIFICANT CHANGE UP (ref 17–32)
CO2 SERPL-SCNC: 20 MMOL/L — SIGNIFICANT CHANGE UP (ref 17–32)
COLOR SPEC: YELLOW — SIGNIFICANT CHANGE UP
COLOR SPEC: YELLOW — SIGNIFICANT CHANGE UP
CREAT SERPL-MCNC: 1.9 MG/DL — HIGH (ref 0.7–1.5)
CREAT SERPL-MCNC: 1.9 MG/DL — HIGH (ref 0.7–1.5)
DIFF PNL FLD: NEGATIVE — SIGNIFICANT CHANGE UP
DIFF PNL FLD: NEGATIVE — SIGNIFICANT CHANGE UP
EGFR: 33 ML/MIN/1.73M2 — LOW
EGFR: 33 ML/MIN/1.73M2 — LOW
EOSINOPHIL # BLD AUTO: 0.17 K/UL — SIGNIFICANT CHANGE UP (ref 0–0.7)
EOSINOPHIL # BLD AUTO: 0.17 K/UL — SIGNIFICANT CHANGE UP (ref 0–0.7)
EOSINOPHIL NFR BLD AUTO: 2.1 % — SIGNIFICANT CHANGE UP (ref 0–8)
EOSINOPHIL NFR BLD AUTO: 2.1 % — SIGNIFICANT CHANGE UP (ref 0–8)
GLUCOSE SERPL-MCNC: 126 MG/DL — HIGH (ref 70–99)
GLUCOSE SERPL-MCNC: 126 MG/DL — HIGH (ref 70–99)
GLUCOSE UR QL: NEGATIVE MG/DL — SIGNIFICANT CHANGE UP
GLUCOSE UR QL: NEGATIVE MG/DL — SIGNIFICANT CHANGE UP
HCT VFR BLD CALC: 40.2 % — LOW (ref 42–52)
HCT VFR BLD CALC: 40.2 % — LOW (ref 42–52)
HGB BLD-MCNC: 13.3 G/DL — LOW (ref 14–18)
HGB BLD-MCNC: 13.3 G/DL — LOW (ref 14–18)
IMM GRANULOCYTES NFR BLD AUTO: 0.5 % — HIGH (ref 0.1–0.3)
IMM GRANULOCYTES NFR BLD AUTO: 0.5 % — HIGH (ref 0.1–0.3)
INR BLD: 2.18 RATIO — HIGH (ref 0.65–1.3)
INR BLD: 2.18 RATIO — HIGH (ref 0.65–1.3)
KETONES UR-MCNC: NEGATIVE MG/DL — SIGNIFICANT CHANGE UP
KETONES UR-MCNC: NEGATIVE MG/DL — SIGNIFICANT CHANGE UP
LEUKOCYTE ESTERASE UR-ACNC: ABNORMAL
LEUKOCYTE ESTERASE UR-ACNC: ABNORMAL
LYMPHOCYTES # BLD AUTO: 0.77 K/UL — LOW (ref 1.2–3.4)
LYMPHOCYTES # BLD AUTO: 0.77 K/UL — LOW (ref 1.2–3.4)
LYMPHOCYTES # BLD AUTO: 9.3 % — LOW (ref 20.5–51.1)
LYMPHOCYTES # BLD AUTO: 9.3 % — LOW (ref 20.5–51.1)
MCHC RBC-ENTMCNC: 30 PG — SIGNIFICANT CHANGE UP (ref 27–31)
MCHC RBC-ENTMCNC: 30 PG — SIGNIFICANT CHANGE UP (ref 27–31)
MCHC RBC-ENTMCNC: 33.1 G/DL — SIGNIFICANT CHANGE UP (ref 32–37)
MCHC RBC-ENTMCNC: 33.1 G/DL — SIGNIFICANT CHANGE UP (ref 32–37)
MCV RBC AUTO: 90.7 FL — SIGNIFICANT CHANGE UP (ref 80–94)
MCV RBC AUTO: 90.7 FL — SIGNIFICANT CHANGE UP (ref 80–94)
MONOCYTES # BLD AUTO: 0.88 K/UL — HIGH (ref 0.1–0.6)
MONOCYTES # BLD AUTO: 0.88 K/UL — HIGH (ref 0.1–0.6)
MONOCYTES NFR BLD AUTO: 10.6 % — HIGH (ref 1.7–9.3)
MONOCYTES NFR BLD AUTO: 10.6 % — HIGH (ref 1.7–9.3)
NEUTROPHILS # BLD AUTO: 6.38 K/UL — SIGNIFICANT CHANGE UP (ref 1.4–6.5)
NEUTROPHILS # BLD AUTO: 6.38 K/UL — SIGNIFICANT CHANGE UP (ref 1.4–6.5)
NEUTROPHILS NFR BLD AUTO: 77 % — HIGH (ref 42.2–75.2)
NEUTROPHILS NFR BLD AUTO: 77 % — HIGH (ref 42.2–75.2)
NITRITE UR-MCNC: NEGATIVE — SIGNIFICANT CHANGE UP
NITRITE UR-MCNC: NEGATIVE — SIGNIFICANT CHANGE UP
NRBC # BLD: 0 /100 WBCS — SIGNIFICANT CHANGE UP (ref 0–0)
NRBC # BLD: 0 /100 WBCS — SIGNIFICANT CHANGE UP (ref 0–0)
PH UR: 5.5 — SIGNIFICANT CHANGE UP (ref 5–8)
PH UR: 5.5 — SIGNIFICANT CHANGE UP (ref 5–8)
PLATELET # BLD AUTO: 208 K/UL — SIGNIFICANT CHANGE UP (ref 130–400)
PLATELET # BLD AUTO: 208 K/UL — SIGNIFICANT CHANGE UP (ref 130–400)
PMV BLD: 12.1 FL — HIGH (ref 7.4–10.4)
PMV BLD: 12.1 FL — HIGH (ref 7.4–10.4)
POTASSIUM SERPL-MCNC: 4.5 MMOL/L — SIGNIFICANT CHANGE UP (ref 3.5–5)
POTASSIUM SERPL-MCNC: 4.5 MMOL/L — SIGNIFICANT CHANGE UP (ref 3.5–5)
POTASSIUM SERPL-SCNC: 4.5 MMOL/L — SIGNIFICANT CHANGE UP (ref 3.5–5)
POTASSIUM SERPL-SCNC: 4.5 MMOL/L — SIGNIFICANT CHANGE UP (ref 3.5–5)
PROT SERPL-MCNC: 6.2 G/DL — SIGNIFICANT CHANGE UP (ref 6–8)
PROT SERPL-MCNC: 6.2 G/DL — SIGNIFICANT CHANGE UP (ref 6–8)
PROT UR-MCNC: 100 MG/DL
PROT UR-MCNC: 100 MG/DL
PROTHROM AB SERPL-ACNC: 25.5 SEC — HIGH (ref 9.95–12.87)
PROTHROM AB SERPL-ACNC: 25.5 SEC — HIGH (ref 9.95–12.87)
RBC # BLD: 4.43 M/UL — LOW (ref 4.7–6.1)
RBC # BLD: 4.43 M/UL — LOW (ref 4.7–6.1)
RBC # FLD: 16.1 % — HIGH (ref 11.5–14.5)
RBC # FLD: 16.1 % — HIGH (ref 11.5–14.5)
RBC CASTS # UR COMP ASSIST: 1 /HPF — SIGNIFICANT CHANGE UP (ref 0–4)
RBC CASTS # UR COMP ASSIST: 1 /HPF — SIGNIFICANT CHANGE UP (ref 0–4)
SODIUM SERPL-SCNC: 138 MMOL/L — SIGNIFICANT CHANGE UP (ref 135–146)
SODIUM SERPL-SCNC: 138 MMOL/L — SIGNIFICANT CHANGE UP (ref 135–146)
SP GR SPEC: 1.02 — SIGNIFICANT CHANGE UP (ref 1–1.03)
SP GR SPEC: 1.02 — SIGNIFICANT CHANGE UP (ref 1–1.03)
SQUAMOUS # UR AUTO: 4 /HPF — SIGNIFICANT CHANGE UP (ref 0–5)
SQUAMOUS # UR AUTO: 4 /HPF — SIGNIFICANT CHANGE UP (ref 0–5)
UROBILINOGEN FLD QL: 0.2 MG/DL — SIGNIFICANT CHANGE UP (ref 0.2–1)
UROBILINOGEN FLD QL: 0.2 MG/DL — SIGNIFICANT CHANGE UP (ref 0.2–1)
WBC # BLD: 8.28 K/UL — SIGNIFICANT CHANGE UP (ref 4.8–10.8)
WBC # BLD: 8.28 K/UL — SIGNIFICANT CHANGE UP (ref 4.8–10.8)
WBC # FLD AUTO: 8.28 K/UL — SIGNIFICANT CHANGE UP (ref 4.8–10.8)
WBC # FLD AUTO: 8.28 K/UL — SIGNIFICANT CHANGE UP (ref 4.8–10.8)
WBC UR QL: 26 /HPF — HIGH (ref 0–5)
WBC UR QL: 26 /HPF — HIGH (ref 0–5)

## 2023-10-18 PROCEDURE — 80053 COMPREHEN METABOLIC PANEL: CPT

## 2023-10-18 PROCEDURE — 93010 ELECTROCARDIOGRAM REPORT: CPT

## 2023-10-18 PROCEDURE — 85730 THROMBOPLASTIN TIME PARTIAL: CPT

## 2023-10-18 PROCEDURE — 81001 URINALYSIS AUTO W/SCOPE: CPT

## 2023-10-18 PROCEDURE — 87086 URINE CULTURE/COLONY COUNT: CPT

## 2023-10-18 PROCEDURE — 85025 COMPLETE CBC W/AUTO DIFF WBC: CPT

## 2023-10-18 PROCEDURE — 85610 PROTHROMBIN TIME: CPT

## 2023-10-18 PROCEDURE — 71046 X-RAY EXAM CHEST 2 VIEWS: CPT

## 2023-10-18 PROCEDURE — 86901 BLOOD TYPING SEROLOGIC RH(D): CPT

## 2023-10-18 PROCEDURE — 99214 OFFICE O/P EST MOD 30 MIN: CPT | Mod: 25

## 2023-10-18 PROCEDURE — 93005 ELECTROCARDIOGRAM TRACING: CPT

## 2023-10-18 PROCEDURE — 36415 COLL VENOUS BLD VENIPUNCTURE: CPT

## 2023-10-18 PROCEDURE — 71046 X-RAY EXAM CHEST 2 VIEWS: CPT | Mod: 26

## 2023-10-18 PROCEDURE — 86900 BLOOD TYPING SEROLOGIC ABO: CPT

## 2023-10-18 PROCEDURE — 86850 RBC ANTIBODY SCREEN: CPT

## 2023-10-18 RX ORDER — PHENAZOPYRIDINE HCL 100 MG
1 TABLET ORAL
Qty: 0 | Refills: 0 | DISCHARGE

## 2023-10-18 NOTE — H&P PST ADULT - REASON FOR ADMISSION
Pretesting Visit   Case Type: OP   Suite: [facility]  Proceduralist: Fabian Pierre  Confirmed Surgery Date Time: 10-   PAST Date Time: 10- - 7:30  Procedure: CYSTOSCOPY, TRANSURETHRAL RESECTION OF BLADDER TUMOR, POSSIBLE LASER VAPORIZATION OF THE PROSTATE  Laterality: N/A  Length of Procedure: 90 Minutes  Anesthesia Type: General

## 2023-10-18 NOTE — H&P PST ADULT - HISTORY OF PRESENT ILLNESS
89 Yo yo M with PMHx CAD/stents, AFib on coumadin, HTN, HLD, spinal stenosis, bladder CA, Vit D, s/p fall who presents to pretesting for the above surgery due to blood in the urine and bladder cancer.   Patient denies any cp, sob, palpitations, fever, cough, URI, abdominal pains, N/V, UTI, Rashes or open wounds.  As per patient exercise tolerance is limited and ambulating with walker   Patient denies any s/s covid 19 and reports no contact with known positive people. Patient instructed to continue to self monitor and report any concerns to MD. Pt will continue to practice self isolation and  exposure control measures pre op  Anesthesia Alert  NO--Difficult Airway  NO--History of neck surgery or radiation  NO--Limited ROM of neck  NO--History of Malignant hyperthermia  NO--Personal or family history of Pseudocholinesterase deficiency  NO--Prior Anesthesia Complication  NO--Latex Allergy  NO--Loose teeth  NO--History of Rheumatoid Arthritis  NO--ZACK  Yes Risk of Bleeding due to coumadin   Pt instructed to stop vitamins/supplements/herbal medications for one week prior to surgery  As per patient this is the complete medical, surgical history and medications.  Duke Activity Status Index (DASI) from [email or web address]  on 10/18/2023  ** All calculations should be rechecked by clinician prior to use **    RESULT SUMMARY:  10.7 points  The higher the score (maximum 58.2), the higher the functional status.  4.06 METs  INPUTS:  Take care of self —> 2.75 = Yes  Walk indoors —> 1.75 = Yes  Walk 1&ndash;2 blocks on level ground —> 0 = No  Climb a flight of stairs or walk up a hill —> 0 = No  Run a short distance —> 0 = No  Do light work around the house —> 2.7 = Yes  Do moderate work around the house —> 3.5 = Yes  Do heavy work around the house —> 0 = No  Do yardwork —> 0 = No  Have sexual relations —> 0 = No  Participate in moderate recreational activities —> 0 = No  Participate in strenuous sports —> 0 = No  Revised Cardiac Risk Index for Pre-Operative Risk from [email or web address]  on 10/18/2023  ** All calculations should be rechecked by clinician prior to use **    RESULT SUMMARY:  2 points  Class III Risk  10.1 %  30-day risk of death, MI, or cardiac arrest  INPUTS:  Elevated-risk surgery —> 1 = Yes  History of ischemic heart disease —> 1 = Yes  History of congestive heart failure —> 0 = No  History of cerebrovascular disease —> 0 = No  Pre-operative treatment with insulin —> 0 = No  Pre-operative creatinine >2 mg/dL / 176.8 µmol/L —> 0 = No       89 Yo yo M with PMHx CAD/stents, AFib on coumadin, HTN, HLD, Elim IRA, COPD, spinal stenosis, bladder CA, Vit D deficiency , s/p fall who presents to pretesting for the above surgery due to blood in the urine and bladder cancer.   Patient denies any cp, sob, palpitations, fever, cough, URI, abdominal pains, N/V, Rashes. Patient is on antibiotics daily for recurrent UTI. Right forearm Skin tear with dressing on and left arm ecchymosis noted. PVD discolorations remains on BLE.   As per patient exercise tolerance is limited and ambulating with walker   Patient denies any s/s covid 19 and reports no contact with known positive people. Patient instructed to continue to self monitor and report any concerns to MD. Pt will continue to practice self isolation and  exposure control measures pre op  Anesthesia Alert  NO--Difficult Airway  NO--History of neck surgery or radiation  NO--Limited ROM of neck  NO--History of Malignant hyperthermia  NO--Personal or family history of Pseudocholinesterase deficiency  NO--Prior Anesthesia Complication  NO--Latex Allergy  NO--Loose teeth  NO--History of Rheumatoid Arthritis  NO--ZACK  Yes Risk of Bleeding due to coumadin   Elim IRA B/L hearing Aid  Bruise easily   Pt instructed to stop vitamins/supplements/herbal medications for one week prior to surgery  As per patient this is the complete medical, surgical history and medications.  Duke Activity Status Index (DASI) from [email or web address]  on 10/18/2023  ** All calculations should be rechecked by clinician prior to use **    RESULT SUMMARY:  10.7 points  The higher the score (maximum 58.2), the higher the functional status.  4.06 METs  INPUTS:  Take care of self —> 2.75 = Yes  Walk indoors —> 1.75 = Yes  Walk 1&ndash;2 blocks on level ground —> 0 = No  Climb a flight of stairs or walk up a hill —> 0 = No  Run a short distance —> 0 = No  Do light work around the house —> 2.7 = Yes  Do moderate work around the house —> 3.5 = Yes  Do heavy work around the house —> 0 = No  Do yard work —> 0 = No  Have sexual relations —> 0 = No  Participate in moderate recreational activities —> 0 = No  Participate in strenuous sports —> 0 = No  Revised Cardiac Risk Index for Pre-Operative Risk from [email or web address]  on 10/18/2023  ** All calculations should be rechecked by clinician prior to use **  RESULT SUMMARY:  2 points  Class III Risk  10.1 %  30-day risk of death, MI, or cardiac arrest  INPUTS:  Elevated-risk surgery —> 1 = Yes  History of ischemic heart disease —> 1 = Yes  History of congestive heart failure —> 0 = No  History of cerebrovascular disease —> 0 = No  Pre-operative treatment with insulin —> 0 = No  Pre-operative creatinine >2 mg/dL / 176.8 µmol/L —> 0 = No

## 2023-10-18 NOTE — H&P PST ADULT - NSICDXPASTMEDICALHX_GEN_ALL_CORE_FT
PAST MEDICAL HISTORY:  Afib     Bladder cancer     Chronic obstructive pulmonary disease     CKD (chronic kidney disease)     Diabetes     Hypertension     PVD (peripheral vascular disease)     Spinal stenosis at L4-L5 level

## 2023-10-18 NOTE — H&P PST ADULT - BP NONINVASIVE DIASTOLIC (MM HG)
[address] [facility] [address] [address]  Dept: [phone]  Dept Fax: [phone]  Loc: [phone]    Ronnie Schmitt is a 12 y.o. female who presents today for her medical conditions/complaintsas noted below. Ronnie Schmitt is c/o of Eye Pain (x1 day. Mother states she is unsure if her fake eye lashes could be the cause)      HPI:   Patient states that she is having bilateral eye itching and pain. Her eye lids are swollen and they are hurting. She has been using fake eye lashes and is concerned that they may have something to do with lids being swollen. Clear drainage noted. Past Medical History:   Diagnosis Date    Allergic      Past Surgical History:   Procedure Laterality Date    ARM SURGERY Left 2012     Family History   Problem Relation Age of Onset    Diabetes Mother     Allergy (Severe) Father     High Blood Pressure Maternal Grandmother     High Blood Pressure Maternal Grandfather     Diabetes Paternal Grandmother     Diabetes Paternal Grandfather      Social History     Tobacco Use    Smoking status: Never Smoker    Smokeless tobacco: Never Used   Substance Use Topics    Alcohol use: Not on file      Current Outpatient Medications on File Prior to Visit   Medication Sig Dispense Refill    escitalopram (LEXAPRO) 5 MG tablet       norgestimate-ethinyl estradiol (ORTHO-CYCLEN, 28,) 0.25-35 MG-MCG per tablet Take 1 tablet by mouth daily 1 packet 3    cetirizine (ZYRTEC) 10 MG tablet Take 1 tablet by mouth daily 90 tablet 3     No current facility-administered medications on file prior to visit.       No Known Allergies  Health Maintenance   Topic Date Due    Hepatitis B vaccine (1 of 3 - 3-dose primary series) Never done    Polio vaccine (1 of 3 - 4-dose series) Never done    Hepatitis A vaccine (1 of 2 - 2-dose series) Never done    Measles,Mumps,Rubella (MMR) vaccine (1 of 2 - Standard series) Never done    Varicella vaccine (1 of 2 - 2-dose childhood series) Never done    DTaP/Tdap/Td vaccine (1 - Tdap) Never done    HPV vaccine (1 - 2-dose series) Never done    COVID-19 Vaccine (1) Never done    HIV screen  Never done    Meningococcal (ACWY) vaccine (1 - 2-dose series) Never done    Chlamydia screen  Never done    Flu vaccine (1) 09/01/2021    Hib vaccine  Aged Out    Pneumococcal 0-64 years Vaccine  Aged Out       Subjective:   Review of Systems   Constitutional: Negative for chills, fatigue and fever. HENT: Negative for congestion, ear pain, postnasal drip, rhinorrhea, sinus pain and sore throat. Eyes: Positive for pain and itching. Negative for photophobia, discharge, redness and visual disturbance. Respiratory: Negative for cough, chest tightness and shortness of breath. Cardiovascular: Negative for chest pain. Gastrointestinal: Negative for abdominal pain. Skin: Negative for rash. Objective:   /64 (Site: Right Upper Arm, Position: Sitting)   Pulse 84   Temp 97.6 °F (36.4 °C) (Temporal)   Resp 16   Wt 165 lb (74.8 kg)   LMP 08/16/2021   SpO2 99%    Physical Exam  Vitals and nursing note reviewed. Exam conducted with a chaperone present. Constitutional:       General: She is not in acute distress. Appearance: Normal appearance. She is not ill-appearing. Interventions: She is not intubated. HENT:      Head: Normocephalic. Right Ear: Tympanic membrane and ear canal normal.      Left Ear: Tympanic membrane and ear canal normal.      Nose: Nose normal.      Mouth/Throat:      Mouth: Mucous membranes are moist.   Eyes:      General:         Right eye: No foreign body, discharge or hordeolum. Left eye: No foreign body, discharge or hordeolum. Pupils: Pupils are equal, round, and reactive to light. Comments: Upper eye lids are mildly swollen and red at the lash line. Mild conjunctival erythema.     Cardiovascular:      Rate and Rhythm: Normal rate and regular rhythm. Pulmonary:      Effort: Pulmonary effort is normal. No tachypnea, bradypnea, accessory muscle usage, prolonged expiration, respiratory distress or retractions. She is not intubated. Breath sounds: Normal breath sounds and air entry. No decreased air movement or transmitted upper airway sounds. No decreased breath sounds, wheezing, rhonchi or rales. Musculoskeletal:      Cervical back: Normal range of motion and neck supple. Skin:     General: Skin is warm and dry. Neurological:      Mental Status: She is alert. No results found for this visit on 08/20/21. Assessment:      Diagnosis Orders   1. Acute conjunctivitis of both eyes, unspecified acute conjunctivitis type  tobramycin-dexamethasone (TOBRADEX) 0.3-0.1 % ophthalmic suspension       Plan:   Francisco Galaviz was seen today for eye pain. Diagnoses and all orders for this visit:    Acute conjunctivitis of both eyes, unspecified acute conjunctivitis type  -     tobramycin-dexamethasone (TOBRADEX) 0.3-0.1 % ophthalmic suspension; Place 1 drop into both eyes every 4 hours (while awake) for 10 days       Use eye drops as directed. Cool compresses to bilateral lids. Wash hands after touching eyes and using drops  Do not use Fake eye lashes until lids are totally healed. No follow-ups on file. Patient given educational materials- see patient instructions. Discussed use, benefit, and side effects of prescribedmedications. All patient questions answered. Pt voiced understanding.      Electronically signed by GINO Truong CNP on 8/20/2021 at 9:59 AM 68

## 2023-10-19 DIAGNOSIS — C67.2 MALIGNANT NEOPLASM OF LATERAL WALL OF BLADDER: ICD-10-CM

## 2023-10-19 DIAGNOSIS — Z01.818 ENCOUNTER FOR OTHER PREPROCEDURAL EXAMINATION: ICD-10-CM

## 2023-10-19 LAB
BLD GP AB SCN SERPL QL: SIGNIFICANT CHANGE UP
BLD GP AB SCN SERPL QL: SIGNIFICANT CHANGE UP
CULTURE RESULTS: NO GROWTH — SIGNIFICANT CHANGE UP
CULTURE RESULTS: NO GROWTH — SIGNIFICANT CHANGE UP
SPECIMEN SOURCE: SIGNIFICANT CHANGE UP
SPECIMEN SOURCE: SIGNIFICANT CHANGE UP

## 2023-10-24 ENCOUNTER — NON-APPOINTMENT (OUTPATIENT)
Age: 88
End: 2023-10-24

## 2023-10-25 ENCOUNTER — INPATIENT (INPATIENT)
Facility: HOSPITAL | Age: 88
LOS: 0 days | Discharge: HOME | DRG: 669 | End: 2023-10-26
Attending: UROLOGY | Admitting: UROLOGY
Payer: MEDICARE

## 2023-10-25 ENCOUNTER — RESULT REVIEW (OUTPATIENT)
Age: 88
End: 2023-10-25

## 2023-10-25 ENCOUNTER — APPOINTMENT (OUTPATIENT)
Dept: UROLOGY | Facility: HOSPITAL | Age: 88
End: 2023-10-25

## 2023-10-25 VITALS
RESPIRATION RATE: 17 BRPM | HEART RATE: 87 BPM | WEIGHT: 184.97 LBS | SYSTOLIC BLOOD PRESSURE: 139 MMHG | OXYGEN SATURATION: 97 % | DIASTOLIC BLOOD PRESSURE: 79 MMHG | HEIGHT: 70 IN | TEMPERATURE: 97 F

## 2023-10-25 DIAGNOSIS — C67.2 MALIGNANT NEOPLASM OF LATERAL WALL OF BLADDER: ICD-10-CM

## 2023-10-25 DIAGNOSIS — Z98.41 CATARACT EXTRACTION STATUS, RIGHT EYE: Chronic | ICD-10-CM

## 2023-10-25 DIAGNOSIS — Z98.890 OTHER SPECIFIED POSTPROCEDURAL STATES: Chronic | ICD-10-CM

## 2023-10-25 DIAGNOSIS — Z95.5 PRESENCE OF CORONARY ANGIOPLASTY IMPLANT AND GRAFT: Chronic | ICD-10-CM

## 2023-10-25 DIAGNOSIS — N40.1 BENIGN PROSTATIC HYPERPLASIA WITH LOWER URINARY TRACT SYMPTOMS: ICD-10-CM

## 2023-10-25 LAB
APTT BLD: 32.5 SEC — SIGNIFICANT CHANGE UP (ref 27–39.2)
APTT BLD: 32.5 SEC — SIGNIFICANT CHANGE UP (ref 27–39.2)
INR BLD: 1 RATIO — SIGNIFICANT CHANGE UP (ref 0.65–1.3)
INR BLD: 1 RATIO — SIGNIFICANT CHANGE UP (ref 0.65–1.3)
PROTHROM AB SERPL-ACNC: 11.4 SEC — SIGNIFICANT CHANGE UP (ref 9.95–12.87)
PROTHROM AB SERPL-ACNC: 11.4 SEC — SIGNIFICANT CHANGE UP (ref 9.95–12.87)

## 2023-10-25 PROCEDURE — 85730 THROMBOPLASTIN TIME PARTIAL: CPT

## 2023-10-25 PROCEDURE — 88307 TISSUE EXAM BY PATHOLOGIST: CPT | Mod: 26

## 2023-10-25 PROCEDURE — 85610 PROTHROMBIN TIME: CPT

## 2023-10-25 PROCEDURE — 88307 TISSUE EXAM BY PATHOLOGIST: CPT

## 2023-10-25 PROCEDURE — 36415 COLL VENOUS BLD VENIPUNCTURE: CPT

## 2023-10-25 PROCEDURE — 80048 BASIC METABOLIC PNL TOTAL CA: CPT

## 2023-10-25 PROCEDURE — C9399: CPT

## 2023-10-25 PROCEDURE — C1769: CPT

## 2023-10-25 PROCEDURE — 52240 CYSTOSCOPY AND TREATMENT: CPT

## 2023-10-25 PROCEDURE — 85025 COMPLETE CBC W/AUTO DIFF WBC: CPT

## 2023-10-25 RX ORDER — HYDROMORPHONE HYDROCHLORIDE 2 MG/ML
0.25 INJECTION INTRAMUSCULAR; INTRAVENOUS; SUBCUTANEOUS
Refills: 0 | Status: DISCONTINUED | OUTPATIENT
Start: 2023-10-25 | End: 2023-10-25

## 2023-10-25 RX ORDER — LANOLIN ALCOHOL/MO/W.PET/CERES
3 CREAM (GRAM) TOPICAL AT BEDTIME
Refills: 0 | Status: DISCONTINUED | OUTPATIENT
Start: 2023-10-25 | End: 2023-10-26

## 2023-10-25 RX ORDER — ACETAMINOPHEN 500 MG
650 TABLET ORAL EVERY 6 HOURS
Refills: 0 | Status: DISCONTINUED | OUTPATIENT
Start: 2023-10-25 | End: 2023-10-26

## 2023-10-25 RX ORDER — ATORVASTATIN CALCIUM 80 MG/1
10 TABLET, FILM COATED ORAL AT BEDTIME
Refills: 0 | Status: DISCONTINUED | OUTPATIENT
Start: 2023-10-25 | End: 2023-10-26

## 2023-10-25 RX ORDER — SODIUM CHLORIDE 9 MG/ML
1000 INJECTION, SOLUTION INTRAVENOUS
Refills: 0 | Status: DISCONTINUED | OUTPATIENT
Start: 2023-10-25 | End: 2023-10-25

## 2023-10-25 RX ORDER — CEPHALEXIN 500 MG
1 CAPSULE ORAL
Refills: 0 | DISCHARGE

## 2023-10-25 RX ORDER — ONDANSETRON 8 MG/1
4 TABLET, FILM COATED ORAL ONCE
Refills: 0 | Status: DISCONTINUED | OUTPATIENT
Start: 2023-10-25 | End: 2023-10-25

## 2023-10-25 RX ORDER — ASPIRIN/CALCIUM CARB/MAGNESIUM 324 MG
81 TABLET ORAL DAILY
Refills: 0 | Status: DISCONTINUED | OUTPATIENT
Start: 2023-10-26 | End: 2023-10-26

## 2023-10-25 RX ORDER — NIFEDIPINE 30 MG
60 TABLET, EXTENDED RELEASE 24 HR ORAL DAILY
Refills: 0 | Status: DISCONTINUED | OUTPATIENT
Start: 2023-10-25 | End: 2023-10-26

## 2023-10-25 RX ORDER — TAMSULOSIN HYDROCHLORIDE 0.4 MG/1
0.4 CAPSULE ORAL AT BEDTIME
Refills: 0 | Status: DISCONTINUED | OUTPATIENT
Start: 2023-10-25 | End: 2023-10-26

## 2023-10-25 RX ORDER — CEFAZOLIN SODIUM 1 G
2000 VIAL (EA) INJECTION EVERY 8 HOURS
Refills: 0 | Status: DISCONTINUED | OUTPATIENT
Start: 2023-10-25 | End: 2023-10-26

## 2023-10-25 RX ORDER — CHOLECALCIFEROL (VITAMIN D3) 125 MCG
1 CAPSULE ORAL
Qty: 0 | Refills: 0 | DISCHARGE

## 2023-10-25 RX ORDER — FINASTERIDE 5 MG/1
5 TABLET, FILM COATED ORAL DAILY
Refills: 0 | Status: DISCONTINUED | OUTPATIENT
Start: 2023-10-25 | End: 2023-10-26

## 2023-10-25 RX ADMIN — SODIUM CHLORIDE 100 MILLILITER(S): 9 INJECTION, SOLUTION INTRAVENOUS at 16:31

## 2023-10-25 RX ADMIN — TAMSULOSIN HYDROCHLORIDE 0.4 MILLIGRAM(S): 0.4 CAPSULE ORAL at 22:00

## 2023-10-25 RX ADMIN — Medication 100 MILLIGRAM(S): at 22:00

## 2023-10-25 RX ADMIN — ATORVASTATIN CALCIUM 10 MILLIGRAM(S): 80 TABLET, FILM COATED ORAL at 22:00

## 2023-10-25 NOTE — ASU PATIENT PROFILE, ADULT - NSICDXPASTMEDICALHX_GEN_ALL_CORE_FT
PAST MEDICAL HISTORY:  Afib     Bladder cancer     Hypertension     PVD (peripheral vascular disease)     Spinal stenosis at L4-L5 level

## 2023-10-25 NOTE — ASU PREOP CHECKLIST - TEMPERATURE IN CELSIUS (DEGREES C)
-- DO NOT REPLY / DO NOT REPLY ALL --  -- Message is from the [facility]--    COVID-19 Universal Screening: N/A - Not about scheduling    General Patient Message      Reason for Call: Patient was informed her appointment for the CT scan isn't secured because prior authorization which is required for Medicare hasn't been complete. The referral is still pending and the appointment is 12/19/2020. Please assist.    Caller Information       Type Contact Phone    12/14/2020 09:53 AM CST Phone (Incoming) Cheryle Avalos (Self) [phone] (H)          Alternative phone number: none     Turnaround time given to caller:   \"This message will be sent to [state Provider's name]. The clinical team will fulfill your request as soon as they review your message.\"    
35.9

## 2023-10-25 NOTE — ASU PATIENT PROFILE, ADULT - NSICDXPASTSURGICALHX_GEN_ALL_CORE_FT
PAST SURGICAL HISTORY:  H/O heart artery stent 4 or     PAST SURGICAL HISTORY:  H/O heart artery stent 4 or 5    H/O hemorrhoidectomy     Status post cataract extraction of both eyes with insertion of intraocular lens

## 2023-10-25 NOTE — CHART NOTE - NSCHARTNOTEFT_GEN_A_CORE
89M pmhx cad s/p 2 stents, afib, htn, hld, copd is being admitted s/p TURBT, POD 0 by Dr. Pierre.     Per Dr. Herman [facility]'s cardiologist. patient takes plavix and coumadin. In preparation of this procedure, plavix was changed to aspirin, which can be resumed in the AM, per Dr. Pierre. Per Dr. Herman's note, this should be changed back to plavix when able. Warfarin has also been held, with instruction to instead start 2.5 mg eliquis post procedurally. per Dr. Pierre, continue holding eliquis until after patient is assessed tomorrow.     Patient has a 22fr lott in place. if necessary, the lott may be VERY GENTLY irrigated.     continue ancef while inpatient.     continue other home medications (finasteride, tamsulosin, nifedipine) 89M pmhx cad s/p 2 stents, afib, htn, hld, copd is being admitted s/p TURBT, POD 0 by Dr. Pierre.     Per Dr. Herman [facility]'s cardiologist. patient takes plavix and coumadin. In preparation of this procedure, plavix was changed to aspirin, which can be resumed in the AM, per Dr. Pierre. Per Dr. Herman's note, this should be changed back to plavix when able. Warfarin has also been held, with instruction to instead start 2.5 mg eliquis post procedurally. per Dr. Pierre, continue holding eliquis until after patient is assessed tomorrow.     Patient has a 22fr lott in place. if necessary, the lott may be VERY GENTLY irrigated.     continue ancef while inpatient.     continue other home medications (finasteride, tamsulosin, nifedipine, statin)

## 2023-10-25 NOTE — BRIEF OPERATIVE NOTE - NSICDXBRIEFPROCEDURE_GEN_ALL_CORE_FT
PROCEDURES:  Transurethral resection of bladder tumor (TURBT) with biopsy 25-Oct-2023 13:56:09  Fabian Pierre

## 2023-10-25 NOTE — ASU PREOP CHECKLIST - TEMPERATURE IN FAHRENHEIT (DEGREES F)
96.7 Patient alert to name, , place, time of month and year. PMH HTN, HLD, depression, ETOH abuse. patient reports drinking about 2 bottles of tequila/vodka everyday for the last 4 days. reports drinking last drink this morning. reports drinking one beer. patient reports he has been living in the streets for the last few days. patient complaining of unable to fall asleep. Patient complaining of hitting his heada yesterday, unknown LOC. reports one episode of vomiting and diarrhea this morning. Reports chest discomfort after vomiting. denies any abdominal pain, nausea at this time. no signs of alcohol withdrawal noted at this time.

## 2023-10-25 NOTE — ASU PATIENT PROFILE, ADULT - REASON FOR ADMISSION, PROFILE
Cystoscopy, transuretheral Cystoscopy, transuretheral resection of bladder tumor possible laser vaporization of prostate

## 2023-10-25 NOTE — CHART NOTE - NSCHARTNOTEFT_GEN_A_CORE
PACU ANESTHESIA ADMISSION NOTE      Procedure: Transurethral resection of bladder tumor (TURBT) with biopsy      Post op diagnosis:      ____  Intubated  TV:______       Rate: ______      FiO2: ______    __x__  Patent Airway    __x__  Full return of protective reflexes    ____  Full recovery from anesthesia / back to baseline     Vitals:   T:  97.7         R:  18                BP:   134/74               Sat:     96%              P: 98      Mental Status:  _x___ Awake   __x___ Alert   _____ Drowsy   _____ Sedated    Nausea/Vomiting:  __x__ NO  ______Yes,   See Post - Op Orders          Pain Scale (0-10):  ___0__    Treatment: ____ None    ____ See Post - Op/PCA Orders    Post - Operative Fluids:   ____ Oral   __x__ See Post - Op Orders    Plan: Discharge:   ____Home       ___x__Floor     _____Critical Care    _____  Other:_________________    Comments:

## 2023-10-25 NOTE — ASU PATIENT PROFILE, ADULT - FALL HARM RISK - HARM RISK INTERVENTIONS

## 2023-10-26 ENCOUNTER — TRANSCRIPTION ENCOUNTER (OUTPATIENT)
Age: 88
End: 2023-10-26

## 2023-10-26 VITALS
SYSTOLIC BLOOD PRESSURE: 120 MMHG | TEMPERATURE: 97 F | RESPIRATION RATE: 18 BRPM | HEART RATE: 80 BPM | DIASTOLIC BLOOD PRESSURE: 77 MMHG

## 2023-10-26 LAB
ANION GAP SERPL CALC-SCNC: 10 MMOL/L — SIGNIFICANT CHANGE UP (ref 7–14)
ANION GAP SERPL CALC-SCNC: 10 MMOL/L — SIGNIFICANT CHANGE UP (ref 7–14)
BASOPHILS # BLD AUTO: 0.02 K/UL — SIGNIFICANT CHANGE UP (ref 0–0.2)
BASOPHILS # BLD AUTO: 0.02 K/UL — SIGNIFICANT CHANGE UP (ref 0–0.2)
BASOPHILS NFR BLD AUTO: 0.2 % — SIGNIFICANT CHANGE UP (ref 0–1)
BASOPHILS NFR BLD AUTO: 0.2 % — SIGNIFICANT CHANGE UP (ref 0–1)
BUN SERPL-MCNC: 29 MG/DL — HIGH (ref 10–20)
BUN SERPL-MCNC: 29 MG/DL — HIGH (ref 10–20)
CALCIUM SERPL-MCNC: 8.7 MG/DL — SIGNIFICANT CHANGE UP (ref 8.4–10.5)
CALCIUM SERPL-MCNC: 8.7 MG/DL — SIGNIFICANT CHANGE UP (ref 8.4–10.5)
CHLORIDE SERPL-SCNC: 105 MMOL/L — SIGNIFICANT CHANGE UP (ref 98–110)
CHLORIDE SERPL-SCNC: 105 MMOL/L — SIGNIFICANT CHANGE UP (ref 98–110)
CO2 SERPL-SCNC: 24 MMOL/L — SIGNIFICANT CHANGE UP (ref 17–32)
CO2 SERPL-SCNC: 24 MMOL/L — SIGNIFICANT CHANGE UP (ref 17–32)
CREAT SERPL-MCNC: 1.7 MG/DL — HIGH (ref 0.7–1.5)
CREAT SERPL-MCNC: 1.7 MG/DL — HIGH (ref 0.7–1.5)
EGFR: 38 ML/MIN/1.73M2 — LOW
EGFR: 38 ML/MIN/1.73M2 — LOW
EOSINOPHIL # BLD AUTO: 0 K/UL — SIGNIFICANT CHANGE UP (ref 0–0.7)
EOSINOPHIL # BLD AUTO: 0 K/UL — SIGNIFICANT CHANGE UP (ref 0–0.7)
EOSINOPHIL NFR BLD AUTO: 0 % — SIGNIFICANT CHANGE UP (ref 0–8)
EOSINOPHIL NFR BLD AUTO: 0 % — SIGNIFICANT CHANGE UP (ref 0–8)
GLUCOSE SERPL-MCNC: 108 MG/DL — HIGH (ref 70–99)
GLUCOSE SERPL-MCNC: 108 MG/DL — HIGH (ref 70–99)
HCT VFR BLD CALC: 36.2 % — LOW (ref 42–52)
HCT VFR BLD CALC: 36.2 % — LOW (ref 42–52)
HGB BLD-MCNC: 12.4 G/DL — LOW (ref 14–18)
HGB BLD-MCNC: 12.4 G/DL — LOW (ref 14–18)
IMM GRANULOCYTES NFR BLD AUTO: 0.2 % — SIGNIFICANT CHANGE UP (ref 0.1–0.3)
IMM GRANULOCYTES NFR BLD AUTO: 0.2 % — SIGNIFICANT CHANGE UP (ref 0.1–0.3)
LYMPHOCYTES # BLD AUTO: 0.53 K/UL — LOW (ref 1.2–3.4)
LYMPHOCYTES # BLD AUTO: 0.53 K/UL — LOW (ref 1.2–3.4)
LYMPHOCYTES # BLD AUTO: 6 % — LOW (ref 20.5–51.1)
LYMPHOCYTES # BLD AUTO: 6 % — LOW (ref 20.5–51.1)
MCHC RBC-ENTMCNC: 29.5 PG — SIGNIFICANT CHANGE UP (ref 27–31)
MCHC RBC-ENTMCNC: 29.5 PG — SIGNIFICANT CHANGE UP (ref 27–31)
MCHC RBC-ENTMCNC: 34.3 G/DL — SIGNIFICANT CHANGE UP (ref 32–37)
MCHC RBC-ENTMCNC: 34.3 G/DL — SIGNIFICANT CHANGE UP (ref 32–37)
MCV RBC AUTO: 86 FL — SIGNIFICANT CHANGE UP (ref 80–94)
MCV RBC AUTO: 86 FL — SIGNIFICANT CHANGE UP (ref 80–94)
MONOCYTES # BLD AUTO: 0.98 K/UL — HIGH (ref 0.1–0.6)
MONOCYTES # BLD AUTO: 0.98 K/UL — HIGH (ref 0.1–0.6)
MONOCYTES NFR BLD AUTO: 11.1 % — HIGH (ref 1.7–9.3)
MONOCYTES NFR BLD AUTO: 11.1 % — HIGH (ref 1.7–9.3)
NEUTROPHILS # BLD AUTO: 7.31 K/UL — HIGH (ref 1.4–6.5)
NEUTROPHILS # BLD AUTO: 7.31 K/UL — HIGH (ref 1.4–6.5)
NEUTROPHILS NFR BLD AUTO: 82.5 % — HIGH (ref 42.2–75.2)
NEUTROPHILS NFR BLD AUTO: 82.5 % — HIGH (ref 42.2–75.2)
NRBC # BLD: 0 /100 WBCS — SIGNIFICANT CHANGE UP (ref 0–0)
NRBC # BLD: 0 /100 WBCS — SIGNIFICANT CHANGE UP (ref 0–0)
PLATELET # BLD AUTO: 199 K/UL — SIGNIFICANT CHANGE UP (ref 130–400)
PLATELET # BLD AUTO: 199 K/UL — SIGNIFICANT CHANGE UP (ref 130–400)
PMV BLD: 11.6 FL — HIGH (ref 7.4–10.4)
PMV BLD: 11.6 FL — HIGH (ref 7.4–10.4)
POTASSIUM SERPL-MCNC: 4.4 MMOL/L — SIGNIFICANT CHANGE UP (ref 3.5–5)
POTASSIUM SERPL-MCNC: 4.4 MMOL/L — SIGNIFICANT CHANGE UP (ref 3.5–5)
POTASSIUM SERPL-SCNC: 4.4 MMOL/L — SIGNIFICANT CHANGE UP (ref 3.5–5)
POTASSIUM SERPL-SCNC: 4.4 MMOL/L — SIGNIFICANT CHANGE UP (ref 3.5–5)
RBC # BLD: 4.21 M/UL — LOW (ref 4.7–6.1)
RBC # BLD: 4.21 M/UL — LOW (ref 4.7–6.1)
RBC # FLD: 16 % — HIGH (ref 11.5–14.5)
RBC # FLD: 16 % — HIGH (ref 11.5–14.5)
SODIUM SERPL-SCNC: 139 MMOL/L — SIGNIFICANT CHANGE UP (ref 135–146)
SODIUM SERPL-SCNC: 139 MMOL/L — SIGNIFICANT CHANGE UP (ref 135–146)
SURGICAL PATHOLOGY STUDY: SIGNIFICANT CHANGE UP
SURGICAL PATHOLOGY STUDY: SIGNIFICANT CHANGE UP
WBC # BLD: 8.86 K/UL — SIGNIFICANT CHANGE UP (ref 4.8–10.8)
WBC # BLD: 8.86 K/UL — SIGNIFICANT CHANGE UP (ref 4.8–10.8)
WBC # FLD AUTO: 8.86 K/UL — SIGNIFICANT CHANGE UP (ref 4.8–10.8)
WBC # FLD AUTO: 8.86 K/UL — SIGNIFICANT CHANGE UP (ref 4.8–10.8)

## 2023-10-26 RX ORDER — WARFARIN SODIUM 2.5 MG/1
0 TABLET ORAL
Refills: 0 | DISCHARGE

## 2023-10-26 RX ORDER — TAMSULOSIN HYDROCHLORIDE 0.4 MG/1
1 CAPSULE ORAL
Qty: 0 | Refills: 0 | DISCHARGE
Start: 2023-10-26

## 2023-10-26 RX ORDER — ASPIRIN/CALCIUM CARB/MAGNESIUM 324 MG
1 TABLET ORAL
Qty: 0 | Refills: 0 | DISCHARGE
Start: 2023-10-26

## 2023-10-26 RX ORDER — FINASTERIDE 5 MG/1
0 TABLET, FILM COATED ORAL
Qty: 0 | Refills: 0 | DISCHARGE

## 2023-10-26 RX ORDER — DOCUSATE SODIUM 100 MG
1 CAPSULE ORAL
Qty: 21 | Refills: 0
Start: 2023-10-26 | End: 2023-11-01

## 2023-10-26 RX ORDER — TAMSULOSIN HYDROCHLORIDE 0.4 MG/1
0 CAPSULE ORAL
Qty: 0 | Refills: 2 | DISCHARGE

## 2023-10-26 RX ORDER — CLOPIDOGREL BISULFATE 75 MG/1
1 TABLET, FILM COATED ORAL
Qty: 0 | Refills: 0 | DISCHARGE

## 2023-10-26 RX ORDER — FINASTERIDE 5 MG/1
1 TABLET, FILM COATED ORAL
Qty: 0 | Refills: 0 | DISCHARGE
Start: 2023-10-26

## 2023-10-26 RX ADMIN — FINASTERIDE 5 MILLIGRAM(S): 5 TABLET, FILM COATED ORAL at 12:07

## 2023-10-26 RX ADMIN — Medication 100 MILLIGRAM(S): at 05:30

## 2023-10-26 RX ADMIN — Medication 60 MILLIGRAM(S): at 05:29

## 2023-10-26 RX ADMIN — Medication 81 MILLIGRAM(S): at 12:07

## 2023-10-26 RX ADMIN — Medication 100 MILLIGRAM(S): at 12:08

## 2023-10-26 NOTE — DISCHARGE NOTE NURSING/CASE MANAGEMENT/SOCIAL WORK - PATIENT PORTAL LINK FT
You can access the FollowMyHealth Patient Portal offered by [facility] by registering at the following website: [email or web address]. By joining [employer]’s FollowMyHealth portal, you will also be able to view your health information using other applications (apps) compatible with our system.

## 2023-10-26 NOTE — PROGRESS NOTE ADULT - SUBJECTIVE AND OBJECTIVE BOX
STATUS POST:  TURBT    POST OPERATIVE DAY #: 1      Subjective: 89yo male POD 1 s/p TURBT. Pt is doing well without complaints Denies any abdominal pain, n/v, f/c            Vital Signs Last 24 Hrs  T(C): 36.2 (26 Oct 2023 04:36), Max: 36.3 (25 Oct 2023 13:55)  T(F): 97.1 (26 Oct 2023 04:36), Max: 97.3 (25 Oct 2023 13:55)  HR: 73 (26 Oct 2023 04:36) (73 - 89)  BP: 135/67 (26 Oct 2023 04:36) (134/74 - 165/87)  BP(mean): --  RR: 18 (26 Oct 2023 04:36) (15 - 18)  SpO2: 96% (26 Oct 2023 04:36) (95% - 97%)    Parameters below as of 26 Oct 2023 04:36  Patient On (Oxygen Delivery Method): room air        General Appearance: Appears well, NAD  Neck: Supple  Chest: Equal expansion bilaterally, equal breath sounds  CV: Pulse regular presently  Abdomen: Soft, nontense, ND, no SP fullness  Brooks in place dng clear yellow urine  Extremities: Grossly symmetric, no calf tend b/l    Brooks: yellow urine      I&O's Summary    25 Oct 2023 07:01  -  26 Oct 2023 07:00  --------------------------------------------------------  IN: 0 mL / OUT: 600 mL / NET: -600 mL      I&O's Detail    25 Oct 2023 07:01  -  26 Oct 2023 07:00  --------------------------------------------------------  IN:  Total IN: 0 mL    OUT:    Voided (mL): 600 mL  Total OUT: 600 mL    Total NET: -600 mL          MEDICATIONS  (STANDING):  aspirin  chewable 81 milliGRAM(s) Oral daily  atorvastatin 10 milliGRAM(s) Oral at bedtime  ceFAZolin   IVPB 2000 milliGRAM(s) IV Intermittent every 8 hours  finasteride 5 milliGRAM(s) Oral daily  NIFEdipine XL 60 milliGRAM(s) Oral daily  tamsulosin 0.4 milliGRAM(s) Oral at bedtime    MEDICATIONS  (PRN):  acetaminophen     Tablet .. 650 milliGRAM(s) Oral every 6 hours PRN Temp greater or equal to 38C (100.4F), Mild Pain (1 - 3)  melatonin 3 milliGRAM(s) Oral at bedtime PRN Insomnia      LABS:                        12.4   8.86  )-----------( 199      ( 26 Oct 2023 06:27 )             36.2     10-26    139  |  105  |  29<H>  ----------------------------<  108<H>  4.4   |  24  |  1.7<H>    Ca    8.7      26 Oct 2023 06:27      PT/INR - ( 25 Oct 2023 11:17 )   PT: 11.40 sec;   INR: 1.00 ratio         PTT - ( 25 Oct 2023 11:17 )  PTT:32.5 sec  Urinalysis Basic - ( 26 Oct 2023 06:27 )    Color: x / Appearance: x / SG: x / pH: x  Gluc: 108 mg/dL / Ketone: x  / Bili: x / Urobili: x   Blood: x / Protein: x / Nitrite: x   Leuk Esterase: x / RBC: x / WBC x   Sq Epi: x / Non Sq Epi: x / Bacteria: x        RADIOLOGY & ADDITIONAL STUDIES: none new

## 2023-10-26 NOTE — PROGRESS NOTE ADULT - ASSESSMENT
89 yo male POD 1 s/p TURBT doing well.         Plan:  1. s/p TURBT  - cont lott to BSD  - stay well hydrated  - Colace daily   - fup Dr Blanco on Monday for lott removal and TOV    2. Afib  - transitioning to Eliquis from Coumadin  - pt to speak with cardiologist about meds cont Lovenox vs starting Eliquis and timing related to surgery  - would like most conservative approach possible while medically appropriate    All above D/W Dr Blanco - who also explained all to pt 89 yo male POD 1 s/p TURBT doing well.         Plan:  1. s/p TURBT  - cont lott to BSD  - stay well hydrated  - Colace daily   - fup Dr Blanco on Monday for lott removal and TOV    2. Afib  - transitioning to Eliquis from Coumadin  - pt to speak with cardiologist about meds cont Lovenox vs starting Eliquis and timing related to surgery  - would like most conservative approach possible while medically appropriate    All above D/W Dr Blacno - who also explained all to pt

## 2023-10-26 NOTE — DISCHARGE NOTE PROVIDER - NSDCCPCAREPLAN_GEN_ALL_CORE_FT
PRINCIPAL DISCHARGE DIAGNOSIS  Diagnosis: S/P bladder tumor excision with fulguration  Assessment and Plan of Treatment: stay well hydrated   cont lott to BSD  take Colace daily to avoid constpation  fup Dr Pierre on Monday for lott removal

## 2023-10-26 NOTE — DISCHARGE NOTE PROVIDER - HOSPITAL COURSE
89 yo male with PMHx CAD with stents, Afib, HTN, Bladder Ca POD 1 s/p TURBT.    Pt is doing well without complaints. No hematuria.  Lott remains in place.    Pt to resume anticoagulation per Dr Luciano winslow - pt to contact him today.    FUP Dr Pierre Monday for lott removal  Colace daily.    Pt seen and examined by Dr Pierre as well who agrees with all above   89 yo male with PMHx CAD with stents, Afib, HTN, Bladder Ca POD 1 s/p TURBT.    Pt is doing well without complaints. No hematuria.  Lott remains in place.    Pt to resume anticoagulation per Dr Luciano winslow - pt to contact him today.    FUP Dr Pierre Monday for lott removal  Colace daily.    Pt seen and examined by Dr Pierre as well who agrees with all above

## 2023-10-26 NOTE — DISCHARGE NOTE PROVIDER - NSDCFUSCHEDAPPT_GEN_ALL_CORE_FT
Margarito Herman  [facility] Physician [facility]  CARDIOLOGY [address]  Scheduled Appointment: 01/22/2024

## 2023-10-26 NOTE — DISCHARGE NOTE PROVIDER - NSDCMRMEDTOKEN_GEN_ALL_CORE_FT
aspirin 81 mg oral tablet, chewable: 1 tab(s) orally once a day  calcitriol 0.25 mcg oral capsule: 1 cap(s) orally once a day  finasteride 5 mg oral tablet: 1 tab(s) orally once a day  NIFEdipine 60 mg oral tablet, extended release: 1 tab(s) orally once a day  PRAVASTATIN 40MG TABLETS: TAKE 1 TABLET BY MOUTH EVERY DAY AT BEDTIME  tamsulosin 0.4 mg oral capsule: 1 cap(s) orally once a day (at bedtime)

## 2023-10-26 NOTE — DISCHARGE NOTE PROVIDER - CARE PROVIDER_API CALL
Fabian Pierre  Urology  [address] [place] [id number]  Phone: [phone]  Fax: [phone]  Follow Up Time: 1-3 days

## 2023-10-27 RX ORDER — ASPIRIN 81 MG/1
81 TABLET, CHEWABLE ORAL
Refills: 0 | Status: ACTIVE | COMMUNITY
Start: 2023-10-27

## 2023-10-30 ENCOUNTER — APPOINTMENT (OUTPATIENT)
Dept: UROLOGY | Facility: CLINIC | Age: 88
End: 2023-10-30
Payer: MEDICARE

## 2023-10-30 ENCOUNTER — NON-APPOINTMENT (OUTPATIENT)
Age: 88
End: 2023-10-30

## 2023-10-30 VITALS
SYSTOLIC BLOOD PRESSURE: 120 MMHG | DIASTOLIC BLOOD PRESSURE: 67 MMHG | HEIGHT: 70 IN | HEART RATE: 104 BPM | WEIGHT: 185 LBS | BODY MASS INDEX: 26.48 KG/M2 | TEMPERATURE: 97.5 F

## 2023-10-30 DIAGNOSIS — I77.9 DISORDER OF ARTERIES AND ARTERIOLES, UNSPECIFIED: ICD-10-CM

## 2023-10-30 DIAGNOSIS — N30.90 CYSTITIS, UNSPECIFIED W/OUT HEMATURIA: ICD-10-CM

## 2023-10-30 PROBLEM — C67.9 MALIGNANT NEOPLASM OF BLADDER, UNSPECIFIED: Chronic | Status: ACTIVE | Noted: 2023-10-18

## 2023-10-30 PROCEDURE — 99215 OFFICE O/P EST HI 40 MIN: CPT

## 2023-10-31 ENCOUNTER — TRANSCRIPTION ENCOUNTER (OUTPATIENT)
Age: 88
End: 2023-10-31

## 2023-10-31 PROBLEM — N30.90 RECURRENT CYSTITIS: Status: ACTIVE | Noted: 2022-11-09

## 2023-10-31 PROBLEM — I77.9 BILATERAL CAROTID ARTERY DISEASE, UNSPECIFIED TYPE: Status: ACTIVE | Noted: 2019-04-02

## 2023-11-01 ENCOUNTER — NON-APPOINTMENT (OUTPATIENT)
Age: 88
End: 2023-11-01

## 2023-11-02 ENCOUNTER — EMERGENCY (EMERGENCY)
Facility: HOSPITAL | Age: 88
LOS: 0 days | Discharge: HOME | End: 2023-11-03
Attending: STUDENT IN AN ORGANIZED HEALTH CARE EDUCATION/TRAINING PROGRAM
Payer: MEDICARE

## 2023-11-02 ENCOUNTER — NON-APPOINTMENT (OUTPATIENT)
Age: 88
End: 2023-11-02

## 2023-11-02 VITALS
OXYGEN SATURATION: 97 % | HEART RATE: 86 BPM | HEIGHT: 70 IN | TEMPERATURE: 98 F | RESPIRATION RATE: 18 BRPM | WEIGHT: 184.97 LBS | DIASTOLIC BLOOD PRESSURE: 80 MMHG | SYSTOLIC BLOOD PRESSURE: 168 MMHG

## 2023-11-02 DIAGNOSIS — Z98.890 OTHER SPECIFIED POSTPROCEDURAL STATES: Chronic | ICD-10-CM

## 2023-11-02 DIAGNOSIS — Z98.41 CATARACT EXTRACTION STATUS, RIGHT EYE: Chronic | ICD-10-CM

## 2023-11-02 DIAGNOSIS — I10 ESSENTIAL (PRIMARY) HYPERTENSION: ICD-10-CM

## 2023-11-02 DIAGNOSIS — M48.061 SPINAL STENOSIS, LUMBAR REGION WITHOUT NEUROGENIC CLAUDICATION: ICD-10-CM

## 2023-11-02 DIAGNOSIS — Z95.5 PRESENCE OF CORONARY ANGIOPLASTY IMPLANT AND GRAFT: ICD-10-CM

## 2023-11-02 DIAGNOSIS — Z79.82 LONG TERM (CURRENT) USE OF ASPIRIN: ICD-10-CM

## 2023-11-02 DIAGNOSIS — Z88.2 ALLERGY STATUS TO SULFONAMIDES: ICD-10-CM

## 2023-11-02 DIAGNOSIS — Z95.5 PRESENCE OF CORONARY ANGIOPLASTY IMPLANT AND GRAFT: Chronic | ICD-10-CM

## 2023-11-02 DIAGNOSIS — I48.91 UNSPECIFIED ATRIAL FIBRILLATION: ICD-10-CM

## 2023-11-02 DIAGNOSIS — N39.0 URINARY TRACT INFECTION, SITE NOT SPECIFIED: ICD-10-CM

## 2023-11-02 DIAGNOSIS — I25.10 ATHEROSCLEROTIC HEART DISEASE OF NATIVE CORONARY ARTERY WITHOUT ANGINA PECTORIS: ICD-10-CM

## 2023-11-02 DIAGNOSIS — Z85.51 PERSONAL HISTORY OF MALIGNANT NEOPLASM OF BLADDER: ICD-10-CM

## 2023-11-02 DIAGNOSIS — R31.9 HEMATURIA, UNSPECIFIED: ICD-10-CM

## 2023-11-02 DIAGNOSIS — Z79.01 LONG TERM (CURRENT) USE OF ANTICOAGULANTS: ICD-10-CM

## 2023-11-02 DIAGNOSIS — I73.9 PERIPHERAL VASCULAR DISEASE, UNSPECIFIED: ICD-10-CM

## 2023-11-02 LAB
ALBUMIN SERPL ELPH-MCNC: 4.6 G/DL — SIGNIFICANT CHANGE UP (ref 3.5–5.2)
ALBUMIN SERPL ELPH-MCNC: 4.6 G/DL — SIGNIFICANT CHANGE UP (ref 3.5–5.2)
ALP SERPL-CCNC: 66 U/L — SIGNIFICANT CHANGE UP (ref 30–115)
ALP SERPL-CCNC: 66 U/L — SIGNIFICANT CHANGE UP (ref 30–115)
ALT FLD-CCNC: 12 U/L — SIGNIFICANT CHANGE UP (ref 0–41)
ALT FLD-CCNC: 12 U/L — SIGNIFICANT CHANGE UP (ref 0–41)
ANION GAP SERPL CALC-SCNC: 14 MMOL/L — SIGNIFICANT CHANGE UP (ref 7–14)
ANION GAP SERPL CALC-SCNC: 14 MMOL/L — SIGNIFICANT CHANGE UP (ref 7–14)
APPEARANCE UR: ABNORMAL
APPEARANCE UR: ABNORMAL
APTT BLD: 38.6 SEC — SIGNIFICANT CHANGE UP (ref 27–39.2)
APTT BLD: 38.6 SEC — SIGNIFICANT CHANGE UP (ref 27–39.2)
AST SERPL-CCNC: 19 U/L — SIGNIFICANT CHANGE UP (ref 0–41)
AST SERPL-CCNC: 19 U/L — SIGNIFICANT CHANGE UP (ref 0–41)
BASOPHILS # BLD AUTO: 0.04 K/UL — SIGNIFICANT CHANGE UP (ref 0–0.2)
BASOPHILS # BLD AUTO: 0.04 K/UL — SIGNIFICANT CHANGE UP (ref 0–0.2)
BASOPHILS NFR BLD AUTO: 0.4 % — SIGNIFICANT CHANGE UP (ref 0–1)
BASOPHILS NFR BLD AUTO: 0.4 % — SIGNIFICANT CHANGE UP (ref 0–1)
BILIRUB SERPL-MCNC: 0.4 MG/DL — SIGNIFICANT CHANGE UP (ref 0.2–1.2)
BILIRUB SERPL-MCNC: 0.4 MG/DL — SIGNIFICANT CHANGE UP (ref 0.2–1.2)
BILIRUB UR-MCNC: ABNORMAL
BILIRUB UR-MCNC: ABNORMAL
BUN SERPL-MCNC: 36 MG/DL — HIGH (ref 10–20)
BUN SERPL-MCNC: 36 MG/DL — HIGH (ref 10–20)
CALCIUM SERPL-MCNC: 9.3 MG/DL — SIGNIFICANT CHANGE UP (ref 8.4–10.5)
CALCIUM SERPL-MCNC: 9.3 MG/DL — SIGNIFICANT CHANGE UP (ref 8.4–10.5)
CHLORIDE SERPL-SCNC: 100 MMOL/L — SIGNIFICANT CHANGE UP (ref 98–110)
CHLORIDE SERPL-SCNC: 100 MMOL/L — SIGNIFICANT CHANGE UP (ref 98–110)
CO2 SERPL-SCNC: 22 MMOL/L — SIGNIFICANT CHANGE UP (ref 17–32)
CO2 SERPL-SCNC: 22 MMOL/L — SIGNIFICANT CHANGE UP (ref 17–32)
COLOR SPEC: ABNORMAL
COLOR SPEC: ABNORMAL
CREAT SERPL-MCNC: 1.8 MG/DL — HIGH (ref 0.7–1.5)
CREAT SERPL-MCNC: 1.8 MG/DL — HIGH (ref 0.7–1.5)
DIFF PNL FLD: ABNORMAL
DIFF PNL FLD: ABNORMAL
EGFR: 35 ML/MIN/1.73M2 — LOW
EGFR: 35 ML/MIN/1.73M2 — LOW
EOSINOPHIL # BLD AUTO: 0.15 K/UL — SIGNIFICANT CHANGE UP (ref 0–0.7)
EOSINOPHIL # BLD AUTO: 0.15 K/UL — SIGNIFICANT CHANGE UP (ref 0–0.7)
EOSINOPHIL NFR BLD AUTO: 1.6 % — SIGNIFICANT CHANGE UP (ref 0–8)
EOSINOPHIL NFR BLD AUTO: 1.6 % — SIGNIFICANT CHANGE UP (ref 0–8)
GLUCOSE SERPL-MCNC: 111 MG/DL — HIGH (ref 70–99)
GLUCOSE SERPL-MCNC: 111 MG/DL — HIGH (ref 70–99)
GLUCOSE UR QL: NEGATIVE MG/DL — SIGNIFICANT CHANGE UP
GLUCOSE UR QL: NEGATIVE MG/DL — SIGNIFICANT CHANGE UP
HCT VFR BLD CALC: 40.6 % — LOW (ref 42–52)
HCT VFR BLD CALC: 40.6 % — LOW (ref 42–52)
HGB BLD-MCNC: 14 G/DL — SIGNIFICANT CHANGE UP (ref 14–18)
HGB BLD-MCNC: 14 G/DL — SIGNIFICANT CHANGE UP (ref 14–18)
IMM GRANULOCYTES NFR BLD AUTO: 0.4 % — HIGH (ref 0.1–0.3)
IMM GRANULOCYTES NFR BLD AUTO: 0.4 % — HIGH (ref 0.1–0.3)
INR BLD: 1.1 RATIO — SIGNIFICANT CHANGE UP (ref 0.65–1.3)
INR BLD: 1.1 RATIO — SIGNIFICANT CHANGE UP (ref 0.65–1.3)
KETONES UR-MCNC: NEGATIVE MG/DL — SIGNIFICANT CHANGE UP
KETONES UR-MCNC: NEGATIVE MG/DL — SIGNIFICANT CHANGE UP
LEUKOCYTE ESTERASE UR-ACNC: ABNORMAL
LEUKOCYTE ESTERASE UR-ACNC: ABNORMAL
LYMPHOCYTES # BLD AUTO: 0.75 K/UL — LOW (ref 1.2–3.4)
LYMPHOCYTES # BLD AUTO: 0.75 K/UL — LOW (ref 1.2–3.4)
LYMPHOCYTES # BLD AUTO: 8.1 % — LOW (ref 20.5–51.1)
LYMPHOCYTES # BLD AUTO: 8.1 % — LOW (ref 20.5–51.1)
MCHC RBC-ENTMCNC: 30.2 PG — SIGNIFICANT CHANGE UP (ref 27–31)
MCHC RBC-ENTMCNC: 30.2 PG — SIGNIFICANT CHANGE UP (ref 27–31)
MCHC RBC-ENTMCNC: 34.5 G/DL — SIGNIFICANT CHANGE UP (ref 32–37)
MCHC RBC-ENTMCNC: 34.5 G/DL — SIGNIFICANT CHANGE UP (ref 32–37)
MCV RBC AUTO: 87.7 FL — SIGNIFICANT CHANGE UP (ref 80–94)
MCV RBC AUTO: 87.7 FL — SIGNIFICANT CHANGE UP (ref 80–94)
MONOCYTES # BLD AUTO: 0.85 K/UL — HIGH (ref 0.1–0.6)
MONOCYTES # BLD AUTO: 0.85 K/UL — HIGH (ref 0.1–0.6)
MONOCYTES NFR BLD AUTO: 9.2 % — SIGNIFICANT CHANGE UP (ref 1.7–9.3)
MONOCYTES NFR BLD AUTO: 9.2 % — SIGNIFICANT CHANGE UP (ref 1.7–9.3)
NEUTROPHILS # BLD AUTO: 7.41 K/UL — HIGH (ref 1.4–6.5)
NEUTROPHILS # BLD AUTO: 7.41 K/UL — HIGH (ref 1.4–6.5)
NEUTROPHILS NFR BLD AUTO: 80.3 % — HIGH (ref 42.2–75.2)
NEUTROPHILS NFR BLD AUTO: 80.3 % — HIGH (ref 42.2–75.2)
NITRITE UR-MCNC: POSITIVE
NITRITE UR-MCNC: POSITIVE
NRBC # BLD: 0 /100 WBCS — SIGNIFICANT CHANGE UP (ref 0–0)
NRBC # BLD: 0 /100 WBCS — SIGNIFICANT CHANGE UP (ref 0–0)
PH UR: 5 — SIGNIFICANT CHANGE UP (ref 5–8)
PH UR: 5 — SIGNIFICANT CHANGE UP (ref 5–8)
PLATELET # BLD AUTO: 239 K/UL — SIGNIFICANT CHANGE UP (ref 130–400)
PLATELET # BLD AUTO: 239 K/UL — SIGNIFICANT CHANGE UP (ref 130–400)
PMV BLD: 10.7 FL — HIGH (ref 7.4–10.4)
PMV BLD: 10.7 FL — HIGH (ref 7.4–10.4)
POTASSIUM SERPL-MCNC: 4.7 MMOL/L — SIGNIFICANT CHANGE UP (ref 3.5–5)
POTASSIUM SERPL-MCNC: 4.7 MMOL/L — SIGNIFICANT CHANGE UP (ref 3.5–5)
POTASSIUM SERPL-SCNC: 4.7 MMOL/L — SIGNIFICANT CHANGE UP (ref 3.5–5)
POTASSIUM SERPL-SCNC: 4.7 MMOL/L — SIGNIFICANT CHANGE UP (ref 3.5–5)
PROT SERPL-MCNC: 6.6 G/DL — SIGNIFICANT CHANGE UP (ref 6–8)
PROT SERPL-MCNC: 6.6 G/DL — SIGNIFICANT CHANGE UP (ref 6–8)
PROT UR-MCNC: 100 MG/DL
PROT UR-MCNC: 100 MG/DL
PROTHROM AB SERPL-ACNC: 12.6 SEC — SIGNIFICANT CHANGE UP (ref 9.95–12.87)
PROTHROM AB SERPL-ACNC: 12.6 SEC — SIGNIFICANT CHANGE UP (ref 9.95–12.87)
RBC # BLD: 4.63 M/UL — LOW (ref 4.7–6.1)
RBC # BLD: 4.63 M/UL — LOW (ref 4.7–6.1)
RBC # FLD: 15.2 % — HIGH (ref 11.5–14.5)
RBC # FLD: 15.2 % — HIGH (ref 11.5–14.5)
SODIUM SERPL-SCNC: 136 MMOL/L — SIGNIFICANT CHANGE UP (ref 135–146)
SODIUM SERPL-SCNC: 136 MMOL/L — SIGNIFICANT CHANGE UP (ref 135–146)
SP GR SPEC: 1.02 — SIGNIFICANT CHANGE UP (ref 1–1.03)
SP GR SPEC: 1.02 — SIGNIFICANT CHANGE UP (ref 1–1.03)
UROBILINOGEN FLD QL: 0.2 MG/DL — SIGNIFICANT CHANGE UP (ref 0.2–1)
UROBILINOGEN FLD QL: 0.2 MG/DL — SIGNIFICANT CHANGE UP (ref 0.2–1)
WBC # BLD: 9.24 K/UL — SIGNIFICANT CHANGE UP (ref 4.8–10.8)
WBC # BLD: 9.24 K/UL — SIGNIFICANT CHANGE UP (ref 4.8–10.8)
WBC # FLD AUTO: 9.24 K/UL — SIGNIFICANT CHANGE UP (ref 4.8–10.8)
WBC # FLD AUTO: 9.24 K/UL — SIGNIFICANT CHANGE UP (ref 4.8–10.8)

## 2023-11-02 PROCEDURE — 74177 CT ABD & PELVIS W/CONTRAST: CPT | Mod: 26,MA

## 2023-11-02 PROCEDURE — 85025 COMPLETE CBC W/AUTO DIFF WBC: CPT

## 2023-11-02 PROCEDURE — 80053 COMPREHEN METABOLIC PANEL: CPT

## 2023-11-02 PROCEDURE — 85730 THROMBOPLASTIN TIME PARTIAL: CPT

## 2023-11-02 PROCEDURE — 76770 US EXAM ABDO BACK WALL COMP: CPT | Mod: 26

## 2023-11-02 PROCEDURE — 96374 THER/PROPH/DIAG INJ IV PUSH: CPT | Mod: XU

## 2023-11-02 PROCEDURE — 76770 US EXAM ABDO BACK WALL COMP: CPT

## 2023-11-02 PROCEDURE — 74177 CT ABD & PELVIS W/CONTRAST: CPT | Mod: MA

## 2023-11-02 PROCEDURE — 99284 EMERGENCY DEPT VISIT MOD MDM: CPT | Mod: 25

## 2023-11-02 PROCEDURE — 87086 URINE CULTURE/COLONY COUNT: CPT

## 2023-11-02 PROCEDURE — 81001 URINALYSIS AUTO W/SCOPE: CPT

## 2023-11-02 PROCEDURE — 99285 EMERGENCY DEPT VISIT HI MDM: CPT

## 2023-11-02 PROCEDURE — 85610 PROTHROMBIN TIME: CPT

## 2023-11-02 PROCEDURE — 36415 COLL VENOUS BLD VENIPUNCTURE: CPT

## 2023-11-02 RX ORDER — CALCITRIOL 0.5 UG/1
1 CAPSULE ORAL
Refills: 0 | DISCHARGE

## 2023-11-02 RX ORDER — NIFEDIPINE 30 MG
1 TABLET, EXTENDED RELEASE 24 HR ORAL
Qty: 0 | Refills: 0 | DISCHARGE

## 2023-11-02 RX ORDER — CEFPODOXIME PROXETIL 100 MG
1 TABLET ORAL
Qty: 14 | Refills: 0
Start: 2023-11-02 | End: 2023-11-08

## 2023-11-02 RX ORDER — APIXABAN 2.5 MG/1
0 TABLET, FILM COATED ORAL
Refills: 0 | DISCHARGE

## 2023-11-02 RX ORDER — CEFTRIAXONE 500 MG/1
1000 INJECTION, POWDER, FOR SOLUTION INTRAMUSCULAR; INTRAVENOUS ONCE
Refills: 0 | Status: COMPLETED | OUTPATIENT
Start: 2023-11-02 | End: 2023-11-02

## 2023-11-02 RX ADMIN — CEFTRIAXONE 100 MILLIGRAM(S): 500 INJECTION, POWDER, FOR SOLUTION INTRAMUSCULAR; INTRAVENOUS at 20:55

## 2023-11-02 NOTE — ED PROVIDER NOTE - PHYSICAL EXAMINATION
GENERAL: Well-nourished, Well-developed. NAD.  HEAD: No visible or palpable bumps or hematomas. No ecchymosis behind ears B/L.  Eyes: PERRLA, EOMI.   ENMT: MMM.  Neck: Supple. FROM  CVS: Normal S1,S2. No murmurs appreciated on auscultation   RESP: No use of accessory muscles. Chest rise symmetrical with good expansion. Lungs clear to auscultation B/L. No wheezing, rales, or rhonchi auscultated.  GI: Normal auscultation of bowel sounds in all 4 quadrants. Soft, Nontender, Nondistended. No guarding or rebound tenderness. No CVAT B/L.  Skin: Warm, Dry. No rashes or lesions. Good cap refill < 2 sec B/L.  : Testes non-tender and descended B/L. No penile sores. No erythema. No palpable LNs. no bleeding from penis.

## 2023-11-02 NOTE — ED ADULT TRIAGE NOTE - CHIEF COMPLAINT QUOTE
pt states he had bladder surgery 1 week ago, now has bleeding with and without urine. also on eliquis 2.5mg BID

## 2023-11-02 NOTE — ED ADULT NURSE NOTE - NSFALLRISK_ED_ALL_ED
Patient and wife states patient has been taking Lyrica 100 mg twice daily for Seizures. Med is not ordered. Spoke to Dr Artem Jensen who is on call for Vascular Surgery who states he will address with the team.    Yes

## 2023-11-02 NOTE — ED ADULT NURSE NOTE - NS ED NURSE DC INFO COMPLEXITY
conducted a detailed discussion... I had a detailed discussion with the patient and/or guardian regarding the historical points, exam findings, and any diagnostic results supporting the discharge/admit diagnosis. Simple: Patient demonstrates quick and easy understanding

## 2023-11-02 NOTE — CHART NOTE - NSCHARTNOTEFT_GEN_A_CORE
Called by ED regarding pt with recent s/p TURBT c/o hematuria but no active bleed on arrival.   A/P:  Recommends work up and recall if there is the need for urology.

## 2023-11-02 NOTE — ED ADULT NURSE NOTE - NSICDXPASTSURGICALHX_GEN_ALL_CORE_FT
PAST SURGICAL HISTORY:  H/O heart artery stent 4 or 5    H/O hemorrhoidectomy     Status post cataract extraction of both eyes with insertion of intraocular lens

## 2023-11-02 NOTE — ED PROVIDER NOTE - OBJECTIVE STATEMENT
89 yo M pmhx cad s/p stent, afib on eliquis, htn, bladder cancer s/p turbt 1 week ago by Dr Pierre presenting to the ED for evaluation of hematuria. pt states last night had dark colored urine, today had hematuria intermittently and noted blood in his underwear. pt denies any pain to abdomen or penis. pt states he is able to urinate without difficulty, just has blood when urinating. denies fever, chills, chest pain, sob, nausea, vomiting, flank pain.

## 2023-11-02 NOTE — ED PROVIDER NOTE - CARE PROVIDER_API CALL
Fabian Pierre  Urology  [address] [place] [id number]  Phone: [phone]  Fax: [phone]  Follow Up Time: 1-3 Days

## 2023-11-02 NOTE — ED ADULT NURSE NOTE - NSFALLHARMRISKINTERV_ED_ALL_ED

## 2023-11-02 NOTE — ED PROVIDER NOTE - CLINICAL SUMMARY MEDICAL DECISION MAKING FREE TEXT BOX
pt with PMH of cad s/p stent, afib on eliquis, htn, bladder cancer s/p turbt 1 week ago by Dr Ignacio malhotra hematuria new from procedure. Pt noticed urine in underwear. Labs and CT/US were ordered and reviewed.  Imaging was ordered and reviewed by me.  Appropriate medications for patient's presenting complaints were ordered and effects were reassessed.  Patient's records (prior hospital, ED visit, and/or nursing home notes if available) were reviewed.  Additional history was obtained from EMS, family, and/or PCP (where available).  Escalation to admission/observation was considered.  However patient feels much better and is comfortable with discharge.  Appropriate follow-up was arranged.     [facility] urology, will DC with abx. differential: acute abdominal pathology, stone, procedural complication

## 2023-11-02 NOTE — ED PROVIDER NOTE - PATIENT PORTAL LINK FT
You can access the FollowMyHealth Patient Portal offered by [facility] by registering at the following website: [email or web address]. By joining [employer]’s FollowMyHealth portal, you will also be able to view your health information using other applications (apps) compatible with our system. You can access the FollowMyHealth Patient Portal offered by [facility] by registering at the following website: [email or web address]. By joining [employer]’s FollowMyHealth portal, you will also be able to view your health information using other applications (apps) compatible with our system.

## 2023-11-03 ENCOUNTER — NON-APPOINTMENT (OUTPATIENT)
Age: 88
End: 2023-11-03

## 2023-11-03 ENCOUNTER — APPOINTMENT (OUTPATIENT)
Dept: UROLOGY | Facility: CLINIC | Age: 88
End: 2023-11-03
Payer: MEDICARE

## 2023-11-03 VITALS
RESPIRATION RATE: 18 BRPM | SYSTOLIC BLOOD PRESSURE: 154 MMHG | OXYGEN SATURATION: 98 % | DIASTOLIC BLOOD PRESSURE: 78 MMHG | HEART RATE: 80 BPM | TEMPERATURE: 98 F

## 2023-11-03 DIAGNOSIS — R39.198 OTHER DIFFICULTIES WITH MICTURITION: ICD-10-CM

## 2023-11-03 DIAGNOSIS — R10.30 LOWER ABDOMINAL PAIN, UNSPECIFIED: ICD-10-CM

## 2023-11-03 DIAGNOSIS — R33.9 RETENTION OF URINE, UNSPECIFIED: ICD-10-CM

## 2023-11-03 DIAGNOSIS — R31.9 HEMATURIA, UNSPECIFIED: ICD-10-CM

## 2023-11-03 LAB
CULTURE RESULTS: SIGNIFICANT CHANGE UP
CULTURE RESULTS: SIGNIFICANT CHANGE UP
SPECIMEN SOURCE: SIGNIFICANT CHANGE UP
SPECIMEN SOURCE: SIGNIFICANT CHANGE UP

## 2023-11-03 PROCEDURE — 99213 OFFICE O/P EST LOW 20 MIN: CPT | Mod: 25

## 2023-11-03 PROCEDURE — 51702 INSERT TEMP BLADDER CATH: CPT

## 2023-11-03 RX ORDER — APIXABAN 2.5 MG/1
2.5 TABLET, FILM COATED ORAL
Qty: 60 | Refills: 0 | Status: ACTIVE | COMMUNITY
Start: 2023-10-27

## 2023-11-06 ENCOUNTER — APPOINTMENT (OUTPATIENT)
Dept: UROLOGY | Facility: CLINIC | Age: 88
End: 2023-11-06
Payer: MEDICARE

## 2023-11-06 DIAGNOSIS — M48.00 SPINAL STENOSIS, SITE UNSPECIFIED: ICD-10-CM

## 2023-11-06 PROCEDURE — 99215 OFFICE O/P EST HI 40 MIN: CPT

## 2023-11-08 PROBLEM — M48.00 SPINAL STENOSIS: Status: ACTIVE | Noted: 2017-04-04

## 2023-11-09 DIAGNOSIS — Z85.51 PERSONAL HISTORY OF MALIGNANT NEOPLASM OF BLADDER: ICD-10-CM

## 2023-11-09 DIAGNOSIS — I25.10 ATHEROSCLEROTIC HEART DISEASE OF NATIVE CORONARY ARTERY WITHOUT ANGINA PECTORIS: ICD-10-CM

## 2023-11-09 DIAGNOSIS — E78.5 HYPERLIPIDEMIA, UNSPECIFIED: ICD-10-CM

## 2023-11-09 DIAGNOSIS — C67.2 MALIGNANT NEOPLASM OF LATERAL WALL OF BLADDER: ICD-10-CM

## 2023-11-09 DIAGNOSIS — N35.811 OTHER URETHRAL STRICTURE, MALE, MEATAL: ICD-10-CM

## 2023-11-09 DIAGNOSIS — I48.91 UNSPECIFIED ATRIAL FIBRILLATION: ICD-10-CM

## 2023-11-09 DIAGNOSIS — I10 ESSENTIAL (PRIMARY) HYPERTENSION: ICD-10-CM

## 2023-11-09 DIAGNOSIS — Z79.01 LONG TERM (CURRENT) USE OF ANTICOAGULANTS: ICD-10-CM

## 2023-11-09 DIAGNOSIS — N13.8 OTHER OBSTRUCTIVE AND REFLUX UROPATHY: ICD-10-CM

## 2023-11-09 DIAGNOSIS — N40.1 BENIGN PROSTATIC HYPERPLASIA WITH LOWER URINARY TRACT SYMPTOMS: ICD-10-CM

## 2023-11-09 DIAGNOSIS — Z95.5 PRESENCE OF CORONARY ANGIOPLASTY IMPLANT AND GRAFT: ICD-10-CM

## 2023-11-16 PROBLEM — R31.9 BLOOD IN URINE: Status: ACTIVE | Noted: 2023-08-24

## 2023-11-16 PROBLEM — R39.198 SLOW URINARY STREAM: Status: ACTIVE | Noted: 2023-11-16

## 2023-11-16 PROBLEM — R33.9 URINARY RETENTION: Status: ACTIVE | Noted: 2023-11-16

## 2023-11-16 PROBLEM — R10.30 LOWER ABDOMINAL PAIN: Status: ACTIVE | Noted: 2023-11-16

## 2023-11-26 ENCOUNTER — NON-APPOINTMENT (OUTPATIENT)
Age: 88
End: 2023-11-26

## 2023-11-29 ENCOUNTER — APPOINTMENT (OUTPATIENT)
Dept: INFUSION THERAPY | Facility: CLINIC | Age: 88
End: 2023-11-29

## 2023-11-29 ENCOUNTER — OUTPATIENT (OUTPATIENT)
Dept: OUTPATIENT SERVICES | Facility: HOSPITAL | Age: 88
LOS: 1 days | End: 2023-11-29
Payer: MEDICARE

## 2023-11-29 ENCOUNTER — NON-APPOINTMENT (OUTPATIENT)
Age: 88
End: 2023-11-29

## 2023-11-29 VITALS
BODY MASS INDEX: 26.48 KG/M2 | TEMPERATURE: 97.1 F | DIASTOLIC BLOOD PRESSURE: 60 MMHG | HEIGHT: 70 IN | WEIGHT: 185 LBS | HEART RATE: 99 BPM | SYSTOLIC BLOOD PRESSURE: 118 MMHG

## 2023-11-29 DIAGNOSIS — Z95.5 PRESENCE OF CORONARY ANGIOPLASTY IMPLANT AND GRAFT: Chronic | ICD-10-CM

## 2023-11-29 DIAGNOSIS — Z98.890 OTHER SPECIFIED POSTPROCEDURAL STATES: Chronic | ICD-10-CM

## 2023-11-29 DIAGNOSIS — Z98.41 CATARACT EXTRACTION STATUS, RIGHT EYE: Chronic | ICD-10-CM

## 2023-11-29 DIAGNOSIS — C67.2 MALIGNANT NEOPLASM OF LATERAL WALL OF BLADDER: ICD-10-CM

## 2023-11-29 PROCEDURE — 51720 TREATMENT OF BLADDER LESION: CPT

## 2023-11-29 RX ORDER — GEMCITABINE 38 MG/ML
2000 INJECTION, SOLUTION INTRAVENOUS ONCE
Refills: 0 | Status: COMPLETED | OUTPATIENT
Start: 2023-11-29 | End: 2023-11-29

## 2023-11-29 RX ADMIN — GEMCITABINE 2000 MILLIGRAM(S): 38 INJECTION, SOLUTION INTRAVENOUS at 11:04

## 2023-11-30 DIAGNOSIS — C67.9 MALIGNANT NEOPLASM OF BLADDER, UNSPECIFIED: ICD-10-CM

## 2023-12-06 ENCOUNTER — APPOINTMENT (OUTPATIENT)
Dept: INFUSION THERAPY | Facility: CLINIC | Age: 88
End: 2023-12-06
Payer: MEDICARE

## 2023-12-06 ENCOUNTER — OUTPATIENT (OUTPATIENT)
Dept: OUTPATIENT SERVICES | Facility: HOSPITAL | Age: 88
LOS: 1 days | End: 2023-12-06
Payer: MEDICARE

## 2023-12-06 VITALS
HEIGHT: 70 IN | TEMPERATURE: 97.8 F | BODY MASS INDEX: 26.48 KG/M2 | SYSTOLIC BLOOD PRESSURE: 124 MMHG | WEIGHT: 185 LBS | DIASTOLIC BLOOD PRESSURE: 64 MMHG | HEART RATE: 114 BPM

## 2023-12-06 DIAGNOSIS — C67.2 MALIGNANT NEOPLASM OF LATERAL WALL OF BLADDER: ICD-10-CM

## 2023-12-06 DIAGNOSIS — Z98.41 CATARACT EXTRACTION STATUS, RIGHT EYE: Chronic | ICD-10-CM

## 2023-12-06 DIAGNOSIS — Z95.5 PRESENCE OF CORONARY ANGIOPLASTY IMPLANT AND GRAFT: Chronic | ICD-10-CM

## 2023-12-06 PROCEDURE — 51720 TREATMENT OF BLADDER LESION: CPT

## 2023-12-06 RX ORDER — PRENATAL VIT 75/IRON/FOLIC/OM3 28-800-440
COMBINATION PACKAGE (EA) ORAL
Refills: 0 | Status: ACTIVE | COMMUNITY

## 2023-12-06 RX ORDER — GEMCITABINE 38 MG/ML
2000 INJECTION, SOLUTION INTRAVENOUS ONCE
Refills: 0 | Status: COMPLETED | OUTPATIENT
Start: 2023-12-06 | End: 2023-12-06

## 2023-12-06 RX ADMIN — GEMCITABINE 2000 MILLIGRAM(S): 38 INJECTION, SOLUTION INTRAVENOUS at 16:11

## 2023-12-07 DIAGNOSIS — C67.9 MALIGNANT NEOPLASM OF BLADDER, UNSPECIFIED: ICD-10-CM

## 2023-12-13 ENCOUNTER — APPOINTMENT (OUTPATIENT)
Dept: INFUSION THERAPY | Facility: CLINIC | Age: 88
End: 2023-12-13
Payer: MEDICARE

## 2023-12-13 ENCOUNTER — OUTPATIENT (OUTPATIENT)
Dept: OUTPATIENT SERVICES | Facility: HOSPITAL | Age: 88
LOS: 1 days | End: 2023-12-13
Payer: MEDICARE

## 2023-12-13 VITALS
BODY MASS INDEX: 26.48 KG/M2 | WEIGHT: 185 LBS | HEART RATE: 96 BPM | SYSTOLIC BLOOD PRESSURE: 125 MMHG | DIASTOLIC BLOOD PRESSURE: 63 MMHG | HEIGHT: 70 IN

## 2023-12-13 DIAGNOSIS — Z98.890 OTHER SPECIFIED POSTPROCEDURAL STATES: Chronic | ICD-10-CM

## 2023-12-13 DIAGNOSIS — Z95.5 PRESENCE OF CORONARY ANGIOPLASTY IMPLANT AND GRAFT: Chronic | ICD-10-CM

## 2023-12-13 DIAGNOSIS — C67.2 MALIGNANT NEOPLASM OF LATERAL WALL OF BLADDER: ICD-10-CM

## 2023-12-13 PROCEDURE — 51720 TREATMENT OF BLADDER LESION: CPT

## 2023-12-13 RX ORDER — GEMCITABINE 38 MG/ML
2000 INJECTION, SOLUTION INTRAVENOUS ONCE
Refills: 0 | Status: COMPLETED | OUTPATIENT
Start: 2023-12-13 | End: 2023-12-13

## 2023-12-13 RX ADMIN — GEMCITABINE 2000 MILLIGRAM(S): 38 INJECTION, SOLUTION INTRAVENOUS at 14:53

## 2023-12-14 DIAGNOSIS — C67.9 MALIGNANT NEOPLASM OF BLADDER, UNSPECIFIED: ICD-10-CM

## 2023-12-20 ENCOUNTER — OUTPATIENT (OUTPATIENT)
Dept: OUTPATIENT SERVICES | Facility: HOSPITAL | Age: 88
LOS: 1 days | End: 2023-12-20
Payer: MEDICARE

## 2023-12-20 ENCOUNTER — APPOINTMENT (OUTPATIENT)
Dept: INFUSION THERAPY | Facility: CLINIC | Age: 88
End: 2023-12-20
Payer: MEDICARE

## 2023-12-20 VITALS
HEART RATE: 89 BPM | HEIGHT: 70 IN | TEMPERATURE: 97.3 F | SYSTOLIC BLOOD PRESSURE: 110 MMHG | DIASTOLIC BLOOD PRESSURE: 60 MMHG | BODY MASS INDEX: 26.48 KG/M2 | WEIGHT: 185 LBS

## 2023-12-20 DIAGNOSIS — Z95.5 PRESENCE OF CORONARY ANGIOPLASTY IMPLANT AND GRAFT: Chronic | ICD-10-CM

## 2023-12-20 DIAGNOSIS — C67.9 MALIGNANT NEOPLASM OF BLADDER, UNSPECIFIED: ICD-10-CM

## 2023-12-20 DIAGNOSIS — Z98.41 CATARACT EXTRACTION STATUS, RIGHT EYE: Chronic | ICD-10-CM

## 2023-12-20 PROCEDURE — 51720 TREATMENT OF BLADDER LESION: CPT

## 2023-12-20 RX ORDER — GEMCITABINE 38 MG/ML
2000 INJECTION, SOLUTION INTRAVENOUS ONCE
Refills: 0 | Status: COMPLETED | OUTPATIENT
Start: 2023-12-20 | End: 2023-12-20

## 2023-12-20 RX ADMIN — GEMCITABINE 2000 MILLIGRAM(S): 38 INJECTION, SOLUTION INTRAVENOUS at 15:31

## 2023-12-21 DIAGNOSIS — C67.9 MALIGNANT NEOPLASM OF BLADDER, UNSPECIFIED: ICD-10-CM

## 2023-12-27 ENCOUNTER — OUTPATIENT (OUTPATIENT)
Dept: OUTPATIENT SERVICES | Facility: HOSPITAL | Age: 88
LOS: 1 days | End: 2023-12-27
Payer: MEDICARE

## 2023-12-27 ENCOUNTER — APPOINTMENT (OUTPATIENT)
Dept: INFUSION THERAPY | Facility: CLINIC | Age: 88
End: 2023-12-27
Payer: MEDICARE

## 2023-12-27 VITALS
SYSTOLIC BLOOD PRESSURE: 114 MMHG | WEIGHT: 185 LBS | DIASTOLIC BLOOD PRESSURE: 78 MMHG | HEART RATE: 98 BPM | BODY MASS INDEX: 26.48 KG/M2 | TEMPERATURE: 97.5 F | HEIGHT: 70 IN

## 2023-12-27 DIAGNOSIS — Z98.41 CATARACT EXTRACTION STATUS, RIGHT EYE: Chronic | ICD-10-CM

## 2023-12-27 DIAGNOSIS — C67.9 MALIGNANT NEOPLASM OF BLADDER, UNSPECIFIED: ICD-10-CM

## 2023-12-27 DIAGNOSIS — Z95.5 PRESENCE OF CORONARY ANGIOPLASTY IMPLANT AND GRAFT: Chronic | ICD-10-CM

## 2023-12-27 DIAGNOSIS — Z98.890 OTHER SPECIFIED POSTPROCEDURAL STATES: Chronic | ICD-10-CM

## 2023-12-27 PROCEDURE — 51720 TREATMENT OF BLADDER LESION: CPT

## 2023-12-27 RX ORDER — GEMCITABINE 38 MG/ML
2000 INJECTION, SOLUTION INTRAVENOUS ONCE
Refills: 0 | Status: COMPLETED | OUTPATIENT
Start: 2023-12-27 | End: 2023-12-27

## 2023-12-27 RX ADMIN — GEMCITABINE 2000 MILLIGRAM(S): 38 INJECTION, SOLUTION INTRAVENOUS at 13:25

## 2023-12-28 DIAGNOSIS — C67.9 MALIGNANT NEOPLASM OF BLADDER, UNSPECIFIED: ICD-10-CM

## 2024-01-03 ENCOUNTER — APPOINTMENT (OUTPATIENT)
Dept: INFUSION THERAPY | Facility: CLINIC | Age: 89
End: 2024-01-03
Payer: MEDICARE

## 2024-01-03 ENCOUNTER — OUTPATIENT (OUTPATIENT)
Dept: OUTPATIENT SERVICES | Facility: HOSPITAL | Age: 89
LOS: 1 days | End: 2024-01-03
Payer: MEDICARE

## 2024-01-03 VITALS
HEIGHT: 70 IN | TEMPERATURE: 97.8 F | BODY MASS INDEX: 26.48 KG/M2 | HEART RATE: 95 BPM | WEIGHT: 185 LBS | SYSTOLIC BLOOD PRESSURE: 128 MMHG | DIASTOLIC BLOOD PRESSURE: 74 MMHG

## 2024-01-03 DIAGNOSIS — C67.9 MALIGNANT NEOPLASM OF BLADDER, UNSPECIFIED: ICD-10-CM

## 2024-01-03 DIAGNOSIS — Z98.41 CATARACT EXTRACTION STATUS, RIGHT EYE: Chronic | ICD-10-CM

## 2024-01-03 DIAGNOSIS — Z95.5 PRESENCE OF CORONARY ANGIOPLASTY IMPLANT AND GRAFT: Chronic | ICD-10-CM

## 2024-01-03 PROCEDURE — 51720 TREATMENT OF BLADDER LESION: CPT

## 2024-01-03 RX ORDER — GEMCITABINE 38 MG/ML
2000 INJECTION, SOLUTION INTRAVENOUS ONCE
Refills: 0 | Status: COMPLETED | OUTPATIENT
Start: 2024-01-03 | End: 2024-01-03

## 2024-01-03 RX ADMIN — GEMCITABINE 2000 MILLIGRAM(S): 38 INJECTION, SOLUTION INTRAVENOUS at 13:19

## 2024-01-04 DIAGNOSIS — C67.9 MALIGNANT NEOPLASM OF BLADDER, UNSPECIFIED: ICD-10-CM

## 2024-01-06 ENCOUNTER — RX RENEWAL (OUTPATIENT)
Age: 89
End: 2024-01-06

## 2024-01-06 RX ORDER — PRAVASTATIN SODIUM 40 MG/1
40 TABLET ORAL
Qty: 90 | Refills: 3 | Status: ACTIVE | COMMUNITY
Start: 2019-03-17 | End: 1900-01-01

## 2024-01-09 ENCOUNTER — NON-APPOINTMENT (OUTPATIENT)
Age: 89
End: 2024-01-09

## 2024-01-11 DIAGNOSIS — R30.0 DYSURIA: ICD-10-CM

## 2024-01-16 ENCOUNTER — NON-APPOINTMENT (OUTPATIENT)
Age: 89
End: 2024-01-16

## 2024-01-26 ENCOUNTER — APPOINTMENT (OUTPATIENT)
Dept: CARDIOLOGY | Facility: CLINIC | Age: 89
End: 2024-01-26
Payer: MEDICARE

## 2024-01-26 VITALS
HEIGHT: 70 IN | HEART RATE: 80 BPM | SYSTOLIC BLOOD PRESSURE: 120 MMHG | WEIGHT: 185 LBS | BODY MASS INDEX: 26.48 KG/M2 | DIASTOLIC BLOOD PRESSURE: 60 MMHG

## 2024-01-26 DIAGNOSIS — I35.0 NONRHEUMATIC AORTIC (VALVE) STENOSIS: ICD-10-CM

## 2024-01-26 DIAGNOSIS — I25.10 ATHEROSCLEROTIC HEART DISEASE OF NATIVE CORONARY ARTERY W/OUT ANGINA PECTORIS: ICD-10-CM

## 2024-01-26 DIAGNOSIS — E78.5 HYPERLIPIDEMIA, UNSPECIFIED: ICD-10-CM

## 2024-01-26 DIAGNOSIS — I48.91 UNSPECIFIED ATRIAL FIBRILLATION: ICD-10-CM

## 2024-01-26 DIAGNOSIS — N28.1 CYST OF KIDNEY, ACQUIRED: ICD-10-CM

## 2024-01-26 DIAGNOSIS — I48.92 UNSPECIFIED ATRIAL FIBRILLATION: ICD-10-CM

## 2024-01-26 DIAGNOSIS — D64.9 ANEMIA, UNSPECIFIED: ICD-10-CM

## 2024-01-26 PROCEDURE — 99214 OFFICE O/P EST MOD 30 MIN: CPT | Mod: 25

## 2024-01-26 PROCEDURE — 93000 ELECTROCARDIOGRAM COMPLETE: CPT

## 2024-01-26 RX ORDER — PHENAZOPYRIDINE HYDROCHLORIDE 100 MG/1
100 TABLET ORAL
Qty: 9 | Refills: 0 | Status: DISCONTINUED | COMMUNITY
Start: 2023-12-27 | End: 2024-01-26

## 2024-01-26 RX ORDER — METHENAMINE, SODIUM PHOSPHATE, MONOBASIC, MONOHYDRATE, PHENYL SALICYLATE, METHYLENE BLUE, AND HYOSCYAMINE SULFATE 118; 40.8; 36; 10; .12 MG/1; MG/1; MG/1; MG/1; MG/1
118 CAPSULE ORAL EVERY 6 HOURS
Qty: 9 | Refills: 0 | Status: DISCONTINUED | COMMUNITY
Start: 2024-01-11 | End: 2024-01-26

## 2024-01-26 NOTE — PHYSICAL EXAM
[General Appearance - Well Developed] : well developed [Normal Appearance] : normal appearance [Well Groomed] : well groomed [General Appearance - Well Nourished] : well nourished [No Deformities] : no deformities [General Appearance - In No Acute Distress] : no acute distress [Normal Conjunctiva] : the conjunctiva exhibited no abnormalities [Eyelids - No Xanthelasma] : the eyelids demonstrated no xanthelasmas [Normal Oral Mucosa] : normal oral mucosa [No Oral Pallor] : no oral pallor [No Oral Cyanosis] : no oral cyanosis [Exaggerated Use Of Accessory Muscles For Inspiration] : no accessory muscle use [Respiration, Rhythm And Depth] : normal respiratory rhythm and effort [Auscultation Breath Sounds / Voice Sounds] : lungs were clear to auscultation bilaterally [FreeTextEntry1] : There appears to be gynecomastia of left breast .  [Abdomen Soft] : soft [Abdomen Tenderness] : non-tender [Abdomen Mass (___ Cm)] : no abdominal mass palpated [Abnormal Walk] : normal gait [Gait - Sufficient For Exercise Testing] : the gait was sufficient for exercise testing [Nail Clubbing] : no clubbing of the fingernails [Cyanosis, Localized] : no localized cyanosis [Petechial Hemorrhages (___cm)] : no petechial hemorrhages [Skin Color & Pigmentation] : normal skin color and pigmentation [] : no rash [No Venous Stasis] : no venous stasis [Skin Lesions] : no skin lesions [No Skin Ulcers] : no skin ulcer [No Xanthoma] : no  xanthoma was observed [Oriented To Time, Place, And Person] : oriented to person, place, and time [Affect] : the affect was normal [Mood] : the mood was normal [No Anxiety] : not feeling anxious [Normal Rate] : normal [Rhythm Regular] : regular [II] : a grade 2 [1+] : left 1+ [No Pitting Edema] : no pitting edema present [Rt] : no varicose veins of the right leg [Lt] : no varicose veins of the left leg

## 2024-01-26 NOTE — CARDIOLOGY SUMMARY
[de-identified] : 4- AF  LAD  9-8-2021 AF LAFB  8- AF LAD  1-  AF LAD .  10- AF  8- AF  4- AF  [de-identified] : 8- EF 53% mild AS mild MR mild TR LA volume increased .  9- Normal LV systolic function mild AS RVSP was 36 mmhg  [___] : [unfilled]

## 2024-01-26 NOTE — HISTORY OF PRESENT ILLNESS
[FreeTextEntry1] : The patient had a TURBT and is getting chemo infusions . The patient was changed to Eliquis and ASA .  The patient is going for cystoscopy . He is followed by urology . He has some dysuria . He has permanent AF and has CAD and previous LAD stents . The patient has not had chest pain.  No SOB .

## 2024-01-29 ENCOUNTER — APPOINTMENT (OUTPATIENT)
Dept: UROLOGY | Facility: CLINIC | Age: 89
End: 2024-01-29
Payer: MEDICARE

## 2024-01-29 PROCEDURE — 52000 CYSTOURETHROSCOPY: CPT

## 2024-01-29 PROCEDURE — 81003 URINALYSIS AUTO W/O SCOPE: CPT | Mod: QW

## 2024-02-01 LAB
BILIRUB UR QL STRIP: NORMAL
COLLECTION METHOD: NORMAL
GLUCOSE UR-MCNC: NORMAL
HCG UR QL: 0.2 EU/DL
HGB UR QL STRIP.AUTO: NORMAL
KETONES UR-MCNC: NORMAL
LEUKOCYTE ESTERASE UR QL STRIP: NORMAL
NITRITE UR QL STRIP: NORMAL
PH UR STRIP: 6
PROT UR STRIP-MCNC: NORMAL
SP GR UR STRIP: 1.02

## 2024-02-09 NOTE — ED ADULT TRIAGE NOTE - CHIEF COMPLAINT QUOTE
What Type Of Note Output Would You Prefer (Optional)?: Bullet Format What Is The Reason For Today's Visit?: Full Body Skin Examination What Is The Reason For Today's Visit? (Being Monitored For X): the risk of recurrence of previously treated lesion(s) BIBA from home s/p fall at 8 AM this morning. as per pt, he felt "dizzy", and fell down in his house. denies head injury, denies LOC. takes coumadin.   pt states he fell "onto his front side". pt reports he has been feeling shortness of breath on exertion since yesterday. denies fever/cough. states he has received both vaccines for COVID-19 in February. history of 4 stents placed

## 2024-02-12 ENCOUNTER — APPOINTMENT (OUTPATIENT)
Dept: UROLOGY | Facility: CLINIC | Age: 89
End: 2024-02-12

## 2024-04-08 ENCOUNTER — RX RENEWAL (OUTPATIENT)
Age: 89
End: 2024-04-08

## 2024-05-13 ENCOUNTER — APPOINTMENT (OUTPATIENT)
Dept: UROLOGY | Facility: CLINIC | Age: 89
End: 2024-05-13
Payer: MEDICARE

## 2024-05-13 DIAGNOSIS — N13.8 BENIGN PROSTATIC HYPERPLASIA WITH LOWER URINARY TRACT SYMPMS: ICD-10-CM

## 2024-05-13 DIAGNOSIS — C67.2 MALIGNANT NEOPLASM OF LATERAL WALL OF BLADDER: ICD-10-CM

## 2024-05-13 DIAGNOSIS — N40.1 BENIGN PROSTATIC HYPERPLASIA WITH LOWER URINARY TRACT SYMPMS: ICD-10-CM

## 2024-05-13 PROCEDURE — 81003 URINALYSIS AUTO W/O SCOPE: CPT | Mod: QW

## 2024-05-13 PROCEDURE — 52000 CYSTOURETHROSCOPY: CPT

## 2024-05-16 LAB
BILIRUB UR QL STRIP: NORMAL
COLLECTION METHOD: NORMAL
GLUCOSE UR-MCNC: NORMAL
HCG UR QL: 0.2 EU/DL
HGB UR QL STRIP.AUTO: NORMAL
KETONES UR-MCNC: NORMAL
LEUKOCYTE ESTERASE UR QL STRIP: NORMAL
NITRITE UR QL STRIP: NORMAL
PH UR STRIP: 5.5
PROT UR STRIP-MCNC: 30
SP GR UR STRIP: 1.02

## 2024-05-20 PROBLEM — N40.1 BPH WITH URINARY OBSTRUCTION: Status: ACTIVE | Noted: 2022-11-09

## 2024-05-20 PROBLEM — C67.2 MALIGNANT NEOPLASM OF LATERAL WALL OF URINARY BLADDER: Status: ACTIVE | Noted: 2023-09-19

## 2024-05-20 LAB — URINE CYTOLOGY: NORMAL

## 2024-05-24 ENCOUNTER — APPOINTMENT (OUTPATIENT)
Dept: CARDIOLOGY | Facility: CLINIC | Age: 89
End: 2024-05-24
Payer: MEDICARE

## 2024-05-24 VITALS — HEIGHT: 70 IN | WEIGHT: 180 LBS | TEMPERATURE: 97.6 F | BODY MASS INDEX: 25.77 KG/M2

## 2024-05-24 VITALS — HEART RATE: 73 BPM | DIASTOLIC BLOOD PRESSURE: 70 MMHG | SYSTOLIC BLOOD PRESSURE: 114 MMHG

## 2024-05-24 DIAGNOSIS — I25.10 ATHEROSCLEROTIC HEART DISEASE OF NATIVE CORONARY ARTERY W/OUT ANGINA PECTORIS: ICD-10-CM

## 2024-05-24 DIAGNOSIS — N28.9 DISORDER OF KIDNEY AND URETER, UNSPECIFIED: ICD-10-CM

## 2024-05-24 DIAGNOSIS — R00.1 BRADYCARDIA, UNSPECIFIED: ICD-10-CM

## 2024-05-24 DIAGNOSIS — I48.92 UNSPECIFIED ATRIAL FLUTTER: ICD-10-CM

## 2024-05-24 DIAGNOSIS — I10 ESSENTIAL (PRIMARY) HYPERTENSION: ICD-10-CM

## 2024-05-24 DIAGNOSIS — E78.5 HYPERLIPIDEMIA, UNSPECIFIED: ICD-10-CM

## 2024-05-24 PROCEDURE — 99214 OFFICE O/P EST MOD 30 MIN: CPT | Mod: 25

## 2024-05-24 PROCEDURE — 93000 ELECTROCARDIOGRAM COMPLETE: CPT

## 2024-05-24 RX ORDER — CALCITRIOL 0.25 UG/1
0.25 CAPSULE, LIQUID FILLED ORAL
Refills: 0 | Status: ACTIVE | COMMUNITY

## 2024-05-24 NOTE — HISTORY OF PRESENT ILLNESS
[FreeTextEntry1] : The patient had a TURBT and is getting chemo infusions . The patient was changed to Eliquis and ASA .   He is followed by urology . He has some dysuria . He has permanent AF and has CAD and previous LAD stents . The patient has not had chest pain.  No SOB .   The patient has known CAD S/P 3 stents in LAD .  He has mild AS

## 2024-05-24 NOTE — PHYSICAL EXAM
[General Appearance - Well Developed] : well developed [Normal Appearance] : normal appearance [Well Groomed] : well groomed [General Appearance - Well Nourished] : well nourished [No Deformities] : no deformities [General Appearance - In No Acute Distress] : no acute distress [Normal Conjunctiva] : the conjunctiva exhibited no abnormalities [Eyelids - No Xanthelasma] : the eyelids demonstrated no xanthelasmas [Normal Oral Mucosa] : normal oral mucosa [No Oral Pallor] : no oral pallor [No Oral Cyanosis] : no oral cyanosis [Respiration, Rhythm And Depth] : normal respiratory rhythm and effort [Exaggerated Use Of Accessory Muscles For Inspiration] : no accessory muscle use [Auscultation Breath Sounds / Voice Sounds] : lungs were clear to auscultation bilaterally [FreeTextEntry1] : There appears to be gynecomastia of left breast .  [Abdomen Soft] : soft [Abdomen Tenderness] : non-tender [Abdomen Mass (___ Cm)] : no abdominal mass palpated [Abnormal Walk] : normal gait [Gait - Sufficient For Exercise Testing] : the gait was sufficient for exercise testing [Cyanosis, Localized] : no localized cyanosis [Nail Clubbing] : no clubbing of the fingernails [Petechial Hemorrhages (___cm)] : no petechial hemorrhages [Skin Color & Pigmentation] : normal skin color and pigmentation [No Venous Stasis] : no venous stasis [] : no rash [No Skin Ulcers] : no skin ulcer [Skin Lesions] : no skin lesions [No Xanthoma] : no  xanthoma was observed [Oriented To Time, Place, And Person] : oriented to person, place, and time [Affect] : the affect was normal [Mood] : the mood was normal [No Anxiety] : not feeling anxious [Normal Rate] : normal [Rhythm Regular] : regular [II] : a grade 2 [1+] : left 1+ [No Pitting Edema] : no pitting edema present [Rt] : no varicose veins of the right leg [Lt] : no varicose veins of the left leg

## 2024-05-24 NOTE — CARDIOLOGY SUMMARY
[de-identified] : 4- AF  LAD  9-8-2021 AF LAFB  8- AF LAD  1-  AF LAD .  10- AF  8- AF  4- AF  5- AF LAD  [de-identified] : 8- EF 53% mild AS mild MR mild TR LA volume increased .  9- Normal LV systolic function mild AS RVSP was 36 mmhg  [___] : [unfilled]

## 2024-05-24 NOTE — REASON FOR VISIT
[Arrhythmia/ECG Abnorrmalities] : arrhythmia/ECG abnormalities [FreeTextEntry3] : Dr. Haddda  [Follow-Up - Clinic] : a clinic follow-up of [Atrial Fibrillation] : atrial fibrillation [Coronary Artery Disease] : coronary artery disease [Hyperlipidemia] : hyperlipidemia [Hypertension] : hypertension

## 2024-05-24 NOTE — ASSESSMENT
[FreeTextEntry1] : The patient has multiple medical issues . The patient has had no chest pain or SOB . He has poermannet AF . Multiple stents in his LAD . and known normal LV systolic function . She has bladder CA . and is getting chemo for this . Had recent cystoscopy . LDL is accpetable and renal function is near baseline

## 2024-06-12 ENCOUNTER — APPOINTMENT (OUTPATIENT)
Age: 89
End: 2024-06-12
Payer: MEDICARE

## 2024-06-12 ENCOUNTER — OUTPATIENT (OUTPATIENT)
Dept: OUTPATIENT SERVICES | Facility: HOSPITAL | Age: 89
LOS: 1 days | End: 2024-06-12
Payer: MEDICARE

## 2024-06-12 VITALS — TEMPERATURE: 97.8 F | RESPIRATION RATE: 16 BRPM | SYSTOLIC BLOOD PRESSURE: 147 MMHG | DIASTOLIC BLOOD PRESSURE: 78 MMHG

## 2024-06-12 DIAGNOSIS — C67.9 MALIGNANT NEOPLASM OF BLADDER, UNSPECIFIED: ICD-10-CM

## 2024-06-12 DIAGNOSIS — Z98.890 OTHER SPECIFIED POSTPROCEDURAL STATES: Chronic | ICD-10-CM

## 2024-06-12 DIAGNOSIS — Z95.5 PRESENCE OF CORONARY ANGIOPLASTY IMPLANT AND GRAFT: Chronic | ICD-10-CM

## 2024-06-12 DIAGNOSIS — Z98.41 CATARACT EXTRACTION STATUS, RIGHT EYE: Chronic | ICD-10-CM

## 2024-06-12 PROCEDURE — 51720 TREATMENT OF BLADDER LESION: CPT

## 2024-06-12 RX ORDER — GEMCITABINE 10 MG/ML
2000 INJECTION, SOLUTION INTRAVENOUS ONCE
Refills: 0 | Status: COMPLETED | OUTPATIENT
Start: 2024-06-12 | End: 2024-06-12

## 2024-06-12 RX ADMIN — GEMCITABINE 2000 MILLIGRAM(S): 10 INJECTION, SOLUTION INTRAVENOUS at 13:02

## 2024-06-13 DIAGNOSIS — C67.9 MALIGNANT NEOPLASM OF BLADDER, UNSPECIFIED: ICD-10-CM

## 2024-06-19 ENCOUNTER — OUTPATIENT (OUTPATIENT)
Dept: OUTPATIENT SERVICES | Facility: HOSPITAL | Age: 89
LOS: 1 days | End: 2024-06-19
Payer: MEDICARE

## 2024-06-19 ENCOUNTER — APPOINTMENT (OUTPATIENT)
Age: 89
End: 2024-06-19
Payer: MEDICARE

## 2024-06-19 VITALS
DIASTOLIC BLOOD PRESSURE: 88 MMHG | HEART RATE: 90 BPM | TEMPERATURE: 98.6 F | RESPIRATION RATE: 16 BRPM | SYSTOLIC BLOOD PRESSURE: 146 MMHG

## 2024-06-19 DIAGNOSIS — Z98.890 OTHER SPECIFIED POSTPROCEDURAL STATES: Chronic | ICD-10-CM

## 2024-06-19 DIAGNOSIS — Z95.5 PRESENCE OF CORONARY ANGIOPLASTY IMPLANT AND GRAFT: Chronic | ICD-10-CM

## 2024-06-19 DIAGNOSIS — C67.9 MALIGNANT NEOPLASM OF BLADDER, UNSPECIFIED: ICD-10-CM

## 2024-06-19 DIAGNOSIS — Z98.41 CATARACT EXTRACTION STATUS, RIGHT EYE: Chronic | ICD-10-CM

## 2024-06-19 PROCEDURE — 51720 TREATMENT OF BLADDER LESION: CPT

## 2024-06-19 RX ORDER — GEMCITABINE 10 MG/ML
2000 INJECTION, SOLUTION INTRAVENOUS ONCE
Refills: 0 | Status: COMPLETED | OUTPATIENT
Start: 2024-06-19 | End: 2024-06-19

## 2024-06-19 RX ADMIN — GEMCITABINE 2000 MILLIGRAM(S): 10 INJECTION, SOLUTION INTRAVENOUS at 11:24

## 2024-06-26 ENCOUNTER — APPOINTMENT (OUTPATIENT)
Age: 89
End: 2024-06-26
Payer: MEDICARE

## 2024-06-26 ENCOUNTER — OUTPATIENT (OUTPATIENT)
Dept: OUTPATIENT SERVICES | Facility: HOSPITAL | Age: 89
LOS: 1 days | End: 2024-06-26
Payer: MEDICARE

## 2024-06-26 VITALS
DIASTOLIC BLOOD PRESSURE: 78 MMHG | HEIGHT: 70 IN | RESPIRATION RATE: 16 BRPM | HEART RATE: 90 BPM | TEMPERATURE: 98.2 F | BODY MASS INDEX: 25.77 KG/M2 | WEIGHT: 180 LBS | SYSTOLIC BLOOD PRESSURE: 133 MMHG

## 2024-06-26 DIAGNOSIS — Z95.5 PRESENCE OF CORONARY ANGIOPLASTY IMPLANT AND GRAFT: Chronic | ICD-10-CM

## 2024-06-26 DIAGNOSIS — C67.9 MALIGNANT NEOPLASM OF BLADDER, UNSPECIFIED: ICD-10-CM

## 2024-06-26 DIAGNOSIS — Z98.890 OTHER SPECIFIED POSTPROCEDURAL STATES: Chronic | ICD-10-CM

## 2024-06-26 DIAGNOSIS — Z98.41 CATARACT EXTRACTION STATUS, RIGHT EYE: Chronic | ICD-10-CM

## 2024-06-26 PROCEDURE — 51720 TREATMENT OF BLADDER LESION: CPT

## 2024-06-26 RX ORDER — GEMCITABINE 10 MG/ML
2000 INJECTION, SOLUTION INTRAVENOUS ONCE
Refills: 0 | Status: DISCONTINUED | OUTPATIENT
Start: 2024-06-26 | End: 2024-09-25

## 2024-08-05 ENCOUNTER — RX RENEWAL (OUTPATIENT)
Age: 89
End: 2024-08-05

## 2024-08-12 ENCOUNTER — APPOINTMENT (OUTPATIENT)
Dept: UROLOGY | Facility: CLINIC | Age: 89
End: 2024-08-12
Payer: MEDICARE

## 2024-08-12 DIAGNOSIS — R39.198 OTHER DIFFICULTIES WITH MICTURITION: ICD-10-CM

## 2024-08-12 DIAGNOSIS — N30.90 CYSTITIS, UNSPECIFIED W/OUT HEMATURIA: ICD-10-CM

## 2024-08-12 DIAGNOSIS — C67.9 MALIGNANT NEOPLASM OF BLADDER, UNSPECIFIED: ICD-10-CM

## 2024-08-12 LAB
BILIRUB UR QL STRIP: NEGATIVE
GLUCOSE UR-MCNC: NEGATIVE
HCG UR QL: 0.2 EU/DL
HGB UR QL STRIP.AUTO: NORMAL
KETONES UR-MCNC: NEGATIVE
LEUKOCYTE ESTERASE UR QL STRIP: NORMAL
NITRITE UR QL STRIP: NEGATIVE
PH UR STRIP: 5.5
PROT UR STRIP-MCNC: 100
SP GR UR STRIP: 1.02

## 2024-08-12 PROCEDURE — 52000 CYSTOURETHROSCOPY: CPT

## 2024-08-12 PROCEDURE — 81003 URINALYSIS AUTO W/O SCOPE: CPT | Mod: QW

## 2024-08-13 NOTE — ASU PATIENT PROFILE, ADULT - FALL HARM RISK - PT AGE POPULATION HIDDEN
Quality 226: Preventive Care And Screening: Tobacco Use: Screening And Cessation Intervention: Patient screened for tobacco use and is an ex/non-smoker Detail Level: Detailed Medications/Imaging Studies Adult

## 2024-08-21 LAB — URINE CYTOLOGY: NORMAL

## 2024-10-18 ENCOUNTER — APPOINTMENT (OUTPATIENT)
Dept: CARDIOLOGY | Facility: CLINIC | Age: 89
End: 2024-10-18
Payer: MEDICARE

## 2024-10-18 VITALS
DIASTOLIC BLOOD PRESSURE: 80 MMHG | WEIGHT: 170 LBS | HEIGHT: 70 IN | BODY MASS INDEX: 24.34 KG/M2 | SYSTOLIC BLOOD PRESSURE: 122 MMHG | HEART RATE: 68 BPM

## 2024-10-18 DIAGNOSIS — E78.5 HYPERLIPIDEMIA, UNSPECIFIED: ICD-10-CM

## 2024-10-18 DIAGNOSIS — I48.92 UNSPECIFIED ATRIAL FIBRILLATION: ICD-10-CM

## 2024-10-18 DIAGNOSIS — I48.91 UNSPECIFIED ATRIAL FIBRILLATION: ICD-10-CM

## 2024-10-18 DIAGNOSIS — N28.9 DISORDER OF KIDNEY AND URETER, UNSPECIFIED: ICD-10-CM

## 2024-10-18 DIAGNOSIS — I25.10 ATHEROSCLEROTIC HEART DISEASE OF NATIVE CORONARY ARTERY W/OUT ANGINA PECTORIS: ICD-10-CM

## 2024-10-18 DIAGNOSIS — I48.92 UNSPECIFIED ATRIAL FLUTTER: ICD-10-CM

## 2024-10-18 DIAGNOSIS — I35.0 NONRHEUMATIC AORTIC (VALVE) STENOSIS: ICD-10-CM

## 2024-10-18 PROCEDURE — 99214 OFFICE O/P EST MOD 30 MIN: CPT | Mod: 25

## 2024-10-18 PROCEDURE — 93000 ELECTROCARDIOGRAM COMPLETE: CPT

## 2024-10-30 ENCOUNTER — NON-APPOINTMENT (OUTPATIENT)
Age: 89
End: 2024-10-30

## 2024-11-11 NOTE — ASU PREOP CHECKLIST - ANTIBIOTIC
CSE    Patient location during procedure: OR  Start time: 11/11/2024 7:21 AM  Timeout: 11/11/2024 7:20 AM  End time: 11/11/2024 7:25 AM      Staffing  Authorizing Provider: Loulou Sandhu MD  Performing Provider: Loulou Sandhu MD    Staffing  Performed by: Loulou Sandhu MD  Authorized by: Loulou Sandhu MD    Preanesthetic Checklist  Completed: patient identified, IV checked, site marked, risks and benefits discussed, surgical consent, monitors and equipment checked, pre-op evaluation and timeout performed  CSE  Patient position: sitting  Prep: ChloraPrep  Patient monitoring: heart rate, continuous pulse ox and frequent blood pressure checks  Approach: midline  Spinal Needle  Needle type: pencil-tip   Needle gauge: 25 G  Needle length: 5 in  Epidural Needle  Injection technique: GLADYS saline  Needle type: Tuohy   Needle gauge: 17 G  Needle length: 3.5 in  Needle insertion depth: 6 cm  Location: L3-4  Needle localization: anatomical landmarks   Catheter  Catheter type: Puppet Labs  Catheter size: 19 G  Catheter at skin depth: 10 cm  Test dose: lidocaine 1.5% with Epi 1-to-200,000  Test dose: 3 mL  Additional Documentation: incremental injection, no paresthesia on injection, negative test dose and negative aspiration for heme  Assessment  Sensory level: T8   Dermatomal levels determined by pinch or prick  Intrathecal Medications:   administered: primary anesthetic mcg of    Medications:    Medications: Intrathecal - mepivacaine (CARBOCAINE) injection 15 mg/mL (1.5%) - Intrathecal   3 mL - 11/11/2024 7:24:00 AM           Chepalaxin/yes

## 2024-11-12 ENCOUNTER — APPOINTMENT (OUTPATIENT)
Dept: UROLOGY | Facility: CLINIC | Age: 89
End: 2024-11-12
Payer: MEDICARE

## 2024-11-12 DIAGNOSIS — I35.0 NONRHEUMATIC AORTIC (VALVE) STENOSIS: ICD-10-CM

## 2024-11-12 DIAGNOSIS — N40.1 BENIGN PROSTATIC HYPERPLASIA WITH LOWER URINARY TRACT SYMPMS: ICD-10-CM

## 2024-11-12 DIAGNOSIS — I48.91 UNSPECIFIED ATRIAL FIBRILLATION: ICD-10-CM

## 2024-11-12 DIAGNOSIS — N13.8 BENIGN PROSTATIC HYPERPLASIA WITH LOWER URINARY TRACT SYMPMS: ICD-10-CM

## 2024-11-12 DIAGNOSIS — I48.92 UNSPECIFIED ATRIAL FIBRILLATION: ICD-10-CM

## 2024-11-12 DIAGNOSIS — C67.9 MALIGNANT NEOPLASM OF BLADDER, UNSPECIFIED: ICD-10-CM

## 2024-11-12 PROCEDURE — 81003 URINALYSIS AUTO W/O SCOPE: CPT | Mod: QW

## 2024-11-12 PROCEDURE — 52000 CYSTOURETHROSCOPY: CPT

## 2024-11-13 LAB
BILIRUB UR QL STRIP: NORMAL
COLLECTION METHOD: NORMAL
GLUCOSE UR-MCNC: NORMAL
HCG UR QL: 0.2 EU/DL
HGB UR QL STRIP.AUTO: NORMAL
KETONES UR-MCNC: NORMAL
LEUKOCYTE ESTERASE UR QL STRIP: NORMAL
NITRITE UR QL STRIP: NORMAL
PH UR STRIP: 5.5
PROT UR STRIP-MCNC: 100
SP GR UR STRIP: 1.01

## 2024-12-19 NOTE — PRE-ANESTHESIA EVALUATION ADULT - BMI (KG/M2)
".[facility] -  CONSULTATION NOTE               Author: AMY Hopkins Date & Time: 12/19/2024  11:12 AM       REASON FOR CONSULTATION:   - Evaluation and management of acute and chronic conditions related to Nephrology    CHIEF COMPLAINT:   -  \"AVF aneurysm repair and CVC placement \"    HISTORY OF PRESENT ILLNESS:    73 yo M PMH ESRD MWF iHD, HTN, anemia of CKD, CKD-MBD, and HLD who presented 12/18/2024 for tunneled line HD catheter placement developed hypoxia following procedure in PACU . We have been asked to see him regarding his ESRD, hemodialysis and acute and chronic Nephrology related issues     Pt sitting up in bed, dressing on CVC and L AVF  C/o pain in LUE  Denies SOB but does have to take breaks from sentences to breath and is asking for inhaler   Trace BLE edema  R CVC     No F/C/N/V/CP.  No melena, hematochezia, hematemesis.  No HA, visual changes, or abdominal pain.    REVIEW OF SYSTEMS:    10 point ROS was performed and is as per HPI or otherwise negative    PAST MEDICAL HISTORY:   Past Medical History:   Diagnosis Date    Asthma     Breath shortness     uses oxygen 3.5 L as needed. 12/5/2024 - pt states he doesn't use supplemental oxygen.    Chronic obstructive pulmonary disease (HCC)     Congestive heart failure (HCC)     COPD (chronic obstructive pulmonary disease) ([facility])     Dental disorder     upper and lower dentures    Depression     Dialysis patient ([facility])     MWF    Emphysema of lung (HCC)     Heart valve disease     \"leaky valve\" - pt quit seeing heart doctors \"a couple years ago\"    Hemodialysis patient ([facility])     M, W, F    Pain     lower back and feet    PTSD (post-traumatic stress disorder)     \"change in routine trigger\"    Renal disorder     Sleep apnea     Patient states MD wanting him to use a CPAP after sleep study but denied the use.    Snoring     Urinary incontinence     dialysis- no urination       PAST SURGICAL HISTORY:   Past Surgical History: "   Procedure Laterality Date    CATH PLACEMENT Right 12/18/2024    Procedure: INSERTION OF HEMODIALYSIS CATHETER WITH FLUOROSCOPY, SITE TO BE DETERMINED, LEFT UPPER EXTREMITY FISTULA CREATION BY BASILIC VEIN TRANSPOSITION, RESECTION OF LEFT FOREARM ANEURYSM FISTULA;  Surgeon: Selina Ellison M.D.;  Location: SUR[facility]ERY [facility];  Service: General    AV FISTULA REVISION Left 12/18/2024    Procedure: REVISION, AV FISTULA;  Surgeon: Selina Ellison M.D.;  Location: [facility];  Service: General    GASTROSCOPY-ENDO  1/30/2020    Procedure: GASTROSCOPY;  Surgeon: Richard Cano D.O.;  Location: [facility];  Service: Gastroenterology    GASTROSCOPY  2/1/2018    Procedure: GASTROSCOPY;  Surgeon: Morteza Olivo M.D.;  Location: [facility];  Service: Gastroenterology    COLONOSCOPY  2/1/2018    Procedure: COLONOSCOPY;  Surgeon: Morteza Olivo M.D.;  Location: [facility];  Service: Gastroenterology    GASTROSCOPY-ENDO N/A 8/28/2017    Procedure: GASTROSCOPY-ENDO;  Surgeon: Pankaj Choudhury M.D.;  Location: [facility];  Service: EUS    OTHER      Patient had an arm fistula put in for dialysis       FAMILY HISTORY:   - Reviewed and non contributory to current illness    SOCIAL HISTORY:   Social History     Tobacco Use   Smoking Status Some Days    Current packs/day: 0.50    Average packs/day: 0.5 packs/day for 57.1 years (28.5 ttl pk-yrs)    Types: Cigarettes    Start date: 11/19/1967   Smokeless Tobacco Never   Tobacco Comments    12/5/2024- currently smokes once in awhile.     Social History     Substance and Sexual Activity   Alcohol Use No     Social History     Substance and Sexual Activity   Drug Use No        HOME MEDICATIONS:   - Reviewed and documented in chart    LABORATORY STUDIES:   - Reviewed and documented in chart    ALLERGIES:  Chlorhexidine, Fluoroplex, and Chloraprep one step    VS:  /62   Pulse 84   Temp 37 °C (98.6 °F) (Temporal)    Resp 18   Ht 1.829 m (6')   Wt 79 kg (174 lb 2.6 oz)   SpO2 91%   BMI 23.62 kg/m²   Physical Exam  Vitals and nursing note reviewed.   Constitutional:       Appearance: Normal appearance. He is normal weight.   HENT:      Head: Normocephalic.      Nose: Nose normal.      Mouth/Throat:      Mouth: Mucous membranes are moist.      Pharynx: Oropharynx is clear.   Eyes:      Extraocular Movements: Extraocular movements intact.      Conjunctiva/sclera: Conjunctivae normal.      Pupils: Pupils are equal, round, and reactive to light.   Cardiovascular:      Rate and Rhythm: Normal rate and regular rhythm.      Comments: R CVC   Pulmonary:      Effort: Pulmonary effort is normal.      Breath sounds: Rhonchi present.   Abdominal:      General: Abdomen is flat. Bowel sounds are normal.      Palpations: Abdomen is soft.   Musculoskeletal:      Comments: LFA AVF with multiple dressings   Skin:     General: Skin is warm and dry.      Capillary Refill: Capillary refill takes 2 to 3 seconds.   Neurological:      General: No focal deficit present.      Mental Status: He is alert and oriented to person, place, and time. Mental status is at baseline.   Psychiatric:         Mood and Affect: Mood normal.         Behavior: Behavior normal.         Thought Content: Thought content normal.         Judgment: Judgment normal.         FLUID BALANCE:  In: 680 [P.O.:480; I.V.:200]  Out: -     LABS:  Recent Labs     12/18/24  1232   SODIUM 137   POTASSIUM 4.0   CHLORIDE 93*   CO2 27   GLUCOSE 81   BUN 16   CREATININE 5.27*   CALCIUM 10.1     Recent Labs     12/18/24  1232   WBC 6.4   RBC 6.10   HEMOGLOBIN 15.1   HEMATOCRIT 48.9   MCV 80.2*   MCH 24.8*   MCHC 30.9*   RDW 53.8*   PLATELETCT 260   MPV 9.4       IMAGING:  - All imaging reviewed from admission to present day    IMPRESSION:  # ESRD  - Etiology likely 2/2 HTN  - MWF iHD  - [place]  - via R CVC, LFA AVF   # Acute respiratory failure with hypoxia  - Likely due to volume  overload, + edema on CXR  - elevated BNP  - COPD exacerbation on differential if UF does not help  # Hypotension, controlled   - midodrine 10mg TID   # Anemia of CKD  - Goal Hgb 10-11  - At goal  # CKD-MBD   - phos: 3.9   - sevelamer 4 with meals   # Severely aneurysmal L AVF    - s/p revision with vascular   - placement of CVC for hemodialysis       PLAN:  - iHD today for volume  - iHD q MWF and prn   - UF as tolerated  - No dietary protein restrictions  - Dose all meds per ESRD  - Renal diet  - Ok to transition to outpt hemodialysis after treatment today and medically cleared     Thank you for the consultation!    26.5

## 2025-01-03 ENCOUNTER — APPOINTMENT (OUTPATIENT)
Age: 89
End: 2025-01-03
Payer: MEDICARE

## 2025-01-03 ENCOUNTER — OUTPATIENT (OUTPATIENT)
Dept: OUTPATIENT SERVICES | Facility: HOSPITAL | Age: 89
LOS: 1 days | End: 2025-01-03
Payer: MEDICARE

## 2025-01-03 VITALS
HEART RATE: 87 BPM | OXYGEN SATURATION: 97 % | BODY MASS INDEX: 24.34 KG/M2 | DIASTOLIC BLOOD PRESSURE: 74 MMHG | WEIGHT: 170 LBS | HEIGHT: 70 IN | TEMPERATURE: 97.7 F | SYSTOLIC BLOOD PRESSURE: 140 MMHG

## 2025-01-03 DIAGNOSIS — C67.9 MALIGNANT NEOPLASM OF BLADDER, UNSPECIFIED: ICD-10-CM

## 2025-01-03 DIAGNOSIS — Z95.5 PRESENCE OF CORONARY ANGIOPLASTY IMPLANT AND GRAFT: Chronic | ICD-10-CM

## 2025-01-03 DIAGNOSIS — Z98.41 CATARACT EXTRACTION STATUS, RIGHT EYE: Chronic | ICD-10-CM

## 2025-01-03 DIAGNOSIS — Z98.890 OTHER SPECIFIED POSTPROCEDURAL STATES: Chronic | ICD-10-CM

## 2025-01-03 PROCEDURE — 51720 TREATMENT OF BLADDER LESION: CPT

## 2025-01-03 RX ORDER — BCG LIVE 81 MG
50 VIAL (EA) INTRAVESICAL ONCE
Refills: 0 | Status: DISCONTINUED | OUTPATIENT
Start: 2025-01-03 | End: 2025-01-03

## 2025-01-03 RX ORDER — GEMCITABINE HYDROCHLORIDE 38 MG/ML
2000 INJECTION, SOLUTION INTRAVENOUS ONCE
Refills: 0 | Status: COMPLETED | OUTPATIENT
Start: 2025-01-03 | End: 2025-01-03

## 2025-01-03 RX ADMIN — GEMCITABINE HYDROCHLORIDE 2000 MILLIGRAM(S): 38 INJECTION, SOLUTION INTRAVENOUS at 15:38

## 2025-01-04 DIAGNOSIS — C67.9 MALIGNANT NEOPLASM OF BLADDER, UNSPECIFIED: ICD-10-CM

## 2025-01-10 ENCOUNTER — OUTPATIENT (OUTPATIENT)
Dept: OUTPATIENT SERVICES | Facility: HOSPITAL | Age: 89
LOS: 1 days | End: 2025-01-10
Payer: MEDICARE

## 2025-01-10 ENCOUNTER — APPOINTMENT (OUTPATIENT)
Age: 89
End: 2025-01-10

## 2025-01-10 VITALS
HEART RATE: 82 BPM | DIASTOLIC BLOOD PRESSURE: 72 MMHG | HEIGHT: 70 IN | SYSTOLIC BLOOD PRESSURE: 118 MMHG | WEIGHT: 170 LBS | BODY MASS INDEX: 24.34 KG/M2 | TEMPERATURE: 97.9 F | OXYGEN SATURATION: 100 %

## 2025-01-10 DIAGNOSIS — C67.9 MALIGNANT NEOPLASM OF BLADDER, UNSPECIFIED: ICD-10-CM

## 2025-01-10 DIAGNOSIS — D49.4 NEOPLASM OF UNSPECIFIED BEHAVIOR OF BLADDER: ICD-10-CM

## 2025-01-10 DIAGNOSIS — Z98.890 OTHER SPECIFIED POSTPROCEDURAL STATES: Chronic | ICD-10-CM

## 2025-01-10 DIAGNOSIS — Z98.41 CATARACT EXTRACTION STATUS, RIGHT EYE: Chronic | ICD-10-CM

## 2025-01-10 DIAGNOSIS — Z95.5 PRESENCE OF CORONARY ANGIOPLASTY IMPLANT AND GRAFT: Chronic | ICD-10-CM

## 2025-01-10 PROCEDURE — 51720 TREATMENT OF BLADDER LESION: CPT

## 2025-01-10 RX ORDER — GEMCITABINE HYDROCHLORIDE 38 MG/ML
2000 INJECTION, SOLUTION INTRAVENOUS ONCE
Refills: 0 | Status: COMPLETED | OUTPATIENT
Start: 2025-01-10 | End: 2025-01-10

## 2025-01-10 RX ADMIN — GEMCITABINE HYDROCHLORIDE 2000 MILLIGRAM(S): 38 INJECTION, SOLUTION INTRAVENOUS at 14:00

## 2025-01-17 ENCOUNTER — OUTPATIENT (OUTPATIENT)
Dept: OUTPATIENT SERVICES | Facility: HOSPITAL | Age: 89
LOS: 1 days | End: 2025-01-17
Payer: MEDICARE

## 2025-01-17 ENCOUNTER — APPOINTMENT (OUTPATIENT)
Age: 89
End: 2025-01-17

## 2025-01-17 VITALS
HEART RATE: 69 BPM | DIASTOLIC BLOOD PRESSURE: 69 MMHG | HEIGHT: 70 IN | WEIGHT: 170 LBS | BODY MASS INDEX: 24.34 KG/M2 | OXYGEN SATURATION: 100 % | SYSTOLIC BLOOD PRESSURE: 124 MMHG | TEMPERATURE: 97.9 F | RESPIRATION RATE: 16 BRPM

## 2025-01-17 DIAGNOSIS — Z98.41 CATARACT EXTRACTION STATUS, RIGHT EYE: Chronic | ICD-10-CM

## 2025-01-17 DIAGNOSIS — Z98.890 OTHER SPECIFIED POSTPROCEDURAL STATES: Chronic | ICD-10-CM

## 2025-01-17 DIAGNOSIS — Z95.5 PRESENCE OF CORONARY ANGIOPLASTY IMPLANT AND GRAFT: Chronic | ICD-10-CM

## 2025-01-17 DIAGNOSIS — C67.9 MALIGNANT NEOPLASM OF BLADDER, UNSPECIFIED: ICD-10-CM

## 2025-01-17 PROCEDURE — 51720 TREATMENT OF BLADDER LESION: CPT

## 2025-01-17 RX ORDER — GEMCITABINE HYDROCHLORIDE 38 MG/ML
2000 INJECTION, SOLUTION INTRAVENOUS ONCE
Refills: 0 | Status: COMPLETED | OUTPATIENT
Start: 2025-01-17 | End: 2025-01-17

## 2025-01-17 RX ADMIN — GEMCITABINE HYDROCHLORIDE 2000 MILLIGRAM(S): 38 INJECTION, SOLUTION INTRAVENOUS at 13:35

## 2025-01-21 ENCOUNTER — LABORATORY RESULT (OUTPATIENT)
Age: 89
End: 2025-01-21

## 2025-01-27 LAB
APPEARANCE: ABNORMAL
BACTERIA UR CULT: NORMAL
BILIRUBIN URINE: NEGATIVE
BLOOD URINE: ABNORMAL
COLOR: YELLOW
GLUCOSE QUALITATIVE U: NEGATIVE MG/DL
KETONES URINE: NEGATIVE MG/DL
LEUKOCYTE ESTERASE URINE: ABNORMAL
NITRITE URINE: NEGATIVE
PH URINE: 6.5
PROTEIN URINE: 300 MG/DL
SPECIFIC GRAVITY URINE: 1.02
UROBILINOGEN URINE: 0.2 MG/DL

## 2025-01-30 ENCOUNTER — EMERGENCY (EMERGENCY)
Facility: HOSPITAL | Age: 89
LOS: 0 days | Discharge: HOME | End: 2025-01-30
Attending: EMERGENCY MEDICINE
Payer: MEDICARE

## 2025-01-30 VITALS
HEIGHT: 70 IN | WEIGHT: 169.98 LBS | SYSTOLIC BLOOD PRESSURE: 125 MMHG | HEART RATE: 95 BPM | OXYGEN SATURATION: 99 % | DIASTOLIC BLOOD PRESSURE: 66 MMHG | TEMPERATURE: 98 F | RESPIRATION RATE: 18 BRPM

## 2025-01-30 DIAGNOSIS — R06.00 DYSPNEA, UNSPECIFIED: ICD-10-CM

## 2025-01-30 DIAGNOSIS — Z98.41 CATARACT EXTRACTION STATUS, RIGHT EYE: Chronic | ICD-10-CM

## 2025-01-30 DIAGNOSIS — Z98.890 OTHER SPECIFIED POSTPROCEDURAL STATES: Chronic | ICD-10-CM

## 2025-01-30 DIAGNOSIS — Z88.2 ALLERGY STATUS TO SULFONAMIDES: ICD-10-CM

## 2025-01-30 DIAGNOSIS — R30.0 DYSURIA: ICD-10-CM

## 2025-01-30 DIAGNOSIS — I48.91 UNSPECIFIED ATRIAL FIBRILLATION: ICD-10-CM

## 2025-01-30 DIAGNOSIS — Z79.01 LONG TERM (CURRENT) USE OF ANTICOAGULANTS: ICD-10-CM

## 2025-01-30 DIAGNOSIS — C67.9 MALIGNANT NEOPLASM OF BLADDER, UNSPECIFIED: ICD-10-CM

## 2025-01-30 DIAGNOSIS — Z95.5 PRESENCE OF CORONARY ANGIOPLASTY IMPLANT AND GRAFT: Chronic | ICD-10-CM

## 2025-01-30 LAB
ALBUMIN SERPL ELPH-MCNC: 4.1 G/DL — SIGNIFICANT CHANGE UP (ref 3.5–5.2)
ALP SERPL-CCNC: 72 U/L — SIGNIFICANT CHANGE UP (ref 30–115)
ALT FLD-CCNC: 18 U/L — SIGNIFICANT CHANGE UP (ref 0–41)
ANION GAP SERPL CALC-SCNC: 12 MMOL/L — SIGNIFICANT CHANGE UP (ref 7–14)
ANISOCYTOSIS BLD QL: SLIGHT — SIGNIFICANT CHANGE UP
APPEARANCE UR: CLEAR — SIGNIFICANT CHANGE UP
AST SERPL-CCNC: 19 U/L — SIGNIFICANT CHANGE UP (ref 0–41)
BACTERIA # UR AUTO: ABNORMAL /HPF
BASOPHILS # BLD AUTO: 0 K/UL — SIGNIFICANT CHANGE UP (ref 0–0.2)
BASOPHILS NFR BLD AUTO: 0 % — SIGNIFICANT CHANGE UP (ref 0–1)
BILIRUB SERPL-MCNC: 0.3 MG/DL — SIGNIFICANT CHANGE UP (ref 0.2–1.2)
BILIRUB UR-MCNC: NEGATIVE — SIGNIFICANT CHANGE UP
BUN SERPL-MCNC: 37 MG/DL — HIGH (ref 10–20)
CALCIUM SERPL-MCNC: 9.4 MG/DL — SIGNIFICANT CHANGE UP (ref 8.4–10.5)
CHLORIDE SERPL-SCNC: 106 MMOL/L — SIGNIFICANT CHANGE UP (ref 98–110)
CO2 SERPL-SCNC: 19 MMOL/L — SIGNIFICANT CHANGE UP (ref 17–32)
COLOR SPEC: YELLOW — SIGNIFICANT CHANGE UP
CREAT SERPL-MCNC: 1.9 MG/DL — HIGH (ref 0.7–1.5)
DIFF PNL FLD: NEGATIVE — SIGNIFICANT CHANGE UP
EGFR: 33 ML/MIN/1.73M2 — LOW
EOSINOPHIL # BLD AUTO: 0.05 K/UL — SIGNIFICANT CHANGE UP (ref 0–0.7)
EOSINOPHIL NFR BLD AUTO: 1 % — SIGNIFICANT CHANGE UP (ref 0–8)
EPI CELLS # UR: SIGNIFICANT CHANGE UP
FLUAV AG NPH QL: SIGNIFICANT CHANGE UP
FLUBV AG NPH QL: SIGNIFICANT CHANGE UP
GIANT PLATELETS BLD QL SMEAR: PRESENT — SIGNIFICANT CHANGE UP
GLUCOSE SERPL-MCNC: 108 MG/DL — HIGH (ref 70–99)
GLUCOSE UR QL: NEGATIVE MG/DL — SIGNIFICANT CHANGE UP
HCT VFR BLD CALC: 26.8 % — LOW (ref 42–52)
HGB BLD-MCNC: 9.3 G/DL — LOW (ref 14–18)
KETONES UR-MCNC: NEGATIVE MG/DL — SIGNIFICANT CHANGE UP
LEUKOCYTE ESTERASE UR-ACNC: ABNORMAL
LYMPHOCYTES # BLD AUTO: 0.55 K/UL — LOW (ref 1.2–3.4)
LYMPHOCYTES # BLD AUTO: 10 % — LOW (ref 20.5–51.1)
MANUAL SMEAR VERIFICATION: SIGNIFICANT CHANGE UP
MCHC RBC-ENTMCNC: 30.6 PG — SIGNIFICANT CHANGE UP (ref 27–31)
MCHC RBC-ENTMCNC: 34.7 G/DL — SIGNIFICANT CHANGE UP (ref 32–37)
MCV RBC AUTO: 88.2 FL — SIGNIFICANT CHANGE UP (ref 80–94)
MONOCYTES # BLD AUTO: 0.93 K/UL — HIGH (ref 0.1–0.6)
MONOCYTES NFR BLD AUTO: 17 % — HIGH (ref 1.7–9.3)
MYELOCYTES NFR BLD: 3 % — HIGH (ref 0–0)
NEUTROPHILS # BLD AUTO: 3.72 K/UL — SIGNIFICANT CHANGE UP (ref 1.4–6.5)
NEUTROPHILS NFR BLD AUTO: 67 % — SIGNIFICANT CHANGE UP (ref 42.2–75.2)
NEUTS BAND # BLD: 1 % — SIGNIFICANT CHANGE UP (ref 0–6)
NITRITE UR-MCNC: NEGATIVE — SIGNIFICANT CHANGE UP
NRBC # BLD: 1 /100 WBCS — HIGH (ref 0–0)
NRBC # BLD: SIGNIFICANT CHANGE UP /100 WBCS (ref 0–0)
NT-PROBNP SERPL-SCNC: 4733 PG/ML — HIGH (ref 0–300)
OVALOCYTES BLD QL SMEAR: SLIGHT — SIGNIFICANT CHANGE UP
PH UR: 5.5 — SIGNIFICANT CHANGE UP (ref 5–8)
PLAT MORPH BLD: NORMAL — SIGNIFICANT CHANGE UP
PLATELET # BLD AUTO: 833 K/UL — HIGH (ref 130–400)
PLATELET COUNT - ESTIMATE: ABNORMAL
PMV BLD: 9.7 FL — SIGNIFICANT CHANGE UP (ref 7.4–10.4)
POLYCHROMASIA BLD QL SMEAR: SLIGHT — SIGNIFICANT CHANGE UP
POTASSIUM SERPL-MCNC: 5.6 MMOL/L — HIGH (ref 3.5–5)
POTASSIUM SERPL-SCNC: 5.6 MMOL/L — HIGH (ref 3.5–5)
PROT SERPL-MCNC: 6 G/DL — SIGNIFICANT CHANGE UP (ref 6–8)
PROT UR-MCNC: 100 MG/DL
RBC # BLD: 3.04 M/UL — LOW (ref 4.7–6.1)
RBC # FLD: 15.7 % — HIGH (ref 11.5–14.5)
RBC BLD AUTO: ABNORMAL
RBC CASTS # UR COMP ASSIST: 2 /HPF — SIGNIFICANT CHANGE UP (ref 0–4)
RSV RNA NPH QL NAA+NON-PROBE: SIGNIFICANT CHANGE UP
SARS-COV-2 RNA SPEC QL NAA+PROBE: SIGNIFICANT CHANGE UP
SODIUM SERPL-SCNC: 137 MMOL/L — SIGNIFICANT CHANGE UP (ref 135–146)
SP GR SPEC: 1.01 — SIGNIFICANT CHANGE UP (ref 1–1.03)
TOXIC GRANULES BLD QL SMEAR: PRESENT — SIGNIFICANT CHANGE UP
TROPONIN T, HIGH SENSITIVITY RESULT: 41 NG/L — HIGH (ref 6–21)
TROPONIN T, HIGH SENSITIVITY RESULT: 41 NG/L — HIGH (ref 6–21)
UROBILINOGEN FLD QL: 0.2 MG/DL — SIGNIFICANT CHANGE UP (ref 0.2–1)
VARIANT LYMPHS # BLD: 1 % — SIGNIFICANT CHANGE UP (ref 0–5)
WBC # BLD: 5.47 K/UL — SIGNIFICANT CHANGE UP (ref 4.8–10.8)
WBC # FLD AUTO: 5.47 K/UL — SIGNIFICANT CHANGE UP (ref 4.8–10.8)
WBC UR QL: 40 /HPF — HIGH (ref 0–5)

## 2025-01-30 PROCEDURE — 85025 COMPLETE CBC W/AUTO DIFF WBC: CPT

## 2025-01-30 PROCEDURE — 36415 COLL VENOUS BLD VENIPUNCTURE: CPT

## 2025-01-30 PROCEDURE — 83880 ASSAY OF NATRIURETIC PEPTIDE: CPT

## 2025-01-30 PROCEDURE — 80053 COMPREHEN METABOLIC PANEL: CPT

## 2025-01-30 PROCEDURE — 87086 URINE CULTURE/COLONY COUNT: CPT

## 2025-01-30 PROCEDURE — 71046 X-RAY EXAM CHEST 2 VIEWS: CPT

## 2025-01-30 PROCEDURE — 0241U: CPT

## 2025-01-30 PROCEDURE — 71046 X-RAY EXAM CHEST 2 VIEWS: CPT | Mod: 26

## 2025-01-30 PROCEDURE — 84484 ASSAY OF TROPONIN QUANT: CPT

## 2025-01-30 PROCEDURE — 93010 ELECTROCARDIOGRAM REPORT: CPT

## 2025-01-30 PROCEDURE — 81001 URINALYSIS AUTO W/SCOPE: CPT

## 2025-01-30 PROCEDURE — 99285 EMERGENCY DEPT VISIT HI MDM: CPT

## 2025-01-30 PROCEDURE — 99285 EMERGENCY DEPT VISIT HI MDM: CPT | Mod: 25

## 2025-01-30 PROCEDURE — 93005 ELECTROCARDIOGRAM TRACING: CPT

## 2025-01-30 PROCEDURE — 96374 THER/PROPH/DIAG INJ IV PUSH: CPT

## 2025-01-30 RX ORDER — FUROSEMIDE 20 MG
40 TABLET ORAL ONCE
Refills: 0 | Status: COMPLETED | OUTPATIENT
Start: 2025-01-30 | End: 2025-01-30

## 2025-01-30 RX ADMIN — Medication 40 MILLIGRAM(S): at 20:30

## 2025-01-30 NOTE — ED PROVIDER NOTE - OBJECTIVE STATEMENT
89 y/o male with hx of bladder cancer, lyle on eliquis presents to the ED with exertional dyspnea intermittent over past few weeks. patient denies any fevers, productive cough. no back pain . no chest pain . no calf pain or swelling. no sick contacts. no syncope.

## 2025-01-30 NOTE — ED ADULT NURSE NOTE - NSFALLHARMRISKINTERV_ED_ALL_ED

## 2025-01-30 NOTE — ED PROVIDER NOTE - ATTENDING APP SHARED VISIT CONTRIBUTION OF CARE
89-year-old male history of A-fib on anticoagulation no rate control, bladder CA on chemo with baseline retention and dysuria, ambulates with rollator, brought in by his son for short of breath for 2 days, patient himself states he feels fine, denies chest pain or cough, denies change in his urinary symptoms, no leg pain or swelling, on exam vitals appreciated, very well-appearing, head NC/AT, PERRLA, conjunctive pink, neck supple, cor irregular, lungs clear, abdomen soft nontender, calves nontender no clubbing cyanosis or edema, pulse equal, neurologically nonfocal, given age will check labs urine and imaging

## 2025-01-30 NOTE — ED ADULT NURSE NOTE - NSFALLASSESSNEED_ED_ALL_ED
Attending Physician Notes and Attestation    Unless otherwise indicated below, I saw and evaluated the patient on 07/19/24. I have reviewed the patient's information, participated in the care of the patient, and I concur with the assessment and plan of the resident except as noted below.    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 Prelim findings reviewed with Cardiology, will consult CV surgery.   - resume diet, adjust insulin regimen   - on ceftriaxone and ampicillin  - repeat blood cultures negative to date  - cardiology and ID following   - will likely transfer out of ICU today     I personally spent a total of 50 minutes in the care of this critically ill patient. Total time excludes any procedures performed.    Interventions I performed :  [x] Life support management/intervention   [x] Lab review   [x] Radiograph review   [x] Other data review   [x] Discussion with I.C.U. team and/or consults     Patient's condition has high probability for clinically significant or life-threatening deterioration and requires the highest level of physician preparedness to intervene urgently.    Ngozi Capellanlanalp, DO  Pulmonary and Critical Care      yes

## 2025-01-30 NOTE — ED PROVIDER NOTE - CLINICAL SUMMARY MEDICAL DECISION MAKING FREE TEXT BOX
Warm/Dry
Received sign out from Dr. Burch -- patient presented with intermittent exertional dyspnea  over weeks -- no CP, no dyspnea at rest. Per son and patient, patient is "not a good walker," uses rolling walker but often has to stop and rest, recently has to rest more often. No LE edema, cough, congestion. Is currently being treated for UTI.     Patient has no signs of PNA, fluid overload, acute cardiac ischemia, though not evaluated for PE in ED, clinically sx are not suggestive of such and patient is anticoagulated.    On reassessment patient states he feels good, would like to go home, does not note any shortness of breath. Though etiology of sx are unclear, no signs of acute illness to warrant admission, further emergent treatment. Will dc with close return precautions, sx monitoring, follow up.

## 2025-01-30 NOTE — ED PROVIDER NOTE - PATIENT PORTAL LINK FT
You can access the FollowMyHealth Patient Portal offered by [facility] by registering at the following website: [email or web address]. By joining [employer]’s FollowMyHealth portal, you will also be able to view your health information using other applications (apps) compatible with our system.

## 2025-02-01 LAB
CULTURE RESULTS: SIGNIFICANT CHANGE UP
SPECIMEN SOURCE: SIGNIFICANT CHANGE UP

## 2025-02-03 ENCOUNTER — LABORATORY RESULT (OUTPATIENT)
Age: 89
End: 2025-02-03

## 2025-02-03 ENCOUNTER — EMERGENCY (EMERGENCY)
Facility: HOSPITAL | Age: 89
LOS: 0 days | Discharge: HOME | End: 2025-02-03
Attending: STUDENT IN AN ORGANIZED HEALTH CARE EDUCATION/TRAINING PROGRAM
Payer: MEDICARE

## 2025-02-03 ENCOUNTER — APPOINTMENT (OUTPATIENT)
Dept: CARDIOLOGY | Facility: CLINIC | Age: 89
End: 2025-02-03
Payer: MEDICARE

## 2025-02-03 VITALS
DIASTOLIC BLOOD PRESSURE: 75 MMHG | SYSTOLIC BLOOD PRESSURE: 146 MMHG | TEMPERATURE: 97 F | OXYGEN SATURATION: 99 % | HEART RATE: 94 BPM | RESPIRATION RATE: 18 BRPM | WEIGHT: 169.98 LBS | HEIGHT: 70 IN

## 2025-02-03 VITALS
BODY MASS INDEX: 24.34 KG/M2 | WEIGHT: 170 LBS | DIASTOLIC BLOOD PRESSURE: 70 MMHG | HEIGHT: 70 IN | SYSTOLIC BLOOD PRESSURE: 124 MMHG | HEART RATE: 89 BPM

## 2025-02-03 VITALS
DIASTOLIC BLOOD PRESSURE: 86 MMHG | OXYGEN SATURATION: 97 % | HEART RATE: 78 BPM | RESPIRATION RATE: 20 BRPM | SYSTOLIC BLOOD PRESSURE: 135 MMHG

## 2025-02-03 DIAGNOSIS — Z85.51 PERSONAL HISTORY OF MALIGNANT NEOPLASM OF BLADDER: ICD-10-CM

## 2025-02-03 DIAGNOSIS — I48.91 UNSPECIFIED ATRIAL FIBRILLATION: ICD-10-CM

## 2025-02-03 DIAGNOSIS — Z88.2 ALLERGY STATUS TO SULFONAMIDES: ICD-10-CM

## 2025-02-03 DIAGNOSIS — R06.02 SHORTNESS OF BREATH: ICD-10-CM

## 2025-02-03 DIAGNOSIS — R31.9 HEMATURIA, UNSPECIFIED: ICD-10-CM

## 2025-02-03 DIAGNOSIS — R00.1 BRADYCARDIA, UNSPECIFIED: ICD-10-CM

## 2025-02-03 DIAGNOSIS — Z79.01 LONG TERM (CURRENT) USE OF ANTICOAGULANTS: ICD-10-CM

## 2025-02-03 DIAGNOSIS — Z98.41 CATARACT EXTRACTION STATUS, RIGHT EYE: Chronic | ICD-10-CM

## 2025-02-03 DIAGNOSIS — I25.10 ATHEROSCLEROTIC HEART DISEASE OF NATIVE CORONARY ARTERY W/OUT ANGINA PECTORIS: ICD-10-CM

## 2025-02-03 DIAGNOSIS — I48.92 UNSPECIFIED ATRIAL FIBRILLATION: ICD-10-CM

## 2025-02-03 DIAGNOSIS — N28.1 CYST OF KIDNEY, ACQUIRED: ICD-10-CM

## 2025-02-03 LAB
ALBUMIN SERPL ELPH-MCNC: 3.8 G/DL — SIGNIFICANT CHANGE UP (ref 3.5–5.2)
ALP SERPL-CCNC: 69 U/L — SIGNIFICANT CHANGE UP (ref 30–115)
ALT FLD-CCNC: 16 U/L — SIGNIFICANT CHANGE UP (ref 0–41)
ANION GAP SERPL CALC-SCNC: 14 MMOL/L — SIGNIFICANT CHANGE UP (ref 7–14)
ANISOCYTOSIS BLD QL: SLIGHT — SIGNIFICANT CHANGE UP
AST SERPL-CCNC: 19 U/L — SIGNIFICANT CHANGE UP (ref 0–41)
BASOPHILS # BLD AUTO: 0.19 K/UL — SIGNIFICANT CHANGE UP (ref 0–0.2)
BASOPHILS NFR BLD AUTO: 2.5 % — HIGH (ref 0–1)
BILIRUB SERPL-MCNC: 0.3 MG/DL — SIGNIFICANT CHANGE UP (ref 0.2–1.2)
BUN SERPL-MCNC: 37 MG/DL — HIGH (ref 10–20)
CALCIUM SERPL-MCNC: 9.2 MG/DL — SIGNIFICANT CHANGE UP (ref 8.4–10.5)
CHLORIDE SERPL-SCNC: 108 MMOL/L — SIGNIFICANT CHANGE UP (ref 98–110)
CO2 SERPL-SCNC: 17 MMOL/L — SIGNIFICANT CHANGE UP (ref 17–32)
CREAT SERPL-MCNC: 1.9 MG/DL — HIGH (ref 0.7–1.5)
EGFR: 33 ML/MIN/1.73M2 — LOW
EOSINOPHIL # BLD AUTO: 0.26 K/UL — SIGNIFICANT CHANGE UP (ref 0–0.7)
EOSINOPHIL NFR BLD AUTO: 3.5 % — SIGNIFICANT CHANGE UP (ref 0–8)
GIANT PLATELETS BLD QL SMEAR: PRESENT — SIGNIFICANT CHANGE UP
GLUCOSE SERPL-MCNC: 107 MG/DL — HIGH (ref 70–99)
HCT VFR BLD CALC: 29.4 % — LOW (ref 42–52)
HGB BLD-MCNC: 10.2 G/DL — LOW (ref 14–18)
IMM GRANULOCYTES NFR BLD AUTO: 11.5 % — HIGH (ref 0.1–0.3)
LYMPHOCYTES # BLD AUTO: 0.96 K/UL — LOW (ref 1.2–3.4)
LYMPHOCYTES # BLD AUTO: 12.8 % — LOW (ref 20.5–51.1)
MACROCYTES BLD QL: SLIGHT — SIGNIFICANT CHANGE UP
MAGNESIUM SERPL-MCNC: 2 MG/DL — SIGNIFICANT CHANGE UP (ref 1.8–2.4)
MANUAL SMEAR VERIFICATION: SIGNIFICANT CHANGE UP
MCHC RBC-ENTMCNC: 30.1 PG — SIGNIFICANT CHANGE UP (ref 27–31)
MCHC RBC-ENTMCNC: 34.7 G/DL — SIGNIFICANT CHANGE UP (ref 32–37)
MCV RBC AUTO: 86.7 FL — SIGNIFICANT CHANGE UP (ref 80–94)
METAMYELOCYTES # FLD: 2 % — HIGH (ref 0–0)
METAMYELOCYTES NFR BLD: 2 % — HIGH (ref 0–0)
MONOCYTES # BLD AUTO: 2.56 K/UL — HIGH (ref 0.1–0.6)
MONOCYTES NFR BLD AUTO: 34.2 % — HIGH (ref 1.7–9.3)
MYELOCYTES NFR BLD: 4 % — HIGH (ref 0–0)
NEUTROPHILS # BLD AUTO: 2.65 K/UL — SIGNIFICANT CHANGE UP (ref 1.4–6.5)
NEUTROPHILS NFR BLD AUTO: 35.5 % — LOW (ref 42.2–75.2)
NEUTS BAND # BLD: 1 % — SIGNIFICANT CHANGE UP (ref 0–6)
NEUTS BAND NFR BLD: 1 % — SIGNIFICANT CHANGE UP (ref 0–6)
NRBC # BLD: 0 /100 WBCS — SIGNIFICANT CHANGE UP (ref 0–0)
NRBC # BLD: 0 /100 WBCS — SIGNIFICANT CHANGE UP (ref 0–0)
NRBC BLD-RTO: 0 /100 WBCS — SIGNIFICANT CHANGE UP (ref 0–0)
NRBC BLD-RTO: 0 /100 WBCS — SIGNIFICANT CHANGE UP (ref 0–0)
NT-PROBNP SERPL-SCNC: 3747 PG/ML — HIGH (ref 0–300)
OVALOCYTES BLD QL SMEAR: SLIGHT — SIGNIFICANT CHANGE UP
PLAT MORPH BLD: ABNORMAL
PLATELET # BLD AUTO: 1150 K/UL — CRITICAL HIGH (ref 130–400)
PLATELET COUNT - ESTIMATE: ABNORMAL
PMV BLD: 9.2 FL — SIGNIFICANT CHANGE UP (ref 7.4–10.4)
POLYCHROMASIA BLD QL SMEAR: SLIGHT — SIGNIFICANT CHANGE UP
POTASSIUM SERPL-MCNC: 5.1 MMOL/L — HIGH (ref 3.5–5)
POTASSIUM SERPL-SCNC: 5.1 MMOL/L — HIGH (ref 3.5–5)
PROMYELOCYTES # FLD: 1 % — HIGH (ref 0–0)
PROMYELOCYTES NFR BLD: 1 % — HIGH (ref 0–0)
PROT SERPL-MCNC: 5.9 G/DL — LOW (ref 6–8)
RBC # BLD: 3.39 M/UL — LOW (ref 4.7–6.1)
RBC # FLD: 16.7 % — HIGH (ref 11.5–14.5)
RBC BLD AUTO: ABNORMAL
SCHISTOCYTES BLD QL AUTO: SIGNIFICANT CHANGE UP
SODIUM SERPL-SCNC: 139 MMOL/L — SIGNIFICANT CHANGE UP (ref 135–146)
TOXIC GRANULES BLD QL SMEAR: PRESENT — SIGNIFICANT CHANGE UP
TROPONIN T, HIGH SENSITIVITY RESULT: 41 NG/L — HIGH (ref 6–21)
TROPONIN T, HIGH SENSITIVITY RESULT: 42 NG/L — HIGH (ref 6–21)
WBC # BLD: 7.48 K/UL — SIGNIFICANT CHANGE UP (ref 4.8–10.8)
WBC # FLD AUTO: 7.48 K/UL — SIGNIFICANT CHANGE UP (ref 4.8–10.8)

## 2025-02-03 PROCEDURE — 83880 ASSAY OF NATRIURETIC PEPTIDE: CPT

## 2025-02-03 PROCEDURE — 71275 CT ANGIOGRAPHY CHEST: CPT | Mod: 26

## 2025-02-03 PROCEDURE — 80053 COMPREHEN METABOLIC PANEL: CPT

## 2025-02-03 PROCEDURE — 99285 EMERGENCY DEPT VISIT HI MDM: CPT

## 2025-02-03 PROCEDURE — 36000 PLACE NEEDLE IN VEIN: CPT | Mod: XU

## 2025-02-03 PROCEDURE — 93000 ELECTROCARDIOGRAM COMPLETE: CPT

## 2025-02-03 PROCEDURE — 83735 ASSAY OF MAGNESIUM: CPT

## 2025-02-03 PROCEDURE — 93005 ELECTROCARDIOGRAM TRACING: CPT

## 2025-02-03 PROCEDURE — 85025 COMPLETE CBC W/AUTO DIFF WBC: CPT

## 2025-02-03 PROCEDURE — 99214 OFFICE O/P EST MOD 30 MIN: CPT | Mod: 25

## 2025-02-03 PROCEDURE — 71275 CT ANGIOGRAPHY CHEST: CPT | Mod: MC

## 2025-02-03 PROCEDURE — 93010 ELECTROCARDIOGRAM REPORT: CPT

## 2025-02-03 PROCEDURE — 36415 COLL VENOUS BLD VENIPUNCTURE: CPT

## 2025-02-03 PROCEDURE — 84484 ASSAY OF TROPONIN QUANT: CPT

## 2025-02-03 PROCEDURE — 99285 EMERGENCY DEPT VISIT HI MDM: CPT | Mod: 25

## 2025-02-03 NOTE — ED PROVIDER NOTE - CLINICAL SUMMARY MEDICAL DECISION MAKING FREE TEXT BOX
89 yo male, PMHx of bladder cancer, lyle on eliquis presents to the ED with exertional dyspnea.  Patient has had ongoing symptoms since last week and was evaluated in ED 1/30.  Shortness of breath is worse with exertion and improved at rest.  He followed up with Dr. Herman today and was told his symptoms are not likely cardiac but recommended hematology for follow up given downtrending Hgb.  Denies fevers, uri symptoms, chest pain, nausea, vomiting, abdominal pain, leg swelling. 89 yo male, PMHx of bladder cancer, lyle on eliquis presents to the ED with exertional dyspnea.  Patient has had ongoing symptoms since last week and was evaluated in ED 1/30.  Shortness of breath is worse with exertion and improved at rest.  He followed up with Dr. Herman today and was told his symptoms are not likely cardiac but recommended hematology for follow up given downtrending Hgb.  Denies fevers, uri symptoms, chest pain, nausea, vomiting, abdominal pain, leg swelling.  Labs and EKG were ordered and reviewed.  Imaging was ordered and reviewed by me.  Patient's records (prior hospital, ED visit, and/or nursing home notes if available) were reviewed.  Additional history was obtained from son at bedside.  Discussed all results.  Troponin stable at baseline compared to prior.  CT shows no PE and mild emphysema.  Escalation to admission considered.  Patient not requiring supplemental oxygen.  Stable vitals.  Given return precautions and follow up outpatient with pulmonology.  Patient and son comfortable with plan.

## 2025-02-03 NOTE — ED PROVIDER NOTE - ATTENDING APP SHARED VISIT CONTRIBUTION OF CARE
89 yo male, PMHx of bladder cancer, lyle on eliquis presents to the ED with exertional dyspnea.  Patient has had ongoing symptoms since last week and was evaluated in ED 1/30.  Shortness of breath is worse with exertion and improved at rest.  He followed up with Dr. Herman today and was told his symptoms are not likely cardiac but recommended hematology for follow up given downtrending Hgb.  Denies fevers, uri symptoms, chest pain, nausea, vomiting, abdominal pain, leg swelling.    CONSTITUTIONAL: Well-developed; well-nourished; in no acute distress.   SKIN: warm, dry  HEAD: Normocephalic  EYES: no conjunctival erythema  ENT: No nasal discharge; airway clear.  NECK: Supple  CARD: S1, S2 normal; Regular rate and rhythm.   RESP: No wheezes, rales or rhonchi.  ABD: soft ntnd  EXT: Normal ROM.  No clubbing, cyanosis or edema.   NEURO: Alert, oriented, grossly unremarkable  PSYCH: Cooperative, appropriate.

## 2025-02-03 NOTE — ED PROVIDER NOTE - NSFOLLOWUPINSTRUCTIONS_ED_ALL_ED_FT
Our Emergency Department Referral Coordinators will be reaching out to you in the next 24-48 hours from 9:00am to 5:00pm to schedule a follow up appointment. Please expect a phone call from the hospital in that time frame. If you do not receive a call or if you have any questions or concerns, you can reach them at   [phone].    Shortness of breath    Shortness of breath (dyspnea) means you have trouble breathing and could indicate a medical problem. Causes include lung disease, heart disease, low amount of red blood cells (anemia), poor physical fitness, being overweight, smoking, etc. Your health care provider today may not be able to find a cause for your shortness of breath after your exam. In this case, it is important to have a follow-up exam with your primary care physician as instructed. If medicines were prescribed, take them as directed for the full length of time directed. Refrain from tobacco products.    SEEK IMMEDIATE MEDICAL CARE IF YOU HAVE ANY OF THE FOLLOWING SYMPTOMS: worsening shortness of breath, chest pain, back pain, abdominal pain, fever, coughing up blood, lightheadedness/dizziness.

## 2025-02-03 NOTE — ED PROVIDER NOTE - CARE PROVIDER_API CALL
Sourav Allen  Pulmonary Disease  [address] [place] [id number]  Phone: [phone]  Fax: [phone]  Follow Up Time: 1-3 Days

## 2025-02-03 NOTE — ED PROVIDER NOTE - PATIENT PORTAL LINK FT
You can access the FollowMyHealth Patient Portal offered by [facility] by registering at the following website: [email or web address]. By joining [employer]’s FollowMyHealth portal, you will also be able to view your health information using other applications (apps) compatible with our system.

## 2025-02-03 NOTE — ED ADULT NURSE NOTE - NSFALLHARMRISKINTERV_ED_ALL_ED

## 2025-02-03 NOTE — ED PROVIDER NOTE - PHYSICAL EXAMINATION
VITAL SIGNS: I have reviewed nursing notes and confirm.  CONSTITUTIONAL: Well-developed; well-nourished  SKIN:  skin exam is warm and dry, no acute rash.    HEAD: Normocephalic; atraumatic.  EYES: conjunctiva and sclera clear.  ENT: No nasal discharge; airway clear.  CARD: S1, S2 normal; no murmurs, gallops, or rubs. Regular rate and rhythm.   RESP: No wheezes, rales or rhonchi.  ABD: Normal bowel sounds; soft; non-distended; non-tender  EXT: Normal ROM.  No clubbing, cyanosis or edema.   NEURO: Alert, oriented, grossly unremarkable  .

## 2025-02-03 NOTE — ED PROVIDER NOTE - OBJECTIVE STATEMENT
Pt is a 89y/o male with a pmhx afib on eliquis, bladder cancer here for eval of exertional dyspnea for approx 1 week. Pt saw cardio yesterday and is scheduled for echo in next few days, but was concerned about pt's hgb and wants him to see a urologist., Pt denies fever, chills, cough, leg swelling, n/v/d.

## 2025-02-03 NOTE — ED PROVIDER NOTE - NSPTACCESSSVCSAPPTDETAILS_ED_ALL_ED_FT
shortness of breath - mild new emphysema shortness of breath - mild new emphysema  Hematology as well for high platelet count

## 2025-02-04 ENCOUNTER — APPOINTMENT (OUTPATIENT)
Dept: CARDIOLOGY | Facility: CLINIC | Age: 89
End: 2025-02-04
Payer: MEDICARE

## 2025-02-04 DIAGNOSIS — E78.5 HYPERLIPIDEMIA, UNSPECIFIED: ICD-10-CM

## 2025-02-04 LAB
FERRITIN SERPL-MCNC: 531 NG/ML
FOLATE SERPL-MCNC: 8.6 NG/ML
VIT B12 SERPL-MCNC: 440 PG/ML

## 2025-02-04 PROCEDURE — 93306 TTE W/DOPPLER COMPLETE: CPT

## 2025-02-05 PROBLEM — D75.839 THROMBOCYTOSIS: Status: ACTIVE | Noted: 2025-02-05

## 2025-02-06 ENCOUNTER — APPOINTMENT (OUTPATIENT)
Dept: CARDIOLOGY | Facility: CLINIC | Age: 89
End: 2025-02-06
Payer: MEDICARE

## 2025-02-06 ENCOUNTER — APPOINTMENT (OUTPATIENT)
Dept: CARDIOTHORACIC SURGERY | Facility: CLINIC | Age: 89
End: 2025-02-06

## 2025-02-06 VITALS
RESPIRATION RATE: 16 BRPM | BODY MASS INDEX: 23.62 KG/M2 | HEART RATE: 122 BPM | SYSTOLIC BLOOD PRESSURE: 109 MMHG | TEMPERATURE: 97.3 F | DIASTOLIC BLOOD PRESSURE: 72 MMHG | HEIGHT: 70 IN | WEIGHT: 165 LBS | OXYGEN SATURATION: 99 %

## 2025-02-06 PROCEDURE — 99204 OFFICE O/P NEW MOD 45 MIN: CPT

## 2025-02-07 ENCOUNTER — LABORATORY RESULT (OUTPATIENT)
Age: 89
End: 2025-02-07

## 2025-02-07 ENCOUNTER — APPOINTMENT (OUTPATIENT)
Age: 89
End: 2025-02-07
Payer: MEDICARE

## 2025-02-07 ENCOUNTER — OUTPATIENT (OUTPATIENT)
Dept: OUTPATIENT SERVICES | Facility: HOSPITAL | Age: 89
LOS: 1 days | End: 2025-02-07
Payer: MEDICARE

## 2025-02-07 VITALS
HEIGHT: 70 IN | BODY MASS INDEX: 23.62 KG/M2 | HEART RATE: 73 BPM | SYSTOLIC BLOOD PRESSURE: 117 MMHG | WEIGHT: 165 LBS | DIASTOLIC BLOOD PRESSURE: 83 MMHG | RESPIRATION RATE: 16 BRPM | TEMPERATURE: 97.5 F

## 2025-02-07 DIAGNOSIS — Z98.890 OTHER SPECIFIED POSTPROCEDURAL STATES: Chronic | ICD-10-CM

## 2025-02-07 DIAGNOSIS — Z95.5 PRESENCE OF CORONARY ANGIOPLASTY IMPLANT AND GRAFT: Chronic | ICD-10-CM

## 2025-02-07 DIAGNOSIS — C67.2 MALIGNANT NEOPLASM OF LATERAL WALL OF BLADDER: ICD-10-CM

## 2025-02-07 DIAGNOSIS — C67.9 MALIGNANT NEOPLASM OF BLADDER, UNSPECIFIED: ICD-10-CM

## 2025-02-07 DIAGNOSIS — G57.93 UNSPECIFIED MONONEUROPATHY OF BILATERAL LOWER LIMBS: ICD-10-CM

## 2025-02-07 DIAGNOSIS — D75.839 THROMBOCYTOSIS, UNSPECIFIED: ICD-10-CM

## 2025-02-07 DIAGNOSIS — Z98.41 CATARACT EXTRACTION STATUS, RIGHT EYE: Chronic | ICD-10-CM

## 2025-02-07 LAB
HCT VFR BLD CALC: 32.1 %
HGB BLD-MCNC: 10.9 G/DL
MCHC RBC-ENTMCNC: 30.4 PG
MCHC RBC-ENTMCNC: 34 G/DL
MCV RBC AUTO: 89.4 FL
PLATELET # BLD AUTO: 816 K/UL
PMV BLD: 9.3 FL
RBC # BLD: 3.59 M/UL
RBC # FLD: 17.1 %
WBC # FLD AUTO: 11.04 K/UL

## 2025-02-07 PROCEDURE — 99205 OFFICE O/P NEW HI 60 MIN: CPT

## 2025-02-07 PROCEDURE — 85027 COMPLETE CBC AUTOMATED: CPT

## 2025-02-08 DIAGNOSIS — D75.839 THROMBOCYTOSIS, UNSPECIFIED: ICD-10-CM

## 2025-02-09 LAB
ALBUMIN SERPL ELPH-MCNC: 4.3 G/DL
ALP BLD-CCNC: 77 U/L
ALT SERPL-CCNC: 20 U/L
ANION GAP SERPL CALC-SCNC: 17 MMOL/L
AST SERPL-CCNC: 20 U/L
BACTERIA BLD CULT: NORMAL
BASOPHILS # BLD AUTO: 0.15 K/UL
BASOPHILS NFR BLD AUTO: 2 %
BILIRUB SERPL-MCNC: 0.3 MG/DL
BUN SERPL-MCNC: 36 MG/DL
CALCIUM SERPL-MCNC: 10.3 MG/DL
CHLORIDE SERPL-SCNC: 102 MMOL/L
CHOLEST SERPL-MCNC: 176 MG/DL
CO2 SERPL-SCNC: 18 MMOL/L
CREAT SERPL-MCNC: 2 MG/DL
EGFR: 31 ML/MIN/1.73M2
EOSINOPHIL # BLD AUTO: 0.54 K/UL
EOSINOPHIL NFR BLD AUTO: 7 %
GLUCOSE SERPL-MCNC: 111 MG/DL
HCT VFR BLD CALC: 37 %
HDLC SERPL-MCNC: 62 MG/DL
HGB BLD-MCNC: 11.6 G/DL
IRON SATN MFR SERPL: 10 %
IRON SERPL-MCNC: 33 UG/DL
LDLC SERPL CALC-MCNC: 90 MG/DL
LYMPHOCYTES # BLD AUTO: 1.31 K/UL
LYMPHOCYTES NFR BLD AUTO: 17 %
MAN DIFF?: NORMAL
MCHC RBC-ENTMCNC: 30.4 PG
MCHC RBC-ENTMCNC: 31.4 G/DL
MCV RBC AUTO: 97.1 FL
MONOCYTES # BLD AUTO: 2.77 K/UL
MONOCYTES NFR BLD AUTO: 36 %
NEUTROPHILS # BLD AUTO: 2.38 K/UL
NEUTROPHILS NFR BLD AUTO: 31 %
NONHDLC SERPL-MCNC: 114 MG/DL
PLATELET # BLD AUTO: 1316 K/UL
POTASSIUM SERPL-SCNC: 5 MMOL/L
PROT SERPL-MCNC: 6.9 G/DL
RBC # BLD: 3.81 M/UL
RBC # FLD: 17.8 %
SODIUM SERPL-SCNC: 137 MMOL/L
TIBC SERPL-MCNC: 315 UG/DL
TRIGL SERPL-MCNC: 139 MG/DL
UIBC SERPL-MCNC: 282 UG/DL
WBC # FLD AUTO: 7.69 K/UL

## 2025-02-10 ENCOUNTER — LABORATORY RESULT (OUTPATIENT)
Age: 89
End: 2025-02-10

## 2025-02-10 ENCOUNTER — APPOINTMENT (OUTPATIENT)
Age: 89
End: 2025-02-10
Payer: MEDICARE

## 2025-02-10 ENCOUNTER — OUTPATIENT (OUTPATIENT)
Dept: OUTPATIENT SERVICES | Facility: HOSPITAL | Age: 89
LOS: 1 days | End: 2025-02-10
Payer: MEDICARE

## 2025-02-10 ENCOUNTER — RESULT REVIEW (OUTPATIENT)
Age: 89
End: 2025-02-10

## 2025-02-10 VITALS
HEART RATE: 85 BPM | SYSTOLIC BLOOD PRESSURE: 116 MMHG | TEMPERATURE: 97.9 F | OXYGEN SATURATION: 99 % | RESPIRATION RATE: 16 BRPM | BODY MASS INDEX: 23.62 KG/M2 | WEIGHT: 165 LBS | DIASTOLIC BLOOD PRESSURE: 66 MMHG | HEIGHT: 70 IN

## 2025-02-10 DIAGNOSIS — Z98.890 OTHER SPECIFIED POSTPROCEDURAL STATES: Chronic | ICD-10-CM

## 2025-02-10 DIAGNOSIS — D64.9 ANEMIA, UNSPECIFIED: ICD-10-CM

## 2025-02-10 DIAGNOSIS — Z95.5 PRESENCE OF CORONARY ANGIOPLASTY IMPLANT AND GRAFT: Chronic | ICD-10-CM

## 2025-02-10 DIAGNOSIS — D75.839 THROMBOCYTOSIS, UNSPECIFIED: ICD-10-CM

## 2025-02-10 DIAGNOSIS — Z98.41 CATARACT EXTRACTION STATUS, RIGHT EYE: Chronic | ICD-10-CM

## 2025-02-10 PROCEDURE — 38222 DX BONE MARROW BX & ASPIR: CPT

## 2025-02-10 PROCEDURE — 88184 FLOWCYTOMETRY/ TC 1 MARKER: CPT

## 2025-02-10 PROCEDURE — 88305 TISSUE EXAM BY PATHOLOGIST: CPT | Mod: 26

## 2025-02-10 PROCEDURE — 81451 HL NEO GSAP 5-50 RNA ALYS: CPT

## 2025-02-10 PROCEDURE — 88342 IMHCHEM/IMCYTCHM 1ST ANTB: CPT | Mod: 26

## 2025-02-10 PROCEDURE — 81455 SO/HL 51/>GSAP DNA/DNA&RNA: CPT

## 2025-02-10 PROCEDURE — 88341 IMHCHEM/IMCYTCHM EA ADD ANTB: CPT

## 2025-02-10 PROCEDURE — 88313 SPECIAL STAINS GROUP 2: CPT | Mod: 26

## 2025-02-10 PROCEDURE — 87205 SMEAR GRAM STAIN: CPT

## 2025-02-10 PROCEDURE — 81450 HL NEO GSAP 5-50DNA/DNA&RNA: CPT

## 2025-02-10 PROCEDURE — 88280 CHROMOSOME KARYOTYPE STUDY: CPT

## 2025-02-10 PROCEDURE — 88341 IMHCHEM/IMCYTCHM EA ADD ANTB: CPT | Mod: 26

## 2025-02-10 PROCEDURE — 88185 FLOWCYTOMETRY/TC ADD-ON: CPT

## 2025-02-10 PROCEDURE — 88264 CHROMOSOME ANALYSIS 20-25: CPT

## 2025-02-10 PROCEDURE — 88305 TISSUE EXAM BY PATHOLOGIST: CPT

## 2025-02-10 PROCEDURE — 38222 DX BONE MARROW BX & ASPIR: CPT | Mod: 50

## 2025-02-10 PROCEDURE — 88311 DECALCIFY TISSUE: CPT | Mod: 26

## 2025-02-10 PROCEDURE — 88313 SPECIAL STAINS GROUP 2: CPT

## 2025-02-10 PROCEDURE — 88342 IMHCHEM/IMCYTCHM 1ST ANTB: CPT

## 2025-02-10 PROCEDURE — 88271 CYTOGENETICS DNA PROBE: CPT

## 2025-02-10 PROCEDURE — 88311 DECALCIFY TISSUE: CPT

## 2025-02-10 PROCEDURE — 88275 CYTOGENETICS 100-300: CPT

## 2025-02-10 PROCEDURE — 88237 TISSUE CULTURE BONE MARROW: CPT

## 2025-02-11 ENCOUNTER — APPOINTMENT (OUTPATIENT)
Dept: PULMONOLOGY | Facility: CLINIC | Age: 89
End: 2025-02-11
Payer: MEDICARE

## 2025-02-11 VITALS
DIASTOLIC BLOOD PRESSURE: 72 MMHG | HEIGHT: 70 IN | OXYGEN SATURATION: 98 % | SYSTOLIC BLOOD PRESSURE: 124 MMHG | BODY MASS INDEX: 23.62 KG/M2 | WEIGHT: 165 LBS | HEART RATE: 98 BPM

## 2025-02-11 DIAGNOSIS — I35.0 NONRHEUMATIC AORTIC (VALVE) STENOSIS: ICD-10-CM

## 2025-02-11 DIAGNOSIS — D75.839 THROMBOCYTOSIS, UNSPECIFIED: ICD-10-CM

## 2025-02-11 DIAGNOSIS — J44.9 CHRONIC OBSTRUCTIVE PULMONARY DISEASE, UNSPECIFIED: ICD-10-CM

## 2025-02-11 DIAGNOSIS — R06.09 OTHER FORMS OF DYSPNEA: ICD-10-CM

## 2025-02-11 PROCEDURE — G2211 COMPLEX E/M VISIT ADD ON: CPT

## 2025-02-11 PROCEDURE — 99203 OFFICE O/P NEW LOW 30 MIN: CPT

## 2025-02-11 RX ORDER — TIOTROPIUM BROMIDE 18 UG/1
18 CAPSULE ORAL; RESPIRATORY (INHALATION) DAILY
Qty: 3 | Refills: 3 | Status: ACTIVE | COMMUNITY
Start: 2025-02-11 | End: 1900-01-01

## 2025-02-13 ENCOUNTER — NON-APPOINTMENT (OUTPATIENT)
Age: 89
End: 2025-02-13

## 2025-02-27 ENCOUNTER — OUTPATIENT (OUTPATIENT)
Dept: OUTPATIENT SERVICES | Facility: HOSPITAL | Age: 89
LOS: 1 days | End: 2025-02-27
Payer: MEDICARE

## 2025-02-27 ENCOUNTER — LABORATORY RESULT (OUTPATIENT)
Age: 89
End: 2025-02-27

## 2025-02-27 DIAGNOSIS — D75.839 THROMBOCYTOSIS, UNSPECIFIED: ICD-10-CM

## 2025-02-27 DIAGNOSIS — Z95.5 PRESENCE OF CORONARY ANGIOPLASTY IMPLANT AND GRAFT: Chronic | ICD-10-CM

## 2025-02-27 DIAGNOSIS — Z98.890 OTHER SPECIFIED POSTPROCEDURAL STATES: Chronic | ICD-10-CM

## 2025-02-27 DIAGNOSIS — Z98.41 CATARACT EXTRACTION STATUS, RIGHT EYE: Chronic | ICD-10-CM

## 2025-02-27 PROCEDURE — 36415 COLL VENOUS BLD VENIPUNCTURE: CPT

## 2025-02-27 PROCEDURE — 85025 COMPLETE CBC W/AUTO DIFF WBC: CPT

## 2025-02-28 DIAGNOSIS — D75.839 THROMBOCYTOSIS, UNSPECIFIED: ICD-10-CM

## 2025-03-01 ENCOUNTER — NON-APPOINTMENT (OUTPATIENT)
Age: 89
End: 2025-03-01

## 2025-03-20 NOTE — ED ADULT TRIAGE NOTE - PATIENT ON (OXYGEN DELIVERY METHOD)
"Subjective:   Tanvi Fontanez is a 46 y.o. female who presents for Suture / Staple Removal (Removal of 11 sutures placed 5 days ago, forehead.)      HPI:  46-year-old female presents to the urgent care for reevaluation of a laceration to the forehead and possible suture removal.  He sustained a laceration 5 days ago from a snowboard and had sutures placed at [facility] emergency department.  Reports no worsening symptoms.  No discharge in the area.  No increasing erythema.  No fever.    Medications:    This patient does not have an active medication from one of the medication groupers.    Allergies: Patient has no known allergies.    Problem List: Tanvi Fontanez does not have a problem list on file.    Surgical History:  Past Surgical History:   Procedure Laterality Date    APPENDECTOMY  2006       Past Social Hx: Tanvi Fontanez  reports that she has never smoked. She has never used smokeless tobacco. She reports current alcohol use of about 1.2 oz of alcohol per week. She reports that she does not use drugs.     Past Family Hx:  Tanvi Fontanez family history includes Asthma in her brother; Cancer in her maternal grandfather; Heart Disease (age of onset: 65) in her father; No Known Problems in her daughter, mother, sister, and son.     Problem list, medications, and allergies reviewed by myself today in Epic.     Objective:     /76   Pulse 60   Temp 36.5 °C (97.7 °F) (Temporal)   Resp 14   Ht 1.6 m (5' 3\")   Wt 56.7 kg (125 lb)   LMP 03/03/2025 (Exact Date)   SpO2 100%   BMI 22.14 kg/m²     Physical Exam  HENT:      Head:        Comments: 2.5 cm vertical linear laceration with 11 simple interrupted sutures with good wound approximation.  No surrounding erythema or dehiscence.  No discharge or tenderness.  11 sutures removed without dehiscence.  Patient tolerated this well.        Assessment/Plan:     Diagnosis and associated orders:     1. Laceration of forehead, subsequent encounter        2. Visit for suture " removal           Comments/MDM:     Suture removal without complication.  Examination of the laceration shows no secondary bacterial infection.  Discussed continue home supportive care.  Avoid scrubbing at the area to maintain proper healing.         Differential diagnosis, natural history, supportive care, and indications for immediate follow-up discussed.    Advised the patient to follow-up with the primary care physician for recheck, reevaluation, and consideration of further management.    Please note that this dictation was created using voice recognition software. I have made a reasonable attempt to correct obvious errors, but I expect that there are errors of grammar and possibly content that I did not discover before finalizing the note.    Electronically signed by Clemente Camacho PA-C.       room air

## 2025-03-21 ENCOUNTER — OUTPATIENT (OUTPATIENT)
Dept: OUTPATIENT SERVICES | Facility: HOSPITAL | Age: 89
LOS: 1 days | End: 2025-03-21
Payer: MEDICARE

## 2025-03-21 ENCOUNTER — NON-APPOINTMENT (OUTPATIENT)
Age: 89
End: 2025-03-21

## 2025-03-21 ENCOUNTER — RESULT REVIEW (OUTPATIENT)
Age: 89
End: 2025-03-21

## 2025-03-21 ENCOUNTER — APPOINTMENT (OUTPATIENT)
Dept: CARDIOLOGY | Facility: CLINIC | Age: 89
End: 2025-03-21
Payer: MEDICARE

## 2025-03-21 VITALS
HEART RATE: 66 BPM | BODY MASS INDEX: 23.62 KG/M2 | WEIGHT: 165 LBS | SYSTOLIC BLOOD PRESSURE: 123 MMHG | DIASTOLIC BLOOD PRESSURE: 70 MMHG | HEIGHT: 70 IN

## 2025-03-21 DIAGNOSIS — I48.92 UNSPECIFIED ATRIAL FLUTTER: ICD-10-CM

## 2025-03-21 DIAGNOSIS — I25.10 ATHEROSCLEROTIC HEART DISEASE OF NATIVE CORONARY ARTERY W/OUT ANGINA PECTORIS: ICD-10-CM

## 2025-03-21 DIAGNOSIS — I35.0 NONRHEUMATIC AORTIC (VALVE) STENOSIS: ICD-10-CM

## 2025-03-21 DIAGNOSIS — M48.00 SPINAL STENOSIS, SITE UNSPECIFIED: ICD-10-CM

## 2025-03-21 DIAGNOSIS — Z00.8 ENCOUNTER FOR OTHER GENERAL EXAMINATION: ICD-10-CM

## 2025-03-21 DIAGNOSIS — Z98.890 OTHER SPECIFIED POSTPROCEDURAL STATES: Chronic | ICD-10-CM

## 2025-03-21 DIAGNOSIS — D75.839 THROMBOCYTOSIS, UNSPECIFIED: ICD-10-CM

## 2025-03-21 DIAGNOSIS — Z95.5 PRESENCE OF CORONARY ANGIOPLASTY IMPLANT AND GRAFT: Chronic | ICD-10-CM

## 2025-03-21 DIAGNOSIS — R31.9 HEMATURIA, UNSPECIFIED: ICD-10-CM

## 2025-03-21 DIAGNOSIS — Z98.41 CATARACT EXTRACTION STATUS, RIGHT EYE: Chronic | ICD-10-CM

## 2025-03-21 DIAGNOSIS — I35.9 NONRHEUMATIC AORTIC VALVE DISORDER, UNSPECIFIED: ICD-10-CM

## 2025-03-21 PROCEDURE — 94070 EVALUATION OF WHEEZING: CPT

## 2025-03-21 PROCEDURE — 93880 EXTRACRANIAL BILAT STUDY: CPT

## 2025-03-21 PROCEDURE — 94729 DIFFUSING CAPACITY: CPT | Mod: 26

## 2025-03-21 PROCEDURE — 74174 CTA ABD&PLVS W/CONTRAST: CPT

## 2025-03-21 PROCEDURE — 75574 CT ANGIO HRT W/3D IMAGE: CPT

## 2025-03-21 PROCEDURE — 94726 PLETHYSMOGRAPHY LUNG VOLUMES: CPT

## 2025-03-21 PROCEDURE — 99214 OFFICE O/P EST MOD 30 MIN: CPT | Mod: 25

## 2025-03-21 PROCEDURE — 94664 DEMO&/EVAL PT USE INHALER: CPT

## 2025-03-21 PROCEDURE — 75574 CT ANGIO HRT W/3D IMAGE: CPT | Mod: 26

## 2025-03-21 PROCEDURE — 94727 GAS DIL/WSHOT DETER LNG VOL: CPT | Mod: 26

## 2025-03-21 PROCEDURE — 94060 EVALUATION OF WHEEZING: CPT | Mod: 26

## 2025-03-21 PROCEDURE — 93880 EXTRACRANIAL BILAT STUDY: CPT | Mod: 26

## 2025-03-21 PROCEDURE — 94729 DIFFUSING CAPACITY: CPT

## 2025-03-21 PROCEDURE — 74174 CTA ABD&PLVS W/CONTRAST: CPT | Mod: 26

## 2025-03-21 PROCEDURE — 93000 ELECTROCARDIOGRAM COMPLETE: CPT

## 2025-03-22 DIAGNOSIS — I35.9 NONRHEUMATIC AORTIC VALVE DISORDER, UNSPECIFIED: ICD-10-CM

## 2025-03-22 DIAGNOSIS — Z00.8 ENCOUNTER FOR OTHER GENERAL EXAMINATION: ICD-10-CM

## 2025-04-14 ENCOUNTER — APPOINTMENT (OUTPATIENT)
Dept: UROLOGY | Facility: CLINIC | Age: 89
End: 2025-04-14

## 2025-04-25 VITALS
HEART RATE: 75 BPM | WEIGHT: 169.98 LBS | SYSTOLIC BLOOD PRESSURE: 125 MMHG | OXYGEN SATURATION: 99 % | HEIGHT: 70 IN | DIASTOLIC BLOOD PRESSURE: 79 MMHG | RESPIRATION RATE: 16 BRPM

## 2025-04-25 NOTE — H&P CARDIOLOGY - NSICDXPASTMEDICALHX_GEN_ALL_CORE_FT
PAST MEDICAL HISTORY:  Afib     Bladder cancer     Hypertension     PVD (peripheral vascular disease)     Spinal stenosis at L4-L5 level      PAST MEDICAL HISTORY:  Afib     Bladder cancer     CAD (coronary artery disease)     HLD (hyperlipidemia)     Hypertension     Spinal stenosis at L4-L5 level

## 2025-04-25 NOTE — H&P CARDIOLOGY - COMMENTS
89-year-old male with PMH permanent Afib (on Eliquis, last dose 4/24/25), bladder CA, emphysema, HTN, HLD,  moderate to severe AS, CAD s/p PCI/ARGELIA p/m LAD in 2009, with recent ED visit for increased weakness and SOB, was noted to be anemic, and mild increase in his Troponin. He was sent home however without a workup. Patient presents today for LAURA and C/RHC for further workup to evaluate aortic stenosis, prior to possible TAVR.

## 2025-04-25 NOTE — H&P CARDIOLOGY - HISTORY OF PRESENT ILLNESS
Patient is a 89-year-old male PMH: permanent Afib (on Eliquis, last dose ...), bladder CA, COPD, severe AS, CAD s/p PCI LAD2/3/25. The patient has had increased weakness and SOB. The patient went to the ER. He was noted to be more severely anemic. He had very mild increase in his Troponin. He was sent home however without a workup.   Patient presents today for [facility] for workup prior to TAVR.       Pre cath note:  indication:  [ ] STEMI                [ ] NSTEMI                 [ ] Acute coronary syndrome                   [ ]Unstable Angina   [ ] high risk  [ ] intermediate risk  [ ] low risk                   [ ] Stable Angina     non-invasive testing:                          Date:                     result: [ ] high risk  [ ] intermediate risk  [ ] low risk    Anti- Anginal medications:                    [ ] not used d/t                     [ x] used   ( ) BB     (x ) CCB      ( ) Nitrate   (  ) Ranexa          [ ] not used but strong indication not to use    Ejection Fraction                   [ ] <29            [ ] 30-39%   [ ] 40-49%     [x ]>50%    CHF          [ ] active (within last 14 days on meds   [ ] Chronic (on meds but no exacerbation)  NYHA Functional Class:  (  ) Class I (no limitations)  (  ) Class II (slight limitation)  (  ) Class III (marked limitation)  (  ) Class IV (symptoms at rest)    COPD                   [ ] mild (on chronic bronchodilators)  [ ] moderate (on chronic steroid therapy)      [ ] severe (indication for home O2 or PACO2 >50)    Other risk factors:                     [ ] Previous MI                     [ ] CVA/ stroke                    [ ] carotid stent/ CEA                    [ ] PVD/PAD- (arterial aneurysm, non-palpable pulses, tortuous vessel with inability to insert catheter, infra-renal dissection, renal or subclavian artery stenosis)                    [ ] previous CABG                    [ ] Renal Failure:  on HD  (  ) yes  (  ) no                    [ ] Diabetic  (  ) Type 1  (  ) Type 2                                         (  ) Insulin dependent  (  ) non-insulin dependent                                         (  ) Metformin  (  ) Januvia  (  ) Glimepiride  (  ) Glipizide  (  ) Glyburide  (  ) Actos                                         (  ) GLP-1 receptor agonists (Ozempic, Mounjaro, Wegovy, trulicity, Byetta, Victoza)                                         (  ) SGLT2 Inhibitors (Farxiga, Jardiance, Invokana)                                         (  ) Other                Bleeding Risk: 2.2%    Pre-cath Hydration: (  )  cc IV bolus x 1 over 1 hr followed by:  (  ) NS @ 75cc/hr until procedure (up to 2 hrs) if EF> 50%                                                                                                                         (  ) NS @ 50cc/hr until procedure (up to 2 hrs) if EF< 50%                                      (  ) No precath hydration d/t    RIGHT RADIAL ARTERY EVALUATION:  JANINE TEST: [] Negative          [] Positive    EF: 55%  Date: 2/4    EKG:   Date:   89-year-old male with PMH permanent Afib (on Eliquis, last dose 4/26/25), bladder CA, emphysema, HTN, HLD,  moderate to severe AS, CAD s/p PCI/ARGELIA p/m LAD in 2009, CKD (baseline Cr 1.6-2.0), with recent ED visit for increased weakness and SOB, was noted to be anemic with Hb 9.3, and mild increase in his Troponin. He was sent home however without a workup. Pt followed up with hematologist and drop in Hb felt to be reactive, Hb has improved since then, 13.3 now, no evidence of bleed.  TTE 02/2025: EF 55%  Patient presents today for LAURA and LHC/RHC to further evaluate aortic stenosis, prior to possible TAVR.       Pre cath note:  indication:  [ ] STEMI                [ ] NSTEMI                 [ ] Acute coronary syndrome                   [x ]Unstable Angina   [ ] high risk  [x ] intermediate risk  [ ] low risk                   [ ] Stable Angina     non-invasive testing:                          Date:                     result: [ ] high risk  [ ] intermediate risk  [ ] low risk    Anti- Anginal medications:                    [ ] not used d/t                     [ x] used   ( ) BB     (x ) CCB      ( ) Nitrate   (  ) Ranexa          [ ] not used but strong indication not to use  -no 2nd antianginal due to soft BP    Ejection Fraction                   [ ] <29            [ ] 30-39%   [ ] 40-49%     [x ]>50%    CHF          [ ] active (within last 14 days on meds   [ ] Chronic (on meds but no exacerbation)  NYHA Functional Class:  (  ) Class I (no limitations)  (  ) Class II (slight limitation)  (  ) Class III (marked limitation)  (  ) Class IV (symptoms at rest)    COPD                   [ ] mild (on chronic bronchodilators)  [ ] moderate (on chronic steroid therapy)      [ ] severe (indication for home O2 or PACO2 >50)    Other risk factors:                     [ ] Previous MI                     [ ] CVA/ stroke                    [ ] carotid stent/ CEA                    [ ] PVD/PAD- (arterial aneurysm, non-palpable pulses, tortuous vessel with inability to insert catheter, infra-renal dissection, renal or subclavian artery stenosis)                    [ ] previous CABG                    [x ] Renal Failure:  on HD  (  ) yes  (x  ) no                    [ ] Diabetic  (  ) Type 1  (  ) Type 2                                         (  ) Insulin dependent  (  ) non-insulin dependent                                         (  ) Metformin  (  ) Januvia  (  ) Glimepiride  (  ) Glipizide  (  ) Glyburide  (  ) Actos                                         (  ) GLP-1 receptor agonists (Ozempic, Mounjaro, Wegovy, trulicity, Byetta, Victoza)                                         (  ) SGLT2 Inhibitors (Farxiga, Jardiance, Invokana)                                         (  ) Other      Bleeding Risk: 2.2%    Pre-cath Hydration:  ( x ) NS @ 75cc/hr until procedure (up to 2 hrs) if EF> 50%                                                                                                                             RIGHT RADIAL ARTERY EVALUATION:  JANINE TEST: [] Negative          [x] Positive        EKG:   Date:

## 2025-04-29 ENCOUNTER — OUTPATIENT (OUTPATIENT)
Dept: OUTPATIENT SERVICES | Facility: HOSPITAL | Age: 89
LOS: 1 days | Discharge: HOME | End: 2025-04-29
Payer: MEDICARE

## 2025-04-29 ENCOUNTER — TRANSCRIPTION ENCOUNTER (OUTPATIENT)
Age: 89
End: 2025-04-29

## 2025-04-29 ENCOUNTER — RESULT REVIEW (OUTPATIENT)
Age: 89
End: 2025-04-29

## 2025-04-29 VITALS
HEART RATE: 71 BPM | SYSTOLIC BLOOD PRESSURE: 118 MMHG | RESPIRATION RATE: 14 BRPM | OXYGEN SATURATION: 97 % | DIASTOLIC BLOOD PRESSURE: 76 MMHG

## 2025-04-29 DIAGNOSIS — I35.0 NONRHEUMATIC AORTIC (VALVE) STENOSIS: ICD-10-CM

## 2025-04-29 DIAGNOSIS — Z98.890 OTHER SPECIFIED POSTPROCEDURAL STATES: Chronic | ICD-10-CM

## 2025-04-29 DIAGNOSIS — Z98.41 CATARACT EXTRACTION STATUS, RIGHT EYE: Chronic | ICD-10-CM

## 2025-04-29 DIAGNOSIS — Z95.5 PRESENCE OF CORONARY ANGIOPLASTY IMPLANT AND GRAFT: Chronic | ICD-10-CM

## 2025-04-29 PROBLEM — I73.9 PERIPHERAL VASCULAR DISEASE, UNSPECIFIED: Chronic | Status: INACTIVE | Noted: 2022-10-06 | Resolved: 2025-04-29

## 2025-04-29 LAB
ANION GAP SERPL CALC-SCNC: 15 MMOL/L — HIGH (ref 7–14)
BUN SERPL-MCNC: 34 MG/DL — HIGH (ref 10–20)
CALCIUM SERPL-MCNC: 9.9 MG/DL — SIGNIFICANT CHANGE UP (ref 8.4–10.5)
CHLORIDE SERPL-SCNC: 106 MMOL/L — SIGNIFICANT CHANGE UP (ref 98–110)
CO2 SERPL-SCNC: 21 MMOL/L — SIGNIFICANT CHANGE UP (ref 17–32)
CREAT SERPL-MCNC: 1.7 MG/DL — HIGH (ref 0.7–1.5)
EGFR: 37 ML/MIN/1.73M2 — LOW
EGFR: 37 ML/MIN/1.73M2 — LOW
GLUCOSE SERPL-MCNC: 112 MG/DL — HIGH (ref 70–99)
HCT VFR BLD CALC: 40.6 % — LOW (ref 42–52)
HGB BLD-MCNC: 13.8 G/DL — LOW (ref 14–18)
MCHC RBC-ENTMCNC: 30.5 PG — SIGNIFICANT CHANGE UP (ref 27–31)
MCHC RBC-ENTMCNC: 34 G/DL — SIGNIFICANT CHANGE UP (ref 32–37)
MCV RBC AUTO: 89.8 FL — SIGNIFICANT CHANGE UP (ref 80–94)
NRBC BLD AUTO-RTO: 0 /100 WBCS — SIGNIFICANT CHANGE UP (ref 0–0)
PLATELET # BLD AUTO: 204 K/UL — SIGNIFICANT CHANGE UP (ref 130–400)
PMV BLD: 11.4 FL — HIGH (ref 7.4–10.4)
POTASSIUM SERPL-MCNC: 4.2 MMOL/L — SIGNIFICANT CHANGE UP (ref 3.5–5)
POTASSIUM SERPL-SCNC: 4.2 MMOL/L — SIGNIFICANT CHANGE UP (ref 3.5–5)
RBC # BLD: 4.52 M/UL — LOW (ref 4.7–6.1)
RBC # FLD: 14.6 % — HIGH (ref 11.5–14.5)
SODIUM SERPL-SCNC: 142 MMOL/L — SIGNIFICANT CHANGE UP (ref 135–146)
WBC # BLD: 9.8 K/UL — SIGNIFICANT CHANGE UP (ref 4.8–10.8)
WBC # FLD AUTO: 9.8 K/UL — SIGNIFICANT CHANGE UP (ref 4.8–10.8)

## 2025-04-29 PROCEDURE — 93320 DOPPLER ECHO COMPLETE: CPT

## 2025-04-29 PROCEDURE — 93320 DOPPLER ECHO COMPLETE: CPT | Mod: 26

## 2025-04-29 PROCEDURE — 93454 CORONARY ARTERY ANGIO S&I: CPT | Mod: 26

## 2025-04-29 PROCEDURE — 93454 CORONARY ARTERY ANGIO S&I: CPT

## 2025-04-29 PROCEDURE — C1887: CPT

## 2025-04-29 PROCEDURE — C1894: CPT

## 2025-04-29 PROCEDURE — 85027 COMPLETE CBC AUTOMATED: CPT

## 2025-04-29 PROCEDURE — C1769: CPT

## 2025-04-29 PROCEDURE — 93325 DOPPLER ECHO COLOR FLOW MAPG: CPT | Mod: 26

## 2025-04-29 PROCEDURE — 93312 ECHO TRANSESOPHAGEAL: CPT

## 2025-04-29 PROCEDURE — 80048 BASIC METABOLIC PNL TOTAL CA: CPT

## 2025-04-29 PROCEDURE — 36415 COLL VENOUS BLD VENIPUNCTURE: CPT

## 2025-04-29 PROCEDURE — 93312 ECHO TRANSESOPHAGEAL: CPT | Mod: 26,XU

## 2025-04-29 PROCEDURE — 93325 DOPPLER ECHO COLOR FLOW MAPG: CPT

## 2025-04-29 RX ORDER — CEPHALEXIN 250 MG/1
1 CAPSULE ORAL
Refills: 0 | DISCHARGE

## 2025-04-29 RX ORDER — APIXABAN 2.5 MG/1
1 TABLET, FILM COATED ORAL
Refills: 0 | DISCHARGE

## 2025-04-29 RX ORDER — ASPIRIN 325 MG
324 TABLET ORAL ONCE
Refills: 0 | Status: COMPLETED | OUTPATIENT
Start: 2025-04-29 | End: 2025-04-29

## 2025-04-29 RX ADMIN — Medication 324 MILLIGRAM(S): at 10:51

## 2025-04-29 RX ADMIN — Medication 75 MILLILITER(S): at 10:52

## 2025-04-29 NOTE — DISCHARGE NOTE NURSING/CASE MANAGEMENT/SOCIAL WORK - FINANCIAL ASSISTANCE
[facility] provides services at a reduced cost to those who are determined to be eligible through [facility]’s financial assistance program. Information regarding [facility]’s financial assistance program can be found by going to [email or web address] or by calling [phone].

## 2025-04-29 NOTE — ASU DISCHARGE PLAN (ADULT/PEDIATRIC) - ASU DC SPECIAL INSTRUCTIONSFT
Discharge Instructions as follows:  - Continue medical regimen as prescribed to prevent chest pain.  - Continue baby ASA, Nifedipine, Statin   - Resume Eliquis tomorrow AM 4/30  - Instructed to call 911 if chest pain, shortness of breath or bleeding from access site.  - No heavy lifting > 10lbs x 1 week.  - No driving x 24 hours.  - No baths, swimming pools x 1 week, may shower  - Low sodium low fat low cholesterol diet  - Follow-up with Cardiologist in 1-2 weeks after discharge  - Soreness or tenderness at the site is possible, it will diminish over time. You may take Tylenol every 4-6 hours as needed. Nothing stronger is needed. NO Motrin/Ibuprofen  - Any questions call cardiac cath lab [phone]

## 2025-04-29 NOTE — DISCHARGE NOTE NURSING/CASE MANAGEMENT/SOCIAL WORK - PATIENT PORTAL LINK FT
You can access the FollowMyHealth Patient Portal offered by [facility] by registering at the following website: [email or web address]. By joining [employer]’s FollowMyHealth portal, you will also be able to view your health information using other applications (apps) compatible with our system.

## 2025-04-29 NOTE — DISCHARGE NOTE NURSING/CASE MANAGEMENT/SOCIAL WORK - NSCORESITESY/N_GEN_A_CORE_RD
Provider Comments  Provider Comments:   PT NO SHOW FOR APPT      Signatures   Electronically signed by :  Maria Elena Jauregui, ; May  2 2016  6:27PM EST                       (Author)    Electronically signed by : Loren Castañeda, ; May 11 2016  8:13PM EST                       (Author)    Electronically signed by : Rupa Bryant DO; May 16 2016  2:20PM EST                       (Author)
No

## 2025-04-29 NOTE — ASU DISCHARGE PLAN (ADULT/PEDIATRIC) - CARE PROVIDER_API CALL
Alpesh Prabhakar  Interventional Cardiology  [address] [place] [address], [address]  [place] [address]  Phone: [phone]  Fax: [phone]  Follow Up Time: 1 week    Margarito Herman  Cardiovascular Disease  [address] [place] [address], [facility] [address]  [place] [address]  Phone: [phone]  Fax: [phone]  Follow Up Time: 2 weeks

## 2025-04-29 NOTE — ASU PATIENT PROFILE, ADULT - PAIN SCALE PREFERRED, PROFILE
EXAM: CT Abdomen and Pelvis WITH contrast  

INDICATION: Left flank pain for one hour, nausea

COMPARISON: None.

TECHNIQUE: Abdomen and pelvis were scanned utilizing a multidetector helical

scanner from the lung base to the pubic symphysis after administration of IV

contrast. Coronal and sagittal reformations were obtained.  Routine protocol is

performed.

            IV CONTRAST: None.

            ORAL CONTRAST: Water

            RADIATION DOSE: Total DLP: 398.35 mGy*cm

             Estimated effective dose: (DLP x 0.015 x size factor) mSv

            COMPLICATIONS: None



FINDINGS:



LINES and TUBES: None.



LOWER THORAX:  Unremarkable



HEPATOBILIARY:      No focal hepatic lesions. No biliary ductal dilation. 



GALLBLADDER: No radio-opaque stones or sludge.  No wall thickening.



SPLEEN: No splenomegaly. 



PANCREAS: No focal masses or ductal dilatation.  



ADRENALS: No adrenal nodules    



KIDNEYS/URETERS:      No cystic or solid mass lesions.  



3 mm stone in the left distal ureter (series 3, image 134) with mild left

hydroureter. No hydronephrosis.



No other renal or ureteral stones on either side.



GI TRACT: No abnormal distention, wall thickening, or evidence of bowel

obstruction.  Small hiatal hernia.  Sigmoid diverticulosis without evidence of

acute diverticulitis.



PELVIC ORGANS/BLADDER: Unremarkable.



LYMPH NODES: No lymphadenopathy.



VESSELS: Unremarkable.



PERITONEUM / RETROPERITONEUM: No free air or fluid. There is fatty stranding at

the root of the mesentery with a few prominent mesenteric lymph nodes.



BONES: Unremarkable.



SOFT TISSUES: Unremarkable.            



IMPRESSION: 

1.  3 mm stone in the left distal ureter with mild left hydroureter.



2.  Fatty stranding at the root of the mesentery ("brayan mesentery"

appearance). This is nonspecific and may be due to inflammatory conditions

(such as retractile mesenteritis) or lymphoproliferative diseases. The clinical

significance is unclear.



Consider follow-up CT of the abdomen in 6 months to ensure stability.





Kevin Beltre MD



Signed by: Dr. Kevin Beltre M.D. on 5/12/2018 11:55 AM
numerical 0-10

## 2025-04-29 NOTE — ASU PATIENT PROFILE, ADULT - FALL HARM RISK - HARM RISK INTERVENTIONS

## 2025-04-29 NOTE — CHART NOTE - NSCHARTNOTEFT_GEN_A_CORE
INDICATION:  Severe AS, MR    IMPRESSION: Severe AS, Moderate MR and TR    PROCEDURE: Transesophageal echocardiogram    Primary Physician: Roxane Mchugh  Assistant: Dr. FAB fournier    ANESTHESIA TYPE:   - As documented by anesthesia     CONDITION:  [  ] Critical  [  ] Serious  [  ] Fair  [ X ] Good    ESTIMATED BLOOD LOSS: None    COMPLICATIONS: None    FINDINGS:    After risks and benefits of procedures were explained, informed consent was obtained and placed in chart. Refer to Anesthesia note for sedation details.  The LAURA probe was passed into the esophagus without difficulty.  Transesophageal and transgastric images were obtained.  The LAURA probe was removed without difficulty and examined.  There was no evidence for bleeding.  The patient tolerated the procedure well without any immediate LAURA-related complications.      Preliminary Findings:  LA: Dilated  DINORA: Left atrial appendage was clear of clot and spontaneous echo contrast.    LV: LVEF was estimated at 55%  MV: Moderate MR, Prolapsed P3, no evidence of MS.   AV: Severe AS, Mild AI  RA: Mild enlargement   RV: Normal Size and function  Wires / Catheters: None seen  TV: Moderate TR.   IAS: no PFO. No shunt by color flow doppler.   Pericardial / Pleural Effusion: None seen  Aorta: dilation of the aortic root and ascending aorta    PLAN OF CARE:    Full report to follow
PRE-OP DIAGNOSIS:    Severe AS    PROCEDURE:     [x] Coronary Angiogram     [x] LHC     [] LVG     [] RHC     [] Intervention (see below)         PHYSICIAN:  Dr Prabhakar    ASSISTANT: Dr MARTY Velasco     PROCEDURE DESCRIPTION:     Consent:      [x] Patient     [] Family Member     []  Used        Anesthesia:     [] General     [x] Sedation     [x] Local        Access & Closure:     [x] 6 Fr Right Radial Artery (D stat closure)     [] Fr Femoral Artery     [] Fr Femoral Vein     [] Fr Brachial Vein       IV Contrast: 35 mL        Intervention: None      Implants: None       FINDINGS:     Coronary Dominance: Right      LM: Moderate atherosclerosis 40% stenosis    LAD: Severely calcified vessel, mild atherosclerosis, patent prior stent.    CX: Severely calcified vessel, mild atherosclerosis  OM1: Ostial 95% stenosis    RCA: Severely calcified vessel, mild atherosclerosis  RPDA: Mild atherosclerosis        ESTIMATED BLOOD LOSS: < 10 mL        CONDITION:     [x] Good     [] Fair     [] Critical        SPECIMEN REMOVED: N/A       POST-OP DIAGNOSIS:      [x] 1 Vessel Coronary Artery Disease. OM1 95% stenosis, patent prior stents.         PLAN OF CARE:     [x] D/C Home Today     [x] Follow up with Structural heart team for possible TAVR    [x] Medications: Aspirin 81 mg qd, Pravastatin 40mg qd and Nifedipine XL 60 mg qd. Resume Eliquis from tomorrow.     [x] IV Fluids: IV NS @ 75 cc/hr for 2 hours
Pre-procedure Assessment:    Procedure:  LAURA  Indication:  AS and MR evaluation  Cardiologist: Dr. Herman/ Dr. Prabhakar    Chart reviewed.  No contraindications.

## 2025-04-29 NOTE — ASU DISCHARGE PLAN (ADULT/PEDIATRIC) - PROVIDER TOKENS
PROVIDER:[TOKEN:[[id number]],FOLLOWUP:[1 week]],PROVIDER:[TOKEN:[[id number]],FOLLOWUP:[2 weeks]]

## 2025-04-29 NOTE — ASU DISCHARGE PLAN (ADULT/PEDIATRIC) - FINANCIAL ASSISTANCE
[facility] provides services at a reduced cost to those who are determined to be eligible through [facility]’s financial assistance program. Information regarding [facility]’s financial assistance program can be found by going to [email or web address] or by calling [phone].

## 2025-04-30 DIAGNOSIS — I25.10 ATHEROSCLEROTIC HEART DISEASE OF NATIVE CORONARY ARTERY WITHOUT ANGINA PECTORIS: ICD-10-CM

## 2025-04-30 DIAGNOSIS — I10 ESSENTIAL (PRIMARY) HYPERTENSION: ICD-10-CM

## 2025-04-30 DIAGNOSIS — E78.5 HYPERLIPIDEMIA, UNSPECIFIED: ICD-10-CM

## 2025-04-30 DIAGNOSIS — I35.0 NONRHEUMATIC AORTIC (VALVE) STENOSIS: ICD-10-CM

## 2025-04-30 DIAGNOSIS — Z95.5 PRESENCE OF CORONARY ANGIOPLASTY IMPLANT AND GRAFT: ICD-10-CM

## 2025-05-01 ENCOUNTER — APPOINTMENT (OUTPATIENT)
Dept: CARDIOLOGY | Facility: CLINIC | Age: 89
End: 2025-05-01
Payer: MEDICARE

## 2025-05-01 ENCOUNTER — NON-APPOINTMENT (OUTPATIENT)
Age: 89
End: 2025-05-01

## 2025-05-01 VITALS
DIASTOLIC BLOOD PRESSURE: 70 MMHG | WEIGHT: 165 LBS | BODY MASS INDEX: 23.62 KG/M2 | HEART RATE: 75 BPM | HEIGHT: 70 IN | SYSTOLIC BLOOD PRESSURE: 114 MMHG

## 2025-05-01 DIAGNOSIS — I48.92 UNSPECIFIED ATRIAL FLUTTER: ICD-10-CM

## 2025-05-01 PROCEDURE — 93000 ELECTROCARDIOGRAM COMPLETE: CPT

## 2025-05-01 PROCEDURE — 99214 OFFICE O/P EST MOD 30 MIN: CPT | Mod: 25

## 2025-05-04 LAB
ANION GAP SERPL CALC-SCNC: 10 MMOL/L
BASOPHILS # BLD AUTO: 0.03 K/UL
BASOPHILS NFR BLD AUTO: 0.4 %
BUN SERPL-MCNC: 34 MG/DL
CALCIUM SERPL-MCNC: 9.1 MG/DL
CHLORIDE SERPL-SCNC: 106 MMOL/L
CO2 SERPL-SCNC: 25 MMOL/L
CREAT SERPL-MCNC: 1.7 MG/DL
EGFRCR SERPLBLD CKD-EPI 2021: 37 ML/MIN/1.73M2
EOSINOPHIL # BLD AUTO: 0.32 K/UL
EOSINOPHIL NFR BLD AUTO: 4.6 %
GLUCOSE SERPL-MCNC: 114 MG/DL
HCT VFR BLD CALC: 41.2 %
HGB BLD-MCNC: 13.4 G/DL
IMM GRANULOCYTES NFR BLD AUTO: 0.3 %
LYMPHOCYTES # BLD AUTO: 1.02 K/UL
LYMPHOCYTES NFR BLD AUTO: 14.5 %
MAN DIFF?: NORMAL
MCHC RBC-ENTMCNC: 30.9 PG
MCHC RBC-ENTMCNC: 32.5 G/DL
MCV RBC AUTO: 94.9 FL
MONOCYTES # BLD AUTO: 0.83 K/UL
MONOCYTES NFR BLD AUTO: 11.8 %
NEUTROPHILS # BLD AUTO: 4.81 K/UL
NEUTROPHILS NFR BLD AUTO: 68.4 %
PLATELET # BLD AUTO: 193 K/UL
PMV BLD AUTO: 0 /100 WBCS
PMV BLD: 11.5 FL
POTASSIUM SERPL-SCNC: 4.4 MMOL/L
RBC # BLD: 4.34 M/UL
RBC # FLD: 14.7 %
SODIUM SERPL-SCNC: 141 MMOL/L
WBC # FLD AUTO: 7.03 K/UL

## 2025-05-08 ENCOUNTER — APPOINTMENT (OUTPATIENT)
Dept: CARDIOLOGY | Facility: CLINIC | Age: 89
End: 2025-05-08
Payer: MEDICARE

## 2025-05-08 VITALS
WEIGHT: 165 LBS | RESPIRATION RATE: 16 BRPM | SYSTOLIC BLOOD PRESSURE: 134 MMHG | DIASTOLIC BLOOD PRESSURE: 77 MMHG | HEART RATE: 72 BPM | TEMPERATURE: 97.7 F | HEIGHT: 70 IN | BODY MASS INDEX: 23.62 KG/M2 | OXYGEN SATURATION: 92 %

## 2025-05-08 DIAGNOSIS — I35.0 NONRHEUMATIC AORTIC (VALVE) STENOSIS: ICD-10-CM

## 2025-05-08 DIAGNOSIS — I10 ESSENTIAL (PRIMARY) HYPERTENSION: ICD-10-CM

## 2025-05-08 DIAGNOSIS — I25.10 ATHEROSCLEROTIC HEART DISEASE OF NATIVE CORONARY ARTERY W/OUT ANGINA PECTORIS: ICD-10-CM

## 2025-05-08 PROCEDURE — 99214 OFFICE O/P EST MOD 30 MIN: CPT

## 2025-05-09 PROBLEM — E78.5 HYPERLIPIDEMIA, UNSPECIFIED: Chronic | Status: ACTIVE | Noted: 2025-04-29

## 2025-05-09 PROBLEM — I25.10 ATHEROSCLEROTIC HEART DISEASE OF NATIVE CORONARY ARTERY WITHOUT ANGINA PECTORIS: Chronic | Status: ACTIVE | Noted: 2025-04-29

## 2025-05-14 ENCOUNTER — NON-APPOINTMENT (OUTPATIENT)
Age: 89
End: 2025-05-14

## 2025-05-19 ENCOUNTER — APPOINTMENT (OUTPATIENT)
Dept: PULMONOLOGY | Facility: CLINIC | Age: 89
End: 2025-05-19

## 2025-06-06 ENCOUNTER — NON-APPOINTMENT (OUTPATIENT)
Age: 89
End: 2025-06-06

## 2025-06-12 ENCOUNTER — RESULT REVIEW (OUTPATIENT)
Age: 89
End: 2025-06-12

## 2025-06-12 ENCOUNTER — OUTPATIENT (OUTPATIENT)
Dept: OUTPATIENT SERVICES | Facility: HOSPITAL | Age: 89
LOS: 1 days | End: 2025-06-12
Payer: MEDICARE

## 2025-06-12 VITALS
HEART RATE: 74 BPM | DIASTOLIC BLOOD PRESSURE: 80 MMHG | HEIGHT: 70 IN | SYSTOLIC BLOOD PRESSURE: 130 MMHG | OXYGEN SATURATION: 98 % | WEIGHT: 164.91 LBS | RESPIRATION RATE: 16 BRPM | TEMPERATURE: 98 F

## 2025-06-12 DIAGNOSIS — I35.0 NONRHEUMATIC AORTIC (VALVE) STENOSIS: ICD-10-CM

## 2025-06-12 DIAGNOSIS — Z01.818 ENCOUNTER FOR OTHER PREPROCEDURAL EXAMINATION: ICD-10-CM

## 2025-06-12 DIAGNOSIS — Z98.890 OTHER SPECIFIED POSTPROCEDURAL STATES: Chronic | ICD-10-CM

## 2025-06-12 DIAGNOSIS — Z95.5 PRESENCE OF CORONARY ANGIOPLASTY IMPLANT AND GRAFT: Chronic | ICD-10-CM

## 2025-06-12 DIAGNOSIS — Z98.41 CATARACT EXTRACTION STATUS, RIGHT EYE: Chronic | ICD-10-CM

## 2025-06-12 LAB
A1C WITH ESTIMATED AVERAGE GLUCOSE RESULT: 6.2 % — HIGH (ref 4–5.6)
ALBUMIN SERPL ELPH-MCNC: 4.4 G/DL — SIGNIFICANT CHANGE UP (ref 3.5–5.2)
ALP SERPL-CCNC: 67 U/L — SIGNIFICANT CHANGE UP (ref 30–115)
ALT FLD-CCNC: 11 U/L — SIGNIFICANT CHANGE UP (ref 0–41)
ANION GAP SERPL CALC-SCNC: 12 MMOL/L — SIGNIFICANT CHANGE UP (ref 7–14)
APPEARANCE UR: ABNORMAL
APTT BLD: 30.6 SEC — SIGNIFICANT CHANGE UP (ref 27–39.2)
AST SERPL-CCNC: 18 U/L — SIGNIFICANT CHANGE UP (ref 0–41)
BACTERIA # UR AUTO: NEGATIVE /HPF — SIGNIFICANT CHANGE UP
BASOPHILS # BLD AUTO: 0.04 K/UL — SIGNIFICANT CHANGE UP (ref 0–0.2)
BASOPHILS NFR BLD AUTO: 0.6 % — SIGNIFICANT CHANGE UP (ref 0–1)
BILIRUB SERPL-MCNC: 0.5 MG/DL — SIGNIFICANT CHANGE UP (ref 0.2–1.2)
BILIRUB UR-MCNC: NEGATIVE — SIGNIFICANT CHANGE UP
BLD GP AB SCN SERPL QL: SIGNIFICANT CHANGE UP
BUN SERPL-MCNC: 40 MG/DL — HIGH (ref 10–20)
CALCIUM SERPL-MCNC: 9.7 MG/DL — SIGNIFICANT CHANGE UP (ref 8.4–10.5)
CAST: 0 /LPF — SIGNIFICANT CHANGE UP (ref 0–4)
CHLORIDE SERPL-SCNC: 108 MMOL/L — SIGNIFICANT CHANGE UP (ref 98–110)
CO2 SERPL-SCNC: 22 MMOL/L — SIGNIFICANT CHANGE UP (ref 17–32)
COLOR SPEC: YELLOW — SIGNIFICANT CHANGE UP
CREAT SERPL-MCNC: 1.8 MG/DL — HIGH (ref 0.7–1.5)
DIFF PNL FLD: ABNORMAL
EGFR: 35 ML/MIN/1.73M2 — LOW
EGFR: 35 ML/MIN/1.73M2 — LOW
EOSINOPHIL # BLD AUTO: 0.13 K/UL — SIGNIFICANT CHANGE UP (ref 0–0.7)
EOSINOPHIL NFR BLD AUTO: 1.9 % — SIGNIFICANT CHANGE UP (ref 0–8)
ESTIMATED AVERAGE GLUCOSE: 131 MG/DL — HIGH (ref 68–114)
GLUCOSE SERPL-MCNC: 94 MG/DL — SIGNIFICANT CHANGE UP (ref 70–99)
GLUCOSE UR QL: NEGATIVE MG/DL — SIGNIFICANT CHANGE UP
HCT VFR BLD CALC: 42 % — SIGNIFICANT CHANGE UP (ref 42–52)
HGB BLD-MCNC: 14.1 G/DL — SIGNIFICANT CHANGE UP (ref 14–18)
IMM GRANULOCYTES NFR BLD AUTO: 0.3 % — SIGNIFICANT CHANGE UP (ref 0.1–0.3)
INR BLD: 0.98 RATIO — SIGNIFICANT CHANGE UP (ref 0.65–1.3)
KETONES UR QL: NEGATIVE MG/DL — SIGNIFICANT CHANGE UP
LEUKOCYTE ESTERASE UR-ACNC: ABNORMAL
LYMPHOCYTES # BLD AUTO: 0.82 K/UL — LOW (ref 1.2–3.4)
LYMPHOCYTES # BLD AUTO: 11.7 % — LOW (ref 20.5–51.1)
MCHC RBC-ENTMCNC: 29.6 PG — SIGNIFICANT CHANGE UP (ref 27–31)
MCHC RBC-ENTMCNC: 33.6 G/DL — SIGNIFICANT CHANGE UP (ref 32–37)
MCV RBC AUTO: 88.2 FL — SIGNIFICANT CHANGE UP (ref 80–94)
MONOCYTES # BLD AUTO: 0.83 K/UL — HIGH (ref 0.1–0.6)
MONOCYTES NFR BLD AUTO: 11.8 % — HIGH (ref 1.7–9.3)
MRSA PCR RESULT.: NEGATIVE — SIGNIFICANT CHANGE UP
NEUTROPHILS # BLD AUTO: 5.17 K/UL — SIGNIFICANT CHANGE UP (ref 1.4–6.5)
NEUTROPHILS NFR BLD AUTO: 73.7 % — SIGNIFICANT CHANGE UP (ref 42.2–75.2)
NITRITE UR-MCNC: NEGATIVE — SIGNIFICANT CHANGE UP
NRBC BLD AUTO-RTO: 0 /100 WBCS — SIGNIFICANT CHANGE UP (ref 0–0)
NT-PROBNP SERPL-SCNC: 1511 PG/ML — HIGH (ref 0–300)
PH UR: 5.5 — SIGNIFICANT CHANGE UP (ref 5–8)
PLATELET # BLD AUTO: 195 K/UL — SIGNIFICANT CHANGE UP (ref 130–400)
PMV BLD: 11.9 FL — HIGH (ref 7.4–10.4)
POTASSIUM SERPL-MCNC: 4.9 MMOL/L — SIGNIFICANT CHANGE UP (ref 3.5–5)
POTASSIUM SERPL-SCNC: 4.9 MMOL/L — SIGNIFICANT CHANGE UP (ref 3.5–5)
PROT SERPL-MCNC: 6.5 G/DL — SIGNIFICANT CHANGE UP (ref 6–8)
PROT UR-MCNC: 100 MG/DL
PROTHROM AB SERPL-ACNC: 11.6 SEC — SIGNIFICANT CHANGE UP (ref 9.95–12.87)
RBC # BLD: 4.76 M/UL — SIGNIFICANT CHANGE UP (ref 4.7–6.1)
RBC # FLD: 15.3 % — HIGH (ref 11.5–14.5)
RBC CASTS # UR COMP ASSIST: 64 /HPF — HIGH (ref 0–4)
SODIUM SERPL-SCNC: 142 MMOL/L — SIGNIFICANT CHANGE UP (ref 135–146)
SP GR SPEC: 1.02 — SIGNIFICANT CHANGE UP (ref 1–1.03)
SQUAMOUS # UR AUTO: 3 /HPF — SIGNIFICANT CHANGE UP (ref 0–5)
UROBILINOGEN FLD QL: 0.2 MG/DL — SIGNIFICANT CHANGE UP (ref 0.2–1)
WBC # BLD: 7.01 K/UL — SIGNIFICANT CHANGE UP (ref 4.8–10.8)
WBC # FLD AUTO: 7.01 K/UL — SIGNIFICANT CHANGE UP (ref 4.8–10.8)
WBC UR QL: 90 /HPF — HIGH (ref 0–5)

## 2025-06-12 PROCEDURE — 87086 URINE CULTURE/COLONY COUNT: CPT

## 2025-06-12 PROCEDURE — 87641 MR-STAPH DNA AMP PROBE: CPT

## 2025-06-12 PROCEDURE — 93005 ELECTROCARDIOGRAM TRACING: CPT

## 2025-06-12 PROCEDURE — 71046 X-RAY EXAM CHEST 2 VIEWS: CPT

## 2025-06-12 PROCEDURE — 80053 COMPREHEN METABOLIC PANEL: CPT

## 2025-06-12 PROCEDURE — 86900 BLOOD TYPING SEROLOGIC ABO: CPT

## 2025-06-12 PROCEDURE — 85610 PROTHROMBIN TIME: CPT

## 2025-06-12 PROCEDURE — 36415 COLL VENOUS BLD VENIPUNCTURE: CPT

## 2025-06-12 PROCEDURE — 87640 STAPH A DNA AMP PROBE: CPT

## 2025-06-12 PROCEDURE — 85025 COMPLETE CBC W/AUTO DIFF WBC: CPT

## 2025-06-12 PROCEDURE — 71046 X-RAY EXAM CHEST 2 VIEWS: CPT | Mod: 26

## 2025-06-12 PROCEDURE — 99214 OFFICE O/P EST MOD 30 MIN: CPT | Mod: 25

## 2025-06-12 PROCEDURE — 85730 THROMBOPLASTIN TIME PARTIAL: CPT

## 2025-06-12 PROCEDURE — 86850 RBC ANTIBODY SCREEN: CPT

## 2025-06-12 PROCEDURE — 86901 BLOOD TYPING SEROLOGIC RH(D): CPT

## 2025-06-12 PROCEDURE — 83036 HEMOGLOBIN GLYCOSYLATED A1C: CPT

## 2025-06-12 PROCEDURE — 83880 ASSAY OF NATRIURETIC PEPTIDE: CPT

## 2025-06-12 PROCEDURE — 93010 ELECTROCARDIOGRAM REPORT: CPT

## 2025-06-12 PROCEDURE — 81001 URINALYSIS AUTO W/SCOPE: CPT

## 2025-06-12 NOTE — H&P PST ADULT - NSICDXPASTSURGICALHX_GEN_ALL_CORE_FT
PAST SURGICAL HISTORY:  H/O heart artery stent 4 or 5    H/O hemorrhoidectomy     Status post cataract extraction of both eyes with insertion of intraocular lens      PAST SURGICAL HISTORY:  H/O cystoscopy     H/O heart artery stent 4 or 5    H/O hemorrhoidectomy     Status post cataract extraction of both eyes with insertion of intraocular lens

## 2025-06-12 NOTE — H&P PST ADULT - NSICDXPASTMEDICALHX_GEN_ALL_CORE_FT
PAST MEDICAL HISTORY:  Afib     Bladder cancer     CAD (coronary artery disease)     HLD (hyperlipidemia)     Hypertension     Spinal stenosis at L4-L5 level

## 2025-06-12 NOTE — H&P PST ADULT - HISTORY OF PRESENT ILLNESS
89 Y/O MALE PT TO PAST WITH HX  CAD, AORTIC STENOSIS, C/O INCREASED FATIGUE              PT NOW FOR SCHEDULED PROCEDURE ( TAVR) . PT DENIES ANY CP SOB PALP COUGH DYSURIA FEVER URI. PT ABLE TO ZULAY 1-2 FOS W/O SOB  Anesthesia Alert  NO--Difficult Airway  NO--History of neck surgery or radiation  NO--Limited ROM of neck  NO--History of Malignant hyperthermia  NO--Personal or family history of Pseudocholinesterase deficiency.  NO--Prior Anesthesia Complication  NO--Latex Allergy  NO--Loose teeth  NO--History of Rheumatoid Arthritis  NO--ZACK  NO--Bleeding risk  NO--Other_____   89 Y/O MALE PT TO PAST WITH HX  CAD, AORTIC STENOSIS, C/O INCREASED FATIGUE PAST 6 MO  PT NOW FOR SCHEDULED PROCEDURE ( TAVR) . PT DENIES ANY CP SOB PALP COUGH DYSURIA FEVER URI. P  Anesthesia Alert  NO--Difficult Airway  NO--History of neck surgery or radiation  NO--Limited ROM of neck  NO--History of Malignant hyperthermia  NO--Personal or family history of Pseudocholinesterase deficiency.  NO--Prior Anesthesia Complication  NO--Latex Allergy  NO--Loose teeth  NO--History of Rheumatoid Arthritis  NO--ZACK  NO--Bleeding risk  NO--Other_____   89 Y/O MALE PT TO PAST WITH HX  CAD, AORTIC STENOSIS, C/O INCREASED FATIGUE PAST 6 MO  PT NOW FOR SCHEDULED PROCEDURE ( TAVR) . PT DENIES ANY CP SOB PALP COUGH DYSURIA FEVER URI. PT AMBULATES SLOWLY WITH WALKER WITH ASSISTANCE   Anesthesia Alert  NO--Difficult Airway  NO--History of neck surgery or radiation  NO--Limited ROM of neck  NO--History of Malignant hyperthermia  NO--Personal or family history of Pseudocholinesterase deficiency.  NO--Prior Anesthesia Complication  NO--Latex Allergy  NO--Loose teeth  NO--History of Rheumatoid Arthritis  NO--ZACK  NO--Bleeding risk  NO--Other_____  RCRI CLASS I  DASI 3.6 METS

## 2025-06-12 NOTE — H&P PST ADULT - MUSCULOSKELETAL
normal/ROM intact/normal gait/strength 5/5 bilateral upper extremities/strength 5/5 bilateral lower extremities normal/ROM intact/decreased ROM/normal gait/strength 5/5 bilateral upper extremities

## 2025-06-13 DIAGNOSIS — Z01.818 ENCOUNTER FOR OTHER PREPROCEDURAL EXAMINATION: ICD-10-CM

## 2025-06-13 DIAGNOSIS — I35.0 NONRHEUMATIC AORTIC (VALVE) STENOSIS: ICD-10-CM

## 2025-06-13 LAB
CULTURE RESULTS: SIGNIFICANT CHANGE UP
SPECIMEN SOURCE: SIGNIFICANT CHANGE UP

## 2025-06-18 ENCOUNTER — APPOINTMENT (OUTPATIENT)
Dept: CARDIOTHORACIC SURGERY | Facility: HOSPITAL | Age: 89
End: 2025-06-18

## 2025-06-18 ENCOUNTER — TRANSCRIPTION ENCOUNTER (OUTPATIENT)
Age: 89
End: 2025-06-18

## 2025-06-18 ENCOUNTER — INPATIENT (INPATIENT)
Facility: HOSPITAL | Age: 89
LOS: 0 days | Discharge: HOME HEALTH | DRG: 267 | End: 2025-06-19
Attending: THORACIC SURGERY (CARDIOTHORACIC VASCULAR SURGERY) | Admitting: THORACIC SURGERY (CARDIOTHORACIC VASCULAR SURGERY)
Payer: MEDICARE

## 2025-06-18 VITALS — HEIGHT: 70 IN | RESPIRATION RATE: 18 BRPM | WEIGHT: 164.91 LBS

## 2025-06-18 DIAGNOSIS — Z98.41 CATARACT EXTRACTION STATUS, RIGHT EYE: Chronic | ICD-10-CM

## 2025-06-18 DIAGNOSIS — Z98.890 OTHER SPECIFIED POSTPROCEDURAL STATES: Chronic | ICD-10-CM

## 2025-06-18 DIAGNOSIS — Z95.5 PRESENCE OF CORONARY ANGIOPLASTY IMPLANT AND GRAFT: Chronic | ICD-10-CM

## 2025-06-18 DIAGNOSIS — I35.0 NONRHEUMATIC AORTIC (VALVE) STENOSIS: ICD-10-CM

## 2025-06-18 LAB
ACANTHOCYTES BLD QL SMEAR: SLIGHT — SIGNIFICANT CHANGE UP
ALBUMIN SERPL ELPH-MCNC: 3.7 G/DL — SIGNIFICANT CHANGE UP (ref 3.5–5.2)
ALP SERPL-CCNC: 53 U/L — SIGNIFICANT CHANGE UP (ref 30–115)
ALT FLD-CCNC: 8 U/L — SIGNIFICANT CHANGE UP (ref 0–41)
ANION GAP SERPL CALC-SCNC: 14 MMOL/L — SIGNIFICANT CHANGE UP (ref 7–14)
ANISOCYTOSIS BLD QL: SLIGHT — SIGNIFICANT CHANGE UP
APTT BLD: 31.1 SEC — SIGNIFICANT CHANGE UP (ref 27–39.2)
AST SERPL-CCNC: 14 U/L — SIGNIFICANT CHANGE UP (ref 0–41)
BASOPHILS # BLD AUTO: 0 K/UL — SIGNIFICANT CHANGE UP (ref 0–0.2)
BASOPHILS NFR BLD AUTO: 0 % — SIGNIFICANT CHANGE UP (ref 0–1)
BILIRUB SERPL-MCNC: 0.4 MG/DL — SIGNIFICANT CHANGE UP (ref 0.2–1.2)
BUN SERPL-MCNC: 36 MG/DL — HIGH (ref 10–20)
CALCIUM SERPL-MCNC: 8 MG/DL — LOW (ref 8.4–10.5)
CHLORIDE SERPL-SCNC: 105 MMOL/L — SIGNIFICANT CHANGE UP (ref 98–110)
CO2 SERPL-SCNC: 18 MMOL/L — SIGNIFICANT CHANGE UP (ref 17–32)
CREAT SERPL-MCNC: 1.5 MG/DL — SIGNIFICANT CHANGE UP (ref 0.7–1.5)
EGFR: 43 ML/MIN/1.73M2 — LOW
EGFR: 43 ML/MIN/1.73M2 — LOW
EOSINOPHIL # BLD AUTO: 0 K/UL — SIGNIFICANT CHANGE UP (ref 0–0.7)
EOSINOPHIL NFR BLD AUTO: 0 % — SIGNIFICANT CHANGE UP (ref 0–8)
GAS PNL BLDA: SIGNIFICANT CHANGE UP
GIANT PLATELETS BLD QL SMEAR: PRESENT — SIGNIFICANT CHANGE UP
GLUCOSE BLDC GLUCOMTR-MCNC: 136 MG/DL — HIGH (ref 70–99)
GLUCOSE SERPL-MCNC: 153 MG/DL — HIGH (ref 70–99)
HCT VFR BLD CALC: 32.5 % — LOW (ref 42–52)
HGB BLD-MCNC: 11.3 G/DL — LOW (ref 14–18)
INR BLD: 1 RATIO — SIGNIFICANT CHANGE UP (ref 0.65–1.3)
LYMPHOCYTES # BLD AUTO: 0 % — LOW (ref 20.5–51.1)
LYMPHOCYTES # BLD AUTO: 0 K/UL — LOW (ref 1.2–3.4)
MACROCYTES BLD QL: SLIGHT — SIGNIFICANT CHANGE UP
MANUAL SMEAR VERIFICATION: SIGNIFICANT CHANGE UP
MCHC RBC-ENTMCNC: 29.8 PG — SIGNIFICANT CHANGE UP (ref 27–31)
MCHC RBC-ENTMCNC: 34.8 G/DL — SIGNIFICANT CHANGE UP (ref 32–37)
MCV RBC AUTO: 85.8 FL — SIGNIFICANT CHANGE UP (ref 80–94)
MONOCYTES # BLD AUTO: 0.23 K/UL — SIGNIFICANT CHANGE UP (ref 0.1–0.6)
MONOCYTES NFR BLD AUTO: 1.8 % — SIGNIFICANT CHANGE UP (ref 1.7–9.3)
NEUTROPHILS # BLD AUTO: 12.47 K/UL — HIGH (ref 1.4–6.5)
NEUTROPHILS NFR BLD AUTO: 98.2 % — HIGH (ref 42.2–75.2)
NT-PROBNP SERPL-SCNC: 1648 PG/ML — HIGH (ref 0–300)
OVALOCYTES BLD QL SMEAR: SLIGHT — SIGNIFICANT CHANGE UP
PLAT MORPH BLD: NORMAL — SIGNIFICANT CHANGE UP
PLATELET # BLD AUTO: 148 K/UL — SIGNIFICANT CHANGE UP (ref 130–400)
PMV BLD: 10.6 FL — HIGH (ref 7.4–10.4)
POIKILOCYTOSIS BLD QL AUTO: SIGNIFICANT CHANGE UP
POTASSIUM SERPL-MCNC: 4.4 MMOL/L — SIGNIFICANT CHANGE UP (ref 3.5–5)
POTASSIUM SERPL-SCNC: 4.4 MMOL/L — SIGNIFICANT CHANGE UP (ref 3.5–5)
PROT SERPL-MCNC: 5.2 G/DL — LOW (ref 6–8)
PROTHROM AB SERPL-ACNC: 11.8 SEC — SIGNIFICANT CHANGE UP (ref 9.95–12.87)
RBC # BLD: 3.79 M/UL — LOW (ref 4.7–6.1)
RBC # FLD: 15.4 % — HIGH (ref 11.5–14.5)
RBC BLD AUTO: ABNORMAL
SCHISTOCYTES BLD QL AUTO: SLIGHT — SIGNIFICANT CHANGE UP
SODIUM SERPL-SCNC: 137 MMOL/L — SIGNIFICANT CHANGE UP (ref 135–146)
TARGETS BLD QL SMEAR: SLIGHT — SIGNIFICANT CHANGE UP
WBC # BLD: 12.7 K/UL — HIGH (ref 4.8–10.8)
WBC # FLD AUTO: 12.7 K/UL — HIGH (ref 4.8–10.8)

## 2025-06-18 PROCEDURE — C1887: CPT

## 2025-06-18 PROCEDURE — 93306 TTE W/DOPPLER COMPLETE: CPT

## 2025-06-18 PROCEDURE — 94010 BREATHING CAPACITY TEST: CPT

## 2025-06-18 PROCEDURE — 83735 ASSAY OF MAGNESIUM: CPT

## 2025-06-18 PROCEDURE — L8699: CPT

## 2025-06-18 PROCEDURE — 84295 ASSAY OF SERUM SODIUM: CPT

## 2025-06-18 PROCEDURE — 93005 ELECTROCARDIOGRAM TRACING: CPT

## 2025-06-18 PROCEDURE — 85014 HEMATOCRIT: CPT

## 2025-06-18 PROCEDURE — 85025 COMPLETE CBC W/AUTO DIFF WBC: CPT

## 2025-06-18 PROCEDURE — 99291 CRITICAL CARE FIRST HOUR: CPT | Mod: 25

## 2025-06-18 PROCEDURE — 82330 ASSAY OF CALCIUM: CPT

## 2025-06-18 PROCEDURE — 80053 COMPREHEN METABOLIC PANEL: CPT

## 2025-06-18 PROCEDURE — 82962 GLUCOSE BLOOD TEST: CPT

## 2025-06-18 PROCEDURE — 82803 BLOOD GASES ANY COMBINATION: CPT

## 2025-06-18 PROCEDURE — 85018 HEMOGLOBIN: CPT

## 2025-06-18 PROCEDURE — C1760: CPT

## 2025-06-18 PROCEDURE — 33361 REPLACE AORTIC VALVE PERQ: CPT | Mod: Q0

## 2025-06-18 PROCEDURE — C1889: CPT

## 2025-06-18 PROCEDURE — 83605 ASSAY OF LACTIC ACID: CPT

## 2025-06-18 PROCEDURE — 84132 ASSAY OF SERUM POTASSIUM: CPT

## 2025-06-18 PROCEDURE — 93307 TTE W/O DOPPLER COMPLETE: CPT

## 2025-06-18 PROCEDURE — 93010 ELECTROCARDIOGRAM REPORT: CPT | Mod: 77

## 2025-06-18 PROCEDURE — 33361 REPLACE AORTIC VALVE PERQ: CPT | Mod: 62,Q0

## 2025-06-18 PROCEDURE — C1769: CPT

## 2025-06-18 PROCEDURE — C1894: CPT

## 2025-06-18 PROCEDURE — 85730 THROMBOPLASTIN TIME PARTIAL: CPT

## 2025-06-18 PROCEDURE — 36415 COLL VENOUS BLD VENIPUNCTURE: CPT

## 2025-06-18 PROCEDURE — 83880 ASSAY OF NATRIURETIC PEPTIDE: CPT

## 2025-06-18 PROCEDURE — C9399: CPT

## 2025-06-18 PROCEDURE — 71045 X-RAY EXAM CHEST 1 VIEW: CPT

## 2025-06-18 PROCEDURE — 71045 X-RAY EXAM CHEST 1 VIEW: CPT | Mod: 26

## 2025-06-18 PROCEDURE — 93010 ELECTROCARDIOGRAM REPORT: CPT

## 2025-06-18 PROCEDURE — C1766: CPT

## 2025-06-18 PROCEDURE — 86923 COMPATIBILITY TEST ELECTRIC: CPT

## 2025-06-18 PROCEDURE — 85610 PROTHROMBIN TIME: CPT

## 2025-06-18 RX ORDER — ATORVASTATIN CALCIUM 80 MG/1
10 TABLET, FILM COATED ORAL AT BEDTIME
Refills: 0 | Status: DISCONTINUED | OUTPATIENT
Start: 2025-06-18 | End: 2025-06-19

## 2025-06-18 RX ORDER — ALBUMIN (HUMAN) 12.5 G/50ML
3000 INJECTION, SOLUTION INTRAVENOUS ONCE
Refills: 0 | Status: DISCONTINUED | OUTPATIENT
Start: 2025-06-18 | End: 2025-06-19

## 2025-06-18 RX ORDER — TAMSULOSIN HYDROCHLORIDE 0.4 MG/1
0.4 CAPSULE ORAL AT BEDTIME
Refills: 0 | Status: DISCONTINUED | OUTPATIENT
Start: 2025-06-18 | End: 2025-06-19

## 2025-06-18 RX ORDER — ACETAMINOPHEN 500 MG/5ML
650 LIQUID (ML) ORAL EVERY 6 HOURS
Refills: 0 | Status: DISCONTINUED | OUTPATIENT
Start: 2025-06-18 | End: 2025-06-19

## 2025-06-18 RX ORDER — CEFAZOLIN SODIUM IN 0.9 % NACL 3 G/100 ML
1000 INTRAVENOUS SOLUTION, PIGGYBACK (ML) INTRAVENOUS EVERY 8 HOURS
Refills: 0 | Status: COMPLETED | OUTPATIENT
Start: 2025-06-18 | End: 2025-06-19

## 2025-06-18 RX ORDER — NIFEDIPINE 30 MG
60 TABLET, EXTENDED RELEASE 24 HR ORAL DAILY
Refills: 0 | Status: DISCONTINUED | OUTPATIENT
Start: 2025-06-19 | End: 2025-06-19

## 2025-06-18 RX ORDER — FINASTERIDE 1 MG/1
5 TABLET, FILM COATED ORAL DAILY
Refills: 0 | Status: DISCONTINUED | OUTPATIENT
Start: 2025-06-19 | End: 2025-06-19

## 2025-06-18 RX ORDER — ASPIRIN 325 MG
81 TABLET ORAL DAILY
Refills: 0 | Status: DISCONTINUED | OUTPATIENT
Start: 2025-06-19 | End: 2025-06-19

## 2025-06-18 RX ORDER — ACETAMINOPHEN 500 MG/5ML
1000 LIQUID (ML) ORAL ONCE
Refills: 0 | Status: COMPLETED | OUTPATIENT
Start: 2025-06-18 | End: 2025-06-18

## 2025-06-18 RX ORDER — ONDANSETRON HCL/PF 4 MG/2 ML
4 VIAL (ML) INJECTION ONCE
Refills: 0 | Status: DISCONTINUED | OUTPATIENT
Start: 2025-06-18 | End: 2025-06-19

## 2025-06-18 RX ORDER — APIXABAN 2.5 MG/1
2.5 TABLET, FILM COATED ORAL
Refills: 0 | Status: DISCONTINUED | OUTPATIENT
Start: 2025-06-19 | End: 2025-06-19

## 2025-06-18 RX ORDER — CEPHALEXIN 250 MG/1
250 CAPSULE ORAL EVERY 24 HOURS
Refills: 0 | Status: DISCONTINUED | OUTPATIENT
Start: 2025-06-19 | End: 2025-06-19

## 2025-06-18 RX ORDER — POLYETHYLENE GLYCOL 3350 17 G/17G
17 POWDER, FOR SOLUTION ORAL DAILY
Refills: 0 | Status: DISCONTINUED | OUTPATIENT
Start: 2025-06-18 | End: 2025-06-19

## 2025-06-18 RX ADMIN — TAMSULOSIN HYDROCHLORIDE 0.4 MILLIGRAM(S): 0.4 CAPSULE ORAL at 21:51

## 2025-06-18 RX ADMIN — Medication 1000 MILLIGRAM(S): at 19:07

## 2025-06-18 RX ADMIN — ATORVASTATIN CALCIUM 10 MILLIGRAM(S): 80 TABLET, FILM COATED ORAL at 21:51

## 2025-06-18 RX ADMIN — Medication 100 MILLIGRAM(S): at 19:53

## 2025-06-18 RX ADMIN — Medication 400 MILLIGRAM(S): at 18:52

## 2025-06-18 NOTE — PATIENT PROFILE ADULT - FALL HARM RISK - HARM RISK INTERVENTIONS
Assistance with ambulation/Assistance OOB with selected safe patient handling equipment/Communicate Risk of Fall with Harm to all staff/Discuss with provider need for PT consult/Monitor gait and stability/Provide patient with walking aids - walker, cane, crutches/Reinforce activity limits and safety measures with patient and family/Sit up slowly, dangle for a short time, stand at bedside before walking/Tailored Fall Risk Interventions/Use of alarms - bed, chair and/or voice tab/Visual Cue: Yellow wristband and red socks/Bed in lowest position, wheels locked, appropriate side rails in place/Call bell, personal items and telephone in reach/Instruct patient to call for assistance before getting out of bed or chair/Non-slip footwear when patient is out of bed/[place] to call system/Physically safe environment - no spills, clutter or unnecessary equipment/Purposeful Proactive Rounding/Room/bathroom lighting operational, light cord in reach

## 2025-06-18 NOTE — DISCHARGE NOTE PROVIDER - HOSPITAL COURSE
89-year-old male with PMH permanent Afib (on Eliquis, last dose 4/26/25), bladder CA, emphysema, HTN, HLD,  moderate to severe AS, CAD s/p PCI/ARGELIA p/m LAD in 2009, CKD (baseline Cr 1.6-2.0), with recent ED visit for increased weakness and SOB, was noted to be anemic with Hb 9.3, and mild increase in his Troponin. Pt followed up with hematologist and drop in Hb felt to be reactive, Hb has improved since then, 13.3 now, no evidence of bleed.  TTE 02/2025: EF 55% with severe AS. moderate MR, moderate TR. [facility] april 2025 with ostial cx disease and patent stent. Here now for elective TAVR due to progression of valvular heart disease symptomatology.       A discussion was conducted with the patient regarding the importance and benefits of participating in a cardiothoracic rehabilitation program. Contact information given to the patient. Patient instructed to inquire further upon seeing their cardiologist post op.    A MCOT device was applied to the patient prior to discharge. Patient informed it is used to monitor for potential rhythm disturbances for 30 days after TAVR. Patient instructed on care and use of device with comprehesion confirmed by the teach back method.      89-year-old male with PMH permanent Afib (on Eliquis, last dose 4/26/25), bladder CA, emphysema, HTN, HLD,  moderate to severe AS, CAD s/p PCI/ARGELIA p/m LAD in 2009, CKD (baseline Cr 1.6-2.0), with recent ED visit for increased weakness and SOB, was noted to be anemic with Hb 9.3, and mild increase in his Troponin. Pt followed up with hematologist and drop in Hb felt to be reactive, Hb has improved since then, 13.3 now, no evidence of bleed.  TTE 02/2025: EF 55% with severe AS. moderate MR, moderate TR. [facility] april 2025 with ostial cx disease and patent stent. On 6/18, the patient underwent an elective TAVR due to progression of valvular heart disease symptomatology. He had an uneventful postop course and was discharged home in stable condition on POD # 1.      A discussion was conducted with the patient regarding the importance and benefits of participating in a cardiothoracic rehabilitation program. Contact information given to the patient. Patient instructed to inquire further upon seeing their cardiologist post op.    A MCOT device was applied to the patient prior to discharge. Patient informed it is used to monitor for potential rhythm disturbances for 30 days after TAVR. Patient instructed on care and use of device with comprehesion confirmed by the teach back method.

## 2025-06-18 NOTE — DISCHARGE NOTE PROVIDER - PROVIDER TOKENS
FREE:[LAST:[[facility]PHONE:[phone]],FAX:[(   )    -],ADDRESS:[[address] [place] [address], [place] [address]]],PROVIDER:[TOKEN:[[id number]],FOLLOWUP:[Routine]],PROVIDER:[TOKEN:[[id number]],FOLLOWUP:[Routine]]

## 2025-06-18 NOTE — ASU PATIENT PROFILE, ADULT - FALL HARM RISK - HARM RISK INTERVENTIONS

## 2025-06-18 NOTE — DISCHARGE NOTE PROVIDER - NSDCFUSCHEDAPPT_GEN_ALL_CORE_FT
Richar Mata  [facility] Physician [facility]  CTSURG [place] [address]  Scheduled Appointment: 06/18/2025    Margarito Herman  [facility]  CARDIOLOGY [address]  Scheduled Appointment: 07/10/2025     Jil Zaidi  [facility] Physician [facility]  CTSURG [address]  Scheduled Appointment: 06/27/2025    Margarito Herman  [facility]  CARDIOLOGY [address]  Scheduled Appointment: 07/10/2025

## 2025-06-18 NOTE — DISCHARGE NOTE PROVIDER - NSDCMRMEDTOKEN_GEN_ALL_CORE_FT
aspirin 81 mg oral tablet, chewable: 1 tab(s) orally once a day  calcitriol 0.25 mcg oral capsule: 1 cap(s) orally 3 times a week  cephalexin 250 mg oral capsule: 1 cap(s) orally once a day  D3 25 mcg (1000 intl units) oral capsule: 1 cap(s) orally once a day  Eliquis 2.5 mg oral tablet: 1 tab(s) orally 2 times a day  finasteride 5 mg oral tablet: 1 tab(s) orally once a day  mometasone 0.1% topical lotion: Apply topically to affected area once a day  NIFEdipine 60 mg oral tablet, extended release: 1 tab(s) orally once a day  PRAVASTATIN 40MG TABLETS: TAKE 1 TABLET BY MOUTH EVERY DAY AT BEDTIME  tamsulosin 0.4 mg oral capsule: 1 cap(s) orally once a day (at bedtime)   aspirin 81 mg oral tablet, chewable: 1 tab(s) orally once a day  calcitriol 0.25 mcg oral capsule: 1 cap(s) orally 3 times a week  cephalexin 250 mg oral capsule: 1 cap(s) orally once a day  D3 25 mcg (1000 intl units) oral capsule: 1 cap(s) orally once a day  Eliquis 2.5 mg oral tablet: 1 tab(s) orally 2 times a day  finasteride 5 mg oral tablet: 1 tab(s) orally once a day  mometasone 0.1% topical lotion: Apply topically to affected area once a day  NIFEdipine 60 mg oral tablet, extended release: 1 tab(s) orally once a day  pantoprazole 40 mg oral delayed release tablet: 1 tab(s) orally once a day  PRAVASTATIN 40MG TABLETS: TAKE 1 TABLET BY MOUTH EVERY DAY AT BEDTIME  tamsulosin 0.4 mg oral capsule: 1 cap(s) orally once a day (at bedtime)  Tylenol 325 mg oral tablet: 2 tab(s) orally every 6 hours As needed Temp greater or equal to 38C (100.4F), Mild Pain (1 - 3)

## 2025-06-18 NOTE — BRIEF OPERATIVE NOTE - NSICDXBRIEFPROCEDURE_GEN_ALL_CORE_FT
PROCEDURES:  TAVR, percutaneous femoral approach, using prosthetic valve 18-Jun-2025 17:22:05 utilizing 23 mm [employer] transcatheter heart valve Diego Fuentes

## 2025-06-18 NOTE — ASU PATIENT PROFILE, ADULT - NSICDXPASTSURGICALHX_GEN_ALL_CORE_FT
PAST SURGICAL HISTORY:  H/O cystoscopy     H/O heart artery stent 4 or 5 15 years ago    H/O hemorrhoidectomy     Status post cataract extraction of both eyes with insertion of intraocular lens

## 2025-06-18 NOTE — DISCHARGE NOTE PROVIDER - CARE PROVIDER_API CALL
[facility] [place],   [address], [address]  [place] [address]  Phone: [phone]  Fax: (   )    -  Follow Up Time:     Margarito Herman  Cardiovascular Disease  [address], [address]  [place] [address]  Phone: [phone]  Fax: [phone]  Follow Up Time: Routine    Cordell Haddad  Internal Medicine  [address] [place] [address]  Phone: [phone]  Fax: [phone]  Follow Up Time: Routine

## 2025-06-18 NOTE — PROGRESS NOTE ADULT - SUBJECTIVE AND OBJECTIVE BOX
CTU Attending Progress Daily Note     18 Jun 2025 18:26  Procedure: TAVR on 06-18-25 @ 18:26      History of Spinal stenosis at L4-L5 level    Afib    Hypertension    Bladder cancer    HLD (hyperlipidemia)    CAD (coronary artery disease)      HPI:        Hospital Course:    OBJECTIVE:  ICU Vital Signs Last 24 Hrs  T(C): 35.3 (18 Jun 2025 18:00), Max: 35.3 (18 Jun 2025 18:00)  T(F): 95.6 (18 Jun 2025 18:00), Max: 95.6 (18 Jun 2025 18:00)  HR: 61 (18 Jun 2025 18:00) (53 - 61)  BP: --  BP(mean): --  ABP: 96/66 (18 Jun 2025 17:45) (96/66 - 96/66)  ABP(mean): 78 (18 Jun 2025 17:45) (78 - 78)  RR: 14 (18 Jun 2025 18:00) (12 - 18)  SpO2: 99% (18 Jun 2025 18:00) (98% - 99%)    O2 Parameters below as of 18 Jun 2025 18:00  Patient On (Oxygen Delivery Method): nasal cannula  O2 Flow (L/min): 3        I&O's Summary    18 Jun 2025 07:01  -  18 Jun 2025 18:26  --------------------------------------------------------  IN: 0 mL / OUT: 0 mL / NET: 0 mL      I&O's Detail    18 Jun 2025 07:01  -  18 Jun 2025 18:26  --------------------------------------------------------  IN:  Total IN: 0 mL    OUT:    sodium chloride 0.9%: 0 mL  Total OUT: 0 mL    Total NET: 0 mL        Adult Advanced Hemodynamics Last 24 Hrs  CVP(mm Hg): --  CVP(cm H2O): --  CO: --  CI: --  PA: --  PA(mean): --  PCWP: --  SVR: --  SVRI: --  PVR: --  PVRI: --    CAPILLARY BLOOD GLUCOSE      POCT Blood Glucose.: 136 mg/dL (18 Jun 2025 17:45)    LABS:  ABG - ( 18 Jun 2025 17:52 )  pH, Arterial: 7.33  pH, Blood: x     /  pCO2: 35    /  pO2: 119   / HCO3: 18    / Base Excess: -6.7  /  SaO2: 98.8                                    11.3   12.70 )-----------( 148      ( 18 Jun 2025 17:55 )             32.5                 Home Medications:  aspirin 81 mg oral tablet, chewable: 1 tab(s) orally once a day (18 Jun 2025 10:54)  calcitriol 0.25 mcg oral capsule: 1 cap(s) orally 3 times a week (18 Jun 2025 10:54)  cephalexin 250 mg oral capsule: 1 cap(s) orally once a day (18 Jun 2025 10:54)  D3 25 mcg (1000 intl units) oral capsule: 1 cap(s) orally once a day (18 Jun 2025 10:54)  Eliquis 2.5 mg oral tablet: 1 tab(s) orally 2 times a day (18 Jun 2025 10:54)  finasteride 5 mg oral tablet: 1 tab(s) orally once a day (18 Jun 2025 10:54)  mometasone 0.1% topical lotion: Apply topically to affected area once a day (18 Jun 2025 10:36)  NIFEdipine 60 mg oral tablet, extended release: 1 tab(s) orally once a day (18 Jun 2025 10:54)  PRAVASTATIN 40MG TABLETS: TAKE 1 TABLET BY MOUTH EVERY DAY AT BEDTIME (18 Jun 2025 10:36)  tamsulosin 0.4 mg oral capsule: 1 cap(s) orally once a day (at bedtime) (18 Jun 2025 10:36)    HOSPITAL MEDICATIONS:  MEDICATIONS  (STANDING):  albumin human  5% IVPB 3000 milliLiter(s) IV Intermittent once  atorvastatin 10 milliGRAM(s) Oral at bedtime  ceFAZolin   IVPB 1000 milliGRAM(s) IV Intermittent every 8 hours  ondansetron Injectable 4 milliGRAM(s) IV Push once  polyethylene glycol 3350 17 Gram(s) Oral daily  sodium chloride 0.9%. 1000 milliLiter(s) (10 mL/Hr) IV Continuous <Continuous>  tamsulosin 0.4 milliGRAM(s) Oral at bedtime    MEDICATIONS  (PRN):  acetaminophen     Tablet .. 650 milliGRAM(s) Oral every 6 hours PRN Temp greater or equal to 38C (100.4F), Mild Pain (1 - 3)  acetaminophen   IVPB .. 1000 milliGRAM(s) IV Intermittent once PRN Moderate Pain (4 - 6)    RADIOLOGY:  Chest X-ray Reviewed    REVIEW OF SYSTEMS:  CONSTITUTIONAL: [X] all negative; [ ] weakness, [ ] fevers, [ ] chills  EYES/ENT: [X] all negative; [ ] visual changes, [ ] vertigo, [ ] throat pain   NECK: [X] all negative; [ ] pain, [ ] stiffness  RESPIRATORY: [] all negative, [ ] cough, [ ] wheezing, [ ] hemoptysis, [ ] shortness of breath  CARDIOVASCULAR: [] all negative; [ ] chest pain, [ ] palpitations, [ ] orthopnea  GASTROINTESTINAL: [X] all negative; [ ]abdominal pain, [ ] nausea, [ ] vomiting, [ ] hematemesis, [ ] diarrhea, [ ] constipation, [ ] melena, [ ] hematochezia.  GENITOURINARY: [X] all negative; [ ] dysuria, [ ] frequency, [ ] hematuria  NEUROLOGICAL: [X] all negative; [ ] numbness, [ ] weakness  SKIN: [X] all negative; [ ] itching, [ ] burning, [ ] rashes, [ ] lesions   All other review of systems is negative unless indicated above.  [  ] Unable to assess ROS because     PHYSICAL EXAM:          CONSTITUTIONAL: Well-developed; well-nourished; in no acute distress.   	SKIN: warm, dry  	HEAD: Normocephalic; atraumatic.  	EYES: PERRL, EOM, no conj injection, sclera clear  	ENT: No nasal discharge; airway clear.  	NECK: Supple; non tender.   	CARD: S1, S2 normal; no murmurs, gallops, or rubs. Regular rate and rhythm.  no carotid bruits  	RESP: CTA B/L; good air movement No wheezes, rales or rhonchi.  	ABD: Soft, not tender, not distended,  no rebound or guarding, bowel sounds present  	EXT: Normal ROM.  No clubbing, cyanosis or edema.   warm and well perfused.  pulses palpable;  Groin sites C/D/I, no hematoma  	NEURO: Alert, awake, motor 5/5 R, 5/5 L    Assessment  89y  S/p TAVR    Plan:  Monitor neuro status post optes   Pain control as needed    S/p TAVR  Hx A fib  Monitor rhythm  EKG post op and in 4hrs  Telemetry monitoring  TVP in place - pacing as needed  Monitor for coronary ischemia  Monitor groin sites for bleeding  Post op labs to check for bleeding / coagulopathy  Volume resuscitation as needed  Echo tomorrow  Cardene as needed for Systolic < 140s  Resume home BP meds when able  Resume home AC if applicable tomorrow    Oxygen via NRB  Wean to NC as tolerated  IS / Chest PT / OOBTC / Ambulate    Advance diet once awake  Bowel regimen  GI PPx    High risk for LEYDI post procedure  Monitor UOP / lytes / creatinine    Periop ppx abx    DVT ppx    Upon my evaluation, the above patient has a high probabillity of imminent or life threatening deterioration due to a high risk for Shock, Cardiogenic shock, arrythmias, acute blood loss, acute kidney injury and acute pulmonary insufficiency which required my direct attention, intervention, and personal management.  Total time was 55 minutes which includes review of radiology results, ordering, interpreting and reviewing diagnostic studies / lab tests with immediate assessment and treatment, consultant collaboration on findings and treatment options, monitoring for potential decompensation, obtaining history from family, EMT, nursing home staff and/or treating physicians; directing the titration of pressors, oxygen support devices, fluid resucitation, interpretation of cardiac output measurements, interpretation of radiological assessments, interpretation of EKG / rhythm strips, telemetry.  This time did not overlap with the management of other patients or performing separately reportable procedures.      Management of this patient was discussed with the CT surgery attending and mid-level providers.    I ackknowledge the use of copied documentation.  All copy forward documentation is my own and has been reviewed and revised as appropriate.  If no changes were made, it is because that information remains the same.

## 2025-06-18 NOTE — DISCHARGE NOTE PROVIDER - NSDCHHHOMEBOUND_GEN_ALL_CORE
Shortness of breath with minimal ambulation Shortness of breath with minimal ambulation/Other, specify...

## 2025-06-18 NOTE — DISCHARGE NOTE PROVIDER - CARE PROVIDERS DIRECT ADDRESSES
,DirectAddress_Unknown,[email or web address]

## 2025-06-18 NOTE — BRIEF OPERATIVE NOTE - OPERATION/FINDINGS
as per dictation as per dictation  New valve was a perfect fit onto the annulus  No paravalvular leak  Postimplantation gradient 3 mmHg  Extremely calcified and tortuous vessels  Tolerated well

## 2025-06-19 ENCOUNTER — TRANSCRIPTION ENCOUNTER (OUTPATIENT)
Age: 89
End: 2025-06-19

## 2025-06-19 ENCOUNTER — RESULT REVIEW (OUTPATIENT)
Age: 89
End: 2025-06-19

## 2025-06-19 VITALS
RESPIRATION RATE: 26 BRPM | SYSTOLIC BLOOD PRESSURE: 120 MMHG | HEART RATE: 85 BPM | DIASTOLIC BLOOD PRESSURE: 60 MMHG | OXYGEN SATURATION: 94 %

## 2025-06-19 LAB
ALBUMIN SERPL ELPH-MCNC: 3.5 G/DL — SIGNIFICANT CHANGE UP (ref 3.5–5.2)
ALP SERPL-CCNC: 54 U/L — SIGNIFICANT CHANGE UP (ref 30–115)
ALT FLD-CCNC: 8 U/L — SIGNIFICANT CHANGE UP (ref 0–41)
ANION GAP SERPL CALC-SCNC: 14 MMOL/L — SIGNIFICANT CHANGE UP (ref 7–14)
AST SERPL-CCNC: 17 U/L — SIGNIFICANT CHANGE UP (ref 0–41)
BASOPHILS # BLD AUTO: 0.01 K/UL — SIGNIFICANT CHANGE UP (ref 0–0.2)
BASOPHILS NFR BLD AUTO: 0.1 % — SIGNIFICANT CHANGE UP (ref 0–1)
BILIRUB SERPL-MCNC: 0.6 MG/DL — SIGNIFICANT CHANGE UP (ref 0.2–1.2)
BUN SERPL-MCNC: 39 MG/DL — HIGH (ref 10–20)
CALCIUM SERPL-MCNC: 8.3 MG/DL — LOW (ref 8.4–10.5)
CHLORIDE SERPL-SCNC: 101 MMOL/L — SIGNIFICANT CHANGE UP (ref 98–110)
CO2 SERPL-SCNC: 16 MMOL/L — LOW (ref 17–32)
CREAT SERPL-MCNC: 1.8 MG/DL — HIGH (ref 0.7–1.5)
EGFR: 35 ML/MIN/1.73M2 — LOW
EGFR: 35 ML/MIN/1.73M2 — LOW
EOSINOPHIL # BLD AUTO: 0 K/UL — SIGNIFICANT CHANGE UP (ref 0–0.7)
EOSINOPHIL NFR BLD AUTO: 0 % — SIGNIFICANT CHANGE UP (ref 0–8)
GLUCOSE SERPL-MCNC: 132 MG/DL — HIGH (ref 70–99)
HCT VFR BLD CALC: 32.1 % — LOW (ref 42–52)
HGB BLD-MCNC: 11.1 G/DL — LOW (ref 14–18)
IMM GRANULOCYTES NFR BLD AUTO: 0.6 % — HIGH (ref 0.1–0.3)
LYMPHOCYTES # BLD AUTO: 0.32 K/UL — LOW (ref 1.2–3.4)
LYMPHOCYTES # BLD AUTO: 3.1 % — LOW (ref 20.5–51.1)
MAGNESIUM SERPL-MCNC: 2.2 MG/DL — SIGNIFICANT CHANGE UP (ref 1.8–2.4)
MCHC RBC-ENTMCNC: 29.7 PG — SIGNIFICANT CHANGE UP (ref 27–31)
MCHC RBC-ENTMCNC: 34.6 G/DL — SIGNIFICANT CHANGE UP (ref 32–37)
MCV RBC AUTO: 85.8 FL — SIGNIFICANT CHANGE UP (ref 80–94)
MONOCYTES # BLD AUTO: 0.6 K/UL — SIGNIFICANT CHANGE UP (ref 0.1–0.6)
MONOCYTES NFR BLD AUTO: 5.9 % — SIGNIFICANT CHANGE UP (ref 1.7–9.3)
NEUTROPHILS # BLD AUTO: 9.17 K/UL — HIGH (ref 1.4–6.5)
NEUTROPHILS NFR BLD AUTO: 90.3 % — HIGH (ref 42.2–75.2)
NRBC BLD AUTO-RTO: 0 /100 WBCS — SIGNIFICANT CHANGE UP (ref 0–0)
PLATELET # BLD AUTO: 118 K/UL — LOW (ref 130–400)
PMV BLD: 11.6 FL — HIGH (ref 7.4–10.4)
POTASSIUM SERPL-MCNC: 4.3 MMOL/L — SIGNIFICANT CHANGE UP (ref 3.5–5)
POTASSIUM SERPL-SCNC: 4.3 MMOL/L — SIGNIFICANT CHANGE UP (ref 3.5–5)
PROT SERPL-MCNC: 5.4 G/DL — LOW (ref 6–8)
RBC # BLD: 3.74 M/UL — LOW (ref 4.7–6.1)
RBC # FLD: 15.5 % — HIGH (ref 11.5–14.5)
SODIUM SERPL-SCNC: 131 MMOL/L — LOW (ref 135–146)
WBC # BLD: 10.16 K/UL — SIGNIFICANT CHANGE UP (ref 4.8–10.8)
WBC # FLD AUTO: 10.16 K/UL — SIGNIFICANT CHANGE UP (ref 4.8–10.8)

## 2025-06-19 PROCEDURE — 71045 X-RAY EXAM CHEST 1 VIEW: CPT | Mod: 26

## 2025-06-19 PROCEDURE — 99233 SBSQ HOSP IP/OBS HIGH 50: CPT

## 2025-06-19 PROCEDURE — 93010 ELECTROCARDIOGRAM REPORT: CPT

## 2025-06-19 PROCEDURE — 93306 TTE W/DOPPLER COMPLETE: CPT | Mod: 26

## 2025-06-19 PROCEDURE — 99232 SBSQ HOSP IP/OBS MODERATE 35: CPT

## 2025-06-19 RX ORDER — ACETAMINOPHEN 500 MG/5ML
2 LIQUID (ML) ORAL
Qty: 0 | Refills: 0 | DISCHARGE
Start: 2025-06-19

## 2025-06-19 RX ADMIN — Medication 81 MILLIGRAM(S): at 12:59

## 2025-06-19 RX ADMIN — APIXABAN 2.5 MILLIGRAM(S): 2.5 TABLET, FILM COATED ORAL at 05:40

## 2025-06-19 RX ADMIN — Medication 1 APPLICATION(S): at 12:59

## 2025-06-19 RX ADMIN — Medication 40 MILLIGRAM(S): at 12:57

## 2025-06-19 RX ADMIN — POLYETHYLENE GLYCOL 3350 17 GRAM(S): 17 POWDER, FOR SOLUTION ORAL at 12:59

## 2025-06-19 RX ADMIN — Medication 100 MILLIGRAM(S): at 05:39

## 2025-06-19 RX ADMIN — Medication 60 MILLIGRAM(S): at 05:40

## 2025-06-19 RX ADMIN — Medication 100 MILLIGRAM(S): at 12:00

## 2025-06-19 RX ADMIN — FINASTERIDE 5 MILLIGRAM(S): 1 TABLET, FILM COATED ORAL at 12:58

## 2025-06-19 RX ADMIN — CEPHALEXIN 250 MILLIGRAM(S): 250 CAPSULE ORAL at 05:40

## 2025-06-19 NOTE — PROGRESS NOTE ADULT - SUBJECTIVE AND OBJECTIVE BOX
OPERATIVE PROCEDURE(s):   tavr            POD #1    SURGEON(s): roberta    SUBJECTIVE ASSESSMENT: no complaints and eager to go home    Vital Signs Last 24 Hrs  T(C): 35.8 (19 Jun 2025 12:00), Max: 36.3 (19 Jun 2025 04:00)  T(F): 96.5 (19 Jun 2025 12:00), Max: 97.3 (19 Jun 2025 04:00)  HR: 75 (19 Jun 2025 14:00) (53 - 86)  BP: 128/90 (19 Jun 2025 14:00) (108/72 - 146/72)  BP(mean): 104 (19 Jun 2025 14:00) (83 - 104)  RR: 28 (19 Jun 2025 14:00) (6 - 38)  SpO2: 96% (19 Jun 2025 14:00) (93% - 99%)    Parameters below as of 19 Jun 2025 14:00  Patient On (Oxygen Delivery Method): room air      06-18-25 @ 07:01  -  06-19-25 @ 07:00  --------------------------------------------------------  IN: 390 mL / OUT: 400 mL / NET: -10 mL    06-19-25 @ 07:01  -  06-19-25 @ 14:50  --------------------------------------------------------  IN: 50 mL / OUT: 200 mL / NET: -150 mL        Physical Exam  General: alert and oriented x 3  Chest: cta bl  CVS: s1s2  Abd: pos bs soft nt  GI/ :  Ext: no sig edema ; no groin hematoma    LABS:                        11.1   10.16 )-----------( 118      ( 19 Jun 2025 04:00 )             32.1     COUMADIN:   [ ] YES [x ] NO    PT/INR - ( 18 Jun 2025 17:55 )   PT: 11.80 sec;   INR: 1.00 ratio         PTT - ( 18 Jun 2025 17:55 )  PTT:31.1 sec  06-19    131[L]  |  101  |  39[H]  ----------------------------<  132[H]  4.3   |  16[L]  |  1.8[H]    Ca    8.3[L]      19 Jun 2025 04:00  Mg     2.2     06-19    TPro  5.4[L]  /  Alb  3.5  /  TBili  0.6  /  DBili  x   /  AST  17  /  ALT  8   /  AlkPhos  54  06-19    Urinalysis Basic - ( 19 Jun 2025 04:00 )    Color: x / Appearance: x / SG: x / pH: x  Gluc: 132 mg/dL / Ketone: x  / Bili: x / Urobili: x   Blood: x / Protein: x / Nitrite: x   Leuk Esterase: x / RBC: x / WBC x   Sq Epi: x / Non Sq Epi: x / Bacteria: x        MEDICATIONS  (STANDING):  albumin human  5% IVPB 3000 milliLiter(s) IV Intermittent once  apixaban 2.5 milliGRAM(s) Oral two times a day  aspirin enteric coated 81 milliGRAM(s) Oral daily  atorvastatin 10 milliGRAM(s) Oral at bedtime  cephalexin 250 milliGRAM(s) Oral every 24 hours  chlorhexidine 2% Cloths 1 Application(s) Topical daily  finasteride 5 milliGRAM(s) Oral daily  NIFEdipine XL 60 milliGRAM(s) Oral daily  ondansetron Injectable 4 milliGRAM(s) IV Push once  pantoprazole    Tablet 40 milliGRAM(s) Oral daily  polyethylene glycol 3350 17 Gram(s) Oral daily  tamsulosin 0.4 milliGRAM(s) Oral at bedtime    MEDICATIONS  (PRN):  acetaminophen     Tablet .. 650 milliGRAM(s) Oral every 6 hours PRN Temp greater or equal to 38C (100.4F), Mild Pain (1 - 3)      Allergies    sulfamethoxazole-trimethoprim (Vomiting; Nausea)    Intolerances        Ambulation/Activity Status:    amb well with pt    RADIOLOGY & ADDITIONAL TESTS:  ACC: [id number] EXAM:  XR CHEST PORTABLE ROUTINE 1V   ORDERED BY: ANTHONY BRIONES     PROCEDURE DATE:  06/19/2025          INTERPRETATION:  CLINICAL HISTORY: Follow-up.    COMPARISON: June 18, 2025.    TECHNIQUE: Portable frontal chest radiograph. Low lung volume.    FINDINGS:    Support devices: None.    Cardiac/mediastinum/hilum: Stable.    Lung parenchyma/Pleura: No focal parenchymal opacities, pleural   effusions, or pneumothorax.    Skeleton/soft tissues: Stable.      IMPRESSION: Low lung volume.    No radiographic evidence of acute cardiopulmonary disease.    --- End of Report ---        Assessment/Plan: Patient is a 89 yo M s/p TAVR POD # 1   cont present tx as per ct surgeon  s/p tavr - cont asa and pt is also being anticoagulated for a. fib  d/c'ed tvp and venous sheath- pt tolerated procedure well and ambulated well afterwards  hx of multiple prior uti's - cont prophylactic abx  htn- cont nifedipine  bph- cont proscar and flomax      Social Service Disposition:  home today with vns

## 2025-06-19 NOTE — PROGRESS NOTE ADULT - SUBJECTIVE AND OBJECTIVE BOX
CTU Attending Progress Daily Note     18 Jun 2025 18:26  Procedure: TAVR on 06-18-25 @ 18:26      History of Spinal stenosis at L4-L5 level    Afib    Hypertension    Bladder cancer    HLD (hyperlipidemia)    CAD (coronary artery disease)      HPI:        Hospital Course:    OBJECTIVE:  ICU Vital Signs Last 24 Hrs  T(C): 35.3 (18 Jun 2025 18:00), Max: 35.3 (18 Jun 2025 18:00)  T(F): 95.6 (18 Jun 2025 18:00), Max: 95.6 (18 Jun 2025 18:00)  HR: 61 (18 Jun 2025 18:00) (53 - 61)  BP: --  BP(mean): --  ABP: 96/66 (18 Jun 2025 17:45) (96/66 - 96/66)  ABP(mean): 78 (18 Jun 2025 17:45) (78 - 78)  RR: 14 (18 Jun 2025 18:00) (12 - 18)  SpO2: 99% (18 Jun 2025 18:00) (98% - 99%)    O2 Parameters below as of 18 Jun 2025 18:00  Patient On (Oxygen Delivery Method): nasal cannula  O2 Flow (L/min): 3        I&O's Summary    18 Jun 2025 07:01  -  18 Jun 2025 18:26  --------------------------------------------------------  IN: 0 mL / OUT: 0 mL / NET: 0 mL      I&O's Detail    18 Jun 2025 07:01  -  18 Jun 2025 18:26  --------------------------------------------------------  IN:  Total IN: 0 mL    OUT:    sodium chloride 0.9%: 0 mL  Total OUT: 0 mL    Total NET: 0 mL        Adult Advanced Hemodynamics Last 24 Hrs  CVP(mm Hg): --  CVP(cm H2O): --  CO: --  CI: --  PA: --  PA(mean): --  PCWP: --  SVR: --  SVRI: --  PVR: --  PVRI: --    CAPILLARY BLOOD GLUCOSE      POCT Blood Glucose.: 136 mg/dL (18 Jun 2025 17:45)    LABS:  ABG - ( 18 Jun 2025 17:52 )  pH, Arterial: 7.33  pH, Blood: x     /  pCO2: 35    /  pO2: 119   / HCO3: 18    / Base Excess: -6.7  /  SaO2: 98.8                                    11.3   12.70 )-----------( 148      ( 18 Jun 2025 17:55 )             32.5                 Home Medications:  aspirin 81 mg oral tablet, chewable: 1 tab(s) orally once a day (18 Jun 2025 10:54)  calcitriol 0.25 mcg oral capsule: 1 cap(s) orally 3 times a week (18 Jun 2025 10:54)  cephalexin 250 mg oral capsule: 1 cap(s) orally once a day (18 Jun 2025 10:54)  D3 25 mcg (1000 intl units) oral capsule: 1 cap(s) orally once a day (18 Jun 2025 10:54)  Eliquis 2.5 mg oral tablet: 1 tab(s) orally 2 times a day (18 Jun 2025 10:54)  finasteride 5 mg oral tablet: 1 tab(s) orally once a day (18 Jun 2025 10:54)  mometasone 0.1% topical lotion: Apply topically to affected area once a day (18 Jun 2025 10:36)  NIFEdipine 60 mg oral tablet, extended release: 1 tab(s) orally once a day (18 Jun 2025 10:54)  PRAVASTATIN 40MG TABLETS: TAKE 1 TABLET BY MOUTH EVERY DAY AT BEDTIME (18 Jun 2025 10:36)  tamsulosin 0.4 mg oral capsule: 1 cap(s) orally once a day (at bedtime) (18 Jun 2025 10:36)    HOSPITAL MEDICATIONS:  MEDICATIONS  (STANDING):  albumin human  5% IVPB 3000 milliLiter(s) IV Intermittent once  atorvastatin 10 milliGRAM(s) Oral at bedtime  ceFAZolin   IVPB 1000 milliGRAM(s) IV Intermittent every 8 hours  ondansetron Injectable 4 milliGRAM(s) IV Push once  polyethylene glycol 3350 17 Gram(s) Oral daily  sodium chloride 0.9%. 1000 milliLiter(s) (10 mL/Hr) IV Continuous <Continuous>  tamsulosin 0.4 milliGRAM(s) Oral at bedtime    MEDICATIONS  (PRN):  acetaminophen     Tablet .. 650 milliGRAM(s) Oral every 6 hours PRN Temp greater or equal to 38C (100.4F), Mild Pain (1 - 3)  acetaminophen   IVPB .. 1000 milliGRAM(s) IV Intermittent once PRN Moderate Pain (4 - 6)    RADIOLOGY:  Chest X-ray Reviewed    REVIEW OF SYSTEMS:  CONSTITUTIONAL: [X] all negative; [ ] weakness, [ ] fevers, [ ] chills  EYES/ENT: [X] all negative; [ ] visual changes, [ ] vertigo, [ ] throat pain   NECK: [X] all negative; [ ] pain, [ ] stiffness  RESPIRATORY: [] all negative, [ ] cough, [ ] wheezing, [ ] hemoptysis, [ ] shortness of breath  CARDIOVASCULAR: [] all negative; [ ] chest pain, [ ] palpitations, [ ] orthopnea  GASTROINTESTINAL: [X] all negative; [ ]abdominal pain, [ ] nausea, [ ] vomiting, [ ] hematemesis, [ ] diarrhea, [ ] constipation, [ ] melena, [ ] hematochezia.  GENITOURINARY: [X] all negative; [ ] dysuria, [ ] frequency, [ ] hematuria  NEUROLOGICAL: [X] all negative; [ ] numbness, [ ] weakness  SKIN: [X] all negative; [ ] itching, [ ] burning, [ ] rashes, [ ] lesions   All other review of systems is negative unless indicated above.  [  ] Unable to assess ROS because     PHYSICAL EXAM:          CONSTITUTIONAL: Well-developed; well-nourished; in no acute distress.   	SKIN: warm, dry  	HEAD: Normocephalic; atraumatic.  	EYES: PERRL, EOM, no conj injection, sclera clear  	ENT: No nasal discharge; airway clear.  	NECK: Supple; non tender.   	CARD: S1, S2 normal; no murmurs, gallops, or rubs. Regular rate and rhythm.  no carotid bruits  	RESP: CTA B/L; good air movement No wheezes, rales or rhonchi.  	ABD: Soft, not tender, not distended,  no rebound or guarding, bowel sounds present  	EXT: Normal ROM.  No clubbing, cyanosis or edema.   warm and well perfused.  pulses palpable;  Groin sites C/D/I, no hematoma  	NEURO: Alert, awake, motor 5/5 R, 5/5 L    Assessment  89y  S/p TAVR    Plan:  Monitor neuro status post optes   Pain control as needed    S/p TAVR  Hx A fib  Monitor rhythm  EKG post op and in 4hrs  Telemetry monitoring  TVP in place - pacing as needed  Monitor for coronary ischemia  Monitor groin sites for bleeding  Post op labs to check for bleeding / coagulopathy  Volume resuscitation as needed  Echo tomorrow  Cardene as needed for Systolic < 140s  Resume home BP meds when able  Resume home AC if applicable tomorrow    Oxygen via NRB  Wean to NC as tolerated  IS / Chest PT / OOBTC / Ambulate    Advance diet once awake  Bowel regimen  GI PPx    High risk for LEYDI post procedure  Monitor UOP / lytes / creatinine    Periop ppx abx    DVT ppx    Upon my evaluation, the above patient has a high probabillity of imminent or life threatening deterioration due to a high risk for Shock, Cardiogenic shock, arrythmias, acute blood loss, acute kidney injury and acute pulmonary insufficiency which required my direct attention, intervention, and personal management.  Total time was 55 minutes which includes review of radiology results, ordering, interpreting and reviewing diagnostic studies / lab tests with immediate assessment and treatment, consultant collaboration on findings and treatment options, monitoring for potential decompensation, obtaining history from family, EMT, nursing home staff and/or treating physicians; directing the titration of pressors, oxygen support devices, fluid resucitation, interpretation of cardiac output measurements, interpretation of radiological assessments, interpretation of EKG / rhythm strips, telemetry.  This time did not overlap with the management of other patients or performing separately reportable procedures.      Management of this patient was discussed with the CT surgery attending and mid-level providers.    I ackknowledge the use of copied documentation.  All copy forward documentation is my own and has been reviewed and revised as appropriate.  If no changes were made, it is because that information remains the same. CTU Attending Progress Daily Note     18 Jun 2025 18:26  Procedure: TAVR on 06-18-25 @ 18:26      History of Spinal stenosis at L4-L5 level    Afib    Hypertension    Bladder cancer    HLD (hyperlipidemia)    CAD (coronary artery disease)      HPI:  Hx as SOB afib taver yestrday    OBJECTIVE:  ICU Vital Signs Last 24 Hrs  T(C): 35.8 (19 Jun 2025 12:00), Max: 36.3 (19 Jun 2025 04:00)  T(F): 96.5 (19 Jun 2025 12:00), Max: 97.3 (19 Jun 2025 04:00)  HR: 85 (19 Jun 2025 15:00) (53 - 86)  BP: 120/60 (19 Jun 2025 15:00) (108/72 - 146/72)  BP(mean): 86 (19 Jun 2025 15:00) (83 - 104)  ABP: 122/71 (19 Jun 2025 00:00) (92/75 - 165/137)  ABP(mean): 91 (19 Jun 2025 00:00) (70 - 185)  RR: 26 (19 Jun 2025 15:00) (6 - 38)  SpO2: 94% (19 Jun 2025 15:00) (93% - 99%)    O2 Parameters below as of 19 Jun 2025 15:00  Patient On (Oxygen Delivery Method): room air          I&O's Summary    18 Jun 2025 07:01  -  19 Jun 2025 07:00  --------------------------------------------------------  IN: 390 mL / OUT: 400 mL / NET: -10 mL    19 Jun 2025 07:01  -  19 Jun 2025 16:05  --------------------------------------------------------  IN: 50 mL / OUT: 200 mL / NET: -150 mL      I&O's Detail    18 Jun 2025 07:01  -  19 Jun 2025 07:00  --------------------------------------------------------  IN:    IV PiggyBack: 150 mL    Oral Fluid: 240 mL  Total IN: 390 mL    OUT:    sodium chloride 0.9%: 0 mL    Voided (mL): 400 mL  Total OUT: 400 mL    Total NET: -10 mL      19 Jun 2025 07:01  -  19 Jun 2025 16:05  --------------------------------------------------------  IN:    IV PiggyBack: 50 mL  Total IN: 50 mL    OUT:    Voided (mL): 200 mL  Total OUT: 200 mL    Total NET: -150 mL        Adult Advanced Hemodynamics Last 24 Hrs  CVP(mm Hg): --  CVP(cm H2O): --  CO: --  CI: --  PA: --  PA(mean): --  PCWP: --  SVR: --  SVRI: --  PVR: --  PVRI: --    CAPILLARY BLOOD GLUCOSE      POCT Blood Glucose.: 136 mg/dL (18 Jun 2025 17:45)    LABS:  ABG - ( 18 Jun 2025 17:52 )  pH, Arterial: 7.33  pH, Blood: x     /  pCO2: 35    /  pO2: 119   / HCO3: 18    / Base Excess: -6.7  /  SaO2: 98.8                                    11.1   10.16 )-----------( 118      ( 19 Jun 2025 04:00 )             32.1     06-19    131[L]  |  101  |  39[H]  ----------------------------<  132[H]  4.3   |  16[L]  |  1.8[H]    Ca    8.3[L]      19 Jun 2025 04:00  Mg     2.2     06-19    TPro  5.4[L]  /  Alb  3.5  /  TBili  0.6  /  DBili  x   /  AST  17  /  ALT  8   /  AlkPhos  54  06-19    PT/INR - ( 18 Jun 2025 17:55 )   PT: 11.80 sec;   INR: 1.00 ratio         PTT - ( 18 Jun 2025 17:55 )  PTT:31.1 sec  Urinalysis Basic - ( 19 Jun 2025 04:00 )    Color: x / Appearance: x / SG: x / pH: x  Gluc: 132 mg/dL / Ketone: x  / Bili: x / Urobili: x   Blood: x / Protein: x / Nitrite: x   Leuk Esterase: x / RBC: x / WBC x   Sq Epi: x / Non Sq Epi: x / Bacteria: x        Home Medications:  aspirin 81 mg oral tablet, chewable: 1 tab(s) orally once a day (18 Jun 2025 10:54)  calcitriol 0.25 mcg oral capsule: 1 cap(s) orally 3 times a week (18 Jun 2025 10:54)  cephalexin 250 mg oral capsule: 1 cap(s) orally once a day (18 Jun 2025 10:54)  D3 25 mcg (1000 intl units) oral capsule: 1 cap(s) orally once a day (18 Jun 2025 10:54)  Eliquis 2.5 mg oral tablet: 1 tab(s) orally 2 times a day (18 Jun 2025 10:54)  finasteride 5 mg oral tablet: 1 tab(s) orally once a day (18 Jun 2025 10:54)  mometasone 0.1% topical lotion: Apply topically to affected area once a day (18 Jun 2025 10:36)  NIFEdipine 60 mg oral tablet, extended release: 1 tab(s) orally once a day (18 Jun 2025 10:54)  pantoprazole 40 mg oral delayed release tablet: 1 tab(s) orally once a day (19 Jun 2025 15:26)  PRAVASTATIN 40MG TABLETS: TAKE 1 TABLET BY MOUTH EVERY DAY AT BEDTIME (18 Jun 2025 10:36)  tamsulosin 0.4 mg oral capsule: 1 cap(s) orally once a day (at bedtime) (18 Jun 2025 10:36)  Tylenol 325 mg oral tablet: 2 tab(s) orally every 6 hours As needed Temp greater or equal to 38C (100.4F), Mild Pain (1 - 3) (19 Jun 2025 15:26)    HOSPITAL MEDICATIONS:  MEDICATIONS  (STANDING):  albumin human  5% IVPB 3000 milliLiter(s) IV Intermittent once  apixaban 2.5 milliGRAM(s) Oral two times a day  aspirin enteric coated 81 milliGRAM(s) Oral daily  atorvastatin 10 milliGRAM(s) Oral at bedtime  cephalexin 250 milliGRAM(s) Oral every 24 hours  chlorhexidine 2% Cloths 1 Application(s) Topical daily  finasteride 5 milliGRAM(s) Oral daily  NIFEdipine XL 60 milliGRAM(s) Oral daily  ondansetron Injectable 4 milliGRAM(s) IV Push once  pantoprazole    Tablet 40 milliGRAM(s) Oral daily  polyethylene glycol 3350 17 Gram(s) Oral daily  tamsulosin 0.4 milliGRAM(s) Oral at bedtime    MEDICATIONS  (PRN):  acetaminophen     Tablet .. 650 milliGRAM(s) Oral every 6 hours PRN Temp greater or equal to 38C (100.4F), Mild Pain (1 - 3)    RADIOLOGY:  Chest X-ray Reviewed    REVIEW OF SYSTEMS:  CONSTITUTIONAL: [X] all negative; [ ] weakness, [ ] fevers, [ ] chills  EYES/ENT: [X] all negative; [ ] visual changes, [ ] vertigo, [ ] throat pain   NECK: [X] all negative; [ ] pain, [ ] stiffness  RESPIRATORY: [] all negative, [ ] cough, [ ] wheezing, [ ] hemoptysis, [ ] shortness of breath  CARDIOVASCULAR: [] all negative; [ ] chest pain, [ ] palpitations, [ ] orthopnea  GASTROINTESTINAL: [X] all negative; [ ]abdominal pain, [ ] nausea, [ ] vomiting, [ ] hematemesis, [ ] diarrhea, [ ] constipation, [ ] melena, [ ] hematochezia.  GENITOURINARY: [X] all negative; [ ] dysuria, [ ] frequency, [ ] hematuria  NEUROLOGICAL: [X] all negative; [ ] numbness, [ ] weakness  SKIN: [X] all negative; [ ] itching, [ ] burning, [ ] rashes, [ ] lesions   All other review of systems is negative unless indicated above.  [  ] Unable to assess ROS because   PHYSICAL EXAM:          CONSTITUTIONAL: Well-developed; well-nourished; in no acute distress.   	SKIN: warm, dry  	HEAD: Normocephalic; atraumatic.  	EYES: PERRL, EOM, no conj injection, sclera clear  	ENT: No nasal discharge; airway clear.  	NECK: Supple; non tender.   	CARD: S1, S2 normal; no murmurs, gallops, or rubs. Regular rate and rhythm.  no carotid bruits  	RESP: CTA B/L; good air movement No wheezes, rales or rhonchi.  	ABD: Soft, not tender, not distended,  no rebound or guarding, bowel sounds present  	EXT: Normal ROM.  No clubbing, cyanosis or edema.   warm and well perfused.  pulses palpable;  Groin sites C/D/I, no hematoma  	NEURO: Alert, awake, motor 5/5 R, 5/5 L    Assessment  89y  S/p TAVR    Plan:  Monitor neuro status post optes   Pain control as needed    S/p TAVR  Hx A fib  Monitor rhythm  EKG post op and in 4hrs  Telemetry monitoring  TVP in place - pacing as needed  Monitor for coronary ischemia  Monitor groin sites for bleeding  Post op labs to check for bleeding / coagulopathy  Volume resuscitation as needed  Echo tomorrow  Cardene as needed for Systolic < 140s  Resume home BP meds when able  Resume home AC if applicable tomorrow    Oxygen via NRB  Wean to NC as tolerated  IS / Chest PT / OOBTC / Ambulate    Advance diet once awake  Bowel regimen  GI PPx    High risk for LEYDI post procedure  Monitor UOP / lytes / creatinine    Periop ppx abx    DVT ppx    Upon my evaluation, the above patient has a high probabillity of imminent or life threatening deterioration due to a high risk for Shock, Cardiogenic shock, arrythmias, acute blood loss, acute kidney injury and acute pulmonary insufficiency which required my direct attention, intervention, and personal management.  Total time was 55 minutes which includes review of radiology results, ordering, interpreting and reviewing diagnostic studies / lab tests with immediate assessment and treatment, consultant collaboration on findings and treatment options, monitoring for potential decompensation, obtaining history from family, EMT, nursing home staff and/or treating physicians; directing the titration of pressors, oxygen support devices, fluid resucitation, interpretation of cardiac output measurements, interpretation of radiological assessments, interpretation of EKG / rhythm strips, telemetry.  This time did not overlap with the management of other patients or performing separately reportable procedures.      Management of this patient was discussed with the CT surgery attending and mid-level providers.    I ackknowledge the use of copied documentation.  All copy forward documentation is my own and has been reviewed and revised as appropriate.  If no changes were made, it is because that information remains the same.

## 2025-06-20 ENCOUNTER — TRANSCRIPTION ENCOUNTER (OUTPATIENT)
Age: 89
End: 2025-06-20

## 2025-06-20 RX ORDER — PANTOPRAZOLE 40 MG/1
40 TABLET, DELAYED RELEASE ORAL
Qty: 90 | Refills: 0 | Status: ACTIVE | COMMUNITY
Start: 2025-06-20 | End: 1900-01-01

## 2025-06-23 ENCOUNTER — APPOINTMENT (OUTPATIENT)
Dept: CARE COORDINATION | Facility: HOME HEALTH | Age: 89
End: 2025-06-23

## 2025-06-23 VITALS
HEART RATE: 71 BPM | RESPIRATION RATE: 14 BRPM | SYSTOLIC BLOOD PRESSURE: 102 MMHG | DIASTOLIC BLOOD PRESSURE: 60 MMHG | OXYGEN SATURATION: 97 %

## 2025-06-23 NOTE — H&P CARDIOLOGY - PEDAL EDEMA TYPE
Spinal Block    Patient location during procedure: OR  End time: 6/23/2025 8:46 AM  Reason for block: primary anesthetic  Staffing  Performed: resident/CRNA   Anesthesiologist: Rober Parr DO  Resident/CRNA: Cecilia Kaufman APRN - CRNA  Performed by: Cecilia Kaufman APRN - CRNA  Authorized by: Rober Parr DO    Spinal Block  Patient position: sitting  Prep: Betasept  Patient monitoring: continuous pulse ox, frequent blood pressure checks and oxygen  Approach: midline  Location: L4/L5  Provider prep: mask and sterile gloves  Local infiltration: lidocaine  Needle  Needle type: Pencan   Needle gauge: 25 G  Needle length: 3.5 in  Assessment  Sensory level: T6  Events: cerebrospinal fluid  Swirl obtained: Yes  CSF: clear  Attempts: 1  Hemodynamics: stable  Preanesthetic Checklist  Completed: patient identified, IV checked, site marked, risks and benefits discussed, surgical/procedural consents, equipment checked, pre-op evaluation, timeout performed, anesthesia consent given, oxygen available, monitors applied/VS acknowledged, fire risk safety assessment completed and verbalized and blood product R/B/A discussed and consented           pitting

## 2025-06-25 DIAGNOSIS — Z79.82 LONG TERM (CURRENT) USE OF ASPIRIN: ICD-10-CM

## 2025-06-25 DIAGNOSIS — M48.061 SPINAL STENOSIS, LUMBAR REGION WITHOUT NEUROGENIC CLAUDICATION: ICD-10-CM

## 2025-06-25 DIAGNOSIS — I13.0 HYPERTENSIVE HEART AND CHRONIC KIDNEY DISEASE WITH HEART FAILURE AND STAGE 1 THROUGH STAGE 4 CHRONIC KIDNEY DISEASE, OR UNSPECIFIED CHRONIC KIDNEY DISEASE: ICD-10-CM

## 2025-06-25 DIAGNOSIS — N18.9 CHRONIC KIDNEY DISEASE, UNSPECIFIED: ICD-10-CM

## 2025-06-25 DIAGNOSIS — J43.9 EMPHYSEMA, UNSPECIFIED: ICD-10-CM

## 2025-06-25 DIAGNOSIS — Z95.5 PRESENCE OF CORONARY ANGIOPLASTY IMPLANT AND GRAFT: ICD-10-CM

## 2025-06-25 DIAGNOSIS — Z88.2 ALLERGY STATUS TO SULFONAMIDES: ICD-10-CM

## 2025-06-25 DIAGNOSIS — D63.1 ANEMIA IN CHRONIC KIDNEY DISEASE: ICD-10-CM

## 2025-06-25 DIAGNOSIS — I25.10 ATHEROSCLEROTIC HEART DISEASE OF NATIVE CORONARY ARTERY WITHOUT ANGINA PECTORIS: ICD-10-CM

## 2025-06-25 DIAGNOSIS — I35.0 NONRHEUMATIC AORTIC (VALVE) STENOSIS: ICD-10-CM

## 2025-06-25 DIAGNOSIS — E78.5 HYPERLIPIDEMIA, UNSPECIFIED: ICD-10-CM

## 2025-06-25 DIAGNOSIS — N40.0 BENIGN PROSTATIC HYPERPLASIA WITHOUT LOWER URINARY TRACT SYMPTOMS: ICD-10-CM

## 2025-06-25 DIAGNOSIS — I48.19 OTHER PERSISTENT ATRIAL FIBRILLATION: ICD-10-CM

## 2025-06-25 DIAGNOSIS — Z00.6 ENCOUNTER FOR EXAMINATION FOR NORMAL COMPARISON AND CONTROL IN CLINICAL RESEARCH PROGRAM: ICD-10-CM

## 2025-06-25 DIAGNOSIS — I50.32 CHRONIC DIASTOLIC (CONGESTIVE) HEART FAILURE: ICD-10-CM

## 2025-06-25 DIAGNOSIS — D68.9 COAGULATION DEFECT, UNSPECIFIED: ICD-10-CM

## 2025-06-26 ENCOUNTER — INPATIENT (INPATIENT)
Facility: HOSPITAL | Age: 89
LOS: 6 days | Discharge: HOME HEALTH | DRG: 684 | End: 2025-07-03
Attending: STUDENT IN AN ORGANIZED HEALTH CARE EDUCATION/TRAINING PROGRAM | Admitting: STUDENT IN AN ORGANIZED HEALTH CARE EDUCATION/TRAINING PROGRAM
Payer: MEDICARE

## 2025-06-26 VITALS
RESPIRATION RATE: 18 BRPM | HEART RATE: 85 BPM | TEMPERATURE: 98 F | HEIGHT: 70 IN | OXYGEN SATURATION: 100 % | DIASTOLIC BLOOD PRESSURE: 72 MMHG | WEIGHT: 164.91 LBS | SYSTOLIC BLOOD PRESSURE: 138 MMHG

## 2025-06-26 DIAGNOSIS — Z98.890 OTHER SPECIFIED POSTPROCEDURAL STATES: Chronic | ICD-10-CM

## 2025-06-26 DIAGNOSIS — Z98.41 CATARACT EXTRACTION STATUS, RIGHT EYE: Chronic | ICD-10-CM

## 2025-06-26 DIAGNOSIS — Z95.5 PRESENCE OF CORONARY ANGIOPLASTY IMPLANT AND GRAFT: Chronic | ICD-10-CM

## 2025-06-26 PROCEDURE — 99285 EMERGENCY DEPT VISIT HI MDM: CPT | Mod: 25

## 2025-06-26 PROCEDURE — 93010 ELECTROCARDIOGRAM REPORT: CPT

## 2025-06-26 NOTE — ED ADULT TRIAGE NOTE - LOCATION:
Left arm; I have personally provided the amount of critical care time documented below excluding time spent on separate procedures

## 2025-06-26 NOTE — ED ADULT NURSE NOTE - OBJECTIVE STATEMENT
pt referrered to ED by cardiologist s/p recent blood work that showed abnormal results. Pt had recent TAVR & d/c a week ago.

## 2025-06-26 NOTE — ED ADULT TRIAGE NOTE - CHIEF COMPLAINT QUOTE
Pt c/o abnormal labs s/p TAVR procedure. Pt referred to ED from cardiologist x 2 hours ago for abnormal blood work. Pt denies chest pain, As per son, pt's blood work showed anemia and dehydration Pt c/o abnormal labs s/p TAVR procedure. Pt referred to ED from cardiologist x 2 hours ago for abnormal blood; labs showed anemia and dehydration. Pt on Eliquis. Pt denies chest pain, SOB

## 2025-06-26 NOTE — ED ADULT NURSE NOTE - CHIEF COMPLAINT QUOTE
Pt c/o abnormal labs s/p TAVR procedure. Pt referred to ED from cardiologist x 2 hours ago for abnormal blood; labs showed anemia and dehydration. Pt on Eliquis. Pt denies chest pain, SOB

## 2025-06-26 NOTE — ED ADULT NURSE NOTE - NS ED NURSE LEVEL OF CONSCIOUSNESS ORIENTATION
From: Maribell Guerra  To: Faraz Kasi  Sent: 8/31/2020 8:13 PM CDT  Subject: Non-Urgent Medical Question    Hi,    I recently visited my 89yr old grandma to help get her house ready to sell - she's honestly a hoarder, and the home has been most often unoccupied over the past few years (think many rodents, etc in basement and garage attic). Is in [place].    When I arrived, she had recently been seen for a new, itchy rash, originally diagnosed as eczema.    Last night I developed the start of an uncomfortable rash on my upper back - with my allergies I didn't think much of it. My hands are also itchy. My dad just called, and my grandma's diagnosis has just been changed to scabies, and I was advised to contact you.    I slept in her home for three nights using a common bathroom over the course of one week. A little  over a week later, the kids and I (Scarlet and Oh) spent one additional day in her home (now just over a week ago) for my aunt's memorial. It's likely that 3/4 of us in the home have been exposed.     Do I make an appointment to be seen? School year inconveniently begins tomorrow.     Thanks!  Maribell   Oriented - self; Oriented - place; Oriented - time

## 2025-06-26 NOTE — ED ADULT NURSE NOTE - NSFALLHARMRISKINTERV_ED_ALL_ED
Assistance OOB with selected safe patient handling equipment if applicable/Communicate risk of Fall with Harm to all staff, patient, and family/Provide visual cue: red socks, yellow wristband, yellow gown, etc/Reinforce activity limits and safety measures with patient and family/Bed in lowest position, wheels locked, appropriate side rails in place/Call bell, personal items and telephone in reach/Instruct patient to call for assistance before getting out of bed/chair/stretcher/Non-slip footwear applied when patient is off stretcher/Louann to call system/Physically safe environment - no spills, clutter or unnecessary equipment/Purposeful Proactive Rounding/Room/bathroom lighting operational, light cord in reach

## 2025-06-27 ENCOUNTER — RESULT REVIEW (OUTPATIENT)
Age: 89
End: 2025-06-27

## 2025-06-27 ENCOUNTER — APPOINTMENT (OUTPATIENT)
Dept: CARDIOTHORACIC SURGERY | Facility: CLINIC | Age: 89
End: 2025-06-27

## 2025-06-27 DIAGNOSIS — N17.9 ACUTE KIDNEY FAILURE, UNSPECIFIED: ICD-10-CM

## 2025-06-27 LAB
ALBUMIN SERPL ELPH-MCNC: 3.4 G/DL — LOW (ref 3.5–5.2)
ALBUMIN SERPL ELPH-MCNC: 3.6 G/DL — SIGNIFICANT CHANGE UP (ref 3.5–5.2)
ALP SERPL-CCNC: 53 U/L — SIGNIFICANT CHANGE UP (ref 30–115)
ALP SERPL-CCNC: 54 U/L — SIGNIFICANT CHANGE UP (ref 30–115)
ALT FLD-CCNC: <5 U/L — SIGNIFICANT CHANGE UP (ref 0–41)
ALT FLD-CCNC: <5 U/L — SIGNIFICANT CHANGE UP (ref 0–41)
ANION GAP SERPL CALC-SCNC: 11 MMOL/L — SIGNIFICANT CHANGE UP (ref 7–14)
ANION GAP SERPL CALC-SCNC: 12 MMOL/L — SIGNIFICANT CHANGE UP (ref 7–14)
APPEARANCE UR: ABNORMAL
APTT BLD: 27.3 SEC — SIGNIFICANT CHANGE UP (ref 27–39.2)
AST SERPL-CCNC: 21 U/L — SIGNIFICANT CHANGE UP (ref 0–41)
AST SERPL-CCNC: 21 U/L — SIGNIFICANT CHANGE UP (ref 0–41)
BASOPHILS # BLD AUTO: 0.03 K/UL — SIGNIFICANT CHANGE UP (ref 0–0.2)
BASOPHILS # BLD AUTO: 0.05 K/UL — SIGNIFICANT CHANGE UP (ref 0–0.2)
BASOPHILS NFR BLD AUTO: 0.4 % — SIGNIFICANT CHANGE UP (ref 0–1)
BASOPHILS NFR BLD AUTO: 0.6 % — SIGNIFICANT CHANGE UP (ref 0–1)
BILIRUB SERPL-MCNC: 1.1 MG/DL — SIGNIFICANT CHANGE UP (ref 0.2–1.2)
BILIRUB SERPL-MCNC: 1.1 MG/DL — SIGNIFICANT CHANGE UP (ref 0.2–1.2)
BILIRUB UR-MCNC: NEGATIVE — SIGNIFICANT CHANGE UP
BUN SERPL-MCNC: 70 MG/DL — CRITICAL HIGH (ref 10–20)
BUN SERPL-MCNC: 74 MG/DL — CRITICAL HIGH (ref 10–20)
CALCIUM SERPL-MCNC: 8.3 MG/DL — LOW (ref 8.4–10.5)
CALCIUM SERPL-MCNC: 8.3 MG/DL — LOW (ref 8.4–10.5)
CHLORIDE SERPL-SCNC: 109 MMOL/L — SIGNIFICANT CHANGE UP (ref 98–110)
CHLORIDE SERPL-SCNC: 109 MMOL/L — SIGNIFICANT CHANGE UP (ref 98–110)
CO2 SERPL-SCNC: 19 MMOL/L — SIGNIFICANT CHANGE UP (ref 17–32)
CO2 SERPL-SCNC: 20 MMOL/L — SIGNIFICANT CHANGE UP (ref 17–32)
COLOR SPEC: YELLOW — SIGNIFICANT CHANGE UP
CREAT SERPL-MCNC: 3.1 MG/DL — HIGH (ref 0.7–1.5)
CREAT SERPL-MCNC: 3.3 MG/DL — HIGH (ref 0.7–1.5)
DIFF PNL FLD: ABNORMAL
EGFR: 17 ML/MIN/1.73M2 — LOW
EGFR: 17 ML/MIN/1.73M2 — LOW
EGFR: 18 ML/MIN/1.73M2 — LOW
EGFR: 18 ML/MIN/1.73M2 — LOW
EOSINOPHIL # BLD AUTO: 0.28 K/UL — SIGNIFICANT CHANGE UP (ref 0–0.7)
EOSINOPHIL # BLD AUTO: 0.43 K/UL — SIGNIFICANT CHANGE UP (ref 0–0.7)
EOSINOPHIL NFR BLD AUTO: 3.4 % — SIGNIFICANT CHANGE UP (ref 0–8)
EOSINOPHIL NFR BLD AUTO: 5 % — SIGNIFICANT CHANGE UP (ref 0–8)
FERRITIN SERPL-MCNC: 369 NG/ML — SIGNIFICANT CHANGE UP (ref 30–400)
GAS PNL BLDV: SIGNIFICANT CHANGE UP
GLUCOSE SERPL-MCNC: 107 MG/DL — HIGH (ref 70–99)
GLUCOSE SERPL-MCNC: 98 MG/DL — SIGNIFICANT CHANGE UP (ref 70–99)
GLUCOSE UR QL: NEGATIVE MG/DL — SIGNIFICANT CHANGE UP
HCT VFR BLD CALC: 23.7 % — LOW (ref 42–52)
HCT VFR BLD CALC: 23.9 % — LOW (ref 42–52)
HCT VFR BLD CALC: 24.2 % — LOW (ref 42–52)
HGB BLD-MCNC: 8 G/DL — LOW (ref 14–18)
HGB BLD-MCNC: 8.1 G/DL — LOW (ref 14–18)
HGB BLD-MCNC: 8.2 G/DL — LOW (ref 14–18)
IMM GRANULOCYTES NFR BLD AUTO: 1.5 % — HIGH (ref 0.1–0.3)
IMM GRANULOCYTES NFR BLD AUTO: 2 % — HIGH (ref 0.1–0.3)
INR BLD: 1.09 RATIO — SIGNIFICANT CHANGE UP (ref 0.65–1.3)
IRON SATN MFR SERPL: 105 UG/DL — SIGNIFICANT CHANGE UP (ref 35–150)
IRON SATN MFR SERPL: 38 % — SIGNIFICANT CHANGE UP (ref 15–50)
KETONES UR QL: NEGATIVE MG/DL — SIGNIFICANT CHANGE UP
LDH SERPL L TO P-CCNC: 744 U/L — HIGH (ref 50–242)
LEUKOCYTE ESTERASE UR-ACNC: ABNORMAL
LYMPHOCYTES # BLD AUTO: 0.65 K/UL — LOW (ref 1.2–3.4)
LYMPHOCYTES # BLD AUTO: 0.77 K/UL — LOW (ref 1.2–3.4)
LYMPHOCYTES # BLD AUTO: 7.9 % — LOW (ref 20.5–51.1)
LYMPHOCYTES # BLD AUTO: 9 % — LOW (ref 20.5–51.1)
MCHC RBC-ENTMCNC: 29.3 PG — SIGNIFICANT CHANGE UP (ref 27–31)
MCHC RBC-ENTMCNC: 29.5 PG — SIGNIFICANT CHANGE UP (ref 27–31)
MCHC RBC-ENTMCNC: 30.5 PG — SIGNIFICANT CHANGE UP (ref 27–31)
MCHC RBC-ENTMCNC: 33.1 G/DL — SIGNIFICANT CHANGE UP (ref 32–37)
MCHC RBC-ENTMCNC: 33.9 G/DL — SIGNIFICANT CHANGE UP (ref 32–37)
MCHC RBC-ENTMCNC: 34.6 G/DL — SIGNIFICANT CHANGE UP (ref 32–37)
MCV RBC AUTO: 86.6 FL — SIGNIFICANT CHANGE UP (ref 80–94)
MCV RBC AUTO: 88.1 FL — SIGNIFICANT CHANGE UP (ref 80–94)
MCV RBC AUTO: 89.3 FL — SIGNIFICANT CHANGE UP (ref 80–94)
MONOCYTES # BLD AUTO: 0.88 K/UL — HIGH (ref 0.1–0.6)
MONOCYTES # BLD AUTO: 0.97 K/UL — HIGH (ref 0.1–0.6)
MONOCYTES NFR BLD AUTO: 10.3 % — HIGH (ref 1.7–9.3)
MONOCYTES NFR BLD AUTO: 11.8 % — HIGH (ref 1.7–9.3)
NEUTROPHILS # BLD AUTO: 6.1 K/UL — SIGNIFICANT CHANGE UP (ref 1.4–6.5)
NEUTROPHILS # BLD AUTO: 6.26 K/UL — SIGNIFICANT CHANGE UP (ref 1.4–6.5)
NEUTROPHILS NFR BLD AUTO: 73.6 % — SIGNIFICANT CHANGE UP (ref 42.2–75.2)
NEUTROPHILS NFR BLD AUTO: 74.5 % — SIGNIFICANT CHANGE UP (ref 42.2–75.2)
NITRITE UR-MCNC: NEGATIVE — SIGNIFICANT CHANGE UP
NRBC BLD AUTO-RTO: 0 /100 WBCS — SIGNIFICANT CHANGE UP (ref 0–0)
NT-PROBNP SERPL-SCNC: HIGH PG/ML (ref 0–300)
OSMOLALITY UR: 502 MOS/KG — SIGNIFICANT CHANGE UP (ref 50–1200)
PH UR: 5.5 — SIGNIFICANT CHANGE UP (ref 5–8)
PHOSPHATE 24H UR-MCNC: 33 MG/DL — SIGNIFICANT CHANGE UP
PLATELET # BLD AUTO: 134 K/UL — SIGNIFICANT CHANGE UP (ref 130–400)
PLATELET # BLD AUTO: 146 K/UL — SIGNIFICANT CHANGE UP (ref 130–400)
PLATELET # BLD AUTO: 155 K/UL — SIGNIFICANT CHANGE UP (ref 130–400)
PMV BLD: 13.7 FL — HIGH (ref 7.4–10.4)
PMV BLD: SIGNIFICANT CHANGE UP (ref 7.4–10.4)
PMV BLD: SIGNIFICANT CHANGE UP (ref 7.4–10.4)
POTASSIUM SERPL-MCNC: 4.9 MMOL/L — SIGNIFICANT CHANGE UP (ref 3.5–5)
POTASSIUM SERPL-MCNC: 5 MMOL/L — SIGNIFICANT CHANGE UP (ref 3.5–5)
POTASSIUM SERPL-SCNC: 4.9 MMOL/L — SIGNIFICANT CHANGE UP (ref 3.5–5)
POTASSIUM SERPL-SCNC: 5 MMOL/L — SIGNIFICANT CHANGE UP (ref 3.5–5)
POTASSIUM UR-SCNC: 35 MMOL/L — SIGNIFICANT CHANGE UP
PROT ?TM UR-MCNC: 42 MG/DL — SIGNIFICANT CHANGE UP
PROT SERPL-MCNC: 4.9 G/DL — LOW (ref 6–8)
PROT SERPL-MCNC: 5.1 G/DL — LOW (ref 6–8)
PROT UR-MCNC: 100 MG/DL
PROTHROM AB SERPL-ACNC: 12.9 SEC — HIGH (ref 9.95–12.87)
RBC # BLD: 2.69 M/UL — LOW (ref 4.7–6.1)
RBC # BLD: 2.71 M/UL — LOW (ref 4.7–6.1)
RBC # BLD: 2.76 M/UL — LOW (ref 4.7–6.1)
RBC # BLD: 2.79 M/UL — LOW (ref 4.7–6.1)
RBC # FLD: 17.4 % — HIGH (ref 11.5–14.5)
RBC # FLD: 17.7 % — HIGH (ref 11.5–14.5)
RBC # FLD: 17.9 % — HIGH (ref 11.5–14.5)
RETICS #: 75.1 K/UL — SIGNIFICANT CHANGE UP (ref 25–125)
RETICS/RBC NFR: 2.7 % — HIGH (ref 0.5–1.5)
SODIUM SERPL-SCNC: 139 MMOL/L — SIGNIFICANT CHANGE UP (ref 135–146)
SODIUM SERPL-SCNC: 141 MMOL/L — SIGNIFICANT CHANGE UP (ref 135–146)
SODIUM UR-SCNC: 43 MMOL/L — SIGNIFICANT CHANGE UP
SP GR SPEC: 1.02 — SIGNIFICANT CHANGE UP (ref 1–1.03)
TIBC SERPL-MCNC: 276 UG/DL — SIGNIFICANT CHANGE UP (ref 220–430)
TROPONIN T, HIGH SENSITIVITY RESULT: 68 NG/L — CRITICAL HIGH (ref 6–21)
UIBC SERPL-MCNC: 171 UG/DL — SIGNIFICANT CHANGE UP (ref 110–370)
UROBILINOGEN FLD QL: 0.2 MG/DL — SIGNIFICANT CHANGE UP (ref 0.2–1)
WBC # BLD: 8.19 K/UL — SIGNIFICANT CHANGE UP (ref 4.8–10.8)
WBC # BLD: 8.52 K/UL — SIGNIFICANT CHANGE UP (ref 4.8–10.8)
WBC # BLD: 9.12 K/UL — SIGNIFICANT CHANGE UP (ref 4.8–10.8)
WBC # FLD AUTO: 8.19 K/UL — SIGNIFICANT CHANGE UP (ref 4.8–10.8)
WBC # FLD AUTO: 8.52 K/UL — SIGNIFICANT CHANGE UP (ref 4.8–10.8)
WBC # FLD AUTO: 9.12 K/UL — SIGNIFICANT CHANGE UP (ref 4.8–10.8)

## 2025-06-27 PROCEDURE — 85027 COMPLETE CBC AUTOMATED: CPT

## 2025-06-27 PROCEDURE — 97166 OT EVAL MOD COMPLEX 45 MIN: CPT | Mod: GO

## 2025-06-27 PROCEDURE — 86900 BLOOD TYPING SEROLOGIC ABO: CPT

## 2025-06-27 PROCEDURE — 83540 ASSAY OF IRON: CPT

## 2025-06-27 PROCEDURE — 71045 X-RAY EXAM CHEST 1 VIEW: CPT | Mod: 26

## 2025-06-27 PROCEDURE — 80053 COMPREHEN METABOLIC PANEL: CPT

## 2025-06-27 PROCEDURE — 99223 1ST HOSP IP/OBS HIGH 75: CPT

## 2025-06-27 PROCEDURE — 97110 THERAPEUTIC EXERCISES: CPT | Mod: GP

## 2025-06-27 PROCEDURE — P9016: CPT

## 2025-06-27 PROCEDURE — 84300 ASSAY OF URINE SODIUM: CPT

## 2025-06-27 PROCEDURE — 36415 COLL VENOUS BLD VENIPUNCTURE: CPT

## 2025-06-27 PROCEDURE — 84105 ASSAY OF URINE PHOSPHORUS: CPT

## 2025-06-27 PROCEDURE — 83010 ASSAY OF HAPTOGLOBIN QUANT: CPT

## 2025-06-27 PROCEDURE — 82728 ASSAY OF FERRITIN: CPT

## 2025-06-27 PROCEDURE — 93325 DOPPLER ECHO COLOR FLOW MAPG: CPT

## 2025-06-27 PROCEDURE — 82270 OCCULT BLOOD FECES: CPT

## 2025-06-27 PROCEDURE — 93306 TTE W/DOPPLER COMPLETE: CPT

## 2025-06-27 PROCEDURE — 83935 ASSAY OF URINE OSMOLALITY: CPT

## 2025-06-27 PROCEDURE — 84133 ASSAY OF URINE POTASSIUM: CPT

## 2025-06-27 PROCEDURE — 82607 VITAMIN B-12: CPT

## 2025-06-27 PROCEDURE — 85025 COMPLETE CBC W/AUTO DIFF WBC: CPT

## 2025-06-27 PROCEDURE — 36430 TRANSFUSION BLD/BLD COMPNT: CPT

## 2025-06-27 PROCEDURE — 93312 ECHO TRANSESOPHAGEAL: CPT

## 2025-06-27 PROCEDURE — 85045 AUTOMATED RETICULOCYTE COUNT: CPT

## 2025-06-27 PROCEDURE — 83550 IRON BINDING TEST: CPT

## 2025-06-27 PROCEDURE — 87086 URINE CULTURE/COLONY COUNT: CPT

## 2025-06-27 PROCEDURE — 83735 ASSAY OF MAGNESIUM: CPT

## 2025-06-27 PROCEDURE — 74176 CT ABD & PELVIS W/O CONTRAST: CPT

## 2025-06-27 PROCEDURE — 81001 URINALYSIS AUTO W/SCOPE: CPT

## 2025-06-27 PROCEDURE — 86901 BLOOD TYPING SEROLOGIC RH(D): CPT

## 2025-06-27 PROCEDURE — 86850 RBC ANTIBODY SCREEN: CPT

## 2025-06-27 PROCEDURE — 74176 CT ABD & PELVIS W/O CONTRAST: CPT | Mod: 26

## 2025-06-27 PROCEDURE — 97116 GAIT TRAINING THERAPY: CPT | Mod: GP

## 2025-06-27 PROCEDURE — 93306 TTE W/DOPPLER COMPLETE: CPT | Mod: 26

## 2025-06-27 PROCEDURE — 82746 ASSAY OF FOLIC ACID SERUM: CPT

## 2025-06-27 PROCEDURE — 84156 ASSAY OF PROTEIN URINE: CPT

## 2025-06-27 PROCEDURE — 83615 LACTATE (LD) (LDH) ENZYME: CPT

## 2025-06-27 PROCEDURE — 97162 PT EVAL MOD COMPLEX 30 MIN: CPT | Mod: GP

## 2025-06-27 PROCEDURE — 93320 DOPPLER ECHO COMPLETE: CPT

## 2025-06-27 PROCEDURE — 80048 BASIC METABOLIC PNL TOTAL CA: CPT

## 2025-06-27 RX ORDER — TAMSULOSIN HYDROCHLORIDE 0.4 MG/1
0.4 CAPSULE ORAL AT BEDTIME
Refills: 0 | Status: DISCONTINUED | OUTPATIENT
Start: 2025-06-27 | End: 2025-07-03

## 2025-06-27 RX ORDER — ATORVASTATIN CALCIUM 80 MG/1
10 TABLET, FILM COATED ORAL AT BEDTIME
Refills: 0 | Status: DISCONTINUED | OUTPATIENT
Start: 2025-06-27 | End: 2025-07-03

## 2025-06-27 RX ORDER — CALCITRIOL 0.5 UG/1
1 CAPSULE, GELATIN COATED ORAL
Refills: 0 | DISCHARGE

## 2025-06-27 RX ORDER — APIXABAN 2.5 MG/1
2.5 TABLET, FILM COATED ORAL
Refills: 0 | Status: DISCONTINUED | OUTPATIENT
Start: 2025-06-27 | End: 2025-07-03

## 2025-06-27 RX ORDER — FUROSEMIDE 10 MG/ML
80 INJECTION INTRAMUSCULAR; INTRAVENOUS ONCE
Refills: 0 | Status: COMPLETED | OUTPATIENT
Start: 2025-06-27 | End: 2025-06-27

## 2025-06-27 RX ORDER — MOMETASONE FUROATE 1 MG/G
1 OINTMENT TOPICAL
Refills: 0 | DISCHARGE

## 2025-06-27 RX ORDER — ASPIRIN 325 MG
81 TABLET ORAL DAILY
Refills: 0 | Status: DISCONTINUED | OUTPATIENT
Start: 2025-06-27 | End: 2025-07-03

## 2025-06-27 RX ORDER — CEPHALEXIN 250 MG/1
250 CAPSULE ORAL DAILY
Refills: 0 | Status: DISCONTINUED | OUTPATIENT
Start: 2025-06-27 | End: 2025-07-03

## 2025-06-27 RX ORDER — FINASTERIDE 1 MG/1
5 TABLET, FILM COATED ORAL DAILY
Refills: 0 | Status: DISCONTINUED | OUTPATIENT
Start: 2025-06-27 | End: 2025-07-03

## 2025-06-27 RX ORDER — SODIUM CHLORIDE 9 G/1000ML
500 INJECTION, SOLUTION INTRAVENOUS ONCE
Refills: 0 | Status: COMPLETED | OUTPATIENT
Start: 2025-06-27 | End: 2025-06-27

## 2025-06-27 RX ADMIN — FUROSEMIDE 80 MILLIGRAM(S): 10 INJECTION INTRAMUSCULAR; INTRAVENOUS at 18:20

## 2025-06-27 RX ADMIN — SODIUM CHLORIDE 500 MILLILITER(S): 9 INJECTION, SOLUTION INTRAVENOUS at 01:28

## 2025-06-27 RX ADMIN — Medication 40 MILLIGRAM(S): at 05:17

## 2025-06-27 RX ADMIN — APIXABAN 2.5 MILLIGRAM(S): 2.5 TABLET, FILM COATED ORAL at 05:17

## 2025-06-27 RX ADMIN — SODIUM CHLORIDE 500 MILLILITER(S): 9 INJECTION, SOLUTION INTRAVENOUS at 01:00

## 2025-06-27 RX ADMIN — FINASTERIDE 5 MILLIGRAM(S): 1 TABLET, FILM COATED ORAL at 11:32

## 2025-06-27 RX ADMIN — Medication 81 MILLIGRAM(S): at 11:32

## 2025-06-27 RX ADMIN — CEPHALEXIN 250 MILLIGRAM(S): 250 CAPSULE ORAL at 11:32

## 2025-06-27 RX ADMIN — APIXABAN 2.5 MILLIGRAM(S): 2.5 TABLET, FILM COATED ORAL at 18:04

## 2025-06-27 RX ADMIN — TAMSULOSIN HYDROCHLORIDE 0.4 MILLIGRAM(S): 0.4 CAPSULE ORAL at 21:29

## 2025-06-27 RX ADMIN — ATORVASTATIN CALCIUM 10 MILLIGRAM(S): 80 TABLET, FILM COATED ORAL at 21:29

## 2025-06-27 NOTE — ED PROVIDER NOTE - OBJECTIVE STATEMENT
89-year-old male with PMH of A-fib on Eliquis, bladder cancer, emphysema, HTN, HLD, moderate/severe aortic stenosis, CAD s/p PCI/ARGELIA, CKD with baseline CR 1.6–2, TAVR done in June 18 2025 sent in by cardiologist.  Patient discharged from hospital and has been having close outpatient monitoring by Dr. Prabhakar.  Labs noted to be anemic and showing LEYDI.  Patient was instructed to report to the ED the ED for admission.  Patient denies any complaints, no chest pain, abdominal pain, vomiting, diarrhea.  Endorses decreased p.o. intake, baseline chronic shortness of breath, dysuria.

## 2025-06-27 NOTE — H&P ADULT - HISTORY OF PRESENT ILLNESS
89-year-old male with PMH permanent Afib (on Eliquis), bladder CA, hx of recurrent UTI on Cephalexin, emphysema, HTN, HLD, CAD s/p PCI/ARGELIA p/m LAD in 2009, CKD (baseline Cr 1.6-2.0), severe AS s/p Successful Transfemoral TAVR (26mm Mcgraw [place]da 3 Ultra RESILIA) referred to come to the ED due to abnormal labs. Patient report feeling fatigue prior having the tavr and post procedure. patient was seen with Son Kp at bedside. Patient denies chest pain, shortness of breath, palpitation, dizziness, LOC/syncope, hematuria, rectal bleeding, fever, chills, recent travel or sick contact.   Patient has hx of anemia s/p transfusion many years ago    IN ED vs /72, HR 85, RR 18, Temp 98, O2sat 100% RA   Lab notable  wbc  8.19, h/h 8.2/23.7, k 5, bun/cr 74/3.3, trop 68, bnp [id number]  EKG   CXR  with no acute cardiopulmonary disease  89-year-old male with PMH permanent Afib (on Eliquis), bladder CA, hx of recurrent UTI on Cephalexin, emphysema, HTN, HLD, CAD s/p PCI/ARGELIA p/m LAD in 2009, CKD (baseline Cr 1.6-2.0), severe AS s/p Successful Transfemoral TAVR (26mm Mcgraw Nereyda 3 Ultra RESILIA) referred to come to the ED due to abnormal labs. Patient report feeling fatigue prior having the tavr and post procedure. patient was seen with Son Kp at bedside. Patient denies chest pain, shortness of breath, palpitation, dizziness, LOC/syncope, hematuria, rectal bleeding, fever, chills, recent travel or sick contact.   Patient has hx of anemia s/p transfusion many years ago    IN ED vs /72, HR 85, RR 18, Temp 98, O2sat 100% RA   Lab notable  wbc  8.19, h/h 8.2/23.7, k 5, bun/cr 74/3.3, trop 68, bnp [id number]  EKG afib, LBBB   CXR  with no acute cardiopulmonary disease  89-year-old male with PMH permanent Afib (on Eliquis), bladder CA, hx of recurrent UTI on Cephalexin, emphysema, HTN, HLD, CAD s/p PCI/ARGELIA p/m LAD in 2009, CKD (baseline Cr 1.6-2.0), severe AS s/p Successful Transfemoral TAVR (26mm Mcgraw Nereyda 3 Ultra RESILIA) on 6/18 referred to come to the ED due to abnormal labs. Patient report feeling fatigue prior having the tavr and post procedure. patient was seen with Son Kp at bedside. Patient denies chest pain, shortness of breath, palpitation, dizziness, LOC/syncope, hematuria, rectal bleeding, fever, chills, recent travel or sick contact.   Patient has hx of anemia s/p transfusion many years ago    IN ED vs /72, HR 85, RR 18, Temp 98, O2sat 100% RA   Lab notable  wbc  8.19, h/h 8.2/23.7, k 5, bun/cr 74/3.3, trop 68, bnp [id number]  EKG afib, LBBB   CXR  with no acute cardiopulmonary disease

## 2025-06-27 NOTE — H&P ADULT - ASSESSMENT
89-year-old male with PMH permanent Afib (on Eliquis), bladder CA, hx of recurrent UTI on Cephalexin, emphysema, HTN, HLD, CAD s/p PCI/ARGELIA p/m LAD in 2009, CKD (baseline Cr 1.6-2.0), severe AS s/p Successful Transfemoral TAVR (26mm Mcgraw Nereyda 3 Ultra RESILIA) with Anemia and LEYDI on CKD, admitted to cards tele for further evaluation and management     #LEYDI on CKD ( baseline Cr 1.6-2)   - admit to card tele   - s/p IVF in the ED   -Trend and monitor renal function   -Renal US r/o obstruction   -check UA /urine electrolyte   -avoid Nephrotoxins     #Anemia   -Trend and monitor cbc   -Maintain active type and screen   -Plan to transfuse Hg<8   -check fecal occult     #severe AS s/p recent TAVR   -Limited TTE     #PAF   - Continue Eliquis 2.5 mg q12     #CAD s/p PCI   -Continue asa 81 mg daily   -continue statin     # Bladder ca c/b UTI on chronic antibiotic   -Continue Cephalexin 250 mg daily   -check UA/urine culture     #HLD   -On pravastatin 40 mg at home, start  equivalent atorvastatin 10 mg daily     #HTN  -Home Nefidipine 60 mg daily on hold now   -Monitor and trend BP     FULL CODE   DVT ppx/ Eliquis   heart healthy diet        89-year-old male with PMH permanent Afib (on Eliquis), bladder CA, hx of recurrent UTI on Cephalexin, emphysema, HTN, HLD, CAD s/p PCI/ARGELIA p/m LAD in 2009, CKD (baseline Cr 1.6-2.0), severe AS s/p Successful Transfemoral TAVR (26mm Mcgraw Nereyda 3 Ultra RESILIA) on 6/18  with Anemia and LEYDI on CKD, admitted to [facility] tele for further evaluation and management     #LEYDI on CKD ( baseline Cr 1.6-2)   - admit to card tele   - s/p IVF in the ED   -Trend and monitor renal function   -Renal US r/o obstruction   -check UA /urine electrolyte   -avoid Nephrotoxins     #Anemia   -Trend and monitor cbc   -Maintain active type and screen   -Plan to transfuse Hg<8   -check fecal occult     #severe AS s/p recent TAVR   -Limited TTE     #PAF   - Continue Eliquis 2.5 mg q12     #CAD s/p PCI   -Continue asa 81 mg daily   -continue statin     # Bladder ca c/b UTI on chronic antibiotic   -Continue Cephalexin 250 mg daily   -check UA/urine culture     #HLD   -On pravastatin 40 mg at home, start  equivalent atorvastatin 10 mg daily     #HTN  -Home Nefidipine 60 mg daily on hold now   -Monitor and trend BP     FULL CODE   DVT ppx/ Eliquis   heart healthy diet        89-year-old male with PMH permanent Afib (on Eliquis), bladder CA, hx of recurrent UTI on Cephalexin, emphysema, HTN, HLD, CAD s/p PCI/ARGELIA p/m LAD in 2009, CKD (baseline Cr 1.6-2.0), severe AS s/p Successful Transfemoral TAVR (26mm Mcgraw Nereyda 3 Ultra RESILIA) on 6/18  with Anemia and LEYDI on CKD, admitted to [email or web address] tele for further evaluation and management     #LEYDI on CKD ( baseline Cr 1.6-2)   - admit to card tele   - s/p IVF in the ED   -Trend and monitor renal function   -Renal US r/o obstruction   -check UA /urine electrolyte   -avoid Nephrotoxins     #Anemia   -Trend and monitor cbc   -Maintain active type and screen   -Plan to transfuse Hg<8   -iron studies   -check fecal occult     #severe AS s/p recent TAVR   -Limited TTE     #PAF   - Continue Eliquis 2.5 mg q12     #CAD s/p PCI   -Continue asa 81 mg daily   -continue statin     # Bladder ca c/b UTI on chronic antibiotic   -Continue Cephalexin 250 mg daily   -check UA/urine culture     #HLD   -On pravastatin 40 mg at home, start  equivalent atorvastatin 10 mg daily     #HTN  -Home Nefidipine 60 mg daily on hold now   -Monitor and trend BP     FULL CODE   DVT ppx/ Eliquis   heart healthy diet

## 2025-06-27 NOTE — H&P ADULT - NSVTERISKREFERASSESS_GEN_ALL_CORE
Group Topic:  Group Psychotherapy    Date: 12/12/2021  Start Time: 1030  End Time: 1130  Facilitators: Karen Sneed LCSW    Focus: Locus of Control   Number in attendance: 13    Group members joined in a discussion talking about the things that he/she/they is in control of and not in control of, such as being in control of his/her/their actions and reactions and not in control of the actions and reactions of someone else. Pt worked to identify their locus of control vs out of control, their worries, and goals. Positive and supportive feedback was offered.     Method: Group    Pt was not present and was monitored by staff.    Refer to the Assessment tab to view/cancel completed assessment.

## 2025-06-27 NOTE — ED PROVIDER NOTE - PHYSICAL EXAMINATION
General: NAD  Head: AT, NC.  Eyes: EOMI  Heart: RRR  Lungs: LCTAB  Abdomen: Soft, nontender.  Extremities: No decrease in AROM. No swelling.   Neuro: Moves all limbs spontaneously.

## 2025-06-27 NOTE — PATIENT PROFILE ADULT - FALL HARM RISK - HARM RISK INTERVENTIONS

## 2025-06-27 NOTE — PROCEDURE NOTE - ADDITIONAL PROCEDURE DETAILS
GLORIA PACHECO    Asked to place lott for appropriate Is/Os, RNs unable to place.    Under sterile conditions, 18 coude lott placed without difficulty.  approx 600cc yellow UO drained.

## 2025-06-27 NOTE — H&P ADULT - NSHPLABSRESULTS_GEN_ALL_CORE
- ECG (date***):    TTE ( 25): CONCLUSIONS: 1. Left ventricular cavity is normal in size. Left ventricular systolic  function is normal with an ejection fraction of 58 % by Giordano's method  of disks.   2. Left atrium is severely dilated.  3. Mild mitral regurgitation.   4. 26 mm Mcgraw Nereyda TAVR in the Aortic position. Appropriately  positioned well expanded valve. Peak transvalvular velocity is 1.7 m/s.  Peak/mean transvalvular pressure gradient is 12/5 mmHg. Aortic valve area  by VTI 2.6 cm2. No evidence of valvular regurgitation. Mild to moderate  paravalvular regurgitation.   5. Aortic root at the sinuses of Valsalva is dilated, measuring 4.10 cm  (indexed 2.14 cm/m²). Ascending aorta is dilated, measuring 3.80 cm  (indexed 1.98 cm/m²).   6. Estimated pulmonary artery systolic pressure is 33 mmHg, consistent  with normal pulmonary artery pressure.   7. Moderate left ventricular hypertrophy.   8. Moderate tricuspid regurgitation.   9. The inferior vena cava is normal in size (normal <2.1cm) with normal  inspiratory collapse (normal >50%) consistent with normal right atrial  pressure (  3, range 0-5mmHg).      CXR ( 25) : IMPRESSION: Low lung volume.  No radiographic evidence of acute cardiopulmonary disease.    - Labs:                        8.2    8.19  )-----------( 155      ( 2025 00:30 )             23.7         139  |  109  |  74[HH]  ----------------------------<  107[H]  5.0   |  19  |  3.3[H]    Ca    8.3[L]      2025 00:30    TPro  5.1[L]  /  Alb  3.6  /  TBili  1.1  /  DBili  x   /  AST  21  /  ALT  <5  /  AlkPhos  53      LIVER FUNCTIONS - ( 2025 00:30 )  Alb: 3.6 g/dL / Pro: 5.1 g/dL / ALK PHOS: 53 U/L / ALT: <5 U/L / AST: 21 U/L / GGT: x           PT/INR - ( 2025 00:30 )   PT: 12.90 sec;   INR: 1.09 ratio         PTT - ( 2025 00:30 )  PTT:27.3 sec    Lactate Trend    Urinalysis Basic - ( 2025 02:45 )    Color: Yellow / Appearance: Cloudy / S.018 / pH: x  Gluc: x / Ketone: x  / Bili: Negative / Urobili: 0.2 mg/dL   Blood: x / Protein: 100 mg/dL / Nitrite: Negative   Leuk Esterase: Large / RBC: x / WBC x   Sq Epi: x / Non Sq Epi: x / Bacteria: x - ECG (25): afib, LBBB     TTE ( 25): CONCLUSIONS: 1. Left ventricular cavity is normal in size. Left ventricular systolic  function is normal with an ejection fraction of 58 % by Giordano's method  of disks.   2. Left atrium is severely dilated.  3. Mild mitral regurgitation.   4. 26 mm Mcgraw Nereyda TAVR in the Aortic position. Appropriately  positioned well expanded valve. Peak transvalvular velocity is 1.7 m/s.  Peak/mean transvalvular pressure gradient is 12/5 mmHg. Aortic valve area  by VTI 2.6 cm2. No evidence of valvular regurgitation. Mild to moderate  paravalvular regurgitation.   5. Aortic root at the sinuses of Valsalva is dilated, measuring 4.10 cm  (indexed 2.14 cm/m²). Ascending aorta is dilated, measuring 3.80 cm  (indexed 1.98 cm/m²).   6. Estimated pulmonary artery systolic pressure is 33 mmHg, consistent  with normal pulmonary artery pressure.   7. Moderate left ventricular hypertrophy.   8. Moderate tricuspid regurgitation.   9. The inferior vena cava is normal in size (normal <2.1cm) with normal  inspiratory collapse (normal >50%) consistent with normal right atrial  pressure (  3, range 0-5mmHg).      CXR ( 25) : IMPRESSION: Low lung volume.  No radiographic evidence of acute cardiopulmonary disease.    - Labs:                        8.2    8.19  )-----------( 155      ( 2025 00:30 )             23.7         139  |  109  |  74[HH]  ----------------------------<  107[H]  5.0   |  19  |  3.3[H]    Ca    8.3[L]      2025 00:30    TPro  5.1[L]  /  Alb  3.6  /  TBili  1.1  /  DBili  x   /  AST  21  /  ALT  <5  /  AlkPhos  53      LIVER FUNCTIONS - ( 2025 00:30 )  Alb: 3.6 g/dL / Pro: 5.1 g/dL / ALK PHOS: 53 U/L / ALT: <5 U/L / AST: 21 U/L / GGT: x           PT/INR - ( 2025 00:30 )   PT: 12.90 sec;   INR: 1.09 ratio         PTT - ( 2025 00:30 )  PTT:27.3 sec    Lactate Trend    Urinalysis Basic - ( 2025 02:45 )    Color: Yellow / Appearance: Cloudy / S.018 / pH: x  Gluc: x / Ketone: x  / Bili: Negative / Urobili: 0.2 mg/dL   Blood: x / Protein: 100 mg/dL / Nitrite: Negative   Leuk Esterase: Large / RBC: x / WBC x   Sq Epi: x / Non Sq Epi: x / Bacteria: x

## 2025-06-27 NOTE — ED PROVIDER NOTE - ATTENDING CONTRIBUTION TO CARE
89-year-old male with PMH of A-fib on Eliquis, bladder cancer, emphysema, HTN, HLD, moderate/severe aortic stenosis, CAD s/p PCI/ARGELIA, CKD with baseline CR 1.6–2, TAVR done in June 18 2025 sent in by cardiologist.  Patient discharged from hospital and has been having close outpatient monitoring by Dr. Prabhakar.  Labs noted to be anemic and showing LEYDI. pt was sent to ED for evaluation and possible admission.   CONSTITUTIONAL: Well developed; in no acute distress  HEAD: Normocephalic; atraumatic  EYES: No conjunctival injection, no scleral icterus  ENT:No nasal discharge; airway clear.  NECK: Supple; non tender.  CARD: Warm and well perfused, not tachycardic  RESP: Breathing comfortably on RA, speaking in full sentences w/o distress  Abdomen: Soft, non-tender, non-distended   EXT: No gross deformities, normal ROM.  SKIN: Warm and dry  NEURO: Alert, oriented, motor and sensory grossly intact  PSYCH: Calm, cooperative

## 2025-06-27 NOTE — H&P ADULT - NS ATTEND AMEND GEN_ALL_CORE FT
89yoM with severe AS s/p TAVR (26 mm Mcgraw Nereyda 3 Ultra RESILIA) on 6/18/25, permanent Afib on Eliquis, history of bladder cancer and recurrent UTIs, CAD s/p LAD intervention in 2009, HTN, HLD, and CKD with baseline Cr 1.6 -2.0 who presents with anemia (Hgb 8.0, down from 11.1) and LEYDI (Cr 3.3, up from 1.8). He endorses fatigue. TTE with normal LVEF, normally functioning TAVR (shanel 2.2, PG 19, MG 10, KENDALL 2.4) with moderate paravalvular regurgitation, moderate MR, moderate TR, and PASP 69 mmHg (RAP 15 mmHg).    # Anemia: Concern for RP bleed  - CT abdomen/pelvis noncontrast  - If RP bleed confirmed or Hgb < 8, will give 1u pRBC followed by Lasix 80 mg IV  - Will check reticulocyte count, hapto, LDH, B12, folate, and iron studies  - Will send fecal occult blood test  - UA with blood, urine in Brooks does not shows signs of hematuria  - Active T&S and 2 PIVs  - Continue ASA and Eliquis as no evidence of active bleed at this time,     # LEYDI: DDx includes hypotension (was on nifedipine 60 mg daily and outpatient BP recently 102/60), renal congestion (overloaded on exam and echo), ALEKSANDR, UTI (sent UA and culture), obstruction (PVR < 200 cc, though will place Brooks to ensure obstruction isn't a confounding factor)  - Post-void residual < 200 cc  - Will place Brooks to closely monitor I/Os  - Lasix 80 mg IV now  - Will assess UOP on 80 mg IV and start daily or BID diuretic tomorrow  - No bacteria on UA, urine culture pending  - Holding nifedipine to avoid hypotension 89yoM with severe AS s/p TAVR (26 mm Mcgraw Nereyda 3 Ultra RESILIA) on 6/18/25, permanent Afib on Eliquis, history of bladder cancer and recurrent UTIs, CAD s/p LAD intervention in 2009, HTN, HLD, and CKD with baseline Cr 1.6 -2.0 who presents with anemia (Hgb 8.0, down from 11.1) and LEYDI (Cr 3.3, up from 1.8). He endorses fatigue. TTE with normal LVEF, normally functioning TAVR (shanel 2.2, PG 19, MG 10, KENDALL 2.4) with moderate paravalvular regurgitation, moderate MR, moderate TR, and PASP 69 mmHg (RAP 15 mmHg).    # Anemia: Concern for RP bleed  - CT abdomen/pelvis noncontrast  - If RP bleed confirmed or Hgb < 8, will give 1u pRBC followed by Lasix 80 mg IV  - Will check reticulocyte count, hapto, LDH, B12, folate, and iron studies  - Will send fecal occult blood test  - UA with blood  - Will check Brokos output for hematuria  - Active T&S and 2 PIVs  - Continue ASA and Eliquis as no evidence of active bleed at this time,     # LEYDI: DDx includes hypotension (was on nifedipine 60 mg daily and outpatient BP recently 102/60), renal congestion (overloaded on exam and echo), ALEKSANDR, UTI (sent UA and culture), obstruction (PVR < 200 cc, though will place Brooks to ensure obstruction isn't a confounding factor)  - Post-void residual < 200 cc  - Will place Brooks to closely monitor I/Os  - Lasix 80 mg IV now  - Will assess UOP on 80 mg IV and start daily or BID diuretic tomorrow  - No bacteria on UA, urine culture pending  - Holding nifedipine to avoid hypotension

## 2025-06-27 NOTE — ED PROVIDER NOTE - CLINICAL SUMMARY MEDICAL DECISION MAKING FREE TEXT BOX
89-year-old male with PMH of A-fib on Eliquis, bladder cancer, emphysema, HTN, HLD, moderate/severe aortic stenosis, CAD s/p PCI/ARGELIA, CKD with baseline CR 1.6–2, TAVR done in June 18 2025 sent in by cardiologist.  Patient discharged from hospital and has been having close outpatient monitoring by Dr. Prabhakar.  Labs noted to be anemic and showing LEYDI. pt was sent to ED for evaluation and possible admission. pt was found ot have LEYDI here, cardiology consulted and recommended admission to cardiology telemetric bed.

## 2025-06-27 NOTE — H&P ADULT - NSHPPHYSICALEXAM_GEN_ALL_CORE
VITALS:   T(C): 36.7 (06-26-25 @ 23:06), Max: 36.7 (06-26-25 @ 23:06)  HR: 85 (06-26-25 @ 23:06) (85 - 85)  BP: 138/72 (06-26-25 @ 23:06) (138/72 - 138/72)  RR: 18 (06-26-25 @ 23:06) (18 - 18)  SpO2: 100% (06-26-25 @ 23:06) (100% - 100%)    GENERAL: NAD, lying in bed comfortably  HEAD:  Atraumatic, normocephalic  EYES: EOMI, PERRLA, conjunctiva and sclera clear  ENT: Moist mucous membranes  NECK: Supple, no JVD  HEART: Regular rate and rhythm, no murmurs, rubs, or gallops  LUNGS: Unlabored respirations.  Clear to auscultation bilaterally, no crackles, wheezing, or rhonchi  ABDOMEN: Soft, nontender, nondistended, +BS  EXTREMITIES: 2+ peripheral pulses bilaterally. No clubbing, cyanosis, or edema  NERVOUS SYSTEM:  A&Ox3, no focal deficits   SKIN: No rashes or lesions

## 2025-06-28 LAB
ANION GAP SERPL CALC-SCNC: 12 MMOL/L — SIGNIFICANT CHANGE UP (ref 7–14)
ANION GAP SERPL CALC-SCNC: 14 MMOL/L — SIGNIFICANT CHANGE UP (ref 7–14)
BUN SERPL-MCNC: 62 MG/DL — CRITICAL HIGH (ref 10–20)
BUN SERPL-MCNC: 63 MG/DL — CRITICAL HIGH (ref 10–20)
CALCIUM SERPL-MCNC: 8.6 MG/DL — SIGNIFICANT CHANGE UP (ref 8.4–10.5)
CALCIUM SERPL-MCNC: 8.7 MG/DL — SIGNIFICANT CHANGE UP (ref 8.4–10.5)
CHLORIDE SERPL-SCNC: 104 MMOL/L — SIGNIFICANT CHANGE UP (ref 98–110)
CHLORIDE SERPL-SCNC: 108 MMOL/L — SIGNIFICANT CHANGE UP (ref 98–110)
CO2 SERPL-SCNC: 21 MMOL/L — SIGNIFICANT CHANGE UP (ref 17–32)
CO2 SERPL-SCNC: 23 MMOL/L — SIGNIFICANT CHANGE UP (ref 17–32)
CREAT SERPL-MCNC: 2.9 MG/DL — HIGH (ref 0.7–1.5)
CREAT SERPL-MCNC: 2.9 MG/DL — HIGH (ref 0.7–1.5)
EGFR: 20 ML/MIN/1.73M2 — LOW
FOLATE SERPL-MCNC: 8.3 NG/ML — SIGNIFICANT CHANGE UP
GLUCOSE SERPL-MCNC: 166 MG/DL — HIGH (ref 70–99)
GLUCOSE SERPL-MCNC: 94 MG/DL — SIGNIFICANT CHANGE UP (ref 70–99)
HAPTOGLOB SERPL-MCNC: <20 MG/DL — LOW (ref 34–200)
HCT VFR BLD CALC: 25.2 % — LOW (ref 42–52)
HGB BLD-MCNC: 8.5 G/DL — LOW (ref 14–18)
MAGNESIUM SERPL-MCNC: 1.9 MG/DL — SIGNIFICANT CHANGE UP (ref 1.8–2.4)
MAGNESIUM SERPL-MCNC: 1.9 MG/DL — SIGNIFICANT CHANGE UP (ref 1.8–2.4)
MCHC RBC-ENTMCNC: 29.7 PG — SIGNIFICANT CHANGE UP (ref 27–31)
MCHC RBC-ENTMCNC: 33.7 G/DL — SIGNIFICANT CHANGE UP (ref 32–37)
MCV RBC AUTO: 88.1 FL — SIGNIFICANT CHANGE UP (ref 80–94)
NRBC BLD AUTO-RTO: 0 /100 WBCS — SIGNIFICANT CHANGE UP (ref 0–0)
OB PNL STL: NEGATIVE — SIGNIFICANT CHANGE UP
PLATELET # BLD AUTO: 166 K/UL — SIGNIFICANT CHANGE UP (ref 130–400)
PMV BLD: SIGNIFICANT CHANGE UP (ref 7.4–10.4)
POTASSIUM SERPL-MCNC: 4.4 MMOL/L — SIGNIFICANT CHANGE UP (ref 3.5–5)
POTASSIUM SERPL-MCNC: 4.8 MMOL/L — SIGNIFICANT CHANGE UP (ref 3.5–5)
POTASSIUM SERPL-SCNC: 4.4 MMOL/L — SIGNIFICANT CHANGE UP (ref 3.5–5)
POTASSIUM SERPL-SCNC: 4.8 MMOL/L — SIGNIFICANT CHANGE UP (ref 3.5–5)
RBC # BLD: 2.86 M/UL — LOW (ref 4.7–6.1)
RBC # FLD: 18.3 % — HIGH (ref 11.5–14.5)
SODIUM SERPL-SCNC: 141 MMOL/L — SIGNIFICANT CHANGE UP (ref 135–146)
SODIUM SERPL-SCNC: 141 MMOL/L — SIGNIFICANT CHANGE UP (ref 135–146)
VIT B12 SERPL-MCNC: 312 PG/ML — SIGNIFICANT CHANGE UP (ref 232–1245)
WBC # BLD: 9.27 K/UL — SIGNIFICANT CHANGE UP (ref 4.8–10.8)
WBC # FLD AUTO: 9.27 K/UL — SIGNIFICANT CHANGE UP (ref 4.8–10.8)

## 2025-06-28 PROCEDURE — 99233 SBSQ HOSP IP/OBS HIGH 50: CPT

## 2025-06-28 RX ORDER — SENNA 187 MG
2 TABLET ORAL AT BEDTIME
Refills: 0 | Status: DISCONTINUED | OUTPATIENT
Start: 2025-06-28 | End: 2025-07-03

## 2025-06-28 RX ORDER — FUROSEMIDE 10 MG/ML
60 INJECTION INTRAMUSCULAR; INTRAVENOUS ONCE
Refills: 0 | Status: COMPLETED | OUTPATIENT
Start: 2025-06-28 | End: 2025-06-28

## 2025-06-28 RX ORDER — POLYETHYLENE GLYCOL 3350 17 G/17G
17 POWDER, FOR SOLUTION ORAL DAILY
Refills: 0 | Status: DISCONTINUED | OUTPATIENT
Start: 2025-06-28 | End: 2025-07-03

## 2025-06-28 RX ADMIN — POLYETHYLENE GLYCOL 3350 17 GRAM(S): 17 POWDER, FOR SOLUTION ORAL at 11:22

## 2025-06-28 RX ADMIN — CEPHALEXIN 250 MILLIGRAM(S): 250 CAPSULE ORAL at 11:22

## 2025-06-28 RX ADMIN — APIXABAN 2.5 MILLIGRAM(S): 2.5 TABLET, FILM COATED ORAL at 17:22

## 2025-06-28 RX ADMIN — ATORVASTATIN CALCIUM 10 MILLIGRAM(S): 80 TABLET, FILM COATED ORAL at 21:31

## 2025-06-28 RX ADMIN — Medication 40 MILLIGRAM(S): at 05:43

## 2025-06-28 RX ADMIN — TAMSULOSIN HYDROCHLORIDE 0.4 MILLIGRAM(S): 0.4 CAPSULE ORAL at 21:30

## 2025-06-28 RX ADMIN — FINASTERIDE 5 MILLIGRAM(S): 1 TABLET, FILM COATED ORAL at 11:22

## 2025-06-28 RX ADMIN — FUROSEMIDE 60 MILLIGRAM(S): 10 INJECTION INTRAMUSCULAR; INTRAVENOUS at 11:22

## 2025-06-28 RX ADMIN — Medication 81 MILLIGRAM(S): at 11:23

## 2025-06-28 RX ADMIN — APIXABAN 2.5 MILLIGRAM(S): 2.5 TABLET, FILM COATED ORAL at 05:43

## 2025-06-28 RX ADMIN — Medication 1 APPLICATION(S): at 11:23

## 2025-06-28 NOTE — PROGRESS NOTE ADULT - NS ATTEND AMEND GEN_ALL_CORE FT
89yoM with severe AS s/p TAVR (26 mm Mcgraw Nereyda 3 Ultra RESILIA) on 6/18/25, permanent Afib on Eliquis, history of bladder cancer and recurrent UTIs, CAD s/p LAD intervention in 2009, HTN, HLD, and CKD with baseline Cr 1.6 -2.0 who presents with anemia (Hgb 8.0, down from 11.1) and LEYDI (Cr 3.3, up from 1.8). He endorses fatigue. TTE with normal LVEF, normally functioning TAVR (shanel 2.2, PG 19, MG 10, KENDALL 2.4) with moderate paravalvular regurgitation, moderate MR, moderate TR, and PASP 69 mmHg (RAP 15 mmHg).    # Anemia:   - CT abdomen/pelvis noncontrast negative for RP bleed  - Reticulocyte count, hapto, LDH, B12, folate, and iron studies noted  - Awaiting FOBT  - UA with blood  - Will check Brooks output for hematuria  - Active T&S and 2 PIVs  - Continue ASA and Eliquis as no evidence of active bleed at this time,     # LEYDI: DDx includes hypotension (was on nifedipine 60 mg daily and outpatient BP recently 102/60), renal congestion (overloaded on exam and echo), ALEKSANDR, UTI (sent UA and culture), obstruction (PVR < 200 cc, though will place Brooks to ensure obstruction isn't a confounding factor)  - Good urine output with lasix IV 80mg; gave Lasix 60mg IV today  - No bacteria on UA, urine culture pending  - Holding nifedipine to avoid hypotension.

## 2025-06-28 NOTE — PROGRESS NOTE ADULT - SUBJECTIVE AND OBJECTIVE BOX
Chief complaint: Patient is a 89y old  Male who presents with a chief complaint of Abnormal lab (27 Jun 2025 03:15)    Interval history: Patient seen and examined at bedside. No overnight events noted. HG remained stable, patient reports feeling " tired ".    Review of systems: A complete 10-point review of systems was obtained and is negative except as stated in the interval history.    Vitals:  T(F): 97.5, Max: 98.3 (06-27 @ 17:32)  HR: 70 (70 - 76)  BP: 136/69 (136/69 - 163/77)  RR: 19 (18 - 19)  SpO2: 99% (98% - 99%)    Ins & outs:     06-27 @ 07:01  -  06-28 @ 07:00  --------------------------------------------------------  IN: 0 mL / OUT: 3175 mL / NET: -3175 mL      Weight trend:  Weight (kg): 77.3 (06-28)    Physical exam:  General: No apparent distress  HEENT: Anicteric sclera. Moist mucous membranes. JVd-  Cardiac: Regular rate and rhythm. No murmurs, rubs, or gallops.   Vascular: Symmetric radial pulses. Dorsalis pedis pulses palpable.   Respiratory: Normal effort. Clear to ascultation.   Abdomen: Soft, nontender. Audible bowel sounds.   Extremities: Warm with +1le edema. No cyanosis or clubbing.   Skin: Warm and dry. No rash.   Neurologic: Grossly normal motor function.   Psychiatric: Oriented to person, place, and time.     Data reviewed:  - Telemetry: Aflutter hr 81 bpm  - ECG 6/26/2025- Afib 73bpm  - TTE -  6/27/2025- EF 55-60%, mod mr , mod tr, IVC dilated 2.1 and non collaps  - Chest x-ray- 6/27/2025- new bilateral perihilar opacities   - Stress test: No recent ST   - CCTA: 3/21/25- ca score 2367, mildly dilated ascending thoracic aorta , severe biatrial enlargement  - Cardiac catheterization: 4/29/25- LM large and severely calcified 40-50%, LAD- large and severely calcified patent prox and mid stents with mild ISR, CX   - Cardiac MRI:    - Labs:                        8.5    9.27  )-----------( 166      ( 28 Jun 2025 05:48 )             25.2     06-28    141  |  108  |  63[HH]  ----------------------------<  94  4.8   |  21  |  2.9[H]    Ca    8.6      28 Jun 2025 05:48  Mg     1.9     06-28    TPro  4.9[L]  /  Alb  3.4[L]  /  TBili  1.1  /  DBili  x   /  AST  21  /  ALT  <5  /  AlkPhos  54  06-27    PT/INR - ( 27 Jun 2025 00:30 )   PT: 12.90 sec;   INR: 1.09 ratio         PTT - ( 27 Jun 2025 00:30 )  PTT:27.3 sec            Urinalysis Basic - ( 28 Jun 2025 05:48 )    Color: x / Appearance: x / SG: x / pH: x  Gluc: 94 mg/dL / Ketone: x  / Bili: x / Urobili: x   Blood: x / Protein: x / Nitrite: x   Leuk Esterase: x / RBC: x / WBC x   Sq Epi: x / Non Sq Epi: x / Bacteria: x        Medications:  apixaban 2.5 milliGRAM(s) Oral two times a day  aspirin  chewable 81 milliGRAM(s) Oral daily  atorvastatin 10 milliGRAM(s) Oral at bedtime  cephalexin 250 milliGRAM(s) Oral daily  chlorhexidine 2% Cloths 1 Application(s) Topical daily  finasteride 5 milliGRAM(s) Oral daily  furosemide   Injectable 60 milliGRAM(s) IV Push once  pantoprazole    Tablet 40 milliGRAM(s) Oral before breakfast  polyethylene glycol 3350 17 Gram(s) Oral daily  senna 2 Tablet(s) Oral at bedtime  tamsulosin 0.4 milliGRAM(s) Oral at bedtime    Drips:    PRN:     Allergies    sulfamethoxazole-trimethoprim (Vomiting; Nausea)    Intolerances      Assessment:      Problems discussed and associated plan:      Please contact me with any questions or concerns at [phone]. Chief complaint: Patient is a 89y old  Male who presents with a chief complaint of Abnormal lab (27 Jun 2025 03:15)    Interval history: Patient seen and examined at bedside. No overnight events noted. HG remained stable, patient reports feeling " tired ".    Review of systems: A complete 10-point review of systems was obtained and is negative except as stated in the interval history.    Vitals:  T(F): 97.5, Max: 98.3 (06-27 @ 17:32)  HR: 70 (70 - 76)  BP: 136/69 (136/69 - 163/77)  RR: 19 (18 - 19)  SpO2: 99% (98% - 99%)    Ins & outs:     06-27 @ 07:01  -  06-28 @ 07:00  --------------------------------------------------------  IN: 0 mL / OUT: 3175 mL / NET: -3175 mL      Weight trend:  Weight (kg): 77.3 (06-28)    Physical exam:  General: No apparent distress  HEENT: Anicteric sclera. Moist mucous membranes. JVd-  Cardiac: Regular rate and rhythm. No murmurs, rubs, or gallops.   Vascular: Symmetric radial pulses. Dorsalis pedis pulses palpable.   Respiratory: Normal effort. Clear to ascultation.   Abdomen: Soft, nontender. Audible bowel sounds.   Extremities: Warm with +1le edema. No cyanosis or clubbing.   Skin: Warm and dry. No rash.   Neurologic: Grossly normal motor function.   Psychiatric: Oriented to person, place, and time.     Data reviewed:  - Telemetry: Aflutter hr 81 bpm  - ECG 6/26/2025- Afib 73bpm  - TTE -  6/27/2025- EF 55-60%, mod mr , mod tr, IVC dilated 2.1 and non collaps  - Chest x-ray- 6/27/2025- new bilateral perihilar opacities   - Stress test: No recent ST   - CCTA: 3/21/25- ca score 2367, mildly dilated ascending thoracic aorta , severe biatrial enlargement  - Cardiac catheterization: 4/29/25- LM large and severely calcified 40-50%, LAD- large and severely calcified patent prox and mid stents with mild ISR, CX med to large sized and severely calcified, high OM1 with 99% ostial stenosis, subtotal OM2 sub-branch occlusion with left to left collateral filling. Rca- large and severely calcified  - Cardiac MRI:    - Labs:                        8.5    9.27  )-----------( 166      ( 28 Jun 2025 05:48 )             25.2     06-28    141  |  108  |  63[HH]  ----------------------------<  94  4.8   |  21  |  2.9[H]    Ca    8.6      28 Jun 2025 05:48  Mg     1.9     06-28    TPro  4.9[L]  /  Alb  3.4[L]  /  TBili  1.1  /  DBili  x   /  AST  21  /  ALT  <5  /  AlkPhos  54  06-27    PT/INR - ( 27 Jun 2025 00:30 )   PT: 12.90 sec;   INR: 1.09 ratio         PTT - ( 27 Jun 2025 00:30 )  PTT:27.3 sec            Urinalysis Basic - ( 28 Jun 2025 05:48 )    Color: x / Appearance: x / SG: x / pH: x  Gluc: 94 mg/dL / Ketone: x  / Bili: x / Urobili: x   Blood: x / Protein: x / Nitrite: x   Leuk Esterase: x / RBC: x / WBC x   Sq Epi: x / Non Sq Epi: x / Bacteria: x        Medications:  apixaban 2.5 milliGRAM(s) Oral two times a day  aspirin  chewable 81 milliGRAM(s) Oral daily  atorvastatin 10 milliGRAM(s) Oral at bedtime  cephalexin 250 milliGRAM(s) Oral daily  chlorhexidine 2% Cloths 1 Application(s) Topical daily  finasteride 5 milliGRAM(s) Oral daily  furosemide   Injectable 60 milliGRAM(s) IV Push once  pantoprazole    Tablet 40 milliGRAM(s) Oral before breakfast  polyethylene glycol 3350 17 Gram(s) Oral daily  senna 2 Tablet(s) Oral at bedtime  tamsulosin 0.4 milliGRAM(s) Oral at bedtime    Drips:    PRN:     Allergies    sulfamethoxazole-trimethoprim (Vomiting; Nausea)    Intolerances      Assessment:      Problems discussed and associated plan:      Please contact me with any questions or concerns at [phone]. Chief complaint: Patient is a 89y old  Male who presents with a chief complaint of Abnormal lab (27 Jun 2025 03:15)    Interval history: Patient seen and examined at bedside. No overnight events noted. HG remained stable, patient reports feeling " tired ".    Review of systems: A complete 10-point review of systems was obtained and is negative except as stated in the interval history.    Vitals:  T(F): 97.5, Max: 98.3 (06-27 @ 17:32)  HR: 70 (70 - 76)  BP: 136/69 (136/69 - 163/77)  RR: 19 (18 - 19)  SpO2: 99% (98% - 99%)    Ins & outs:     06-27 @ 07:01  -  06-28 @ 07:00  --------------------------------------------------------  IN: 0 mL / OUT: 3175 mL / NET: -3175 mL      Weight trend:  Weight (kg): 77.3 (06-28)    Physical exam:  General: No apparent distress  HEENT: Anicteric sclera. Moist mucous membranes. JVd-  Cardiac: Regular rate and rhythm. No murmurs, rubs, or gallops.   Vascular: Symmetric radial pulses. Dorsalis pedis pulses palpable.   Respiratory: Normal effort. Clear to ascultation.   Abdomen: Soft, nontender. Audible bowel sounds.   Extremities: Warm with +1le edema. No cyanosis or clubbing.   Skin: Warm and dry. No rash.   Neurologic: Grossly normal motor function.   Psychiatric: Oriented to person, place, and time.     Data reviewed:  - Telemetry: Aflutter hr 81 bpm  - ECG 6/26/2025- Afib 73bpm  - TTE -  6/27/2025- EF 55-60%, mod mr , mod tr, IVC dilated 2.1 and non collaps  - Chest x-ray- 6/27/2025- new bilateral perihilar opacities   - Stress test: No recent ST   - CCTA: 3/21/25- ca score 2367, mildly dilated ascending thoracic aorta , severe biatrial enlargement  - Cardiac catheterization: 4/29/25- LM large and severely calcified 40-50%, LAD- large and severely calcified patent prox and mid stents with mild ISR, CX med to large sized and severely calcified, high OM1 with 99% ostial stenosis, subtotal OM2 sub-branch occlusion with left to left collateral filling. Rca- large and severely calcified  - Cardiac MRI:    - Labs:                        8.5    9.27  )-----------( 166      ( 28 Jun 2025 05:48 )             25.2     06-28    141  |  108  |  63[HH]  ----------------------------<  94  4.8   |  21  |  2.9[H]    Ca    8.6      28 Jun 2025 05:48  Mg     1.9     06-28    TPro  4.9[L]  /  Alb  3.4[L]  /  TBili  1.1  /  DBili  x   /  AST  21  /  ALT  <5  /  AlkPhos  54  06-27    PT/INR - ( 27 Jun 2025 00:30 )   PT: 12.90 sec;   INR: 1.09 ratio         PTT - ( 27 Jun 2025 00:30 )  PTT:27.3 sec            Urinalysis Basic - ( 28 Jun 2025 05:48 )    Color: x / Appearance: x / SG: x / pH: x  Gluc: 94 mg/dL / Ketone: x  / Bili: x / Urobili: x   Blood: x / Protein: x / Nitrite: x   Leuk Esterase: x / RBC: x / WBC x   Sq Epi: x / Non Sq Epi: x / Bacteria: x        Medications:  apixaban 2.5 milliGRAM(s) Oral two times a day  aspirin  chewable 81 milliGRAM(s) Oral daily  atorvastatin 10 milliGRAM(s) Oral at bedtime  cephalexin 250 milliGRAM(s) Oral daily  chlorhexidine 2% Cloths 1 Application(s) Topical daily  finasteride 5 milliGRAM(s) Oral daily  furosemide   Injectable 60 milliGRAM(s) IV Push once  pantoprazole    Tablet 40 milliGRAM(s) Oral before breakfast  polyethylene glycol 3350 17 Gram(s) Oral daily  senna 2 Tablet(s) Oral at bedtime  tamsulosin 0.4 milliGRAM(s) Oral at bedtime    Drips:    PRN:     Allergies    sulfamethoxazole-trimethoprim (Vomiting; Nausea)    Intolerances

## 2025-06-28 NOTE — PROGRESS NOTE ADULT - ASSESSMENT
· Assessment	  89-year-old male with PMH permanent Afib (on Eliquis), bladder CA, hx of recurrent UTI on Cephalexin, emphysema, HTN, HLD, CAD s/p PCI/ARGELIA p/m LAD in 2009, CKD (baseline Cr 1.6-2.0), severe AS s/p Successful Transfemoral TAVR (26mm Mcgraw Nereyda 3 Ultra RESILIA) on 6/18  with Anemia and LEYDI on CKD, admitted to [facility] for further diuresis and monitoring.    # LEYDI: DDx includes hypotension (was on nifedipine 60 mg daily and outpatient BP recently 102/60), renal congestion (overloaded on exam and echo), ALEKSANDR, UTI (sent UA and culture), obstruction (PVR < 200 cc, though will place Lott to ensure obstruction isn't a confounding factor)  - Coude lott placed by urology 6/27, after insertion 600cc immed drained  - Strict I and Os  - Lasix 80 x1 yesterday, Lasix 60mg IV x 1 today  - No bacteria on UA, urine culture pending  - Holding nifedipine to avoid hypotension.  - CR today improved to 2.9    # Anemia: Concern for RP bleed  - CT abdomen/pelvis noncontrast- neg   - reticulocyte count 2.7%, hapto <20, , B12 312, folate 8.3, and iron TIBC 276, iron sat 38%, iron total 105, unsat iron bind cap 171  - Fecal occult blood sample pend results  - UA with blood  - Will monitor Lott output for hematuria  - Keep Active T&S and 2 PIVs  - Continue ASA and Eliquis as no evidence of active bleed at this time,     #Constipation  - Start Senna 2 tabs HS  - Miralax daily     #severe AS s/p recent TAVR   -Limited TTE     #PAF   - Continue Eliquis 2.5 mg q12     #CAD s/p PCI   -Continue asa 81 mg daily   -continue statin     # Bladder ca c/b UTI on chronic antibiotic   -Continue Cephalexin 250 mg daily   -check UA/urine culture     #HLD   -On pravastatin 40 mg at home, start  equivalent atorvastatin 10 mg daily     #HTN  -Home Nefidipine 60 mg daily on hold now   -Monitor and trend BP     FULL CODE   DVT ppx/ Eliquis   heart healthy diet

## 2025-06-29 LAB
ANION GAP SERPL CALC-SCNC: 13 MMOL/L — SIGNIFICANT CHANGE UP (ref 7–14)
ANION GAP SERPL CALC-SCNC: 14 MMOL/L — SIGNIFICANT CHANGE UP (ref 7–14)
BUN SERPL-MCNC: 58 MG/DL — HIGH (ref 10–20)
BUN SERPL-MCNC: 61 MG/DL — CRITICAL HIGH (ref 10–20)
CALCIUM SERPL-MCNC: 8.3 MG/DL — LOW (ref 8.4–10.5)
CALCIUM SERPL-MCNC: 8.4 MG/DL — SIGNIFICANT CHANGE UP (ref 8.4–10.5)
CHLORIDE SERPL-SCNC: 104 MMOL/L — SIGNIFICANT CHANGE UP (ref 98–110)
CHLORIDE SERPL-SCNC: 106 MMOL/L — SIGNIFICANT CHANGE UP (ref 98–110)
CO2 SERPL-SCNC: 20 MMOL/L — SIGNIFICANT CHANGE UP (ref 17–32)
CO2 SERPL-SCNC: 21 MMOL/L — SIGNIFICANT CHANGE UP (ref 17–32)
CREAT SERPL-MCNC: 2.5 MG/DL — HIGH (ref 0.7–1.5)
CREAT SERPL-MCNC: 2.8 MG/DL — HIGH (ref 0.7–1.5)
EGFR: 20 ML/MIN/1.73M2 — LOW
EGFR: 20 ML/MIN/1.73M2 — LOW
EGFR: 23 ML/MIN/1.73M2 — LOW
EGFR: 23 ML/MIN/1.73M2 — LOW
GLUCOSE SERPL-MCNC: 103 MG/DL — HIGH (ref 70–99)
GLUCOSE SERPL-MCNC: 95 MG/DL — SIGNIFICANT CHANGE UP (ref 70–99)
HCT VFR BLD CALC: 23.3 % — LOW (ref 42–52)
HCT VFR BLD CALC: 30.6 % — LOW (ref 42–52)
HGB BLD-MCNC: 10.5 G/DL — LOW (ref 14–18)
HGB BLD-MCNC: 7.9 G/DL — LOW (ref 14–18)
MAGNESIUM SERPL-MCNC: 1.7 MG/DL — LOW (ref 1.8–2.4)
MAGNESIUM SERPL-MCNC: 1.7 MG/DL — LOW (ref 1.8–2.4)
MCHC RBC-ENTMCNC: 29.6 PG — SIGNIFICANT CHANGE UP (ref 27–31)
MCHC RBC-ENTMCNC: 30.4 PG — SIGNIFICANT CHANGE UP (ref 27–31)
MCHC RBC-ENTMCNC: 33.9 G/DL — SIGNIFICANT CHANGE UP (ref 32–37)
MCHC RBC-ENTMCNC: 34.3 G/DL — SIGNIFICANT CHANGE UP (ref 32–37)
MCV RBC AUTO: 87.3 FL — SIGNIFICANT CHANGE UP (ref 80–94)
MCV RBC AUTO: 88.7 FL — SIGNIFICANT CHANGE UP (ref 80–94)
NRBC BLD AUTO-RTO: 0 /100 WBCS — SIGNIFICANT CHANGE UP (ref 0–0)
NRBC BLD AUTO-RTO: 0 /100 WBCS — SIGNIFICANT CHANGE UP (ref 0–0)
PLATELET # BLD AUTO: 152 K/UL — SIGNIFICANT CHANGE UP (ref 130–400)
PLATELET # BLD AUTO: 172 K/UL — SIGNIFICANT CHANGE UP (ref 130–400)
PMV BLD: SIGNIFICANT CHANGE UP (ref 7.4–10.4)
PMV BLD: SIGNIFICANT CHANGE UP (ref 7.4–10.4)
POTASSIUM SERPL-MCNC: 4.3 MMOL/L — SIGNIFICANT CHANGE UP (ref 3.5–5)
POTASSIUM SERPL-MCNC: 4.9 MMOL/L — SIGNIFICANT CHANGE UP (ref 3.5–5)
POTASSIUM SERPL-SCNC: 4.3 MMOL/L — SIGNIFICANT CHANGE UP (ref 3.5–5)
POTASSIUM SERPL-SCNC: 4.9 MMOL/L — SIGNIFICANT CHANGE UP (ref 3.5–5)
RBC # BLD: 2.67 M/UL — LOW (ref 4.7–6.1)
RBC # BLD: 3.45 M/UL — LOW (ref 4.7–6.1)
RBC # FLD: 18 % — HIGH (ref 11.5–14.5)
RBC # FLD: 18.6 % — HIGH (ref 11.5–14.5)
SODIUM SERPL-SCNC: 139 MMOL/L — SIGNIFICANT CHANGE UP (ref 135–146)
SODIUM SERPL-SCNC: 139 MMOL/L — SIGNIFICANT CHANGE UP (ref 135–146)
WBC # BLD: 8.3 K/UL — SIGNIFICANT CHANGE UP (ref 4.8–10.8)
WBC # BLD: 9.81 K/UL — SIGNIFICANT CHANGE UP (ref 4.8–10.8)
WBC # FLD AUTO: 8.3 K/UL — SIGNIFICANT CHANGE UP (ref 4.8–10.8)
WBC # FLD AUTO: 9.81 K/UL — SIGNIFICANT CHANGE UP (ref 4.8–10.8)

## 2025-06-29 PROCEDURE — 99232 SBSQ HOSP IP/OBS MODERATE 35: CPT

## 2025-06-29 RX ORDER — FUROSEMIDE 10 MG/ML
60 INJECTION INTRAMUSCULAR; INTRAVENOUS ONCE
Refills: 0 | Status: COMPLETED | OUTPATIENT
Start: 2025-06-29 | End: 2025-06-29

## 2025-06-29 RX ADMIN — ATORVASTATIN CALCIUM 10 MILLIGRAM(S): 80 TABLET, FILM COATED ORAL at 21:26

## 2025-06-29 RX ADMIN — APIXABAN 2.5 MILLIGRAM(S): 2.5 TABLET, FILM COATED ORAL at 17:17

## 2025-06-29 RX ADMIN — Medication 81 MILLIGRAM(S): at 11:34

## 2025-06-29 RX ADMIN — Medication 40 MILLIGRAM(S): at 05:39

## 2025-06-29 RX ADMIN — TAMSULOSIN HYDROCHLORIDE 0.4 MILLIGRAM(S): 0.4 CAPSULE ORAL at 21:26

## 2025-06-29 RX ADMIN — FUROSEMIDE 60 MILLIGRAM(S): 10 INJECTION INTRAMUSCULAR; INTRAVENOUS at 13:10

## 2025-06-29 RX ADMIN — FINASTERIDE 5 MILLIGRAM(S): 1 TABLET, FILM COATED ORAL at 11:33

## 2025-06-29 RX ADMIN — CEPHALEXIN 250 MILLIGRAM(S): 250 CAPSULE ORAL at 11:34

## 2025-06-29 RX ADMIN — APIXABAN 2.5 MILLIGRAM(S): 2.5 TABLET, FILM COATED ORAL at 05:39

## 2025-06-29 RX ADMIN — POLYETHYLENE GLYCOL 3350 17 GRAM(S): 17 POWDER, FOR SOLUTION ORAL at 11:33

## 2025-06-29 RX ADMIN — FUROSEMIDE 60 MILLIGRAM(S): 10 INJECTION INTRAMUSCULAR; INTRAVENOUS at 16:54

## 2025-06-29 RX ADMIN — Medication 1 APPLICATION(S): at 11:33

## 2025-06-29 NOTE — PROGRESS NOTE ADULT - SUBJECTIVE AND OBJECTIVE BOX
Chief complaint: Patient is a 89y old  Male who presents with a chief complaint of Abnormal lab (28 Jun 2025 10:37)    Interval history:    Review of systems: A complete 10-point review of systems was obtained and is negative except as stated in the interval history.    Vitals:  T(F): 97.7, Max: 98.5 (06-28 @ 23:44)  HR: 72 (72 - 79)  BP: 122/61 (119/63 - 148/70)  RR: 18 (18 - 20)  SpO2: 98% (96% - 100%)    Ins & outs:     06-27 @ 07:01  -  06-28 @ 07:00  --------------------------------------------------------  IN: 0 mL / OUT: 3175 mL / NET: -3175 mL    06-28 @ 07:01  -  06-29 @ 07:00  --------------------------------------------------------  IN: 390 mL / OUT: 2550 mL / NET: -2160 mL      Weight trend:  Weight (kg): 77.3 (06-28)    Physical exam:  General: No apparent distress  HEENT: Anicteric sclera. Moist mucous membranes. JVP *** cm.   Cardiac: Regular rate and rhythm. No murmurs, rubs, or gallops.   Vascular: Symmetric radial pulses. Dorsalis pedis pulses palpable.   Respiratory: Normal effort. ***Bibasilar crackles. Clear to ascultation.   Abdomen: Soft, nontender. Audible bowel sounds.   Extremities: Warm with *** edema. No cyanosis or clubbing.   Skin: Warm and dry. No rash.   Neurologic: Grossly normal motor function.   Psychiatric: Oriented to person, place, and time.     Data reviewed:  - Telemetry:   - ECG (date***):   - TTE (date***):   - Chest x-ray (date***):   - Stress test:   - CCTA:  - Cardiac catheterization:  - Cardiac MRI:    - Labs:                        7.9    8.30  )-----------( 152      ( 29 Jun 2025 05:39 )             23.3     06-29    139  |  106  |  58[H]  ----------------------------<  103[H]  4.3   |  20  |  2.5[H]    Ca    8.3[L]      29 Jun 2025 05:39  Mg     1.7     06-29                  Urinalysis Basic - ( 29 Jun 2025 05:39 )    Color: x / Appearance: x / SG: x / pH: x  Gluc: 103 mg/dL / Ketone: x  / Bili: x / Urobili: x   Blood: x / Protein: x / Nitrite: x   Leuk Esterase: x / RBC: x / WBC x   Sq Epi: x / Non Sq Epi: x / Bacteria: x        Medications:  apixaban 2.5 milliGRAM(s) Oral two times a day  aspirin  chewable 81 milliGRAM(s) Oral daily  atorvastatin 10 milliGRAM(s) Oral at bedtime  cephalexin 250 milliGRAM(s) Oral daily  chlorhexidine 2% Cloths 1 Application(s) Topical daily  finasteride 5 milliGRAM(s) Oral daily  furosemide   Injectable 60 milliGRAM(s) IV Push once  pantoprazole    Tablet 40 milliGRAM(s) Oral before breakfast  polyethylene glycol 3350 17 Gram(s) Oral daily  senna 2 Tablet(s) Oral at bedtime  tamsulosin 0.4 milliGRAM(s) Oral at bedtime    Drips:    PRN:     Allergies    sulfamethoxazole-trimethoprim (Vomiting; Nausea)    Intolerances      Assessment:      Problems discussed and associated plan:      Please contact me with any questions or concerns at [phone]. Chief complaint: Patient is a 89y old  Male who presents with a chief complaint of Abnormal lab (28 Jun 2025 10:37)    Interval history: Patient seen and examined at bedside this am.  Patient denies any acute events over the past 24 hours.  Patient complains of chronic weakness and fatigue.  Hg dropped from 8.5 -> 7.9.      Review of systems: A complete 10-point review of systems was obtained and is negative except as stated in the interval history.    Vitals:  T(F): 97.7, Max: 98.5 (06-28 @ 23:44)  HR: 72 (72 - 79)  BP: 122/61 (119/63 - 148/70)  RR: 18 (18 - 20)  SpO2: 98% (96% - 100%)    Ins & outs:     06-27 @ 07:01  -  06-28 @ 07:00  --------------------------------------------------------  IN: 0 mL / OUT: 3175 mL / NET: -3175 mL    06-28 @ 07:01  -  06-29 @ 07:00  --------------------------------------------------------  IN: 390 mL / OUT: 2550 mL / NET: -2160 mL      Weight trend:  Weight (kg): 77.3 (06-28)    Physical exam:  General: No apparent distress  HEENT: Anicteric sclera. Moist mucous membranes. JVd-  Cardiac: Regular rate and rhythm. No murmurs, rubs, or gallops.   Vascular: Symmetric radial pulses. Dorsalis pedis pulses palpable.   Respiratory: Normal effort. Clear to ascultation.   Abdomen: Soft, nontender. Audible bowel sounds.   Extremities: Warm with +1le edema. No cyanosis or clubbing.   Skin: Warm and dry. No rash.   Neurologic: Grossly normal motor function.   Psychiatric: Oriented to person, place, and time.     Data reviewed:  - Telemetry: Atrial flutter at  bpm   - ECG 6/26/2025- Afib 73bpm  - TTE -  6/27/2025- EF 55-60%, mod mr , mod tr, IVC dilated 2.1 and non collaps  - Chest x-ray- 6/27/2025- new bilateral perihilar opacities   - Stress test: No recent ST   - CCTA: 3/21/25- ca score 2367, mildly dilated ascending thoracic aorta , severe biatrial enlargement  - Cardiac catheterization: 4/29/25- LM large and severely calcified 40-50%, LAD- large and severely calcified patent prox and mid stents with mild ISR, CX med to large sized and severely calcified, high OM1 with 99% ostial stenosis, subtotal OM2 sub-branch occlusion with left to left collateral filling. Rca- large and severely calcified    - Labs:                        7.9    8.30  )-----------( 152      ( 29 Jun 2025 05:39 )             23.3     06-29    139  |  106  |  58[H]  ----------------------------<  103[H]  4.3   |  20  |  2.5[H]    Ca    8.3[L]      29 Jun 2025 05:39  Mg     1.7     06-29                  Urinalysis Basic - ( 29 Jun 2025 05:39 )    Color: x / Appearance: x / SG: x / pH: x  Gluc: 103 mg/dL / Ketone: x  / Bili: x / Urobili: x   Blood: x / Protein: x / Nitrite: x   Leuk Esterase: x / RBC: x / WBC x   Sq Epi: x / Non Sq Epi: x / Bacteria: x        Medications:  apixaban 2.5 milliGRAM(s) Oral two times a day  aspirin  chewable 81 milliGRAM(s) Oral daily  atorvastatin 10 milliGRAM(s) Oral at bedtime  cephalexin 250 milliGRAM(s) Oral daily  chlorhexidine 2% Cloths 1 Application(s) Topical daily  finasteride 5 milliGRAM(s) Oral daily  furosemide   Injectable 60 milliGRAM(s) IV Push once  pantoprazole    Tablet 40 milliGRAM(s) Oral before breakfast  polyethylene glycol 3350 17 Gram(s) Oral daily  senna 2 Tablet(s) Oral at bedtime  tamsulosin 0.4 milliGRAM(s) Oral at bedtime    Drips:    PRN:     Allergies    sulfamethoxazole-trimethoprim (Vomiting; Nausea)    Intolerances

## 2025-06-29 NOTE — PROGRESS NOTE ADULT - NS ATTEND AMEND GEN_ALL_CORE FT
89yoM with severe AS s/p TAVR (26 mm Mcgraw Nereyda 3 Ultra RESILIA) on 6/18/25, permanent Afib on Eliquis, history of bladder cancer and recurrent UTIs, CAD s/p LAD intervention in 2009, HTN, HLD, and CKD with baseline Cr 1.6 -2.0 who presents with anemia (Hgb 8.0, down from 11.1) and LEYDI (Cr 3.3, up from 1.8). He endorses fatigue. TTE with normal LVEF, normally functioning TAVR (shanel 2.2, PG 19, MG 10, KENDALL 2.4) with moderate paravalvular regurgitation, moderate MR, moderate TR, and PASP 69 mmHg (RAP 15 mmHg).    # Anemia: concern for hemolysis  - CT abdomen/pelvis noncontrast negative for RP bleed  - Reticulocyte count, hapto, LDH, B12, folate, and iron studies noted  - Awaiting FOBT  - Active T&S and 2 PIVs; transfusing 2 unit pRBCs today to keep Hgb above 8  - Continue ASA and Eliquis as no evidence of active bleed at this time    # LEYDI: renal congestion (overloaded on exam and echo), ALEKSANDR, UTI (sent UA and culture), obstruction (PVR < 200 cc, though will place Brooks to ensure obstruction isn't a confounding factor)  - Gave Lasix 60mg IV today with each unit pRBC  - No bacteria on UA, urine culture pending  - Holding nifedipine to avoid hypotension.     Dr. Prabhakar will see patient tomorrow. NPO past midnight in case there is plan for a LAURA.

## 2025-06-29 NOTE — PROGRESS NOTE ADULT - TIME BILLING
Chart review, bedside evaluation, discussion with patient and family, coordination with valve team.
Chart review, bedside evaluation, discussion with patient and team, coordination with structural.

## 2025-06-29 NOTE — PHYSICAL THERAPY INITIAL EVALUATION ADULT - ADDITIONAL COMMENTS
per pt, his LE's have been getting weaker and more numb in the past 1 yr. PTA it has been more difficult to function independently. he and his wife will need a HHA to assist him.

## 2025-06-29 NOTE — PROGRESS NOTE ADULT - ASSESSMENT
Assessment: 89-year-old male with PMH permanent Afib (on Eliquis), bladder CA, hx of recurrent UTI on Cephalexin, emphysema, HTN, HLD, CAD s/p PCI/ARGELIA p/m LAD in 2009, CKD (baseline Cr 1.6-2.0), severe AS s/p Successful Transfemoral TAVR (26mm Mcgraw Nereyda 3 Ultra RESILIA) on 6/18  with Anemia and LEYDI on CKD, admitted to [facility] for further diuresis and monitoring.    Problems discussed and associated plan:  # LEYDI: DDx includes hypotension (was on nifedipine 60 mg daily and outpatient BP recently 102/60), renal congestion (overloaded on exam and echo), ALEKSANDR, UTI (sent UA and culture), obstruction (PVR < 200 cc, though will place Lott to ensure obstruction isn't a confounding factor)  - Coude lott placed by urology 6/27, after insertion 600cc immed drained  - Strict I and Os  - Lasix 60mg IV x1 today during 1 U packed RBC transfusion  - No bacteria on UA, urine culture pending  - Holding nifedipine to avoid hypotension.  - CR today improved to 2.9    # Anemia: Concern for RP bleed  - CT abdomen/pelvis noncontrast- neg   - reticulocyte count 2.7%, hapto <20, , B12 312, folate 8.3, and iron TIBC 276, iron sat 38%, iron total 105, unsat iron bind cap 171  - Fecal occult blood sample pend results  - UA with blood  - Will monitor Lott output for hematuria  - Keep Active T&S and 2 PIVs  - Continue ASA and Eliquis as no evidence of active bleed at this time,   - 6/29, Hg in the am 7.9, transfuse 1U packed RBC with repeat CBC in the evening    #Constipation  - Start Senna 2 tabs HS  - Miralax daily     #severe AS s/p recent TAVR   -Limited TTE     #PAF   - Continue Eliquis 2.5 mg q12     #CAD s/p PCI   -Continue asa 81 mg daily   -continue statin     # Bladder ca c/b UTI on chronic antibiotic   -Continue Cephalexin 250 mg daily   -check UA/urine culture     #HLD   -On pravastatin 40 mg at home, start  equivalent atorvastatin 10 mg daily     #HTN  -Home Nefidipine 60 mg daily on hold now   -Monitor and trend BP     FULL CODE   DVT ppx/ Eliquis   heart healthy diet   consult PT/OT      Please contact me with any questions or concerns at [phone].

## 2025-06-29 NOTE — PHYSICAL THERAPY INITIAL EVALUATION ADULT - PERTINENT HX OF CURRENT PROBLEM, REHAB EVAL
89-year-old male with PMH permanent Afib (on Eliquis), bladder CA, hx of recurrent UTI on Cephalexin, emphysema, HTN, HLD, CAD s/p PCI/ARGELIA p/m LAD in 2009, CKD (baseline Cr 1.6-2.0), severe AS s/p Successful Transfemoral TAVR (26mm Mcgraw Nereyda 3 Ultra RESILIA) on 6/18  with Anemia and LEYDI on CKD, admitted to [facility] tele for further diuresis and monitoring.

## 2025-06-29 NOTE — PHYSICAL THERAPY INITIAL EVALUATION ADULT - GENERAL OBSERVATIONS, REHAB EVAL
89 y/o M received in bed, left in b/s chair, nad, + tele box, kaylie, ar adams, RN present. pt is A+Ox4, pleasant and very motivated. pt transferred with CG and ambulated 50 ft with rollator walker, CG. vitals: in bed 157/81 81 98% on RA, sitting 142/68 78 98%, standing 131/70 82 98%.

## 2025-06-30 ENCOUNTER — TRANSCRIPTION ENCOUNTER (OUTPATIENT)
Age: 89
End: 2025-06-30

## 2025-06-30 LAB
ANION GAP SERPL CALC-SCNC: 15 MMOL/L — HIGH (ref 7–14)
BASOPHILS # BLD AUTO: 0.05 K/UL — SIGNIFICANT CHANGE UP (ref 0–0.2)
BASOPHILS NFR BLD AUTO: 0.6 % — SIGNIFICANT CHANGE UP (ref 0–1)
BUN SERPL-MCNC: 61 MG/DL — CRITICAL HIGH (ref 10–20)
CALCIUM SERPL-MCNC: 8.1 MG/DL — LOW (ref 8.4–10.5)
CHLORIDE SERPL-SCNC: 102 MMOL/L — SIGNIFICANT CHANGE UP (ref 98–110)
CO2 SERPL-SCNC: 22 MMOL/L — SIGNIFICANT CHANGE UP (ref 17–32)
CREAT SERPL-MCNC: 2.8 MG/DL — HIGH (ref 0.7–1.5)
EGFR: 20 ML/MIN/1.73M2 — LOW
EGFR: 20 ML/MIN/1.73M2 — LOW
EOSINOPHIL # BLD AUTO: 0.36 K/UL — SIGNIFICANT CHANGE UP (ref 0–0.7)
EOSINOPHIL NFR BLD AUTO: 4 % — SIGNIFICANT CHANGE UP (ref 0–8)
GLUCOSE SERPL-MCNC: 94 MG/DL — SIGNIFICANT CHANGE UP (ref 70–99)
HCT VFR BLD CALC: 30 % — LOW (ref 42–52)
HGB BLD-MCNC: 10.2 G/DL — LOW (ref 14–18)
IMM GRANULOCYTES NFR BLD AUTO: 1.9 % — HIGH (ref 0.1–0.3)
LYMPHOCYTES # BLD AUTO: 0.97 K/UL — LOW (ref 1.2–3.4)
LYMPHOCYTES # BLD AUTO: 10.7 % — LOW (ref 20.5–51.1)
MAGNESIUM SERPL-MCNC: 1.6 MG/DL — LOW (ref 1.8–2.4)
MCHC RBC-ENTMCNC: 30.1 PG — SIGNIFICANT CHANGE UP (ref 27–31)
MCHC RBC-ENTMCNC: 34 G/DL — SIGNIFICANT CHANGE UP (ref 32–37)
MCV RBC AUTO: 88.5 FL — SIGNIFICANT CHANGE UP (ref 80–94)
MONOCYTES # BLD AUTO: 1.11 K/UL — HIGH (ref 0.1–0.6)
MONOCYTES NFR BLD AUTO: 12.3 % — HIGH (ref 1.7–9.3)
NEUTROPHILS # BLD AUTO: 6.39 K/UL — SIGNIFICANT CHANGE UP (ref 1.4–6.5)
NEUTROPHILS NFR BLD AUTO: 70.5 % — SIGNIFICANT CHANGE UP (ref 42.2–75.2)
NRBC BLD AUTO-RTO: 0 /100 WBCS — SIGNIFICANT CHANGE UP (ref 0–0)
PLATELET # BLD AUTO: 189 K/UL — SIGNIFICANT CHANGE UP (ref 130–400)
PMV BLD: SIGNIFICANT CHANGE UP (ref 7.4–10.4)
POTASSIUM SERPL-MCNC: 3.9 MMOL/L — SIGNIFICANT CHANGE UP (ref 3.5–5)
POTASSIUM SERPL-SCNC: 3.9 MMOL/L — SIGNIFICANT CHANGE UP (ref 3.5–5)
RBC # BLD: 3.39 M/UL — LOW (ref 4.7–6.1)
RBC # FLD: 18.3 % — HIGH (ref 11.5–14.5)
SODIUM SERPL-SCNC: 139 MMOL/L — SIGNIFICANT CHANGE UP (ref 135–146)
WBC # BLD: 9.05 K/UL — SIGNIFICANT CHANGE UP (ref 4.8–10.8)
WBC # FLD AUTO: 9.05 K/UL — SIGNIFICANT CHANGE UP (ref 4.8–10.8)

## 2025-06-30 PROCEDURE — 99233 SBSQ HOSP IP/OBS HIGH 50: CPT

## 2025-06-30 RX ORDER — MAGNESIUM SULFATE 500 MG/ML
2 SYRINGE (ML) INJECTION
Refills: 0 | Status: COMPLETED | OUTPATIENT
Start: 2025-06-30 | End: 2025-06-30

## 2025-06-30 RX ADMIN — Medication 40 MILLIGRAM(S): at 05:10

## 2025-06-30 RX ADMIN — Medication 1 APPLICATION(S): at 11:04

## 2025-06-30 RX ADMIN — Medication 25 GRAM(S): at 14:26

## 2025-06-30 RX ADMIN — FINASTERIDE 5 MILLIGRAM(S): 1 TABLET, FILM COATED ORAL at 11:03

## 2025-06-30 RX ADMIN — CEPHALEXIN 250 MILLIGRAM(S): 250 CAPSULE ORAL at 11:03

## 2025-06-30 RX ADMIN — TAMSULOSIN HYDROCHLORIDE 0.4 MILLIGRAM(S): 0.4 CAPSULE ORAL at 20:52

## 2025-06-30 RX ADMIN — Medication 25 GRAM(S): at 16:08

## 2025-06-30 RX ADMIN — Medication 81 MILLIGRAM(S): at 11:03

## 2025-06-30 RX ADMIN — APIXABAN 2.5 MILLIGRAM(S): 2.5 TABLET, FILM COATED ORAL at 05:10

## 2025-06-30 RX ADMIN — APIXABAN 2.5 MILLIGRAM(S): 2.5 TABLET, FILM COATED ORAL at 17:11

## 2025-06-30 RX ADMIN — ATORVASTATIN CALCIUM 10 MILLIGRAM(S): 80 TABLET, FILM COATED ORAL at 20:52

## 2025-06-30 NOTE — PROGRESS NOTE ADULT - SUBJECTIVE AND OBJECTIVE BOX
Chief complaint: Patient is a 89y old  Male who presents with a chief complaint of Abnormal lab (LEYDI Anemia) (28 Jun 2025 10:37)    Interval history: Patient seen and examined at bedside this am.  Patient denies any acute events over the past 24 hours.  Patient complains of chronic weakness and fatigue.  Hg at 10.2 post 2 units of PRBC transfusion    Review of systems: A complete 10-point review of systems was obtained and is negative except as stated in the interval history.  Vital Signs Last 24 Hrs  T(C): 36.3 (30 Jun 2025 07:45), Max: 36.9 (29 Jun 2025 23:49)  T(F): 97.3 (30 Jun 2025 07:45), Max: 98.5 (29 Jun 2025 23:49)  HR: 69 (30 Jun 2025 07:45) (68 - 85)  BP: 115/58 (30 Jun 2025 07:45) (110/54 - 157/68)  BP(mean): 81 (30 Jun 2025 07:45) (78 - 98)  ABP: --  ABP(mean): --  RR: 18 (30 Jun 2025 07:45) (18 - 18)  SpO2: 98% (30 Jun 2025 07:45) (93% - 100%)    O2 Parameters below as of 30 Jun 2025 07:45  Patient On (Oxygen Delivery Method): room air            IN: 150 mL / OUT: 3950 mL / NET: -3800 mL    06-29 @ 07:01  -  06-30 @ 07:00  --------------------------------------------------------  IN: 150 mL / OUT: 3950 mL / NET: -3800 mL          Physical exam:  General: No apparent distress  HEENT: Anicteric sclera. Moist mucous membranes.   Cardiac: Regular rate and rhythm. Clear S1 and S2 No murmurs, rubs, or gallops.   Vascular: Symmetric radial pulses. Dorsalis pedis pulses palpable.   Respiratory: Normal effort. Clear to ascultation.   Abdomen: Soft, nontender.   Extremities: Warm No cyanosis or clubbing.   Skin: Warm and dry. No rash.   Neurologic: Grossly normal motor function.   Psychiatric: Oriented to person, place, and time.     Data reviewed:  - Telemetry: Atrial flutter at  bpm   - ECG 6/26/2025- Afib 73bpm  - CT Abd/Pelvis 6/27/2025 IMPRESSION: No retroperitoneal hematoma, Incidental/stable findings described above  _ Chest X ray 6/27/2025 bilateral perihilar opacities  - TTE -  6/27/2025- EF 55-60%, mod mr , mod tr, IVC dilated 2.1 and non collaps  - Chest x-ray- 6/27/2025- new bilateral perihilar opacities   - Stress test: No recent ST   - CCTA: 3/21/25- ca score 2367, mildly dilated ascending thoracic aorta , severe biatrial enlargement  - Cardiac catheterization: 4/29/25- LM large and severely calcified 40-50%, LAD- large and severely calcified patent prox and mid stents with mild ISR, CX med to large sized and severely calcified, high OM1 with 99% ostial stenosis, subtotal OM2 sub-branch occlusion with left to left collateral filling. Rca- large and severely calcified    - Labs:                   10.2   9.05  )-----------( 189      ( 30 Jun 2025 05:27 )             30.0       06-30    139  |  102  |  61[HH]  ----------------------------<  94  3.9   |  22  |  2.8[H]    Ca    8.1[L]      30 Jun 2025 05:27  Mg     1.6     06-30                Urinalysis Basic - ( 30 Jun 2025 05:27 )    Color: x / Appearance: x / SG: x / pH: x  Gluc: 94 mg/dL / Ketone: x  / Bili: x / Urobili: x   Blood: x / Protein: x / Nitrite: x   Leuk Esterase: x / RBC: x / WBC x   Sq Epi: x / Non Sq Epi: x / Bacteria: x        Culture Results:   <10,000 CFU/mL Normal Urogenital Kacey (06-27 @ 02:45)  Culture Results:   <10,000 CFU/mL Normal Urogenital Kacey (06-12 @ 13:29)          MEDICATIONS  (STANDING):  apixaban 2.5 milliGRAM(s) Oral two times a day  aspirin  chewable 81 milliGRAM(s) Oral daily  atorvastatin 10 milliGRAM(s) Oral at bedtime  cephalexin 250 milliGRAM(s) Oral daily  chlorhexidine 2% Cloths 1 Application(s) Topical daily  finasteride 5 milliGRAM(s) Oral daily  pantoprazole    Tablet 40 milliGRAM(s) Oral before breakfast  polyethylene glycol 3350 17 Gram(s) Oral daily  senna 2 Tablet(s) Oral at bedtime  tamsulosin 0.4 milliGRAM(s) Oral at bedtime    MEDICATIONS  (PRN):      Allergies    sulfamethoxazole-trimethoprim (Vomiting; Nausea)    Intolerances   Chief complaint: Patient is a 89y old  Male who presents with a chief complaint of Abnormal lab (LEYDI Anemia) (28 Jun 2025 10:37)    Interval history: Patient seen and examined at bedside this am.  Patient denies any acute events over the past 24 hours.  Patient complains of chronic weakness and fatigue.  Hg at 10.2 post 2 units of PRBC transfusion    Review of systems: A complete 10-point review of systems was obtained and is negative except as stated in the interval history.  Vital Signs Last 24 Hrs  T(C): 36.3 (30 Jun 2025 07:45), Max: 36.9 (29 Jun 2025 23:49)  T(F): 97.3 (30 Jun 2025 07:45), Max: 98.5 (29 Jun 2025 23:49)  HR: 69 (30 Jun 2025 07:45) (68 - 85)  BP: 115/58 (30 Jun 2025 07:45) (110/54 - 157/68)  BP(mean): 81 (30 Jun 2025 07:45) (78 - 98)  ABP: --  ABP(mean): --  RR: 18 (30 Jun 2025 07:45) (18 - 18)  SpO2: 98% (30 Jun 2025 07:45) (93% - 100%)    O2 Parameters below as of 30 Jun 2025 07:45  Patient On (Oxygen Delivery Method): room air            IN: 150 mL / OUT: 3950 mL / NET: -3800 mL    06-29 @ 07:01  -  06-30 @ 07:00  --------------------------------------------------------  IN: 150 mL / OUT: 3950 mL / NET: -3800 mL          Physical exam:  General: No apparent distress  HEENT: Anicteric sclera. Moist mucous membranes.   Cardiac: Regular rate and rhythm. Clear S1 and S2 No murmurs, rubs, or gallops.   Vascular: Symmetric radial pulses. Dorsalis pedis pulses palpable.   Respiratory: Normal effort. Clear to ascultation.   Abdomen: Soft, nontender.   Extremities: Warm No cyanosis or clubbing.   Skin: Warm and dry. No rash.   Neurologic: Grossly normal motor function.   Psychiatric: Oriented to person, place, and time.     Data reviewed:  - Telemetry: Atrial flutter at  bpm   - ECG 6/26/2025- Afib 73bpm  - CT Abd/Pelvis 6/27/2025 IMPRESSION: No retroperitoneal hematoma  _ Chest X ray 6/27/2025 bilateral perihilar opacities  - TTE -  6/27/2025- EF 55-60%, mod mr , mod tr, IVC dilated 2.1 and non collaps  - Chest x-ray- 6/27/2025- new bilateral perihilar opacities   - Stress test: No recent ST   - CCTA: 3/21/25- ca score 2367, mildly dilated ascending thoracic aorta , severe biatrial enlargement  - Cardiac catheterization: 4/29/25- LM large and severely calcified 40-50%, LAD- large and severely calcified patent prox and mid stents with mild ISR, CX med to large sized and severely calcified, high OM1 with 99% ostial stenosis, subtotal OM2 sub-branch occlusion with left to left collateral filling. Rca- large and severely calcified    - Labs:                   10.2   9.05  )-----------( 189      ( 30 Jun 2025 05:27 )             30.0       06-30    139  |  102  |  61[HH]  ----------------------------<  94  3.9   |  22  |  2.8[H]    Ca    8.1[L]      30 Jun 2025 05:27  Mg     1.6     06-30        Urinalysis Basic - ( 30 Jun 2025 05:27 )    Color: x / Appearance: x / SG: x / pH: x  Gluc: 94 mg/dL / Ketone: x  / Bili: x / Urobili: x   Blood: x / Protein: x / Nitrite: x   Leuk Esterase: x / RBC: x / WBC x   Sq Epi: x / Non Sq Epi: x / Bacteria: x        Culture Results:   <10,000 CFU/mL Normal Urogenital Kacey (06-27 @ 02:45)  Culture Results:   <10,000 CFU/mL Normal Urogenital Kacey (06-12 @ 13:29)          MEDICATIONS  (STANDING):  apixaban 2.5 milliGRAM(s) Oral two times a day  aspirin  chewable 81 milliGRAM(s) Oral daily  atorvastatin 10 milliGRAM(s) Oral at bedtime  cephalexin 250 milliGRAM(s) Oral daily  chlorhexidine 2% Cloths 1 Application(s) Topical daily  finasteride 5 milliGRAM(s) Oral daily  pantoprazole    Tablet 40 milliGRAM(s) Oral before breakfast  polyethylene glycol 3350 17 Gram(s) Oral daily  senna 2 Tablet(s) Oral at bedtime  tamsulosin 0.4 milliGRAM(s) Oral at bedtime    MEDICATIONS  (PRN):      Allergies    sulfamethoxazole-trimethoprim (Vomiting; Nausea)    Intolerances

## 2025-06-30 NOTE — OCCUPATIONAL THERAPY INITIAL EVALUATION ADULT - PERTINENT HX OF CURRENT PROBLEM, REHAB EVAL
89y old  Male who presents with a chief complaint of Abnormal lab (LEYDI Anemia) (28 Jun 2025 10:37)    Interval history: Patient seen and examined at bedside this am.  Patient denies any acute events over the past 24 hours.  Patient complains of chronic weakness and fatigue.  Hg at 10.2 post 2 units of PRBC transfusion

## 2025-06-30 NOTE — OCCUPATIONAL THERAPY INITIAL EVALUATION ADULT - ADDITIONAL COMMENTS
Pt reports being independent with adls with increased time prior to admission. Pt required assistance with tub transfer

## 2025-06-30 NOTE — DISCHARGE NOTE NURSING/CASE MANAGEMENT/SOCIAL WORK - FINANCIAL ASSISTANCE
[facility] provides services at a reduced cost to those who are determined to be eligible through [facility]’s financial assistance program. Information regarding [facility]’s financial assistance program can be found by going to [email or web address] or by calling [phone].

## 2025-06-30 NOTE — PROGRESS NOTE ADULT - ASSESSMENT
Assessment: 89-year-old male with PMH permanent Afib (on Eliquis), bladder CA, hx of recurrent UTI on Cephalexin, emphysema, HTN, HLD, CAD s/p PCI/ARGELIA p/m LAD in 2009, CKD (baseline Cr 1.6-2.0), severe AS s/p Successful Transfemoral TAVR (26mm Mcgraw Nereyda 3 Ultra RESILIA) on 6/18  with Anemia and LEYDI on CKD, admitted to [facility] for further diuresis and monitoring.    Problems discussed and associated plan:  # LEYDI: DDx includes hypotension (was on nifedipine 60 mg daily and outpatient BP recently 102/60), renal congestion (overloaded on exam and echo), ALEKSANDR, UTI (sent UA and culture), obstruction (PVR < 200 cc, though will place Lott to ensure obstruction isn't a confounding factor)  - Coude lott placed by urology 6/27, after insertion 600cc immed drained  - Trial of void 6/30  - Strict I and Os  - Lasix 60mg IV x 2 6/30during 2 U packed RBC transfusion  - No bacteria on UA, urine culture pending  - Holding nifedipine to avoid hypotension.  - CR today improved to 2.8    # Anemia: Concern for RP bleed  - CT abdomen/pelvis noncontrast- neg   - reticulocyte count 2.7%, hapto <20, , B12 312, folate 8.3, and iron TIBC 276, iron sat 38%, iron total 105, unsat iron bind cap 171  - Fecal occult blood sample pend results  - UA with blood  - Will monitor Lott output for hematuria  - Keep Active T&S and 2 PIVs  - Continue ASA and Eliquis as no evidence of active bleed at this time,   - 6/29, Hg in the am 7.9, transfuse 2U packed RBC with repeat CBC in the evening  - monitor Hg to make sure its stable at 10.2    #Constipation  - Start Senna 2 tabs HS  - Miralax daily     #severe AS s/p recent TAVR   -Limited TTE     #PAF   - Continue Eliquis 2.5 mg q12     #CAD s/p PCI   -Continue asa 81 mg daily   -continue statin     # Bladder ca c/b UTI on chronic antibiotic   -Continue Cephalexin 250 mg daily   -check UA/urine culture     #HLD   -On pravastatin 40 mg at home, start  equivalent atorvastatin 10 mg daily     #HTN  -Home Nefidipine 60 mg daily on hold now   -Monitor and trend BP     FULL CODE   DVT ppx/ Eliquis   heart healthy diet   consult PT/OT      Please contact me with any questions or concerns at [phone].

## 2025-06-30 NOTE — OCCUPATIONAL THERAPY INITIAL EVALUATION ADULT - NSOTDISCHREC_GEN_A_CORE
Patient requires assistance with activities of daily living. OT recommends D/C to rehabilitation facility or home with assistance when medically appropriate. Refer to evaluation/treatment for details.

## 2025-06-30 NOTE — PROGRESS NOTE ADULT - NS ATTEND AMEND GEN_ALL_CORE FT
89 yo male with PMH permanent Afib (on Eliquis), bladder CA, hx of recurrent UTI, emphysema, HTN, HLD, CAD s/p PCI/ARGELIA p/m LAD in 2009, CKD (baseline Cr 1.6-2.0), severe AS s/p TAVR (26mm Mcgraw Nereyda 3 Ultra RESILIA) with residual aortic regurgitation presenting with progressive anemia and LEYDI on CKD s/p 2 units PRBCs yesterday, CT abdomen/pelvis ruled out RP bleed. Will monitor HgB tomorrow, if continues to decline consult with heme/onc into probability of hemolysis vs other source such as GI. Will also attempt trial of void and reassess. 89 yo male with PMH permanent Afib (on Eliquis), bladder CA, hx of recurrent UTI, emphysema, HTN, HLD, CAD s/p PCI/ARGELIA p/m LAD in 2009, CKD (baseline Cr 1.6-2.0), severe AS s/p TAVR (26mm Mcgraw Nereyda 3 Ultra RESILIA) with residual aortic regurgitation presenting with progressive anemia and LEYDI on CKD s/p 2 units PRBCs yesterday, CT abdomen/pelvis ruled out RP bleed. Will monitor HgB tomorrow, if continues to decline consult with heme/onc into probability of hemolysis vs other source such as GI [follow up cologuard]. Will also attempt trial of void and reassess.

## 2025-06-30 NOTE — DISCHARGE NOTE NURSING/CASE MANAGEMENT/SOCIAL WORK - PATIENT PORTAL LINK FT
You can access the FollowMyHealth Patient Portal offered by [facility] by registering at the following website: [email or web address]. By joining [facility]’s FollowMyHealth portal, you will also be able to view your health information using other applications (apps) compatible with our system.

## 2025-07-01 LAB
ANION GAP SERPL CALC-SCNC: 12 MMOL/L — SIGNIFICANT CHANGE UP (ref 7–14)
BUN SERPL-MCNC: 63 MG/DL — CRITICAL HIGH (ref 10–20)
CALCIUM SERPL-MCNC: 8.4 MG/DL — SIGNIFICANT CHANGE UP (ref 8.4–10.5)
CHLORIDE SERPL-SCNC: 104 MMOL/L — SIGNIFICANT CHANGE UP (ref 98–110)
CO2 SERPL-SCNC: 22 MMOL/L — SIGNIFICANT CHANGE UP (ref 17–32)
CREAT SERPL-MCNC: 2.5 MG/DL — HIGH (ref 0.7–1.5)
EGFR: 23 ML/MIN/1.73M2 — LOW
EGFR: 23 ML/MIN/1.73M2 — LOW
GLUCOSE SERPL-MCNC: 101 MG/DL — HIGH (ref 70–99)
HCT VFR BLD CALC: 28.5 % — LOW (ref 42–52)
HGB BLD-MCNC: 9.8 G/DL — LOW (ref 14–18)
MAGNESIUM SERPL-MCNC: 2.5 MG/DL — HIGH (ref 1.8–2.4)
MCHC RBC-ENTMCNC: 30.5 PG — SIGNIFICANT CHANGE UP (ref 27–31)
MCHC RBC-ENTMCNC: 34.4 G/DL — SIGNIFICANT CHANGE UP (ref 32–37)
MCV RBC AUTO: 88.8 FL — SIGNIFICANT CHANGE UP (ref 80–94)
NRBC BLD AUTO-RTO: 0 /100 WBCS — SIGNIFICANT CHANGE UP (ref 0–0)
PLATELET # BLD AUTO: 189 K/UL — SIGNIFICANT CHANGE UP (ref 130–400)
PMV BLD: SIGNIFICANT CHANGE UP (ref 7.4–10.4)
POTASSIUM SERPL-MCNC: 4.1 MMOL/L — SIGNIFICANT CHANGE UP (ref 3.5–5)
POTASSIUM SERPL-SCNC: 4.1 MMOL/L — SIGNIFICANT CHANGE UP (ref 3.5–5)
RBC # BLD: 3.21 M/UL — LOW (ref 4.7–6.1)
RBC # FLD: 18.8 % — HIGH (ref 11.5–14.5)
SODIUM SERPL-SCNC: 138 MMOL/L — SIGNIFICANT CHANGE UP (ref 135–146)
WBC # BLD: 9.77 K/UL — SIGNIFICANT CHANGE UP (ref 4.8–10.8)
WBC # FLD AUTO: 9.77 K/UL — SIGNIFICANT CHANGE UP (ref 4.8–10.8)

## 2025-07-01 PROCEDURE — 99221 1ST HOSP IP/OBS SF/LOW 40: CPT

## 2025-07-01 PROCEDURE — 99233 SBSQ HOSP IP/OBS HIGH 50: CPT

## 2025-07-01 RX ADMIN — CEPHALEXIN 250 MILLIGRAM(S): 250 CAPSULE ORAL at 11:02

## 2025-07-01 RX ADMIN — TAMSULOSIN HYDROCHLORIDE 0.4 MILLIGRAM(S): 0.4 CAPSULE ORAL at 20:38

## 2025-07-01 RX ADMIN — Medication 81 MILLIGRAM(S): at 11:01

## 2025-07-01 RX ADMIN — APIXABAN 2.5 MILLIGRAM(S): 2.5 TABLET, FILM COATED ORAL at 17:09

## 2025-07-01 RX ADMIN — FINASTERIDE 5 MILLIGRAM(S): 1 TABLET, FILM COATED ORAL at 11:01

## 2025-07-01 RX ADMIN — Medication 40 MILLIGRAM(S): at 06:31

## 2025-07-01 RX ADMIN — ATORVASTATIN CALCIUM 10 MILLIGRAM(S): 80 TABLET, FILM COATED ORAL at 20:38

## 2025-07-01 RX ADMIN — APIXABAN 2.5 MILLIGRAM(S): 2.5 TABLET, FILM COATED ORAL at 06:31

## 2025-07-01 RX ADMIN — Medication 1 APPLICATION(S): at 11:02

## 2025-07-01 NOTE — PROGRESS NOTE ADULT - NS ATTEND AMEND GEN_ALL_CORE FT
89 yo male with PMH permanent Afib (on Eliquis), bladder CA, hx of recurrent UTI, emphysema, HTN, HLD, CAD s/p PCI/ARGELIA p/m LAD in 2009, CKD (baseline Cr 1.6-2.0), severe AS s/p TAVR (26mm Mcgraw Nereyda 3 Ultra RESILIA) with residual aortic regurgitation presenting with progressive anemia and LEYDI on CKD s/p 2 units PRBCs yesterday, CT abdomen/pelvis ruled out RP bleed. Today tolerating trial of void with lott out. HgB showing possible continued slight downtrend. IVC normal sized and fully collapsing. Will reach out to hematology for assistance in anemia evaluation and plan for LAURA tomorrow PM.

## 2025-07-01 NOTE — CONSULT NOTE ADULT - SUBJECTIVE AND OBJECTIVE BOX
ENT CONSULT  Pt is a 89yo M admitted with anemia and LEYDI on CKD; ENT consulted for cerumen impaction. Pt seen at bedside. Pt reports he was seen by his audiologist and told he has impacted cerumen in his ear. Was told he should put olive oil drops to his ear and follow up for cerumen removal. Pt [facility]      PAST MEDICAL & SURGICAL HISTORY:  Spinal stenosis at L4-L5 level    Afib    Hypertension    Bladder cancer    HLD (hyperlipidemia)    CAD (coronary artery disease)    H/O heart artery stent  4 or 5 15 years ago    Status post cataract extraction of both eyes with insertion of intraocular lens    H/O hemorrhoidectomy    H/O cystoscopy      MEDICATIONS  (STANDING):  apixaban 2.5 milliGRAM(s) Oral two times a day  aspirin  chewable 81 milliGRAM(s) Oral daily  atorvastatin 10 milliGRAM(s) Oral at bedtime  cephalexin 250 milliGRAM(s) Oral daily  chlorhexidine 2% Cloths 1 Application(s) Topical daily  finasteride 5 milliGRAM(s) Oral daily  pantoprazole    Tablet 40 milliGRAM(s) Oral before breakfast  polyethylene glycol 3350 17 Gram(s) Oral daily  senna 2 Tablet(s) Oral at bedtime  tamsulosin 0.4 milliGRAM(s) Oral at bedtime    MEDICATIONS  (PRN):      Allergies  sulfamethoxazole-trimethoprim (Vomiting; Nausea)    SOCIAL HISTORY:    FAMILY HISTORY:  FHx: kidney disease  Mother    FH: type 2 diabetes  Both parents      REVIEW OF SYSTEMS   [x] A ten-point review of systems was otherwise negative except as noted.    Vital Signs Last 24 Hrs  T(C): 36.9 (01 Jul 2025 07:57), Max: 36.9 (01 Jul 2025 07:57)  T(F): 98.5 (01 Jul 2025 07:57), Max: 98.5 (01 Jul 2025 07:57)  HR: 76 (01 Jul 2025 07:57) (61 - 76)  BP: 134/73 (01 Jul 2025 07:57) (134/73 - 141/74)  BP(mean): 98 (01 Jul 2025 07:57) (87 - 102)  RR: 19 (01 Jul 2025 07:57) (18 - 20)  SpO2: 99% (01 Jul 2025 07:57) (98% - 99%)    Parameters below as of 01 Jul 2025 04:30  Patient On (Oxygen Delivery Method): room air    GEN: NAD, awake and alert  SKIN: Good color, non diaphoretic.  HEENT: No pre/post auricular ttp; +cerumen noted to L EAC   RESP: Non-labored breathing  ABDO: Soft, NT  EXT: DUARN x 4    LABS:                        9.8    9.77  )-----------( 189      ( 01 Jul 2025 05:08 )             28.5     07-01    138  |  104  |  63[HH]  ----------------------------<  101[H]  4.1   |  22  |  2.5[H]    Ca    8.4      01 Jul 2025 05:08  Mg     2.5     07-01

## 2025-07-01 NOTE — PROGRESS NOTE ADULT - ASSESSMENT
Assessment: 89-year-old male with PMH permanent Afib (on Eliquis), bladder CA, hx of recurrent UTI on Cephalexin, emphysema, HTN, HLD, CAD s/p PCI/ARGELIA p/m LAD in 2009, CKD (baseline Cr 1.6-2.0), severe AS s/p Successful Transfemoral TAVR (26mm Mcgraw Nereyda 3 Ultra RESILIA) on 6/18  with Anemia and LEYDI on CKD, admitted to [facility] for further diuresis and monitoring.    Problems discussed and associated plan:  # LEYDI: DDx includes hypotension (was on nifedipine 60 mg daily and outpatient BP recently 102/60), renal congestion (overloaded on exam and echo), ALEKSANDR, UTI (sent UA and culture), obstruction (PVR < 200 cc, though will place Lott to ensure obstruction isn't a confounding factor)  - Coude lott placed by urology 6/27, after insertion 600cc immed drained  - Trial of void 6/30 during which pt voided with some hematuria but was retaining around 300ccs of urine  - Strict I and Os  - 6/30 Lasix 60mg IV x 2 during 2 U packed RBC transfusion  - No bacteria on UA, urine culture pending  - Holding nifedipine to avoid hypotension.  - CR today improved to 2.5    # Anemia: Concern for RP bleed  - CT abdomen/pelvis noncontrast- neg   - reticulocyte count 2.7%, hapto <20, , B12 312, folate 8.3, and iron TIBC 276, iron sat 38%, iron total 105, unsat iron bind cap 171  - Fecal occult blood sample pend results  - UA with blood  - Will monitor Pt for hematuria  - Keep Active T&S and 2 PIVs  - Continue ASA and Eliquis as no evidence of active bleed at this time,   - monitor Hg downtrending from 10.2 - 9.8  -7/1 hem/onc consulted due to down trending Hg    #Constipation  - Start Senna 2 tabs HS  - Miralax daily     #severe AS s/p recent TAVR   -Limited TTE   -F/U with Dr. Prabhakar for a possible LAURA    #PAF   - Continue Eliquis 2.5 mg q12     #CAD s/p PCI   -Continue asa 81 mg daily   -continue statin     # Bladder ca c/b UTI on chronic antibiotic   -Continue Cephalexin 250 mg daily   -check UA/urine culture     #HLD   -On pravastatin 40 mg at home, start  equivalent atorvastatin 10 mg daily     #HTN  -Home Nefidipine 60 mg daily on hold now   -Monitor and trend BP     #Impacted Cerumen  -consulted ENT to remove impacted cerumen in pt's L ear    FULL CODE   DVT ppx/ Eliquis   heart healthy diet   consult PT/OT Assessment: 89-year-old male with PMH permanent Afib (on Eliquis), bladder CA, hx of recurrent UTI on Cephalexin, emphysema, HTN, HLD, CAD s/p PCI/ARGELIA p/m LAD in 2009, CKD (baseline Cr 1.6-2.0), severe AS s/p Successful Transfemoral TAVR (26mm Mcgraw Neredya 3 Ultra RESILIA) on 6/18  with Anemia and LEYDI on CKD, admitted to [facility] for further diuresis and monitoring.    Problems discussed and associated plan:  # LEYDI: DDx includes hypotension (was on nifedipine 60 mg daily and outpatient BP recently 102/60), renal congestion (overloaded on exam and echo), ALEKSANDR, UTI (sent UA and culture), obstruction (PVR < 200 cc, though will place Lott to ensure obstruction isn't a confounding factor)  - Coude lott placed by urology 6/27, after insertion 600cc immed drained  - Trial of void 6/30 during which pt voided with some hematuria but was retaining around 300ccs of urine  - Strict I and Os  - 6/30 Lasix 60mg IV x 2 during 2 U packed RBC transfusion  - No bacteria on UA, urine culture pending  - Holding nifedipine to avoid hypotension.  - CR today improved to 2.5    # Anemia: Concern for RP bleed  - CT abdomen/pelvis noncontrast- neg   - reticulocyte count 2.7%, hapto <20, , B12 312, folate 8.3, and iron TIBC 276, iron sat 38%, iron total 105, unsat iron bind cap 171  - Fecal occult blood sample pend results  - UA with blood  - Will monitor Pt for hematuria  - Keep Active T&S and 2 PIVs  - Continue ASA and Eliquis as no evidence of active bleed at this time,   - monitor Hg downtrending from 10.2 - 9.8  -7/1 hem/onc consulted due to down trending Hg    #Constipation  - Start Senna 2 tabs HS  - Miralax daily     #severe AS s/p recent TAVR   -Limited TTE   -F/U with Dr. Prabhakar reconsidering LAURA    #PAF   - Continue Eliquis 2.5 mg q12     #CAD s/p PCI   -Continue asa 81 mg daily   -continue statin     # Bladder ca c/b UTI on chronic antibiotic   -Continue Cephalexin 250 mg daily   -check UA/urine culture     #HLD   -On pravastatin 40 mg at home, start  equivalent atorvastatin 10 mg daily     #HTN  -Home Nefidipine 60 mg daily on hold now   -Monitor and trend BP     #Impacted Cerumen  -consulted ENT to remove impacted cerumen in pt's L ear    FULL CODE   DVT ppx/ Eliquis   heart healthy diet   consult PT/OT

## 2025-07-01 NOTE — PROGRESS NOTE ADULT - SUBJECTIVE AND OBJECTIVE BOX
Interval history: Patient seen and examined at bedside this am.  Patient denies any acute events over the past 24 hours.  Patient complains of chronic weakness and fatigue.  Hg at 9.8 post 2 units of PRBC transfusion on 6/29. Pt stated he was supposed to have an ENT appointment to remove some impacted cerumen and asked to be seen by ENT for which a consult was put in. Pt also complained of some hematuria. Nurse said pt has been retaining around 300ccs of urine as well.    Review of systems: A complete 10-point review of systems was obtained and is negative except as stated in the interval history.  Vital Signs Last 24 Hrs  T(C): 36.9 (01 Jul 2025 07:57), Max: 36.9 (01 Jul 2025 07:57)  T(F): 98.5 (01 Jul 2025 07:57), Max: 98.5 (01 Jul 2025 07:57)  HR: 76 (01 Jul 2025 07:57) (61 - 76)  BP: 134/73 (01 Jul 2025 07:57) (134/73 - 141/74)  BP(mean): 98 (01 Jul 2025 07:57) (87 - 102)  ABP: --  ABP(mean): --  RR: 19 (01 Jul 2025 07:57) (18 - 20)  SpO2: 99% (01 Jul 2025 07:57) (98% - 99%)    O2 Parameters below as of 01 Jul 2025 04:30  Patient On (Oxygen Delivery Method): room air          IN: 1520 mL / OUT: 900 mL / NET: 620 mL    06-29 @ 07:01  -  06-30 @ 07:00  --------------------------------------------------------  IN: 1520 mL / OUT: 900 mL / NET: 620 mL        Physical exam:  General: No apparent distress  HEENT: Anicteric sclera. Moist mucous membranes.   Cardiac: Regular rate and rhythm. Clear S1 and S2 No murmurs, rubs, or gallops.   Vascular: Symmetric radial pulses. Dorsalis pedis pulses palpable.   Respiratory: Normal effort. Clear to ascultation.   Abdomen: Soft, nontender.   Extremities: Warm No cyanosis or clubbing.   Skin: Warm and dry. No rash.   Neurologic: Grossly normal motor function.   Psychiatric: Oriented to person, place, and time.     Data reviewed:  - Telemetry: Atrial flutter at  bpm   - ECG 6/26/2025- Afib 73bpm  - CT Abd/Pelvis 6/27/2025 IMPRESSION: No retroperitoneal hematoma  _ Chest X ray 6/27/2025 bilateral perihilar opacities  - TTE -  6/27/2025- EF 55-60%, mod mr , mod tr, IVC dilated 2.1 and non collaps  - Chest x-ray- 6/27/2025- new bilateral perihilar opacities   - Stress test: No recent ST   - CCTA: 3/21/25- ca score 2367, mildly dilated ascending thoracic aorta , severe biatrial enlargement  - Cardiac catheterization: 4/29/25- LM large and severely calcified 40-50%, LAD- large and severely calcified patent prox and mid stents with mild ISR, CX med to large sized and severely calcified, high OM1 with 99% ostial stenosis, subtotal OM2 sub-branch occlusion with left to left collateral filling. Rca- large and severely calcified    - Labs:                             9.8    9.77  )-----------( 189      ( 01 Jul 2025 05:08 )             28.5       07-01    138  |  104  |  63[HH]  ----------------------------<  101[H]  4.1   |  22  |  2.5[H]    Ca    8.4      01 Jul 2025 05:08  Mg     2.5     07-01              Urinalysis Basic - ( 01 Jul 2025 05:08 )    Color: x / Appearance: x / SG: x / pH: x  Gluc: 101 mg/dL / Ketone: x  / Bili: x / Urobili: x   Blood: x / Protein: x / Nitrite: x   Leuk Esterase: x / RBC: x / WBC x   Sq Epi: x / Non Sq Epi: x / Bacteria: x            Culture Results:   <10,000 CFU/mL Normal Urogenital Kacey (06-27 @ 02:45)  Culture Results:   <10,000 CFU/mL Normal Urogenital Kacey (06-12 @ 13:29)        MEDICATIONS  (STANDING):  apixaban 2.5 milliGRAM(s) Oral two times a day  aspirin  chewable 81 milliGRAM(s) Oral daily  atorvastatin 10 milliGRAM(s) Oral at bedtime  cephalexin 250 milliGRAM(s) Oral daily  chlorhexidine 2% Cloths 1 Application(s) Topical daily  finasteride 5 milliGRAM(s) Oral daily  pantoprazole    Tablet 40 milliGRAM(s) Oral before breakfast  polyethylene glycol 3350 17 Gram(s) Oral daily  senna 2 Tablet(s) Oral at bedtime  tamsulosin 0.4 milliGRAM(s) Oral at bedtime      Allergies    sulfamethoxazole-trimethoprim (Vomiting; Nausea)    Intolerances     Interval history: Patient seen and examined at bedside this am.  Patient denies any acute events over the past 24 hours.  Patient complains of chronic weakness and fatigue.  Hg at 9.8 post 2 units of PRBC transfusion on 6/29. Pt stated he was supposed to have an ENT appointment to remove some impacted cerumen and asked to be seen by ENT for which a consult was put in. Pt also complained of some hematuria. Nurse said pt has been retaining around 300ccs of urine as well.    Review of systems: A complete 10-point review of systems was obtained and is negative except as stated in the interval history.  Vital Signs Last 24 Hrs  T(C): 36.9 (01 Jul 2025 07:57), Max: 36.9 (01 Jul 2025 07:57)  T(F): 98.5 (01 Jul 2025 07:57), Max: 98.5 (01 Jul 2025 07:57)  HR: 76 (01 Jul 2025 07:57) (61 - 76)  BP: 134/73 (01 Jul 2025 07:57) (134/73 - 141/74)  BP(mean): 98 (01 Jul 2025 07:57) (87 - 102)  ABP: --  ABP(mean): --  RR: 19 (01 Jul 2025 07:57) (18 - 20)  SpO2: 99% (01 Jul 2025 07:57) (98% - 99%)    O2 Parameters below as of 01 Jul 2025 04:30  Patient On (Oxygen Delivery Method): room air          IN: 1520 mL / OUT: 900 mL / NET: 620 mL    06-29 @ 07:01  -  06-30 @ 07:00  --------------------------------------------------------  IN: 1520 mL / OUT: 900 mL / NET: 620 mL        Physical exam:  General: No apparent distress  HEENT: Anicteric sclera. Moist mucous membranes.   Cardiac: Regular rate and rhythm. Clear S1 and S2 No murmurs, rubs, or gallops.   Vascular: Symmetric radial pulses. Dorsalis pedis pulses palpable.   Respiratory: Normal effort. Clear to ascultation.   Abdomen: Soft, nontender.   Extremities: Warm No cyanosis or clubbing.   Skin: Warm and dry. No rash.   Neurologic: Grossly normal motor function.   Psychiatric: Oriented to person, place, and time.     Data reviewed:  - Telemetry: Atrial flutter at  bpm   - ECG 6/26/2025- Afib 73bpm  - CT Abd/Pelvis 6/27/2025 IMPRESSION: No retroperitoneal hematoma  _ Chest X ray 6/27/2025 bilateral perihilar opacities  - TTE -  6/27/2025- EF 55-60%, mod mr , mod tr,   - Chest x-ray- 6/27/2025- new bilateral perihilar opacities   - Stress test: No recent ST   - 7/1/25 IVC dilated 1.8 and collpasing  - CCTA: 3/21/25- ca score 2367, mildly dilated ascending thoracic aorta , severe biatrial enlargement  - Cardiac catheterization: 4/29/25- LM large and severely calcified 40-50%, LAD- large and severely calcified patent prox and mid stents with mild ISR, CX med to large sized and severely calcified, high OM1 with 99% ostial stenosis, subtotal OM2 sub-branch occlusion with left to left collateral filling. Rca- large and severely calcified    - Labs:                             9.8    9.77  )-----------( 189      ( 01 Jul 2025 05:08 )             28.5       07-01    138  |  104  |  63[HH]  ----------------------------<  101[H]  4.1   |  22  |  2.5[H]    Ca    8.4      01 Jul 2025 05:08  Mg     2.5     07-01              Urinalysis Basic - ( 01 Jul 2025 05:08 )    Color: x / Appearance: x / SG: x / pH: x  Gluc: 101 mg/dL / Ketone: x  / Bili: x / Urobili: x   Blood: x / Protein: x / Nitrite: x   Leuk Esterase: x / RBC: x / WBC x   Sq Epi: x / Non Sq Epi: x / Bacteria: x            Culture Results:   <10,000 CFU/mL Normal Urogenital Kacey (06-27 @ 02:45)  Culture Results:   <10,000 CFU/mL Normal Urogenital Kacey (06-12 @ 13:29)        MEDICATIONS  (STANDING):  apixaban 2.5 milliGRAM(s) Oral two times a day  aspirin  chewable 81 milliGRAM(s) Oral daily  atorvastatin 10 milliGRAM(s) Oral at bedtime  cephalexin 250 milliGRAM(s) Oral daily  chlorhexidine 2% Cloths 1 Application(s) Topical daily  finasteride 5 milliGRAM(s) Oral daily  pantoprazole    Tablet 40 milliGRAM(s) Oral before breakfast  polyethylene glycol 3350 17 Gram(s) Oral daily  senna 2 Tablet(s) Oral at bedtime  tamsulosin 0.4 milliGRAM(s) Oral at bedtime      Allergies    sulfamethoxazole-trimethoprim (Vomiting; Nausea)    Intolerances

## 2025-07-01 NOTE — CONSULT NOTE ADULT - ASSESSMENT
Pt is a 89yo M admitted with anemia and LEYDI on CKD; ENT consulted for cerumen impaction.     ·	 Pt is a 89yo M admitted with anemia and LEYDI on CKD; ENT consulted for cerumen impaction.     ·	Debrox to L EAC-- 5 gtt bid x 5 days   ·	f/u outpatient with ENT for cerumen disimpaction, [address]   ·	Pt seen with Dr. Petersen

## 2025-07-02 ENCOUNTER — RESULT REVIEW (OUTPATIENT)
Age: 89
End: 2025-07-02

## 2025-07-02 LAB
ANION GAP SERPL CALC-SCNC: 11 MMOL/L — SIGNIFICANT CHANGE UP (ref 7–14)
BASOPHILS # BLD AUTO: 0.04 K/UL — SIGNIFICANT CHANGE UP (ref 0–0.2)
BASOPHILS NFR BLD AUTO: 0.4 % — SIGNIFICANT CHANGE UP (ref 0–1)
BUN SERPL-MCNC: 68 MG/DL — CRITICAL HIGH (ref 10–20)
CALCIUM SERPL-MCNC: 8.1 MG/DL — LOW (ref 8.4–10.5)
CHLORIDE SERPL-SCNC: 106 MMOL/L — SIGNIFICANT CHANGE UP (ref 98–110)
CO2 SERPL-SCNC: 22 MMOL/L — SIGNIFICANT CHANGE UP (ref 17–32)
CREAT SERPL-MCNC: 2.4 MG/DL — HIGH (ref 0.7–1.5)
EGFR: 25 ML/MIN/1.73M2 — LOW
EGFR: 25 ML/MIN/1.73M2 — LOW
EOSINOPHIL # BLD AUTO: 0.42 K/UL — SIGNIFICANT CHANGE UP (ref 0–0.7)
EOSINOPHIL NFR BLD AUTO: 4.6 % — SIGNIFICANT CHANGE UP (ref 0–8)
GLUCOSE SERPL-MCNC: 106 MG/DL — HIGH (ref 70–99)
HCT VFR BLD CALC: 25.8 % — LOW (ref 42–52)
HGB BLD-MCNC: 8.8 G/DL — LOW (ref 14–18)
IMM GRANULOCYTES NFR BLD AUTO: 2 % — HIGH (ref 0.1–0.3)
LYMPHOCYTES # BLD AUTO: 0.62 K/UL — LOW (ref 1.2–3.4)
LYMPHOCYTES # BLD AUTO: 6.7 % — LOW (ref 20.5–51.1)
MAGNESIUM SERPL-MCNC: 2.2 MG/DL — SIGNIFICANT CHANGE UP (ref 1.8–2.4)
MCHC RBC-ENTMCNC: 30.4 PG — SIGNIFICANT CHANGE UP (ref 27–31)
MCHC RBC-ENTMCNC: 34.1 G/DL — SIGNIFICANT CHANGE UP (ref 32–37)
MCV RBC AUTO: 89.3 FL — SIGNIFICANT CHANGE UP (ref 80–94)
MONOCYTES # BLD AUTO: 1.01 K/UL — HIGH (ref 0.1–0.6)
MONOCYTES NFR BLD AUTO: 10.9 % — HIGH (ref 1.7–9.3)
NEUTROPHILS # BLD AUTO: 6.96 K/UL — HIGH (ref 1.4–6.5)
NEUTROPHILS NFR BLD AUTO: 75.4 % — HIGH (ref 42.2–75.2)
NRBC BLD AUTO-RTO: 0 /100 WBCS — SIGNIFICANT CHANGE UP (ref 0–0)
PLATELET # BLD AUTO: 185 K/UL — SIGNIFICANT CHANGE UP (ref 130–400)
PMV BLD: SIGNIFICANT CHANGE UP (ref 7.4–10.4)
POTASSIUM SERPL-MCNC: 3.8 MMOL/L — SIGNIFICANT CHANGE UP (ref 3.5–5)
POTASSIUM SERPL-SCNC: 3.8 MMOL/L — SIGNIFICANT CHANGE UP (ref 3.5–5)
RBC # BLD: 2.89 M/UL — LOW (ref 4.7–6.1)
RBC # FLD: 19.4 % — HIGH (ref 11.5–14.5)
SODIUM SERPL-SCNC: 139 MMOL/L — SIGNIFICANT CHANGE UP (ref 135–146)
WBC # BLD: 9.23 K/UL — SIGNIFICANT CHANGE UP (ref 4.8–10.8)
WBC # FLD AUTO: 9.23 K/UL — SIGNIFICANT CHANGE UP (ref 4.8–10.8)

## 2025-07-02 PROCEDURE — 99233 SBSQ HOSP IP/OBS HIGH 50: CPT

## 2025-07-02 PROCEDURE — 93312 ECHO TRANSESOPHAGEAL: CPT | Mod: 26,XU

## 2025-07-02 PROCEDURE — 93325 DOPPLER ECHO COLOR FLOW MAPG: CPT | Mod: 26

## 2025-07-02 PROCEDURE — 93320 DOPPLER ECHO COMPLETE: CPT | Mod: 26

## 2025-07-02 PROCEDURE — 99223 1ST HOSP IP/OBS HIGH 75: CPT

## 2025-07-02 RX ORDER — CYANOCOBALAMIN 1000 UG/ML
1000 INJECTION INTRAMUSCULAR; SUBCUTANEOUS DAILY
Refills: 0 | Status: DISCONTINUED | OUTPATIENT
Start: 2025-07-02 | End: 2025-07-03

## 2025-07-02 RX ORDER — FOLIC ACID 1 MG/1
1 TABLET ORAL DAILY
Refills: 0 | Status: DISCONTINUED | OUTPATIENT
Start: 2025-07-02 | End: 2025-07-03

## 2025-07-02 RX ORDER — EPOETIN ALFA 10000 [IU]/ML
2000 SOLUTION INTRAVENOUS; SUBCUTANEOUS ONCE
Refills: 0 | Status: COMPLETED | OUTPATIENT
Start: 2025-07-02 | End: 2025-07-02

## 2025-07-02 RX ADMIN — CYANOCOBALAMIN 1000 MICROGRAM(S): 1000 INJECTION INTRAMUSCULAR; SUBCUTANEOUS at 18:32

## 2025-07-02 RX ADMIN — Medication 1 APPLICATION(S): at 17:36

## 2025-07-02 RX ADMIN — TAMSULOSIN HYDROCHLORIDE 0.4 MILLIGRAM(S): 0.4 CAPSULE ORAL at 21:32

## 2025-07-02 RX ADMIN — FINASTERIDE 5 MILLIGRAM(S): 1 TABLET, FILM COATED ORAL at 12:29

## 2025-07-02 RX ADMIN — Medication 5 DROP(S): at 17:36

## 2025-07-02 RX ADMIN — APIXABAN 2.5 MILLIGRAM(S): 2.5 TABLET, FILM COATED ORAL at 17:36

## 2025-07-02 RX ADMIN — ATORVASTATIN CALCIUM 10 MILLIGRAM(S): 80 TABLET, FILM COATED ORAL at 21:32

## 2025-07-02 RX ADMIN — FOLIC ACID 1 MILLIGRAM(S): 1 TABLET ORAL at 18:32

## 2025-07-02 RX ADMIN — Medication 81 MILLIGRAM(S): at 12:28

## 2025-07-02 RX ADMIN — CEPHALEXIN 250 MILLIGRAM(S): 250 CAPSULE ORAL at 12:28

## 2025-07-02 RX ADMIN — Medication 20 MILLIEQUIVALENT(S): at 12:29

## 2025-07-02 RX ADMIN — EPOETIN ALFA 2000 UNIT(S): 10000 SOLUTION INTRAVENOUS; SUBCUTANEOUS at 18:32

## 2025-07-02 RX ADMIN — Medication 40 MILLIGRAM(S): at 05:29

## 2025-07-02 RX ADMIN — APIXABAN 2.5 MILLIGRAM(S): 2.5 TABLET, FILM COATED ORAL at 05:29

## 2025-07-02 NOTE — PROGRESS NOTE ADULT - SUBJECTIVE AND OBJECTIVE BOX
Interval history: Patient seen and examined at bedside this am.  Patient denies any acute events over the past 24 hours.  Patient complains of chronic weakness and fatigue.  Hg at 8.8 down from 9.8. Pt had some hematuria again and is retaining urine. Pt is going for LAURA today.      Review of systems: A complete 10-point review of systems was obtained and is negative except as stated in the interval history.  Vital Signs Last 24 Hrs  T(C): 36.4 (02 Jul 2025 10:44), Max: 36.8 (01 Jul 2025 15:16)  T(F): 97.6 (02 Jul 2025 07:58), Max: 98.3 (01 Jul 2025 15:16)  HR: 66 (02 Jul 2025 10:44) (66 - 74)  BP: 140/67 (02 Jul 2025 10:44) (119/56 - 161/72)  BP(mean): 96 (02 Jul 2025 10:44) (80 - 103)  ABP: --  ABP(mean): --  RR: 18 (02 Jul 2025 10:44) (17 - 20)  SpO2: 99% (02 Jul 2025 10:44) (93% - 99%)    O2 Parameters below as of 02 Jul 2025 04:27  Patient On (Oxygen Delivery Method): room air          IN: 885 mL / OUT: 900 mL / NET: - 15 mL    06-29 @ 07:01  -  06-30 @ 07:00  --------------------------------------------------------  IN: 885 mL / OUT: 900 mL / NET: - 15 mL        Physical exam:  General: No apparent distress  HEENT: Anicteric sclera. Moist mucous membranes.   Cardiac: Regular rate and rhythm. Clear S1 and S2 No murmurs, rubs, or gallops.   Vascular: Symmetric radial pulses. Dorsalis pedis pulses palpable.   Respiratory: Normal effort. Clear to ascultation bilaterally.   Abdomen: Soft, nontender.   Extremities: Warm No cyanosis or clubbing.   Skin: Warm and dry. No rash.   Neurologic: Grossly normal motor function.   Psychiatric: Oriented to person, place, and time.     Data reviewed:  - Telemetry: Atrial flutter at  bpm   - ECG 6/26/2025- Afib 73bpm  - CT Abd/Pelvis 6/27/2025 IMPRESSION: No retroperitoneal hematoma  _ Chest X ray 6/27/2025 bilateral perihilar opacities  - TTE -  6/27/2025- EF 55-60%, mod mr , mod tr,   - Chest x-ray- 6/27/2025- new bilateral perihilar opacities   - Stress test: No recent ST   - 7/1/25 IVC dilated 1.8 and collapsing  - 7/2/25 LAURA scheduled  - CCTA: 3/21/25- ca score 2367, mildly dilated ascending thoracic aorta , severe biatrial enlargement  - Cardiac catheterization: 4/29/25- LM large and severely calcified 40-50%, LAD- large and severely calcified patent prox and mid stents with mild ISR, CX med to large sized and severely calcified, high OM1 with 99% ostial stenosis, subtotal OM2 sub-branch occlusion with left to left collateral filling. Rca- large and severely calcified    - Labs:                              8.8    9.23  )-----------( 185      ( 02 Jul 2025 05:12 )             25.8       07-02    139  |  106  |  68[HH]  ----------------------------<  106[H]  3.8   |  22  |  2.4[H]    Ca    8.1[L]      02 Jul 2025 05:12  Mg     2.2     07-02              Urinalysis Basic - ( 02 Jul 2025 05:12 )    Color: x / Appearance: x / SG: x / pH: x  Gluc: 106 mg/dL / Ketone: x  / Bili: x / Urobili: x   Blood: x / Protein: x / Nitrite: x   Leuk Esterase: x / RBC: x / WBC x   Sq Epi: x / Non Sq Epi: x / Bacteria: x              Culture Results:   <10,000 CFU/mL Normal Urogenital Kacey (06-27 @ 02:45)  Culture Results:   <10,000 CFU/mL Normal Urogenital Kacey (06-12 @ 13:29)        MEDICATIONS  (STANDING):  apixaban 2.5 milliGRAM(s) Oral two times a day  aspirin  chewable 81 milliGRAM(s) Oral daily  atorvastatin 10 milliGRAM(s) Oral at bedtime  cephalexin 250 milliGRAM(s) Oral daily  chlorhexidine 2% Cloths 1 Application(s) Topical daily  finasteride 5 milliGRAM(s) Oral daily  pantoprazole    Tablet 40 milliGRAM(s) Oral before breakfast  polyethylene glycol 3350 17 Gram(s) Oral daily  potassium chloride   Powder 20 milliEquivalent(s) Oral once  senna 2 Tablet(s) Oral at bedtime  tamsulosin 0.4 milliGRAM(s) Oral at bedtime      Allergies    sulfamethoxazole-trimethoprim (Vomiting; Nausea)    Intolerances

## 2025-07-02 NOTE — CHART NOTE - NSCHARTNOTEFT_GEN_A_CORE
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 no bioprosthetic valve stenosis    PROCEDURE: Transesophageal Echocardiogram    Primary Physician: Dr. Chávez  Cardiology Fellow: Dr Kashmir Boateng    ANESTHESIA TYPE  [  ] General Anesthesia  [ x ] Conscious Sedation  [  ] Local/Regional    CONDITION  [  ] Critical  [  ] Serious  [  ] Fair  [ x ] Good    SPECIMENS REMOVED (IF APPLICABLE): N/A    IMPLANTS (IF APPLICABLE): None    ESTIMATED BLOOD LOSS: None    COMPLICATIONS: None    After risks and benefits of procedures were explained, informed consent was obtained and placed in chart.   The patient received topical anesthetic to the oropharynx with viscous lidocaine and benzocaine spray.  Refer to Anesthesia note for sedation details.  The LAURA probe was passed into the esophagus without difficulty.  Transesophageal and transgastric images were obtained.  The LAURA probe was removed without difficulty and examined.  There was no evidence for bleeding.  The patient tolerated the procedure well without any immediate LAURA-related complications.      Preliminary Findings:  LA: severely enlarged  DINORA: Left atrial appendage was clear of clot and smoke.  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  [x] rest of care as per medical team   [x] No eating or drinking for 1 hour  [x] No driving for 24 hours    Results of procedure/ plan of care discussed with patient/  in detail. 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 no bioprosthetic valve stenosis    PROCEDURE: Transesophageal Echocardiogram    Primary Physician: Dr. Chávez  Cardiology Fellow: Dr Kashmir Boateng    ANESTHESIA TYPE  [  ] General Anesthesia  [ x ] Conscious Sedation  [  ] Local/Regional    CONDITION  [  ] Critical  [  ] Serious  [  ] Fair  [ x ] Good    SPECIMENS REMOVED (IF APPLICABLE): N/A    IMPLANTS (IF APPLICABLE): None    ESTIMATED BLOOD LOSS: None    COMPLICATIONS: None    After risks and benefits of procedures were explained, informed consent was obtained and placed in chart.   The patient received topical anesthetic to the oropharynx with viscous lidocaine and benzocaine spray.  Refer to Anesthesia note for sedation details.  The LAURA probe was passed into the esophagus without difficulty.  Transesophageal and transgastric images were obtained.  The LAURA probe was removed without difficulty and examined.  There was no evidence for bleeding.  The patient tolerated the procedure well without any immediate LAURA-related complications.      Preliminary Findings:  LA: severely enlarged  DINORA: Left atrial appendage was clear of clot and smoke.  LV: LVEF was estimated at 55-65%  MV: mild to mod MR, No MS  AV: mild PVL seen, normal gradient, MPG 7, normal leaflet opening   RA: Normal  RV: Normal size and function  TV: mild TR  PV: trace MD, no PS  IAS: No PFO or ASD. 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  [x] rest of care as per medical team   [x] No eating or drinking for 1 hour  [x] No driving for 24 hours    Results of procedure/ plan of care discussed with patient/  in detail.

## 2025-07-02 NOTE — CONSULT NOTE ADULT - ASSESSMENT
89-year-old male with PMH permanent Afib (on Eliquis), bladder CA, hx of recurrent UTI on Cephalexin, emphysema, HTN, HLD, CAD s/p PCI/ARGELIA p/m LAD in 2009, CKD (baseline Cr 1.6-2.0), severe AS s/p Successful Transfemoral TAVR (26mm Mcgraw Nereyda 3 Ultra RESILIA) on 6/18 referred to come to the ED due to abnormal labs.     Admitted to cardiology service for LEYDI and anemia . During hospital stay he was treated for LEYDI with diuretics. Anemia was noted and needed 2 units of PRBC. Continued cbc monitoring was done. Baseline Hb before TAVR was  14.1.     Impression :   # Acute on Chronic Anemia : ( Differential Includes ;  Anemia of chronic disease/ TAVR induced hemolysis/   - Hem/onc consulted for dropping hemoglobin. s/p 2 units PRBC>   - Iron studies normal   - Ferritin ( 27 June) : 369 ; % saturation : 38   - Retics : 2.7%  ( increased) ; slight schistocytes present.   - Hapto < 20 ;  ; total Bilirubin 1.1  - s/p TAVR procedure June 18th. Hb before that 14.1   - CT abdomen ruled out RP bleed.     Plan:   -           This is an incomplete note.        89-year-old male with PMH permanent Afib (on Eliquis), bladder CA, hx of recurrent UTI on Cephalexin, emphysema, HTN, HLD, CAD s/p PCI/ARGELIA p/m LAD in 2009, CKD (baseline Cr 1.6-2.0), severe AS s/p Successful Transfemoral TAVR (26mm Mcgraw Nereyda 3 Ultra RESILIA) on 6/18 referred to come to the ED due to abnormal labs.     Admitted to cardiology service for LEYDI and anemia . During hospital stay he was treated for LEYDI with diuretics. Anemia was noted and needed 2 units of PRBC. Continued cbc monitoring was done. Baseline Hb before TAVR was  14.1.     Impression :   # Acute on Chronic Anemia : ( Differential Includes ;  Anemia of chronic disease/ TAVR induced hemolysis/ Blood loss from urine)   - Hem/onc consulted for dropping hemoglobin. s/p 2 units PRBC>   - Iron studies normal   - Ferritin ( 27 June) : 369 ; % saturation : 38   - Retics : 2.7%  ( increased) ; slight schistocytes present.   - Hapto < 20 ;  ; total Bilirubin 1.1  - s/p TAVR procedure June 18th. Hb before that 14.1   - CT abdomen ruled out RP bleed.     Plan:   - Suspect hemolysis ; Labs favorable for the dx ; Patient had normal hb before the procedure. Cardio follow up TAVR.   - Also with decreased Kidney function ( multifactorial ; ATN vs volume overload vs post obstructive ) , would recommend Procrit 52739 subq once then q weekly.   - Start Folic acid and b12 supplementation ( to supplement for hematopoiesis )   - Follow up urology for hematuria.   - Supportive management ; Transfuse as needed   - Active type and screen .

## 2025-07-02 NOTE — PROGRESS NOTE ADULT - ASSESSMENT
Assessment: 89-year-old male with PMH permanent Afib (on Eliquis), bladder CA, hx of recurrent UTI on Cephalexin, emphysema, HTN, HLD, CAD s/p PCI/ARGELIA p/m LAD in 2009, CKD (baseline Cr 1.6-2.0), severe AS s/p Successful Transfemoral TAVR (26mm Mcgraw Nereyda 3 Ultra RESILIA) on 6/18  with Anemia and LEYDI on CKD, admitted to [facility] for further diuresis and monitoring.    Problems discussed and associated plan:  # LEYDI: DDx includes hypotension (was on nifedipine 60 mg daily and outpatient BP recently 102/60), renal congestion (overloaded on exam and echo), ALEKSANDR, UTI (sent UA and culture), obstruction (PVR < 200 cc, though will place Lott to ensure obstruction isn't a confounding factor)  - Coude lott placed by urology 6/27, after insertion 600cc immed drained  - Trial of void 6/30 during which pt voided with some hematuria   - retaining urine  - Strict I and Os  - 6/30 Lasix 60mg IV x 2 during 2 U packed RBC transfusion  - No bacteria on UA, urine culture pending  - Holding nifedipine to avoid hypotension.  - CR today improved to 2.5    # Anemia: Concern for RP bleed  - CT abdomen/pelvis noncontrast- neg   - reticulocyte count 2.7%, hapto <20, , B12 312, folate 8.3, and iron TIBC 276, iron sat 38%, iron total 105, unsat iron bind cap 171  - Fecal occult blood sample pend results  - UA with blood  - Will monitor Pt for hematuria  - Keep Active T&S and 2 PIVs  - Continue ASA and Eliquis as no evidence of active bleed at this time,   - 7/2monitor Hg downtrending from 9.8-8.8   -7/1 hem/onc consulted due to down trending Hg    #Constipation  - Start Senna 2 tabs HS  - Miralax daily     #severe AS s/p recent TAVR   -Limited TTE   - LAURA scheduled for 7/2    #PAF   - Continue Eliquis 2.5 mg q12     #CAD s/p PCI   -Continue asa 81 mg daily   -continue statin     # Bladder ca c/b UTI on chronic antibiotic   -Continue Cephalexin 250 mg daily   -check UA/urine culture     #HLD   -On pravastatin 40 mg at home, start  equivalent atorvastatin 10 mg daily     #HTN  -Home Nefidipine 60 mg daily on hold now   -Monitor and trend BP     #Impacted Cerumen  -ENT consulted and told pt to F/U in office with Dr. Petersen    FULL CODE   DVT ppx/ Eliquis   heart healthy diet   consult PT/OT

## 2025-07-02 NOTE — PROGRESS NOTE ADULT - NS ATTEND AMEND GEN_ALL_CORE FT
89 yo male with PMH permanent Afib (on Eliquis), bladder CA, hx of recurrent UTI, emphysema, HTN, HLD, CAD s/p PCI/ARGELIA p/m LAD in 2009, CKD (baseline Cr 1.6-2.0), severe AS s/p TAVR (26mm Mcgraw Nereyda 3 Ultra RESILIA) with residual aortic regurgitation presenting with progressive anemia and LEYDI on CKD s/p 2 units PRBCs yesterday, CT abdomen/pelvis ruled out RP bleed. Today tolerating trial of void with lott out. HgB showing possible continued slight downtrend. IVC normal sized and fully collapsing. Appreciate hematology recommendations, findings concerning for hemolysis. LAURA done today revealed stable degree of regurgitation.

## 2025-07-02 NOTE — CONSULT NOTE ADULT - SUBJECTIVE AND OBJECTIVE BOX
HEMATOLOGY ONCOLOGY CONSULT     Patient is a 89y old  Male who presents with a chief complaint of Abnormal lab (01 Jul 2025 13:42)      HPI:  89-year-old male with PMH permanent Afib (on Eliquis), bladder CA, hx of recurrent UTI on Cephalexin, emphysema, HTN, HLD, CAD s/p PCI/ARGELIA p/m LAD in 2009, CKD (baseline Cr 1.6-2.0), severe AS s/p Successful Transfemoral TAVR (26mm Mcgraw Nereyda 3 Ultra RESILIA) on 6/18 referred to come to the ED due to abnormal labs. Patient report feeling fatigue prior having the tavr and post procedure. patient was seen with Son Kp at bedside. Patient denies chest pain, shortness of breath, palpitation, dizziness, LOC/syncope, hematuria, rectal bleeding, fever, chills, recent travel or sick contact.   Patient has hx of anemia s/p transfusion many years ago    Admitted to cardiology service for LEYDI and anemia . During hospital stay he was treated for LEYDI with diuretics. Anemia was noted and needed 2 units of PRBC. Continued cbc monitoring was done. Baseline Hb before TAVR was  14.1.     PAST MEDICAL & SURGICAL HISTORY:  Spinal stenosis at L4-L5 level      Afib  Hypertension  Bladder cancer  HLD (hyperlipidemia)  CAD (coronary artery disease)  H/O heart artery stent  4 or 5 15 years ago  Status post cataract extraction of both eyes with insertion of intraocular lens  H/O hemorrhoidectomy  H/O cystoscopy    SOCIAL HISTORY:    FAMILY HISTORY:  FHx: kidney disease  Mother    FH: type 2 diabetes  Both parents        MEDICATIONS  (STANDING):  apixaban 2.5 milliGRAM(s) Oral two times a day  aspirin  chewable 81 milliGRAM(s) Oral daily  atorvastatin 10 milliGRAM(s) Oral at bedtime  cephalexin 250 milliGRAM(s) Oral daily  chlorhexidine 2% Cloths 1 Application(s) Topical daily  finasteride 5 milliGRAM(s) Oral daily  pantoprazole    Tablet 40 milliGRAM(s) Oral before breakfast  polyethylene glycol 3350 17 Gram(s) Oral daily  potassium chloride   Powder 20 milliEquivalent(s) Oral once  senna 2 Tablet(s) Oral at bedtime  tamsulosin 0.4 milliGRAM(s) Oral at bedtime    MEDICATIONS  (PRN):      Allergies    sulfamethoxazole-trimethoprim (Vomiting; Nausea)    Intolerances        Vital Signs Last 24 Hrs  T(C): 36.4 (02 Jul 2025 10:44), Max: 36.8 (01 Jul 2025 15:16)  T(F): 97.6 (02 Jul 2025 07:58), Max: 98.3 (01 Jul 2025 15:16)  HR: 66 (02 Jul 2025 10:44) (66 - 74)  BP: 140/67 (02 Jul 2025 10:44) (119/56 - 161/72)  BP(mean): 96 (02 Jul 2025 10:44) (80 - 103)  RR: 18 (02 Jul 2025 10:44) (17 - 20)  SpO2: 99% (02 Jul 2025 10:44) (93% - 99%)    Parameters below as of 02 Jul 2025 04:27  Patient On (Oxygen Delivery Method): room air        PHYSICAL EXAM  General: No apparent distress  HEENT: Anicteric sclera. Moist mucous membranes.   Cardiac: Regular rate and rhythm. Clear S1 and S2 No murmurs, rubs, or gallops.   Vascular: Symmetric radial pulses. Dorsalis pedis pulses palpable.   Respiratory: Normal effort. Clear to ascultation.   Abdomen: Soft, nontender.   Extremities: Warm No cyanosis or clubbing.   Skin: Warm and dry. No rash.   Neurologic: Grossly normal motor function.   Psychiatric: Oriented to person, place, and time.       07-01-25 @ 07:01  -  07-02-25 @ 07:00  --------------------------------------------------------  IN: 885 mL / OUT: 900 mL / NET: -15 mL      LABS:                          8.8    9.23  )-----------( 185      ( 02 Jul 2025 05:12 )             25.8         Mean Cell Volume : 89.3 fL  Mean Cell Hemoglobin : 30.4 pg  Mean Cell Hemoglobin Concentration : 34.1 g/dL  Auto Neutrophil # : x  Auto Lymphocyte # : x  Auto Monocyte # : x  Auto Eosinophil # : x  Auto Basophil # : x  Auto Neutrophil % : x  Auto Lymphocyte % : x  Auto Monocyte % : x  Auto Eosinophil % : x  Auto Basophil % : x    07-02    139  |  106  |  68[HH]  ----------------------------<  106[H]  3.8   |  22  |  2.4[H]    Ca    8.1[L]      02 Jul 2025 05:12  Mg     2.2     07-02    Reticulocyte Percent: 2.7 % (06-27 @ 16:00)  Vitamin B12, Serum: 312 pg/mL (06-27 @ 16:00)  Folate, Serum: 8.3 ng/mL (06-27 @ 16:00)  Iron - Total Binding Capacity.: 276 ug/dL (06-27 @ 07:29)  Ferritin: 369 ng/mL (06-27 @ 07:29)    Radiology:   CT Abdomen and Pelvis:   FINDINGS:  LOWER CHEST: Bibasilar atelectasis. Status post TAVR    LIVER: Within normal limits.  BILE DUCTS: Normal caliber.  GALLBLADDER: Cholelithiasis.  SPLEEN: Splenic cysts  PANCREAS: Within normal limits.  ADRENALS: Within normal limits.  KIDNEYS/URETERS: Right lower pole hyperdense lesion, likely proteinaceous cysts. Additional left renal exophytic lesion/cyst. No hydronephrosis. Perinephric fat stranding    BLADDER: Brooks catheter with air within urinary bladder.  REPRODUCTIVE ORGANS: Unremarkable at CT.    BOWEL: No bowel obstruction. Colonic diverticulosis. Appendix is normal.  PERITONEUM/RETROPERITONEUM: No retroperitoneal hematoma  VESSELS: Extensive aortic, visceral and pelvic atherosclerotic calcifications  LYMPH NODES: Prominent left inguinal lymph nodes  ABDOMINAL WALL: Left groin postsurgical changes. No hematoma  BONES: Osteopenia. Multilevel degenerative changes    IMPRESSION:  No retroperitoneal hematoma           HEMATOLOGY ONCOLOGY CONSULT     Patient is a 89y old  Male who presents with a chief complaint of Abnormal lab (01 Jul 2025 13:42)      HPI:  89-year-old male with PMH permanent Afib (on Eliquis), bladder CA, hx of recurrent UTI on Cephalexin, emphysema, HTN, HLD, CAD s/p PCI/ARGELIA p/m LAD in 2009, CKD (baseline Cr 1.6-2.0), severe AS s/p Successful Transfemoral TAVR (26mm Mcgraw Nereyda 3 Ultra RESILIA) on 6/18 referred to come to the ED due to abnormal labs. Patient report feeling fatigue prior having the tavr and post procedure. patient was seen with Son Kp at bedside. Patient denies chest pain, shortness of breath, palpitation, dizziness, LOC/syncope, hematuria, rectal bleeding, fever, chills, recent travel or sick contact.   Patient has hx of anemia s/p transfusion many years ago    Admitted to cardiology service for LEYDI and anemia . During hospital stay he was treated for LEYDI with diuretics. Anemia was noted and needed 2 units of PRBC. Continued cbc monitoring was done. Baseline Hb before TAVR was  14.1.     PAST MEDICAL & SURGICAL HISTORY:  Spinal stenosis at L4-L5 level      Afib  Hypertension  Bladder cancer  HLD (hyperlipidemia)  CAD (coronary artery disease)  H/O heart artery stent  4 or 5 15 years ago  Status post cataract extraction of both eyes with insertion of intraocular lens  H/O hemorrhoidectomy  H/O cystoscopy    SOCIAL HISTORY:    FAMILY HISTORY:  FHx: kidney disease  Mother    FH: type 2 diabetes  Both parents        MEDICATIONS  (STANDING):  apixaban 2.5 milliGRAM(s) Oral two times a day  aspirin  chewable 81 milliGRAM(s) Oral daily  atorvastatin 10 milliGRAM(s) Oral at bedtime  cephalexin 250 milliGRAM(s) Oral daily  chlorhexidine 2% Cloths 1 Application(s) Topical daily  finasteride 5 milliGRAM(s) Oral daily  pantoprazole    Tablet 40 milliGRAM(s) Oral before breakfast  polyethylene glycol 3350 17 Gram(s) Oral daily  potassium chloride   Powder 20 milliEquivalent(s) Oral once  senna 2 Tablet(s) Oral at bedtime  tamsulosin 0.4 milliGRAM(s) Oral at bedtime    MEDICATIONS  (PRN):      Allergies    sulfamethoxazole-trimethoprim (Vomiting; Nausea)    Intolerances        Vital Signs Last 24 Hrs  T(C): 36.4 (02 Jul 2025 10:44), Max: 36.8 (01 Jul 2025 15:16)  T(F): 97.6 (02 Jul 2025 07:58), Max: 98.3 (01 Jul 2025 15:16)  HR: 66 (02 Jul 2025 10:44) (66 - 74)  BP: 140/67 (02 Jul 2025 10:44) (119/56 - 161/72)  BP(mean): 96 (02 Jul 2025 10:44) (80 - 103)  RR: 18 (02 Jul 2025 10:44) (17 - 20)  SpO2: 99% (02 Jul 2025 10:44) (93% - 99%)    Parameters below as of 02 Jul 2025 04:27  Patient On (Oxygen Delivery Method): room air        PHYSICAL EXAM  General: No apparent distress  HEENT: Anicteric sclera. Moist mucous membranes.   Cardiac: AIrregularly irregular rhythm ; S1and S2; murmur present.   Respiratory: Basilar Crackles hearf   Abdomen: Soft, nontender.   Extremities: Has ecchymotic patches   Skin: Warm and dry. No rash.   Neurologic: Grossly normal motor function.     07-01-25 @ 07:01  -  07-02-25 @ 07:00  --------------------------------------------------------  IN: 885 mL / OUT: 900 mL / NET: -15 mL      LABS:                          8.8    9.23  )-----------( 185      ( 02 Jul 2025 05:12 )             25.8         Mean Cell Volume : 89.3 fL  Mean Cell Hemoglobin : 30.4 pg  Mean Cell Hemoglobin Concentration : 34.1 g/dL  Auto Neutrophil # : x  Auto Lymphocyte # : x  Auto Monocyte # : x  Auto Eosinophil # : x  Auto Basophil # : x  Auto Neutrophil % : x  Auto Lymphocyte % : x  Auto Monocyte % : x  Auto Eosinophil % : x  Auto Basophil % : x    07-02    139  |  106  |  68[HH]  ----------------------------<  106[H]  3.8   |  22  |  2.4[H]    Ca    8.1[L]      02 Jul 2025 05:12  Mg     2.2     07-02    Reticulocyte Percent: 2.7 % (06-27 @ 16:00)  Vitamin B12, Serum: 312 pg/mL (06-27 @ 16:00)  Folate, Serum: 8.3 ng/mL (06-27 @ 16:00)  Iron - Total Binding Capacity.: 276 ug/dL (06-27 @ 07:29)  Ferritin: 369 ng/mL (06-27 @ 07:29)    Radiology:   CT Abdomen and Pelvis:   FINDINGS:  LOWER CHEST: Bibasilar atelectasis. Status post TAVR    LIVER: Within normal limits.  BILE DUCTS: Normal caliber.  GALLBLADDER: Cholelithiasis.  SPLEEN: Splenic cysts  PANCREAS: Within normal limits.  ADRENALS: Within normal limits.  KIDNEYS/URETERS: Right lower pole hyperdense lesion, likely proteinaceous cysts. Additional left renal exophytic lesion/cyst. No hydronephrosis. Perinephric fat stranding    BLADDER: Brooks catheter with air within urinary bladder.  REPRODUCTIVE ORGANS: Unremarkable at CT.    BOWEL: No bowel obstruction. Colonic diverticulosis. Appendix is normal.  PERITONEUM/RETROPERITONEUM: No retroperitoneal hematoma  VESSELS: Extensive aortic, visceral and pelvic atherosclerotic calcifications  LYMPH NODES: Prominent left inguinal lymph nodes  ABDOMINAL WALL: Left groin postsurgical changes. No hematoma  BONES: Osteopenia. Multilevel degenerative changes    IMPRESSION:  No retroperitoneal hematoma

## 2025-07-02 NOTE — CHART NOTE - NSCHARTNOTEFT_GEN_A_CORE
Pre Procedure Note:  Patient Name: TISHA GRANDA   Age:89y  MRN: [id number]  Location: [address]  Procedure Service:  Cardiology   Procedure Name: Transesophageal Echo    Pre Procedure Evaluation: check for TAVR    HPI: 89yyear old Male presents for Transesophageal echocardiogram.        Any respiratory problems: Asthma, COPD, ZACK, recent respiratory illness? NO    Any history of Atlantoaxial disease and severe generalized cervical arthritis? NO    Oral/Dental history: Any dentures, removable, loose, broken tooth/teeth, mouth sores? NO    Significant dysphagia and odynophagia? NO    Any GI history of: Esophageal surgeries, strictures, diverticula, masses, varices, diaphragmatic hernia, stomach ulcers, stomach surgeries, bariatric surgery, GI bleed? NO    SOCIAL HISTORY:   [ ] Non-smoker [ ] Smoker [ ] Alcohol    ALLERGIES: sulfamethoxazole-trimethoprim (Vomiting; Nausea)      REVIEW OF SYSTEMS:  Negative except as mentioend in H&P    PHYSICAL EXAM:  Vital Signs:  T(C): 36.4 (07-02-25 @ 07:58), Max: 36.8 (07-01-25 @ 15:16)  HR: 66 (07-02-25 @ 07:58) (66 - 74)  BP: 140/67 (07-02-25 @ 07:58) (119/56 - 161/72)  RR: 18 (07-02-25 @ 07:58) (17 - 20)  SpO2: 99% (07-02-25 @ 07:58) (93% - 99%)    Appearance: Normal	  HEENT:   Normal oral mucosa, PERRL, EOMI	  Cardiovascular: Normal S1 S2, No JVD, No murmurs, No edema  Respiratory: Lungs clear to auscultation	  Gastrointestinal:  Soft, Non-tender, + BS	  Neurologic/PSY: Non-focal, A&O x 3  Extremities: No clubbing, cyanosis or edema  :	    Plan: 	  LAURA Procedure Candidate	X YES            Attestation Statements:  I have personally seen and examined the patient.  I fully participated in the care of this patient.  I have made amendments to the documentation where necessary, and agree with the history, physical exam, and plan as documented by the Fellow. Pre Procedure Note:  Patient Name: TISHA GRANDA   Age:89y  MRN: [id number]  Location: [address]  Procedure Service:  Cardiology   Procedure Name: Transesophageal Echo    Pre Procedure Evaluation: check for TAVR gradient     HPI: 89yyear old Male presents for Transesophageal echocardiogram.    check for hemolysis induced Bioprosthetic AV stenosis     Any respiratory problems: Asthma, COPD, ZACK, recent respiratory illness? NO    Any history of Atlantoaxial disease and severe generalized cervical arthritis? NO    Oral/Dental history: Any dentures, removable, loose, broken tooth/teeth, mouth sores? NO    Significant dysphagia and odynophagia? NO    Any GI history of: Esophageal surgeries, strictures, diverticula, masses, varices, diaphragmatic hernia, stomach ulcers, stomach surgeries, bariatric surgery, GI bleed? NO    SOCIAL HISTORY:   [ ] Non-smoker [ ] Smoker [ ] Alcohol    ALLERGIES: sulfamethoxazole-trimethoprim (Vomiting; Nausea)      REVIEW OF SYSTEMS:  Negative except as mentioend in H&P    PHYSICAL EXAM:  Vital Signs:  T(C): 36.4 (07-02-25 @ 07:58), Max: 36.8 (07-01-25 @ 15:16)  HR: 66 (07-02-25 @ 07:58) (66 - 74)  BP: 140/67 (07-02-25 @ 07:58) (119/56 - 161/72)  RR: 18 (07-02-25 @ 07:58) (17 - 20)  SpO2: 99% (07-02-25 @ 07:58) (93% - 99%)    Appearance: Normal	  HEENT:   Normal oral mucosa, PERRL, EOMI	  Cardiovascular: Normal S1 S2, No JVD, No murmurs, No edema  Respiratory: Lungs clear to auscultation	  Gastrointestinal:  Soft, Non-tender, + BS	  Neurologic/PSY: Non-focal, A&O x 3  Extremities: No clubbing, cyanosis or edema  :	    Plan: 	  LAURA Procedure Candidate	X YES            Attestation Statements:  I have personally seen and examined the patient.  I fully participated in the care of this patient.  I have made amendments to the documentation where necessary, and agree with the history, physical exam, and plan as documented by the Fellow.

## 2025-07-03 ENCOUNTER — APPOINTMENT (OUTPATIENT)
Dept: CARDIOTHORACIC SURGERY | Facility: CLINIC | Age: 89
End: 2025-07-03

## 2025-07-03 ENCOUNTER — TRANSCRIPTION ENCOUNTER (OUTPATIENT)
Age: 89
End: 2025-07-03

## 2025-07-03 VITALS
HEART RATE: 74 BPM | TEMPERATURE: 98 F | RESPIRATION RATE: 18 BRPM | DIASTOLIC BLOOD PRESSURE: 102 MMHG | OXYGEN SATURATION: 97 % | SYSTOLIC BLOOD PRESSURE: 142 MMHG

## 2025-07-03 LAB
ANION GAP SERPL CALC-SCNC: 12 MMOL/L — SIGNIFICANT CHANGE UP (ref 7–14)
BASOPHILS # BLD AUTO: 0.04 K/UL — SIGNIFICANT CHANGE UP (ref 0–0.2)
BASOPHILS NFR BLD AUTO: 0.5 % — SIGNIFICANT CHANGE UP (ref 0–1)
BUN SERPL-MCNC: 66 MG/DL — CRITICAL HIGH (ref 10–20)
CALCIUM SERPL-MCNC: 8.1 MG/DL — LOW (ref 8.4–10.5)
CHLORIDE SERPL-SCNC: 109 MMOL/L — SIGNIFICANT CHANGE UP (ref 98–110)
CO2 SERPL-SCNC: 20 MMOL/L — SIGNIFICANT CHANGE UP (ref 17–32)
CREAT SERPL-MCNC: 2.2 MG/DL — HIGH (ref 0.7–1.5)
EGFR: 27 ML/MIN/1.73M2 — LOW
EGFR: 27 ML/MIN/1.73M2 — LOW
EOSINOPHIL # BLD AUTO: 0.43 K/UL — SIGNIFICANT CHANGE UP (ref 0–0.7)
EOSINOPHIL NFR BLD AUTO: 4.9 % — SIGNIFICANT CHANGE UP (ref 0–8)
GLUCOSE SERPL-MCNC: 98 MG/DL — SIGNIFICANT CHANGE UP (ref 70–99)
HCT VFR BLD CALC: 27.2 % — LOW (ref 42–52)
HGB BLD-MCNC: 9.1 G/DL — LOW (ref 14–18)
IMM GRANULOCYTES NFR BLD AUTO: 1.7 % — HIGH (ref 0.1–0.3)
LYMPHOCYTES # BLD AUTO: 0.67 K/UL — LOW (ref 1.2–3.4)
LYMPHOCYTES # BLD AUTO: 7.6 % — LOW (ref 20.5–51.1)
MAGNESIUM SERPL-MCNC: 2.1 MG/DL — SIGNIFICANT CHANGE UP (ref 1.8–2.4)
MCHC RBC-ENTMCNC: 30.1 PG — SIGNIFICANT CHANGE UP (ref 27–31)
MCHC RBC-ENTMCNC: 33.5 G/DL — SIGNIFICANT CHANGE UP (ref 32–37)
MCV RBC AUTO: 90.1 FL — SIGNIFICANT CHANGE UP (ref 80–94)
MONOCYTES # BLD AUTO: 1.03 K/UL — HIGH (ref 0.1–0.6)
MONOCYTES NFR BLD AUTO: 11.7 % — HIGH (ref 1.7–9.3)
NEUTROPHILS # BLD AUTO: 6.48 K/UL — SIGNIFICANT CHANGE UP (ref 1.4–6.5)
NEUTROPHILS NFR BLD AUTO: 73.6 % — SIGNIFICANT CHANGE UP (ref 42.2–75.2)
NRBC BLD AUTO-RTO: 0 /100 WBCS — SIGNIFICANT CHANGE UP (ref 0–0)
PLATELET # BLD AUTO: 154 K/UL — SIGNIFICANT CHANGE UP (ref 130–400)
PMV BLD: SIGNIFICANT CHANGE UP (ref 7.4–10.4)
POTASSIUM SERPL-MCNC: 4.6 MMOL/L — SIGNIFICANT CHANGE UP (ref 3.5–5)
POTASSIUM SERPL-SCNC: 4.6 MMOL/L — SIGNIFICANT CHANGE UP (ref 3.5–5)
RBC # BLD: 3.02 M/UL — LOW (ref 4.7–6.1)
RBC # FLD: 20.1 % — HIGH (ref 11.5–14.5)
SODIUM SERPL-SCNC: 141 MMOL/L — SIGNIFICANT CHANGE UP (ref 135–146)
WBC # BLD: 8.8 K/UL — SIGNIFICANT CHANGE UP (ref 4.8–10.8)
WBC # FLD AUTO: 8.8 K/UL — SIGNIFICANT CHANGE UP (ref 4.8–10.8)

## 2025-07-03 PROCEDURE — 99239 HOSP IP/OBS DSCHRG MGMT >30: CPT

## 2025-07-03 RX ORDER — EPOETIN ALFA 10000 [IU]/ML
20000 SOLUTION INTRAVENOUS; SUBCUTANEOUS
Qty: 1 | Refills: 11
Start: 2025-07-03 | End: 2026-06-27

## 2025-07-03 RX ORDER — CYANOCOBALAMIN 1000 UG/ML
1 INJECTION INTRAMUSCULAR; SUBCUTANEOUS
Qty: 30 | Refills: 0
Start: 2025-07-03 | End: 2025-08-01

## 2025-07-03 RX ORDER — FOLIC ACID 1 MG/1
1 TABLET ORAL
Qty: 30 | Refills: 0
Start: 2025-07-03 | End: 2025-08-01

## 2025-07-03 RX ADMIN — Medication 1 APPLICATION(S): at 11:15

## 2025-07-03 RX ADMIN — Medication 40 MILLIGRAM(S): at 05:58

## 2025-07-03 RX ADMIN — CEPHALEXIN 250 MILLIGRAM(S): 250 CAPSULE ORAL at 11:15

## 2025-07-03 RX ADMIN — FINASTERIDE 5 MILLIGRAM(S): 1 TABLET, FILM COATED ORAL at 11:15

## 2025-07-03 RX ADMIN — Medication 5 DROP(S): at 17:04

## 2025-07-03 RX ADMIN — CYANOCOBALAMIN 1000 MICROGRAM(S): 1000 INJECTION INTRAMUSCULAR; SUBCUTANEOUS at 11:15

## 2025-07-03 RX ADMIN — Medication 5 DROP(S): at 06:01

## 2025-07-03 RX ADMIN — FOLIC ACID 1 MILLIGRAM(S): 1 TABLET ORAL at 11:15

## 2025-07-03 RX ADMIN — APIXABAN 2.5 MILLIGRAM(S): 2.5 TABLET, FILM COATED ORAL at 05:58

## 2025-07-03 RX ADMIN — APIXABAN 2.5 MILLIGRAM(S): 2.5 TABLET, FILM COATED ORAL at 17:04

## 2025-07-03 RX ADMIN — Medication 81 MILLIGRAM(S): at 11:15

## 2025-07-03 NOTE — DISCHARGE NOTE PROVIDER - PROVIDER TOKENS
PROVIDER:[TOKEN:[[id number]],FOLLOWUP:[2 weeks]],PROVIDER:[TOKEN:[[id number]],FOLLOWUP:[2 weeks]],PROVIDER:[TOKEN:[[id number]],FOLLOWUP:[1 week]]

## 2025-07-03 NOTE — DISCHARGE NOTE PROVIDER - NSDCMRMEDTOKEN_GEN_ALL_CORE_FT
aspirin 81 mg oral tablet, chewable: 1 tab(s) orally once a day  calcitriol 0.25 mcg oral capsule: 1 cap(s) orally 3 times a week  carbamide peroxide 6.5% otic solution: 5 drop(s) to each affected ear 2 times a day  cephalexin 250 mg oral capsule: 1 cap(s) orally once a day  cyanocobalamin 1000 mcg oral tablet: 1 tab(s) orally once a day  D3 25 mcg (1000 intl units) oral capsule: 1 cap(s) orally once a day  Eliquis 2.5 mg oral tablet: 1 tab(s) orally 2 times a day  finasteride 5 mg oral tablet: 1 tab(s) orally once a day  folic acid 1 mg oral tablet: 1 tab(s) orally once a day  mometasone 0.1% topical lotion: Apply topically to affected area once a day  pantoprazole 40 mg oral delayed release tablet: 1 tab(s) orally once a day  PRAVASTATIN 40MG TABLETS: TAKE 1 TABLET BY MOUTH EVERY DAY AT BEDTIME  Procrit 10,000 units/mL preservative-free injectable solution: 20,000 unit(s) subcutaneously once a week  tamsulosin 0.4 mg oral capsule: 1 cap(s) orally once a day (at bedtime)  Tylenol 325 mg oral tablet: 2 tab(s) orally every 6 hours As needed Temp greater or equal to 38C (100.4F), Mild Pain (1 - 3)

## 2025-07-03 NOTE — DISCHARGE NOTE PROVIDER - NSDCFUSCHEDAPPT_GEN_ALL_CORE_FT
Margarito Herman  [facility] Physician [facility]  CARDIOLOGY [address]  Scheduled Appointment: 07/10/2025

## 2025-07-03 NOTE — DISCHARGE NOTE PROVIDER - HOSPITAL COURSE
Patient is a 89 yo male with PMH permanent Afib (on Eliquis), bladder CA, hx of recurrent UTI, emphysema, HTN, HLD, CAD s/p PCI/ARGELIA p/m LAD in 2009, CKD (baseline Cr 1.6-2.0), severe AS s/p recentTAVR June 18th 2025 (26mm Mcgraw Nereyda 3 Ultra RESILIA) with residual aortic regurgitation presenting with progressive anemia and LEYDI on CKD s/p 2 units PRBCs during this admission, CT abdomen/pelvis ruled out RP bleed. Lott catheter placed for strict I and Os and taken out , patient tolerating TOV however had some hematuria which is resolving. HgB remained stable. IVC normal sized and fully collapsing. Patient underwent LAURA, mild PVL seen, no bioprosthetic valve stenosis, DINORA clear of clot and smoke, EF 55-60%, no PFO or ASD. Patient will followup with hematology outpatient for suspected hemolysis and will be discharged home on Folic Acid and b12 supplementation, as well as Procrit 38358 weekly. Coude lott placed by urology, followup with Dr. Pierre to monitor hematuria. ENT saw patient for cerumen disimpaction, started on Debrox ear drops, followup outpatient for disimpaction with Dr. morgan. Will followup with Dr. Prabhakar in regards to his recent TAVR.

## 2025-07-03 NOTE — DISCHARGE NOTE PROVIDER - NSDCCPCAREPLAN_GEN_ALL_CORE_FT
PRINCIPAL DISCHARGE DIAGNOSIS  Diagnosis: LEYDI (acute kidney injury)  Assessment and Plan of Treatment: Creatinine improved from 3.3 to 2.2      SECONDARY DISCHARGE DIAGNOSES  Diagnosis: Drop in hemoglobin after surgery  Assessment and Plan of Treatment: You underwent TAVR procedure on 6/18/2025 and had a drop in your hemoglobin levels. You recieved two units of blood, hemoglobin has remained stable since. Please followup with Dr. Prabhakar in 1 week to check your CBC and hemoglobin levels.   You also had some blood in your urine post lott removal, which is resolving in color. Followup with Dr. Pierre outpatient (Urology).  You saw ENT for cerumen disimpaction, Continue Debrox Gtts to left ear.

## 2025-07-03 NOTE — DISCHARGE NOTE PROVIDER - NSDCHHATTENDCERT_GEN_ALL_CORE
20-Sep-2019 02:21 My signature below certifies that the above stated patient is homebound and upon completion of the Face-To-Face encounter, has the need for intermittent skilled nursing, physical therapy and/or speech or occupational therapy services in their home for their current diagnosis as outlined in their initial plan of care. These services will continue to be monitored by myself or another physician.

## 2025-07-03 NOTE — DISCHARGE NOTE PROVIDER - CARE PROVIDERS DIRECT ADDRESSES
[email or web address]

## 2025-07-03 NOTE — DISCHARGE NOTE PROVIDER - CARE PROVIDER_API CALL
Fabian Pierre  Urology  [address] [place] [address]  Phone: [phone]  Fax: [phone]  Follow Up Time: 2 weeks    Tremaine Petersen  Otolaryngology Head And Neck Surgery  [address] [place] [address]  Phone: [phone]  Fax: [phone]  Follow Up Time: 2 weeks    Alpesh Prabhakar  Interventional Cardiology  [address], [address]  [place] [address]  Phone: [phone]  Fax: [phone]  Follow Up Time: 1 week

## 2025-07-04 ENCOUNTER — TRANSCRIPTION ENCOUNTER (OUTPATIENT)
Age: 89
End: 2025-07-04

## 2025-07-07 ENCOUNTER — TRANSCRIPTION ENCOUNTER (OUTPATIENT)
Age: 89
End: 2025-07-07

## 2025-07-10 ENCOUNTER — APPOINTMENT (OUTPATIENT)
Dept: CARDIOLOGY | Facility: CLINIC | Age: 89
End: 2025-07-10
Payer: MEDICARE

## 2025-07-10 VITALS
HEIGHT: 70 IN | BODY MASS INDEX: 22.9 KG/M2 | TEMPERATURE: 97.6 F | WEIGHT: 160 LBS | DIASTOLIC BLOOD PRESSURE: 82 MMHG | SYSTOLIC BLOOD PRESSURE: 153 MMHG | HEART RATE: 79 BPM

## 2025-07-10 VITALS — TEMPERATURE: 97.6 F | HEIGHT: 70 IN

## 2025-07-10 PROCEDURE — 99214 OFFICE O/P EST MOD 30 MIN: CPT | Mod: 25

## 2025-07-10 PROCEDURE — 93000 ELECTROCARDIOGRAM COMPLETE: CPT

## 2025-07-11 DIAGNOSIS — Z85.51 PERSONAL HISTORY OF MALIGNANT NEOPLASM OF BLADDER: ICD-10-CM

## 2025-07-11 DIAGNOSIS — I48.21 PERMANENT ATRIAL FIBRILLATION: ICD-10-CM

## 2025-07-11 DIAGNOSIS — M48.061 SPINAL STENOSIS, LUMBAR REGION WITHOUT NEUROGENIC CLAUDICATION: ICD-10-CM

## 2025-07-11 DIAGNOSIS — N40.0 BENIGN PROSTATIC HYPERPLASIA WITHOUT LOWER URINARY TRACT SYMPTOMS: ICD-10-CM

## 2025-07-11 DIAGNOSIS — I95.9 HYPOTENSION, UNSPECIFIED: ICD-10-CM

## 2025-07-11 DIAGNOSIS — Z95.2 PRESENCE OF PROSTHETIC HEART VALVE: ICD-10-CM

## 2025-07-11 DIAGNOSIS — Z96.1 PRESENCE OF INTRAOCULAR LENS: ICD-10-CM

## 2025-07-11 DIAGNOSIS — Z95.5 PRESENCE OF CORONARY ANGIOPLASTY IMPLANT AND GRAFT: ICD-10-CM

## 2025-07-11 DIAGNOSIS — Z79.82 LONG TERM (CURRENT) USE OF ASPIRIN: ICD-10-CM

## 2025-07-11 DIAGNOSIS — Z88.2 ALLERGY STATUS TO SULFONAMIDES: ICD-10-CM

## 2025-07-11 DIAGNOSIS — I12.9 HYPERTENSIVE CHRONIC KIDNEY DISEASE WITH STAGE 1 THROUGH STAGE 4 CHRONIC KIDNEY DISEASE, OR UNSPECIFIED CHRONIC KIDNEY DISEASE: ICD-10-CM

## 2025-07-11 DIAGNOSIS — I25.10 ATHEROSCLEROTIC HEART DISEASE OF NATIVE CORONARY ARTERY WITHOUT ANGINA PECTORIS: ICD-10-CM

## 2025-07-11 DIAGNOSIS — Z98.42 CATARACT EXTRACTION STATUS, LEFT EYE: ICD-10-CM

## 2025-07-11 DIAGNOSIS — K59.00 CONSTIPATION, UNSPECIFIED: ICD-10-CM

## 2025-07-11 DIAGNOSIS — E78.5 HYPERLIPIDEMIA, UNSPECIFIED: ICD-10-CM

## 2025-07-11 DIAGNOSIS — E83.42 HYPOMAGNESEMIA: ICD-10-CM

## 2025-07-11 DIAGNOSIS — H61.20 IMPACTED CERUMEN, UNSPECIFIED EAR: ICD-10-CM

## 2025-07-11 DIAGNOSIS — N17.9 ACUTE KIDNEY FAILURE, UNSPECIFIED: ICD-10-CM

## 2025-07-11 DIAGNOSIS — I08.3 COMBINED RHEUMATIC DISORDERS OF MITRAL, AORTIC AND TRICUSPID VALVES: ICD-10-CM

## 2025-07-11 DIAGNOSIS — J43.9 EMPHYSEMA, UNSPECIFIED: ICD-10-CM

## 2025-07-11 DIAGNOSIS — Z79.01 LONG TERM (CURRENT) USE OF ANTICOAGULANTS: ICD-10-CM

## 2025-07-11 DIAGNOSIS — Z87.440 PERSONAL HISTORY OF URINARY (TRACT) INFECTIONS: ICD-10-CM

## 2025-07-11 DIAGNOSIS — I35.0 NONRHEUMATIC AORTIC (VALVE) STENOSIS: ICD-10-CM

## 2025-07-11 DIAGNOSIS — Z98.41 CATARACT EXTRACTION STATUS, RIGHT EYE: ICD-10-CM

## 2025-07-11 DIAGNOSIS — R31.9 HEMATURIA, UNSPECIFIED: ICD-10-CM

## 2025-07-11 DIAGNOSIS — D63.1 ANEMIA IN CHRONIC KIDNEY DISEASE: ICD-10-CM

## 2025-07-17 ENCOUNTER — APPOINTMENT (OUTPATIENT)
Dept: CARDIOTHORACIC SURGERY | Facility: CLINIC | Age: 89
End: 2025-07-17

## 2025-07-17 ENCOUNTER — APPOINTMENT (OUTPATIENT)
Dept: CARDIOLOGY | Facility: CLINIC | Age: 89
End: 2025-07-17
Payer: MEDICARE

## 2025-07-17 VITALS
OXYGEN SATURATION: 98 % | HEART RATE: 75 BPM | HEIGHT: 70 IN | SYSTOLIC BLOOD PRESSURE: 139 MMHG | TEMPERATURE: 97.7 F | DIASTOLIC BLOOD PRESSURE: 73 MMHG | RESPIRATION RATE: 16 BRPM | BODY MASS INDEX: 22.9 KG/M2 | WEIGHT: 160 LBS

## 2025-07-17 VITALS
HEART RATE: 75 BPM | SYSTOLIC BLOOD PRESSURE: 139 MMHG | BODY MASS INDEX: 22.9 KG/M2 | OXYGEN SATURATION: 98 % | DIASTOLIC BLOOD PRESSURE: 73 MMHG | TEMPERATURE: 97.7 F | WEIGHT: 160 LBS | HEIGHT: 70 IN | RESPIRATION RATE: 16 BRPM

## 2025-07-17 PROBLEM — S81.801A WOUND OF RIGHT LOWER EXTREMITY, INITIAL ENCOUNTER: Status: RESOLVED | Noted: 2025-07-17 | Resolved: 2025-07-17

## 2025-07-17 PROCEDURE — 99214 OFFICE O/P EST MOD 30 MIN: CPT

## 2025-07-17 RX ORDER — CEPHALEXIN 500 MG/1
500 CAPSULE ORAL 3 TIMES DAILY
Qty: 21 | Refills: 0 | Status: ACTIVE | COMMUNITY
Start: 2025-07-17 | End: 1900-01-01

## 2025-07-17 RX ORDER — CEPHALEXIN 250 MG/1
250 CAPSULE ORAL
Refills: 0 | Status: ACTIVE | COMMUNITY

## 2025-07-18 ENCOUNTER — TRANSCRIPTION ENCOUNTER (OUTPATIENT)
Age: 89
End: 2025-07-18

## 2025-07-22 ENCOUNTER — NON-APPOINTMENT (OUTPATIENT)
Age: 89
End: 2025-07-22

## 2025-07-22 ENCOUNTER — TRANSCRIPTION ENCOUNTER (OUTPATIENT)
Age: 89
End: 2025-07-22

## 2025-07-24 ENCOUNTER — APPOINTMENT (OUTPATIENT)
Dept: CARDIOTHORACIC SURGERY | Facility: CLINIC | Age: 89
End: 2025-07-24
Payer: MEDICARE

## 2025-07-24 ENCOUNTER — LABORATORY RESULT (OUTPATIENT)
Age: 89
End: 2025-07-24

## 2025-07-24 VITALS
WEIGHT: 165 LBS | SYSTOLIC BLOOD PRESSURE: 147 MMHG | BODY MASS INDEX: 23.62 KG/M2 | HEART RATE: 73 BPM | HEIGHT: 70 IN | TEMPERATURE: 97.8 F | RESPIRATION RATE: 16 BRPM | OXYGEN SATURATION: 98 % | DIASTOLIC BLOOD PRESSURE: 80 MMHG

## 2025-07-24 DIAGNOSIS — S81.802A UNSPECIFIED OPEN WOUND, LEFT LOWER LEG, INITIAL ENCOUNTER: ICD-10-CM

## 2025-07-24 PROCEDURE — 99213 OFFICE O/P EST LOW 20 MIN: CPT

## 2025-07-25 LAB
ALBUMIN SERPL ELPH-MCNC: 4.1 G/DL
ALP BLD-CCNC: 65 U/L
ALT SERPL-CCNC: 17 U/L
ANION GAP SERPL CALC-SCNC: 13 MMOL/L
AST SERPL-CCNC: 35 U/L
BILIRUB SERPL-MCNC: 1.5 MG/DL
BUN SERPL-MCNC: 41 MG/DL
CALCIUM SERPL-MCNC: 8.9 MG/DL
CHLORIDE SERPL-SCNC: 109 MMOL/L
CO2 SERPL-SCNC: 19 MMOL/L
CREAT SERPL-MCNC: 2 MG/DL
EGFRCR SERPLBLD CKD-EPI 2021: 31 ML/MIN/1.73M2
GLUCOSE SERPL-MCNC: 118 MG/DL
HCT VFR BLD CALC: 33.3 %
HGB BLD-MCNC: 10.1 G/DL
MCHC RBC-ENTMCNC: 30.3 G/DL
MCHC RBC-ENTMCNC: 31.6 PG
MCV RBC AUTO: 104.1 FL
PLATELET # BLD AUTO: 118 K/UL
PMV BLD AUTO: 0 /100 WBCS
PMV BLD: NORMAL
POTASSIUM SERPL-SCNC: 4.8 MMOL/L
PROT SERPL-MCNC: 5.9 G/DL
RBC # BLD: 3.2 M/UL
RBC # FLD: 26.2 %
SODIUM SERPL-SCNC: 141 MMOL/L
WBC # FLD AUTO: 7.02 K/UL

## 2025-07-28 ENCOUNTER — TRANSCRIPTION ENCOUNTER (OUTPATIENT)
Age: 89
End: 2025-07-28

## 2025-07-31 ENCOUNTER — OUTPATIENT (OUTPATIENT)
Dept: OUTPATIENT SERVICES | Facility: HOSPITAL | Age: 89
LOS: 1 days | End: 2025-07-31
Payer: MEDICARE

## 2025-07-31 ENCOUNTER — APPOINTMENT (OUTPATIENT)
Dept: CARDIOTHORACIC SURGERY | Facility: CLINIC | Age: 89
End: 2025-07-31
Payer: MEDICARE

## 2025-07-31 VITALS
HEART RATE: 88 BPM | RESPIRATION RATE: 14 BRPM | SYSTOLIC BLOOD PRESSURE: 143 MMHG | BODY MASS INDEX: 23.62 KG/M2 | HEIGHT: 70 IN | TEMPERATURE: 98.8 F | DIASTOLIC BLOOD PRESSURE: 73 MMHG | OXYGEN SATURATION: 97 % | WEIGHT: 165 LBS

## 2025-07-31 DIAGNOSIS — Z98.41 CATARACT EXTRACTION STATUS, RIGHT EYE: Chronic | ICD-10-CM

## 2025-07-31 DIAGNOSIS — R07.9 CHEST PAIN, UNSPECIFIED: ICD-10-CM

## 2025-07-31 PROCEDURE — 99213 OFFICE O/P EST LOW 20 MIN: CPT

## 2025-07-31 PROCEDURE — 71046 X-RAY EXAM CHEST 2 VIEWS: CPT | Mod: 26

## 2025-07-31 PROCEDURE — 71046 X-RAY EXAM CHEST 2 VIEWS: CPT

## 2025-07-31 RX ORDER — FOLIC ACID 1 MG/1
1 TABLET ORAL DAILY
Qty: 30 | Refills: 1 | Status: ACTIVE | COMMUNITY
Start: 2025-07-31 | End: 1900-01-01

## 2025-08-01 ENCOUNTER — TRANSCRIPTION ENCOUNTER (OUTPATIENT)
Age: 89
End: 2025-08-01

## 2025-08-01 DIAGNOSIS — R07.9 CHEST PAIN, UNSPECIFIED: ICD-10-CM

## 2025-08-07 ENCOUNTER — APPOINTMENT (OUTPATIENT)
Age: 89
End: 2025-08-07
Payer: MEDICARE

## 2025-08-07 VITALS
RESPIRATION RATE: 17 BRPM | OXYGEN SATURATION: 99 % | DIASTOLIC BLOOD PRESSURE: 70 MMHG | HEIGHT: 70 IN | TEMPERATURE: 98.1 F | SYSTOLIC BLOOD PRESSURE: 160 MMHG | HEART RATE: 90 BPM

## 2025-08-07 DIAGNOSIS — Z95.2 PRESENCE OF PROSTHETIC HEART VALVE: ICD-10-CM

## 2025-08-07 DIAGNOSIS — Z79.899 ANEMIA, UNSPECIFIED: ICD-10-CM

## 2025-08-07 DIAGNOSIS — D64.9 ANEMIA, UNSPECIFIED: ICD-10-CM

## 2025-08-07 DIAGNOSIS — D59.8: ICD-10-CM

## 2025-08-07 LAB
AUTO BASOPHILS #: 0.04 K/UL
AUTO BASOPHILS %: 0.4 %
AUTO EOSINOPHILS #: 0.05 K/UL
AUTO EOSINOPHILS %: 0.6 %
AUTO IMMATURE GRANULOCYTES #: 0.07 K/UL
AUTO LYMPHOCYTES #: 0.39 K/UL
AUTO LYMPHOCYTES %: 4.4 %
AUTO MONOCYTES #: 0.92 K/UL
AUTO MONOCYTES %: 10.3 %
AUTO NEUTROPHILS #: 7.43 K/UL
AUTO NEUTROPHILS %: 83.5 %
AUTO NRBC #: 0 K/UL
HCT VFR BLD CALC: 32.2 %
HGB BLD-MCNC: 10.2 G/DL
IMM GRANULOCYTES NFR BLD AUTO: 0.8 %
MAN DIFF?: NORMAL
MCHC RBC-ENTMCNC: 31.3 PG
MCHC RBC-ENTMCNC: 31.7 G/DL
MCV RBC AUTO: 98.8 FL
PLATELET # BLD AUTO: 118 K/UL
PMV BLD: NORMAL FL
RBC # BLD: 3.26 M/UL
RBC # FLD: 24.9 %
WBC # FLD AUTO: 8.9 K/UL

## 2025-08-07 PROCEDURE — 99215 OFFICE O/P EST HI 40 MIN: CPT

## 2025-08-07 PROCEDURE — G2211 COMPLEX E/M VISIT ADD ON: CPT

## 2025-08-07 RX ORDER — EPOETIN ALFA 20000 [IU]/ML
20000 SOLUTION INTRAVENOUS; SUBCUTANEOUS
Qty: 1 | Refills: 3 | Status: ACTIVE | COMMUNITY
Start: 2025-08-07 | End: 1900-01-01

## 2025-08-08 ENCOUNTER — NON-APPOINTMENT (OUTPATIENT)
Age: 89
End: 2025-08-08

## 2025-08-08 PROBLEM — D64.9 ENCOUNTER FOR ESA (ERYTHROPOIETIN STIMULATING AGENT) ANEMIA MANAGEMENT: Status: ACTIVE | Noted: 2025-08-08

## 2025-08-08 PROBLEM — D59.8 OTHER ACQUIRED HEMOLYTIC ANEMIAS: Status: ACTIVE | Noted: 2025-08-08

## 2025-08-08 PROBLEM — Z95.2 S/P TAVR (TRANSCATHETER AORTIC VALVE REPLACEMENT): Status: ACTIVE | Noted: 2025-06-20

## 2025-08-08 LAB
ALBUMIN SERPL ELPH-MCNC: 3.7 G/DL
ALP BLD-CCNC: 71 U/L
ALT SERPL-CCNC: 20 U/L
ANION GAP SERPL CALC-SCNC: 16 MMOL/L
AST SERPL-CCNC: 33 U/L
BILIRUB SERPL-MCNC: 1.4 MG/DL
BUN SERPL-MCNC: 42 MG/DL
CALCIUM SERPL-MCNC: 9.1 MG/DL
CHLORIDE SERPL-SCNC: 109 MMOL/L
CO2 SERPL-SCNC: 17 MMOL/L
CREAT SERPL-MCNC: 2.1 MG/DL
EGFRCR SERPLBLD CKD-EPI 2021: 29 ML/MIN/1.73M2
GLUCOSE SERPL-MCNC: 104 MG/DL
POTASSIUM SERPL-SCNC: 4.6 MMOL/L
PROT SERPL-MCNC: 5.4 G/DL
SODIUM SERPL-SCNC: 142 MMOL/L

## 2025-08-11 ENCOUNTER — NON-APPOINTMENT (OUTPATIENT)
Age: 89
End: 2025-08-11

## 2025-08-12 ENCOUNTER — APPOINTMENT (OUTPATIENT)
Dept: HOME HEALTH SERVICES | Facility: HOME HEALTH | Age: 89
End: 2025-08-12
Payer: MEDICARE

## 2025-08-12 VITALS
OXYGEN SATURATION: 99 % | SYSTOLIC BLOOD PRESSURE: 134 MMHG | HEART RATE: 84 BPM | DIASTOLIC BLOOD PRESSURE: 80 MMHG | RESPIRATION RATE: 15 BRPM

## 2025-08-12 DIAGNOSIS — D63.1 CHRONIC KIDNEY DISEASE, UNSPECIFIED: ICD-10-CM

## 2025-08-12 DIAGNOSIS — I48.91 UNSPECIFIED ATRIAL FIBRILLATION: ICD-10-CM

## 2025-08-12 DIAGNOSIS — N18.9 CHRONIC KIDNEY DISEASE, UNSPECIFIED: ICD-10-CM

## 2025-08-12 DIAGNOSIS — I48.92 UNSPECIFIED ATRIAL FIBRILLATION: ICD-10-CM

## 2025-08-12 DIAGNOSIS — R60.0 LOCALIZED EDEMA: ICD-10-CM

## 2025-08-12 DIAGNOSIS — N30.90 CYSTITIS, UNSPECIFIED W/OUT HEMATURIA: ICD-10-CM

## 2025-08-12 DIAGNOSIS — Z71.89 OTHER SPECIFIED COUNSELING: ICD-10-CM

## 2025-08-12 DIAGNOSIS — R06.09 OTHER FORMS OF DYSPNEA: ICD-10-CM

## 2025-08-12 DIAGNOSIS — E78.5 HYPERLIPIDEMIA, UNSPECIFIED: ICD-10-CM

## 2025-08-12 DIAGNOSIS — I10 ESSENTIAL (PRIMARY) HYPERTENSION: ICD-10-CM

## 2025-08-12 DIAGNOSIS — N13.8 BENIGN PROSTATIC HYPERPLASIA WITH LOWER URINARY TRACT SYMPMS: ICD-10-CM

## 2025-08-12 DIAGNOSIS — N40.1 BENIGN PROSTATIC HYPERPLASIA WITH LOWER URINARY TRACT SYMPMS: ICD-10-CM

## 2025-08-12 PROCEDURE — 99344 HOME/RES VST NEW MOD MDM 60: CPT

## 2025-08-12 RX ORDER — AMOXICILLIN AND CLAVULANATE POTASSIUM 875; 125 MG/1; MG/1
875-125 TABLET, COATED ORAL
Qty: 14 | Refills: 0 | Status: COMPLETED | COMMUNITY
Start: 2025-07-24 | End: 2025-08-12

## 2025-08-12 RX ORDER — FUROSEMIDE 40 MG/1
40 TABLET ORAL DAILY
Qty: 3 | Refills: 0 | Status: ACTIVE | COMMUNITY
Start: 2025-08-12 | End: 1900-01-01

## 2025-08-15 ENCOUNTER — APPOINTMENT (OUTPATIENT)
Dept: CARDIOLOGY | Facility: CLINIC | Age: 89
End: 2025-08-15

## 2025-08-15 ENCOUNTER — NON-APPOINTMENT (OUTPATIENT)
Age: 89
End: 2025-08-15

## 2025-08-15 ENCOUNTER — INPATIENT (INPATIENT)
Facility: HOSPITAL | Age: 89
LOS: 11 days | Discharge: NURSING, REHAB OR LONG-TERM CARE | DRG: 291 | End: 2025-08-27
Attending: STUDENT IN AN ORGANIZED HEALTH CARE EDUCATION/TRAINING PROGRAM | Admitting: STUDENT IN AN ORGANIZED HEALTH CARE EDUCATION/TRAINING PROGRAM
Payer: MEDICARE

## 2025-08-15 VITALS
SYSTOLIC BLOOD PRESSURE: 127 MMHG | HEART RATE: 81 BPM | RESPIRATION RATE: 20 BRPM | HEIGHT: 70 IN | TEMPERATURE: 97 F | DIASTOLIC BLOOD PRESSURE: 75 MMHG

## 2025-08-15 DIAGNOSIS — Z95.5 PRESENCE OF CORONARY ANGIOPLASTY IMPLANT AND GRAFT: Chronic | ICD-10-CM

## 2025-08-15 DIAGNOSIS — I50.9 HEART FAILURE, UNSPECIFIED: ICD-10-CM

## 2025-08-15 DIAGNOSIS — Z98.890 OTHER SPECIFIED POSTPROCEDURAL STATES: Chronic | ICD-10-CM

## 2025-08-15 DIAGNOSIS — Z98.41 CATARACT EXTRACTION STATUS, RIGHT EYE: Chronic | ICD-10-CM

## 2025-08-15 LAB
ALBUMIN SERPL ELPH-MCNC: 3.8 G/DL
ALBUMIN SERPL ELPH-MCNC: 3.8 G/DL — SIGNIFICANT CHANGE UP (ref 3.5–5.2)
ALP BLD-CCNC: 70 U/L
ALP SERPL-CCNC: 63 U/L — SIGNIFICANT CHANGE UP (ref 30–115)
ALT FLD-CCNC: 19 U/L — SIGNIFICANT CHANGE UP (ref 0–41)
ALT SERPL-CCNC: 31 U/L
ANION GAP SERPL CALC-SCNC: 11 MMOL/L — SIGNIFICANT CHANGE UP (ref 7–14)
ANION GAP SERPL CALC-SCNC: 16 MMOL/L — HIGH (ref 7–14)
ANION GAP SERPL CALC-SCNC: 19 MMOL/L
AST SERPL-CCNC: 38 U/L — SIGNIFICANT CHANGE UP (ref 0–41)
AST SERPL-CCNC: 55 U/L
BASE EXCESS BLDV CALC-SCNC: -1.5 MMOL/L — SIGNIFICANT CHANGE UP (ref -2–3)
BASOPHILS # BLD AUTO: 0 K/UL — SIGNIFICANT CHANGE UP (ref 0–0.2)
BASOPHILS NFR BLD AUTO: 0 % — SIGNIFICANT CHANGE UP (ref 0–1)
BILIRUB SERPL-MCNC: 1.5 MG/DL
BILIRUB SERPL-MCNC: 1.6 MG/DL — HIGH (ref 0.2–1.2)
BUN SERPL-MCNC: 40 MG/DL
BUN SERPL-MCNC: 42 MG/DL — HIGH (ref 10–20)
BUN SERPL-MCNC: 42 MG/DL — HIGH (ref 10–20)
CALCIUM SERPL-MCNC: 8.6 MG/DL — SIGNIFICANT CHANGE UP (ref 8.4–10.5)
CALCIUM SERPL-MCNC: 8.8 MG/DL — SIGNIFICANT CHANGE UP (ref 8.4–10.5)
CALCIUM SERPL-MCNC: 8.9 MG/DL
CHLORIDE SERPL-SCNC: 106 MMOL/L — SIGNIFICANT CHANGE UP (ref 98–110)
CHLORIDE SERPL-SCNC: 109 MMOL/L — SIGNIFICANT CHANGE UP (ref 98–110)
CHLORIDE SERPL-SCNC: 110 MMOL/L
CO2 SERPL-SCNC: 16 MMOL/L
CO2 SERPL-SCNC: 21 MMOL/L — SIGNIFICANT CHANGE UP (ref 17–32)
CO2 SERPL-SCNC: 24 MMOL/L — SIGNIFICANT CHANGE UP (ref 17–32)
CREAT SERPL-MCNC: 2.2 MG/DL
CREAT SERPL-MCNC: 2.2 MG/DL — HIGH (ref 0.7–1.5)
CREAT SERPL-MCNC: 2.4 MG/DL — HIGH (ref 0.7–1.5)
EGFR: 25 ML/MIN/1.73M2 — LOW
EGFR: 25 ML/MIN/1.73M2 — LOW
EGFR: 27 ML/MIN/1.73M2 — LOW
EGFR: 27 ML/MIN/1.73M2 — LOW
EGFRCR SERPLBLD CKD-EPI 2021: 27 ML/MIN/1.73M2
EOSINOPHIL # BLD AUTO: 0.14 K/UL — SIGNIFICANT CHANGE UP (ref 0–0.7)
EOSINOPHIL NFR BLD AUTO: 1.7 % — SIGNIFICANT CHANGE UP (ref 0–8)
GLUCOSE SERPL-MCNC: 111 MG/DL — HIGH (ref 70–99)
GLUCOSE SERPL-MCNC: 112 MG/DL — HIGH (ref 70–99)
GLUCOSE SERPL-MCNC: 80 MG/DL
HCO3 BLDV-SCNC: 24 MMOL/L — SIGNIFICANT CHANGE UP (ref 22–29)
HCT VFR BLD CALC: 34.3 % — LOW (ref 42–52)
HGB BLD-MCNC: 10.6 G/DL — LOW (ref 14–18)
LYMPHOCYTES # BLD AUTO: 0.37 K/UL — LOW (ref 1.2–3.4)
LYMPHOCYTES # BLD AUTO: 4.4 % — LOW (ref 20.5–51.1)
MAGNESIUM SERPL-MCNC: 2.1 MG/DL — SIGNIFICANT CHANGE UP (ref 1.8–2.4)
MCHC RBC-ENTMCNC: 30.4 PG — SIGNIFICANT CHANGE UP (ref 27–31)
MCHC RBC-ENTMCNC: 30.9 G/DL — LOW (ref 32–37)
MCV RBC AUTO: 98.3 FL — HIGH (ref 80–94)
MONOCYTES # BLD AUTO: 0.66 K/UL — HIGH (ref 0.1–0.6)
MONOCYTES NFR BLD AUTO: 7.8 % — SIGNIFICANT CHANGE UP (ref 1.7–9.3)
NEUTROPHILS # BLD AUTO: 7.29 K/UL — HIGH (ref 1.4–6.5)
NEUTROPHILS NFR BLD AUTO: 86.1 % — HIGH (ref 42.2–75.2)
NT-PROBNP SERPL-MCNC: ABNORMAL PG/ML
NT-PROBNP SERPL-SCNC: HIGH PG/ML (ref 0–300)
PCO2 BLDV: 41 MMHG — LOW (ref 42–55)
PH BLDV: 7.37 — SIGNIFICANT CHANGE UP (ref 7.32–7.43)
PLATELET # BLD AUTO: 149 K/UL — SIGNIFICANT CHANGE UP (ref 130–400)
PMV BLD: SIGNIFICANT CHANGE UP (ref 7.4–10.4)
PO2 BLDV: 25 MMHG — SIGNIFICANT CHANGE UP (ref 25–45)
POTASSIUM SERPL-MCNC: 4.4 MMOL/L — SIGNIFICANT CHANGE UP (ref 3.5–5)
POTASSIUM SERPL-MCNC: 4.9 MMOL/L — SIGNIFICANT CHANGE UP (ref 3.5–5)
POTASSIUM SERPL-SCNC: 4.4 MMOL/L — SIGNIFICANT CHANGE UP (ref 3.5–5)
POTASSIUM SERPL-SCNC: 4.9 MMOL/L — SIGNIFICANT CHANGE UP (ref 3.5–5)
POTASSIUM SERPL-SCNC: 5.3 MMOL/L
PROT SERPL-MCNC: 5.3 G/DL — LOW (ref 6–8)
PROT SERPL-MCNC: 5.5 G/DL
RBC # BLD: 3.49 M/UL — LOW (ref 4.7–6.1)
RBC # FLD: 24.7 % — HIGH (ref 11.5–14.5)
SAO2 % BLDV: 34.8 % — LOW (ref 67–88)
SODIUM SERPL-SCNC: 141 MMOL/L — SIGNIFICANT CHANGE UP (ref 135–146)
SODIUM SERPL-SCNC: 145 MMOL/L
SODIUM SERPL-SCNC: 146 MMOL/L — SIGNIFICANT CHANGE UP (ref 135–146)
WBC # BLD: 8.47 K/UL — SIGNIFICANT CHANGE UP (ref 4.8–10.8)
WBC # FLD AUTO: 8.47 K/UL — SIGNIFICANT CHANGE UP (ref 4.8–10.8)

## 2025-08-15 PROCEDURE — 86900 BLOOD TYPING SEROLOGIC ABO: CPT

## 2025-08-15 PROCEDURE — 83540 ASSAY OF IRON: CPT

## 2025-08-15 PROCEDURE — 83036 HEMOGLOBIN GLYCOSYLATED A1C: CPT

## 2025-08-15 PROCEDURE — 84443 ASSAY THYROID STIM HORMONE: CPT

## 2025-08-15 PROCEDURE — 83735 ASSAY OF MAGNESIUM: CPT

## 2025-08-15 PROCEDURE — 80048 BASIC METABOLIC PNL TOTAL CA: CPT

## 2025-08-15 PROCEDURE — 80061 LIPID PANEL: CPT

## 2025-08-15 PROCEDURE — 84439 ASSAY OF FREE THYROXINE: CPT

## 2025-08-15 PROCEDURE — 97116 GAIT TRAINING THERAPY: CPT | Mod: GP

## 2025-08-15 PROCEDURE — 93010 ELECTROCARDIOGRAM REPORT: CPT

## 2025-08-15 PROCEDURE — 93005 ELECTROCARDIOGRAM TRACING: CPT

## 2025-08-15 PROCEDURE — 97161 PT EVAL LOW COMPLEX 20 MIN: CPT | Mod: GP

## 2025-08-15 PROCEDURE — 85027 COMPLETE CBC AUTOMATED: CPT

## 2025-08-15 PROCEDURE — 86850 RBC ANTIBODY SCREEN: CPT

## 2025-08-15 PROCEDURE — 97110 THERAPEUTIC EXERCISES: CPT | Mod: GP

## 2025-08-15 PROCEDURE — 97166 OT EVAL MOD COMPLEX 45 MIN: CPT | Mod: GO

## 2025-08-15 PROCEDURE — 93010 ELECTROCARDIOGRAM REPORT: CPT | Mod: 77

## 2025-08-15 PROCEDURE — 94010 BREATHING CAPACITY TEST: CPT

## 2025-08-15 PROCEDURE — 87040 BLOOD CULTURE FOR BACTERIA: CPT

## 2025-08-15 PROCEDURE — 99285 EMERGENCY DEPT VISIT HI MDM: CPT

## 2025-08-15 PROCEDURE — 97530 THERAPEUTIC ACTIVITIES: CPT | Mod: GP

## 2025-08-15 PROCEDURE — 71045 X-RAY EXAM CHEST 1 VIEW: CPT | Mod: 26

## 2025-08-15 PROCEDURE — 82728 ASSAY OF FERRITIN: CPT

## 2025-08-15 PROCEDURE — 83880 ASSAY OF NATRIURETIC PEPTIDE: CPT

## 2025-08-15 PROCEDURE — 93306 TTE W/DOPPLER COMPLETE: CPT

## 2025-08-15 PROCEDURE — 86901 BLOOD TYPING SEROLOGIC RH(D): CPT

## 2025-08-15 PROCEDURE — 83550 IRON BINDING TEST: CPT

## 2025-08-15 PROCEDURE — 85025 COMPLETE CBC W/AUTO DIFF WBC: CPT

## 2025-08-15 PROCEDURE — 36415 COLL VENOUS BLD VENIPUNCTURE: CPT

## 2025-08-15 RX ORDER — APIXABAN 2.5 MG/1
2.5 TABLET, FILM COATED ORAL
Refills: 0 | Status: DISCONTINUED | OUTPATIENT
Start: 2025-08-15 | End: 2025-08-27

## 2025-08-15 RX ORDER — TAMSULOSIN HYDROCHLORIDE 0.4 MG/1
0.4 CAPSULE ORAL AT BEDTIME
Refills: 0 | Status: DISCONTINUED | OUTPATIENT
Start: 2025-08-15 | End: 2025-08-27

## 2025-08-15 RX ORDER — POLYETHYLENE GLYCOL 3350 17 G/17G
17 POWDER, FOR SOLUTION ORAL DAILY
Refills: 0 | Status: DISCONTINUED | OUTPATIENT
Start: 2025-08-15 | End: 2025-08-26

## 2025-08-15 RX ORDER — CALCITRIOL 0.5 UG/1
0.25 CAPSULE, GELATIN COATED ORAL DAILY
Refills: 0 | Status: DISCONTINUED | OUTPATIENT
Start: 2025-08-15 | End: 2025-08-27

## 2025-08-15 RX ORDER — FUROSEMIDE 10 MG/ML
60 INJECTION INTRAMUSCULAR; INTRAVENOUS ONCE
Refills: 0 | Status: COMPLETED | OUTPATIENT
Start: 2025-08-15 | End: 2025-08-15

## 2025-08-15 RX ORDER — ASPIRIN 325 MG
81 TABLET ORAL DAILY
Refills: 0 | Status: DISCONTINUED | OUTPATIENT
Start: 2025-08-15 | End: 2025-08-27

## 2025-08-15 RX ORDER — FINASTERIDE 1 MG/1
5 TABLET, FILM COATED ORAL DAILY
Refills: 0 | Status: DISCONTINUED | OUTPATIENT
Start: 2025-08-15 | End: 2025-08-27

## 2025-08-15 RX ORDER — SENNA 187 MG
2 TABLET ORAL AT BEDTIME
Refills: 0 | Status: DISCONTINUED | OUTPATIENT
Start: 2025-08-15 | End: 2025-08-27

## 2025-08-15 RX ORDER — ATORVASTATIN CALCIUM 80 MG/1
10 TABLET, FILM COATED ORAL AT BEDTIME
Refills: 0 | Status: DISCONTINUED | OUTPATIENT
Start: 2025-08-15 | End: 2025-08-27

## 2025-08-15 RX ORDER — CEPHALEXIN 250 MG/1
250 CAPSULE ORAL DAILY
Refills: 0 | Status: DISCONTINUED | OUTPATIENT
Start: 2025-08-15 | End: 2025-08-27

## 2025-08-15 RX ORDER — FUROSEMIDE 10 MG/ML
60 INJECTION INTRAMUSCULAR; INTRAVENOUS
Refills: 0 | Status: DISCONTINUED | OUTPATIENT
Start: 2025-08-16 | End: 2025-08-21

## 2025-08-15 RX ADMIN — CEPHALEXIN 250 MILLIGRAM(S): 250 CAPSULE ORAL at 20:02

## 2025-08-15 RX ADMIN — Medication 2 TABLET(S): at 21:32

## 2025-08-15 RX ADMIN — APIXABAN 2.5 MILLIGRAM(S): 2.5 TABLET, FILM COATED ORAL at 17:17

## 2025-08-15 RX ADMIN — ATORVASTATIN CALCIUM 10 MILLIGRAM(S): 80 TABLET, FILM COATED ORAL at 21:32

## 2025-08-15 RX ADMIN — FUROSEMIDE 60 MILLIGRAM(S): 10 INJECTION INTRAMUSCULAR; INTRAVENOUS at 17:15

## 2025-08-15 RX ADMIN — FUROSEMIDE 60 MILLIGRAM(S): 10 INJECTION INTRAMUSCULAR; INTRAVENOUS at 10:14

## 2025-08-15 RX ADMIN — TAMSULOSIN HYDROCHLORIDE 0.4 MILLIGRAM(S): 0.4 CAPSULE ORAL at 21:32

## 2025-08-16 ENCOUNTER — RESULT REVIEW (OUTPATIENT)
Age: 89
End: 2025-08-16

## 2025-08-16 LAB
A1C WITH ESTIMATED AVERAGE GLUCOSE RESULT: 4.5 % — SIGNIFICANT CHANGE UP (ref 4–5.6)
ANION GAP SERPL CALC-SCNC: 14 MMOL/L — SIGNIFICANT CHANGE UP (ref 7–14)
ANION GAP SERPL CALC-SCNC: 14 MMOL/L — SIGNIFICANT CHANGE UP (ref 7–14)
BUN SERPL-MCNC: 41 MG/DL — HIGH (ref 10–20)
BUN SERPL-MCNC: 44 MG/DL — HIGH (ref 10–20)
CALCIUM SERPL-MCNC: 8.6 MG/DL — SIGNIFICANT CHANGE UP (ref 8.4–10.5)
CALCIUM SERPL-MCNC: 8.9 MG/DL — SIGNIFICANT CHANGE UP (ref 8.4–10.5)
CHLORIDE SERPL-SCNC: 102 MMOL/L — SIGNIFICANT CHANGE UP (ref 98–110)
CHLORIDE SERPL-SCNC: 106 MMOL/L — SIGNIFICANT CHANGE UP (ref 98–110)
CHOLEST SERPL-MCNC: 134 MG/DL — SIGNIFICANT CHANGE UP
CO2 SERPL-SCNC: 23 MMOL/L — SIGNIFICANT CHANGE UP (ref 17–32)
CO2 SERPL-SCNC: 23 MMOL/L — SIGNIFICANT CHANGE UP (ref 17–32)
CREAT SERPL-MCNC: 2.1 MG/DL — HIGH (ref 0.7–1.5)
CREAT SERPL-MCNC: 2.4 MG/DL — HIGH (ref 0.7–1.5)
EGFR: 25 ML/MIN/1.73M2 — LOW
EGFR: 25 ML/MIN/1.73M2 — LOW
EGFR: 29 ML/MIN/1.73M2 — LOW
EGFR: 29 ML/MIN/1.73M2 — LOW
ESTIMATED AVERAGE GLUCOSE: 82 MG/DL — SIGNIFICANT CHANGE UP (ref 68–114)
FERRITIN SERPL-MCNC: 127 NG/ML — SIGNIFICANT CHANGE UP (ref 30–400)
GLUCOSE SERPL-MCNC: 101 MG/DL — HIGH (ref 70–99)
GLUCOSE SERPL-MCNC: 124 MG/DL — HIGH (ref 70–99)
HCT VFR BLD CALC: 33.2 % — LOW (ref 42–52)
HDLC SERPL-MCNC: 64 MG/DL — SIGNIFICANT CHANGE UP
HGB BLD-MCNC: 10.5 G/DL — LOW (ref 14–18)
IRON SATN MFR SERPL: 20 % — SIGNIFICANT CHANGE UP (ref 15–50)
IRON SATN MFR SERPL: 54 UG/DL — SIGNIFICANT CHANGE UP (ref 35–150)
LDLC SERPL-MCNC: 54 MG/DL — SIGNIFICANT CHANGE UP
LIPID PNL WITH DIRECT LDL SERPL: 54 MG/DL — SIGNIFICANT CHANGE UP
MAGNESIUM SERPL-MCNC: 2 MG/DL — SIGNIFICANT CHANGE UP (ref 1.8–2.4)
MAGNESIUM SERPL-MCNC: 2.2 MG/DL — SIGNIFICANT CHANGE UP (ref 1.8–2.4)
MCHC RBC-ENTMCNC: 30.7 PG — SIGNIFICANT CHANGE UP (ref 27–31)
MCHC RBC-ENTMCNC: 31.6 G/DL — LOW (ref 32–37)
MCV RBC AUTO: 97.1 FL — HIGH (ref 80–94)
NONHDLC SERPL-MCNC: 70 MG/DL — SIGNIFICANT CHANGE UP
NRBC BLD AUTO-RTO: 0 /100 WBCS — SIGNIFICANT CHANGE UP (ref 0–0)
PLATELET # BLD AUTO: 138 K/UL — SIGNIFICANT CHANGE UP (ref 130–400)
PMV BLD: SIGNIFICANT CHANGE UP (ref 7.4–10.4)
POTASSIUM SERPL-MCNC: 4.4 MMOL/L — SIGNIFICANT CHANGE UP (ref 3.5–5)
POTASSIUM SERPL-MCNC: 4.5 MMOL/L — SIGNIFICANT CHANGE UP (ref 3.5–5)
POTASSIUM SERPL-SCNC: 4.4 MMOL/L — SIGNIFICANT CHANGE UP (ref 3.5–5)
POTASSIUM SERPL-SCNC: 4.5 MMOL/L — SIGNIFICANT CHANGE UP (ref 3.5–5)
RBC # BLD: 3.42 M/UL — LOW (ref 4.7–6.1)
RBC # FLD: 24.6 % — HIGH (ref 11.5–14.5)
SODIUM SERPL-SCNC: 139 MMOL/L — SIGNIFICANT CHANGE UP (ref 135–146)
SODIUM SERPL-SCNC: 143 MMOL/L — SIGNIFICANT CHANGE UP (ref 135–146)
T4 FREE SERPL-MCNC: 1.2 NG/DL — SIGNIFICANT CHANGE UP (ref 0.9–1.8)
TIBC SERPL-MCNC: 264 UG/DL — SIGNIFICANT CHANGE UP (ref 220–430)
TRIGL SERPL-MCNC: 81 MG/DL — SIGNIFICANT CHANGE UP
TSH SERPL-MCNC: 2.78 UIU/ML — SIGNIFICANT CHANGE UP (ref 0.27–4.2)
UIBC SERPL-MCNC: 210 UG/DL — SIGNIFICANT CHANGE UP (ref 110–370)
WBC # BLD: 8.37 K/UL — SIGNIFICANT CHANGE UP (ref 4.8–10.8)
WBC # FLD AUTO: 8.37 K/UL — SIGNIFICANT CHANGE UP (ref 4.8–10.8)

## 2025-08-16 PROCEDURE — 93010 ELECTROCARDIOGRAM REPORT: CPT

## 2025-08-16 PROCEDURE — 93306 TTE W/DOPPLER COMPLETE: CPT | Mod: 26

## 2025-08-16 PROCEDURE — 99223 1ST HOSP IP/OBS HIGH 75: CPT

## 2025-08-16 RX ADMIN — ATORVASTATIN CALCIUM 10 MILLIGRAM(S): 80 TABLET, FILM COATED ORAL at 22:36

## 2025-08-16 RX ADMIN — FINASTERIDE 5 MILLIGRAM(S): 1 TABLET, FILM COATED ORAL at 11:22

## 2025-08-16 RX ADMIN — Medication 1 APPLICATION(S): at 14:04

## 2025-08-16 RX ADMIN — FUROSEMIDE 60 MILLIGRAM(S): 10 INJECTION INTRAMUSCULAR; INTRAVENOUS at 05:31

## 2025-08-16 RX ADMIN — APIXABAN 2.5 MILLIGRAM(S): 2.5 TABLET, FILM COATED ORAL at 05:31

## 2025-08-16 RX ADMIN — Medication 81 MILLIGRAM(S): at 11:22

## 2025-08-16 RX ADMIN — APIXABAN 2.5 MILLIGRAM(S): 2.5 TABLET, FILM COATED ORAL at 17:23

## 2025-08-16 RX ADMIN — TAMSULOSIN HYDROCHLORIDE 0.4 MILLIGRAM(S): 0.4 CAPSULE ORAL at 21:45

## 2025-08-16 RX ADMIN — CEPHALEXIN 250 MILLIGRAM(S): 250 CAPSULE ORAL at 11:23

## 2025-08-16 RX ADMIN — CALCITRIOL 0.25 MICROGRAM(S): 0.5 CAPSULE, GELATIN COATED ORAL at 11:22

## 2025-08-16 RX ADMIN — FUROSEMIDE 60 MILLIGRAM(S): 10 INJECTION INTRAMUSCULAR; INTRAVENOUS at 14:03

## 2025-08-17 LAB
ANION GAP SERPL CALC-SCNC: 14 MMOL/L — SIGNIFICANT CHANGE UP (ref 7–14)
BLD GP AB SCN SERPL QL: SIGNIFICANT CHANGE UP
BUN SERPL-MCNC: 40 MG/DL — HIGH (ref 10–20)
CALCIUM SERPL-MCNC: 8.9 MG/DL — SIGNIFICANT CHANGE UP (ref 8.4–10.5)
CHLORIDE SERPL-SCNC: 102 MMOL/L — SIGNIFICANT CHANGE UP (ref 98–110)
CO2 SERPL-SCNC: 23 MMOL/L — SIGNIFICANT CHANGE UP (ref 17–32)
CREAT SERPL-MCNC: 2.1 MG/DL — HIGH (ref 0.7–1.5)
EGFR: 29 ML/MIN/1.73M2 — LOW
EGFR: 29 ML/MIN/1.73M2 — LOW
GLUCOSE SERPL-MCNC: 98 MG/DL — SIGNIFICANT CHANGE UP (ref 70–99)
HCT VFR BLD CALC: 35.9 % — LOW (ref 42–52)
HGB BLD-MCNC: 10.9 G/DL — LOW (ref 14–18)
MAGNESIUM SERPL-MCNC: 2.1 MG/DL — SIGNIFICANT CHANGE UP (ref 1.8–2.4)
MCHC RBC-ENTMCNC: 29.7 PG — SIGNIFICANT CHANGE UP (ref 27–31)
MCHC RBC-ENTMCNC: 30.4 G/DL — LOW (ref 32–37)
MCV RBC AUTO: 97.8 FL — HIGH (ref 80–94)
NRBC BLD AUTO-RTO: 0 /100 WBCS — SIGNIFICANT CHANGE UP (ref 0–0)
PLATELET # BLD AUTO: 117 K/UL — LOW (ref 130–400)
PMV BLD: SIGNIFICANT CHANGE UP (ref 7.4–10.4)
POTASSIUM SERPL-MCNC: 4.2 MMOL/L — SIGNIFICANT CHANGE UP (ref 3.5–5)
POTASSIUM SERPL-SCNC: 4.2 MMOL/L — SIGNIFICANT CHANGE UP (ref 3.5–5)
RBC # BLD: 3.67 M/UL — LOW (ref 4.7–6.1)
RBC # FLD: 24.6 % — HIGH (ref 11.5–14.5)
SODIUM SERPL-SCNC: 139 MMOL/L — SIGNIFICANT CHANGE UP (ref 135–146)
WBC # BLD: 9.34 K/UL — SIGNIFICANT CHANGE UP (ref 4.8–10.8)
WBC # FLD AUTO: 9.34 K/UL — SIGNIFICANT CHANGE UP (ref 4.8–10.8)

## 2025-08-17 PROCEDURE — 99232 SBSQ HOSP IP/OBS MODERATE 35: CPT

## 2025-08-17 RX ORDER — MELATONIN 5 MG
5 TABLET ORAL AT BEDTIME
Refills: 0 | Status: DISCONTINUED | OUTPATIENT
Start: 2025-08-17 | End: 2025-08-27

## 2025-08-17 RX ORDER — MELATONIN 5 MG
5 TABLET ORAL ONCE
Refills: 0 | Status: DISCONTINUED | OUTPATIENT
Start: 2025-08-17 | End: 2025-08-17

## 2025-08-17 RX ADMIN — FINASTERIDE 5 MILLIGRAM(S): 1 TABLET, FILM COATED ORAL at 11:11

## 2025-08-17 RX ADMIN — APIXABAN 2.5 MILLIGRAM(S): 2.5 TABLET, FILM COATED ORAL at 05:50

## 2025-08-17 RX ADMIN — CEPHALEXIN 250 MILLIGRAM(S): 250 CAPSULE ORAL at 11:11

## 2025-08-17 RX ADMIN — Medication 2 TABLET(S): at 21:21

## 2025-08-17 RX ADMIN — Medication 81 MILLIGRAM(S): at 11:10

## 2025-08-17 RX ADMIN — CALCITRIOL 0.25 MICROGRAM(S): 0.5 CAPSULE, GELATIN COATED ORAL at 11:10

## 2025-08-17 RX ADMIN — Medication 5 MILLIGRAM(S): at 21:21

## 2025-08-17 RX ADMIN — ATORVASTATIN CALCIUM 10 MILLIGRAM(S): 80 TABLET, FILM COATED ORAL at 21:21

## 2025-08-17 RX ADMIN — FUROSEMIDE 60 MILLIGRAM(S): 10 INJECTION INTRAMUSCULAR; INTRAVENOUS at 05:50

## 2025-08-17 RX ADMIN — Medication 1 APPLICATION(S): at 11:11

## 2025-08-17 RX ADMIN — TAMSULOSIN HYDROCHLORIDE 0.4 MILLIGRAM(S): 0.4 CAPSULE ORAL at 21:21

## 2025-08-17 RX ADMIN — FUROSEMIDE 60 MILLIGRAM(S): 10 INJECTION INTRAMUSCULAR; INTRAVENOUS at 15:20

## 2025-08-17 RX ADMIN — APIXABAN 2.5 MILLIGRAM(S): 2.5 TABLET, FILM COATED ORAL at 17:23

## 2025-08-18 LAB
ANION GAP SERPL CALC-SCNC: 13 MMOL/L — SIGNIFICANT CHANGE UP (ref 7–14)
BASOPHILS # BLD AUTO: 0.05 K/UL — SIGNIFICANT CHANGE UP (ref 0–0.2)
BASOPHILS NFR BLD AUTO: 0.6 % — SIGNIFICANT CHANGE UP (ref 0–1)
BUN SERPL-MCNC: 40 MG/DL — HIGH (ref 10–20)
CALCIUM SERPL-MCNC: 9 MG/DL — SIGNIFICANT CHANGE UP (ref 8.4–10.5)
CHLORIDE SERPL-SCNC: 98 MMOL/L — SIGNIFICANT CHANGE UP (ref 98–110)
CO2 SERPL-SCNC: 28 MMOL/L — SIGNIFICANT CHANGE UP (ref 17–32)
CREAT SERPL-MCNC: 2 MG/DL — HIGH (ref 0.7–1.5)
EGFR: 31 ML/MIN/1.73M2 — LOW
EGFR: 31 ML/MIN/1.73M2 — LOW
EOSINOPHIL # BLD AUTO: 0.27 K/UL — SIGNIFICANT CHANGE UP (ref 0–0.7)
EOSINOPHIL NFR BLD AUTO: 3.2 % — SIGNIFICANT CHANGE UP (ref 0–8)
GLUCOSE SERPL-MCNC: 106 MG/DL — HIGH (ref 70–99)
HCT VFR BLD CALC: 35.6 % — LOW (ref 42–52)
HGB BLD-MCNC: 11.4 G/DL — LOW (ref 14–18)
IMM GRANULOCYTES NFR BLD AUTO: 0.6 % — HIGH (ref 0.1–0.3)
LYMPHOCYTES # BLD AUTO: 0.6 K/UL — LOW (ref 1.2–3.4)
LYMPHOCYTES # BLD AUTO: 7.1 % — LOW (ref 20.5–51.1)
MAGNESIUM SERPL-MCNC: 2 MG/DL — SIGNIFICANT CHANGE UP (ref 1.8–2.4)
MCHC RBC-ENTMCNC: 30.5 PG — SIGNIFICANT CHANGE UP (ref 27–31)
MCHC RBC-ENTMCNC: 32 G/DL — SIGNIFICANT CHANGE UP (ref 32–37)
MCV RBC AUTO: 95.2 FL — HIGH (ref 80–94)
MONOCYTES # BLD AUTO: 0.85 K/UL — HIGH (ref 0.1–0.6)
MONOCYTES NFR BLD AUTO: 10 % — HIGH (ref 1.7–9.3)
NEUTROPHILS # BLD AUTO: 6.65 K/UL — HIGH (ref 1.4–6.5)
NEUTROPHILS NFR BLD AUTO: 78.5 % — HIGH (ref 42.2–75.2)
NRBC BLD AUTO-RTO: 0 /100 WBCS — SIGNIFICANT CHANGE UP (ref 0–0)
PLATELET # BLD AUTO: 113 K/UL — LOW (ref 130–400)
PMV BLD: SIGNIFICANT CHANGE UP (ref 7.4–10.4)
POTASSIUM SERPL-MCNC: 4 MMOL/L — SIGNIFICANT CHANGE UP (ref 3.5–5)
POTASSIUM SERPL-SCNC: 4 MMOL/L — SIGNIFICANT CHANGE UP (ref 3.5–5)
RBC # BLD: 3.74 M/UL — LOW (ref 4.7–6.1)
RBC # FLD: 23.9 % — HIGH (ref 11.5–14.5)
SODIUM SERPL-SCNC: 139 MMOL/L — SIGNIFICANT CHANGE UP (ref 135–146)
WBC # BLD: 8.47 K/UL — SIGNIFICANT CHANGE UP (ref 4.8–10.8)
WBC # FLD AUTO: 8.47 K/UL — SIGNIFICANT CHANGE UP (ref 4.8–10.8)

## 2025-08-18 PROCEDURE — 99232 SBSQ HOSP IP/OBS MODERATE 35: CPT

## 2025-08-18 RX ADMIN — CALCITRIOL 0.25 MICROGRAM(S): 0.5 CAPSULE, GELATIN COATED ORAL at 11:14

## 2025-08-18 RX ADMIN — Medication 81 MILLIGRAM(S): at 11:13

## 2025-08-18 RX ADMIN — Medication 1 APPLICATION(S): at 11:14

## 2025-08-18 RX ADMIN — FUROSEMIDE 60 MILLIGRAM(S): 10 INJECTION INTRAMUSCULAR; INTRAVENOUS at 05:48

## 2025-08-18 RX ADMIN — FINASTERIDE 5 MILLIGRAM(S): 1 TABLET, FILM COATED ORAL at 11:13

## 2025-08-18 RX ADMIN — APIXABAN 2.5 MILLIGRAM(S): 2.5 TABLET, FILM COATED ORAL at 17:25

## 2025-08-18 RX ADMIN — FUROSEMIDE 60 MILLIGRAM(S): 10 INJECTION INTRAMUSCULAR; INTRAVENOUS at 13:46

## 2025-08-18 RX ADMIN — APIXABAN 2.5 MILLIGRAM(S): 2.5 TABLET, FILM COATED ORAL at 05:48

## 2025-08-18 RX ADMIN — TAMSULOSIN HYDROCHLORIDE 0.4 MILLIGRAM(S): 0.4 CAPSULE ORAL at 22:20

## 2025-08-18 RX ADMIN — CEPHALEXIN 250 MILLIGRAM(S): 250 CAPSULE ORAL at 11:13

## 2025-08-18 RX ADMIN — ATORVASTATIN CALCIUM 10 MILLIGRAM(S): 80 TABLET, FILM COATED ORAL at 22:20

## 2025-08-19 LAB
ANION GAP SERPL CALC-SCNC: 13 MMOL/L — SIGNIFICANT CHANGE UP (ref 7–14)
BASOPHILS # BLD AUTO: 0.04 K/UL — SIGNIFICANT CHANGE UP (ref 0–0.2)
BASOPHILS NFR BLD AUTO: 0.4 % — SIGNIFICANT CHANGE UP (ref 0–1)
BUN SERPL-MCNC: 39 MG/DL — HIGH (ref 10–20)
CALCIUM SERPL-MCNC: 8.7 MG/DL — SIGNIFICANT CHANGE UP (ref 8.4–10.5)
CHLORIDE SERPL-SCNC: 101 MMOL/L — SIGNIFICANT CHANGE UP (ref 98–110)
CO2 SERPL-SCNC: 27 MMOL/L — SIGNIFICANT CHANGE UP (ref 17–32)
CREAT SERPL-MCNC: 1.8 MG/DL — HIGH (ref 0.7–1.5)
EGFR: 35 ML/MIN/1.73M2 — LOW
EGFR: 35 ML/MIN/1.73M2 — LOW
EOSINOPHIL # BLD AUTO: 0.26 K/UL — SIGNIFICANT CHANGE UP (ref 0–0.7)
EOSINOPHIL NFR BLD AUTO: 2.8 % — SIGNIFICANT CHANGE UP (ref 0–8)
GLUCOSE SERPL-MCNC: 96 MG/DL — SIGNIFICANT CHANGE UP (ref 70–99)
HCT VFR BLD CALC: 36.3 % — LOW (ref 42–52)
HGB BLD-MCNC: 11.5 G/DL — LOW (ref 14–18)
IMM GRANULOCYTES NFR BLD AUTO: 0.5 % — HIGH (ref 0.1–0.3)
LYMPHOCYTES # BLD AUTO: 0.61 K/UL — LOW (ref 1.2–3.4)
LYMPHOCYTES # BLD AUTO: 6.6 % — LOW (ref 20.5–51.1)
MAGNESIUM SERPL-MCNC: 2 MG/DL — SIGNIFICANT CHANGE UP (ref 1.8–2.4)
MCHC RBC-ENTMCNC: 30.3 PG — SIGNIFICANT CHANGE UP (ref 27–31)
MCHC RBC-ENTMCNC: 31.7 G/DL — LOW (ref 32–37)
MCV RBC AUTO: 95.8 FL — HIGH (ref 80–94)
MONOCYTES # BLD AUTO: 0.87 K/UL — HIGH (ref 0.1–0.6)
MONOCYTES NFR BLD AUTO: 9.4 % — HIGH (ref 1.7–9.3)
NEUTROPHILS # BLD AUTO: 7.43 K/UL — HIGH (ref 1.4–6.5)
NEUTROPHILS NFR BLD AUTO: 80.3 % — HIGH (ref 42.2–75.2)
NRBC BLD AUTO-RTO: 0 /100 WBCS — SIGNIFICANT CHANGE UP (ref 0–0)
PLATELET # BLD AUTO: 106 K/UL — LOW (ref 130–400)
PMV BLD: SIGNIFICANT CHANGE UP (ref 7.4–10.4)
POTASSIUM SERPL-MCNC: 3.8 MMOL/L — SIGNIFICANT CHANGE UP (ref 3.5–5)
POTASSIUM SERPL-SCNC: 3.8 MMOL/L — SIGNIFICANT CHANGE UP (ref 3.5–5)
RBC # BLD: 3.79 M/UL — LOW (ref 4.7–6.1)
RBC # FLD: 23.8 % — HIGH (ref 11.5–14.5)
SODIUM SERPL-SCNC: 141 MMOL/L — SIGNIFICANT CHANGE UP (ref 135–146)
WBC # BLD: 9.26 K/UL — SIGNIFICANT CHANGE UP (ref 4.8–10.8)
WBC # FLD AUTO: 9.26 K/UL — SIGNIFICANT CHANGE UP (ref 4.8–10.8)

## 2025-08-19 PROCEDURE — 99232 SBSQ HOSP IP/OBS MODERATE 35: CPT

## 2025-08-19 RX ORDER — METOPROLOL SUCCINATE 50 MG/1
25 TABLET, EXTENDED RELEASE ORAL DAILY
Refills: 0 | Status: DISCONTINUED | OUTPATIENT
Start: 2025-08-20 | End: 2025-08-27

## 2025-08-19 RX ORDER — EPOETIN ALFA 10000 [IU]/ML
20000 SOLUTION INTRAVENOUS; SUBCUTANEOUS ONCE
Refills: 0 | Status: COMPLETED | OUTPATIENT
Start: 2025-08-20 | End: 2025-08-20

## 2025-08-19 RX ADMIN — Medication 1 APPLICATION(S): at 11:34

## 2025-08-19 RX ADMIN — TAMSULOSIN HYDROCHLORIDE 0.4 MILLIGRAM(S): 0.4 CAPSULE ORAL at 21:48

## 2025-08-19 RX ADMIN — APIXABAN 2.5 MILLIGRAM(S): 2.5 TABLET, FILM COATED ORAL at 05:49

## 2025-08-19 RX ADMIN — Medication 40 MILLIEQUIVALENT(S): at 09:49

## 2025-08-19 RX ADMIN — Medication 81 MILLIGRAM(S): at 11:34

## 2025-08-19 RX ADMIN — ATORVASTATIN CALCIUM 10 MILLIGRAM(S): 80 TABLET, FILM COATED ORAL at 21:48

## 2025-08-19 RX ADMIN — FINASTERIDE 5 MILLIGRAM(S): 1 TABLET, FILM COATED ORAL at 11:34

## 2025-08-19 RX ADMIN — APIXABAN 2.5 MILLIGRAM(S): 2.5 TABLET, FILM COATED ORAL at 17:43

## 2025-08-19 RX ADMIN — FUROSEMIDE 60 MILLIGRAM(S): 10 INJECTION INTRAMUSCULAR; INTRAVENOUS at 05:49

## 2025-08-19 RX ADMIN — CEPHALEXIN 250 MILLIGRAM(S): 250 CAPSULE ORAL at 11:34

## 2025-08-19 RX ADMIN — FUROSEMIDE 60 MILLIGRAM(S): 10 INJECTION INTRAMUSCULAR; INTRAVENOUS at 13:24

## 2025-08-19 RX ADMIN — CALCITRIOL 0.25 MICROGRAM(S): 0.5 CAPSULE, GELATIN COATED ORAL at 11:34

## 2025-08-20 LAB
ANION GAP SERPL CALC-SCNC: 12 MMOL/L — SIGNIFICANT CHANGE UP (ref 7–14)
ANION GAP SERPL CALC-SCNC: 16 MMOL/L — HIGH (ref 7–14)
BUN SERPL-MCNC: 40 MG/DL — HIGH (ref 10–20)
BUN SERPL-MCNC: 44 MG/DL — HIGH (ref 10–20)
CALCIUM SERPL-MCNC: 8.5 MG/DL — SIGNIFICANT CHANGE UP (ref 8.4–10.5)
CALCIUM SERPL-MCNC: 9 MG/DL — SIGNIFICANT CHANGE UP (ref 8.4–10.5)
CHLORIDE SERPL-SCNC: 101 MMOL/L — SIGNIFICANT CHANGE UP (ref 98–110)
CHLORIDE SERPL-SCNC: 98 MMOL/L — SIGNIFICANT CHANGE UP (ref 98–110)
CO2 SERPL-SCNC: 26 MMOL/L — SIGNIFICANT CHANGE UP (ref 17–32)
CO2 SERPL-SCNC: 27 MMOL/L — SIGNIFICANT CHANGE UP (ref 17–32)
CREAT SERPL-MCNC: 1.9 MG/DL — HIGH (ref 0.7–1.5)
CREAT SERPL-MCNC: 2 MG/DL — HIGH (ref 0.7–1.5)
EGFR: 31 ML/MIN/1.73M2 — LOW
EGFR: 31 ML/MIN/1.73M2 — LOW
EGFR: 32 ML/MIN/1.73M2 — LOW
EGFR: 32 ML/MIN/1.73M2 — LOW
GLUCOSE SERPL-MCNC: 109 MG/DL — HIGH (ref 70–99)
GLUCOSE SERPL-MCNC: 90 MG/DL — SIGNIFICANT CHANGE UP (ref 70–99)
HCT VFR BLD CALC: 34.4 % — LOW (ref 42–52)
HGB BLD-MCNC: 11 G/DL — LOW (ref 14–18)
MAGNESIUM SERPL-MCNC: 2.1 MG/DL — SIGNIFICANT CHANGE UP (ref 1.8–2.4)
MAGNESIUM SERPL-MCNC: 2.1 MG/DL — SIGNIFICANT CHANGE UP (ref 1.8–2.4)
MCHC RBC-ENTMCNC: 30.5 PG — SIGNIFICANT CHANGE UP (ref 27–31)
MCHC RBC-ENTMCNC: 32 G/DL — SIGNIFICANT CHANGE UP (ref 32–37)
MCV RBC AUTO: 95.3 FL — HIGH (ref 80–94)
NRBC BLD AUTO-RTO: 0 /100 WBCS — SIGNIFICANT CHANGE UP (ref 0–0)
PLATELET # BLD AUTO: 103 K/UL — LOW (ref 130–400)
PMV BLD: SIGNIFICANT CHANGE UP (ref 7.4–10.4)
POTASSIUM SERPL-MCNC: 4 MMOL/L — SIGNIFICANT CHANGE UP (ref 3.5–5)
POTASSIUM SERPL-MCNC: 4.2 MMOL/L — SIGNIFICANT CHANGE UP (ref 3.5–5)
POTASSIUM SERPL-SCNC: 4 MMOL/L — SIGNIFICANT CHANGE UP (ref 3.5–5)
POTASSIUM SERPL-SCNC: 4.2 MMOL/L — SIGNIFICANT CHANGE UP (ref 3.5–5)
RBC # BLD: 3.61 M/UL — LOW (ref 4.7–6.1)
RBC # FLD: 22.9 % — HIGH (ref 11.5–14.5)
SODIUM SERPL-SCNC: 139 MMOL/L — SIGNIFICANT CHANGE UP (ref 135–146)
SODIUM SERPL-SCNC: 141 MMOL/L — SIGNIFICANT CHANGE UP (ref 135–146)
WBC # BLD: 9.03 K/UL — SIGNIFICANT CHANGE UP (ref 4.8–10.8)
WBC # FLD AUTO: 9.03 K/UL — SIGNIFICANT CHANGE UP (ref 4.8–10.8)

## 2025-08-20 PROCEDURE — 99232 SBSQ HOSP IP/OBS MODERATE 35: CPT

## 2025-08-20 RX ORDER — EPOETIN ALFA 10000 [IU]/ML
20000 SOLUTION INTRAVENOUS; SUBCUTANEOUS ONCE
Refills: 0 | Status: DISCONTINUED | OUTPATIENT
Start: 2025-08-20 | End: 2025-08-20

## 2025-08-20 RX ADMIN — CEPHALEXIN 250 MILLIGRAM(S): 250 CAPSULE ORAL at 11:59

## 2025-08-20 RX ADMIN — APIXABAN 2.5 MILLIGRAM(S): 2.5 TABLET, FILM COATED ORAL at 05:36

## 2025-08-20 RX ADMIN — METOPROLOL SUCCINATE 25 MILLIGRAM(S): 50 TABLET, EXTENDED RELEASE ORAL at 05:36

## 2025-08-20 RX ADMIN — TAMSULOSIN HYDROCHLORIDE 0.4 MILLIGRAM(S): 0.4 CAPSULE ORAL at 21:17

## 2025-08-20 RX ADMIN — Medication 1 APPLICATION(S): at 11:59

## 2025-08-20 RX ADMIN — FUROSEMIDE 60 MILLIGRAM(S): 10 INJECTION INTRAMUSCULAR; INTRAVENOUS at 05:36

## 2025-08-20 RX ADMIN — APIXABAN 2.5 MILLIGRAM(S): 2.5 TABLET, FILM COATED ORAL at 17:14

## 2025-08-20 RX ADMIN — CALCITRIOL 0.25 MICROGRAM(S): 0.5 CAPSULE, GELATIN COATED ORAL at 11:59

## 2025-08-20 RX ADMIN — Medication 81 MILLIGRAM(S): at 12:00

## 2025-08-20 RX ADMIN — FUROSEMIDE 60 MILLIGRAM(S): 10 INJECTION INTRAMUSCULAR; INTRAVENOUS at 13:14

## 2025-08-20 RX ADMIN — FINASTERIDE 5 MILLIGRAM(S): 1 TABLET, FILM COATED ORAL at 11:59

## 2025-08-20 RX ADMIN — ATORVASTATIN CALCIUM 10 MILLIGRAM(S): 80 TABLET, FILM COATED ORAL at 21:17

## 2025-08-20 RX ADMIN — EPOETIN ALFA 20000 UNIT(S): 10000 SOLUTION INTRAVENOUS; SUBCUTANEOUS at 05:37

## 2025-08-21 ENCOUNTER — RESULT REVIEW (OUTPATIENT)
Age: 89
End: 2025-08-21

## 2025-08-21 LAB
ANION GAP SERPL CALC-SCNC: 13 MMOL/L — SIGNIFICANT CHANGE UP (ref 7–14)
BUN SERPL-MCNC: 47 MG/DL — HIGH (ref 10–20)
CALCIUM SERPL-MCNC: 8.9 MG/DL — SIGNIFICANT CHANGE UP (ref 8.4–10.5)
CHLORIDE SERPL-SCNC: 99 MMOL/L — SIGNIFICANT CHANGE UP (ref 98–110)
CO2 SERPL-SCNC: 26 MMOL/L — SIGNIFICANT CHANGE UP (ref 17–32)
CREAT SERPL-MCNC: 2 MG/DL — HIGH (ref 0.7–1.5)
EGFR: 31 ML/MIN/1.73M2 — LOW
EGFR: 31 ML/MIN/1.73M2 — LOW
GLUCOSE SERPL-MCNC: 96 MG/DL — SIGNIFICANT CHANGE UP (ref 70–99)
HCT VFR BLD CALC: 36.1 % — LOW (ref 42–52)
HGB BLD-MCNC: 11.5 G/DL — LOW (ref 14–18)
MAGNESIUM SERPL-MCNC: 2.1 MG/DL — SIGNIFICANT CHANGE UP (ref 1.8–2.4)
MCHC RBC-ENTMCNC: 30 PG — SIGNIFICANT CHANGE UP (ref 27–31)
MCHC RBC-ENTMCNC: 31.9 G/DL — LOW (ref 32–37)
MCV RBC AUTO: 94.3 FL — HIGH (ref 80–94)
NRBC BLD AUTO-RTO: 0 /100 WBCS — SIGNIFICANT CHANGE UP (ref 0–0)
PLATELET # BLD AUTO: 95 K/UL — LOW (ref 130–400)
PMV BLD: SIGNIFICANT CHANGE UP (ref 7.4–10.4)
POTASSIUM SERPL-MCNC: 4.5 MMOL/L — SIGNIFICANT CHANGE UP (ref 3.5–5)
POTASSIUM SERPL-SCNC: 4.5 MMOL/L — SIGNIFICANT CHANGE UP (ref 3.5–5)
RBC # BLD: 3.83 M/UL — LOW (ref 4.7–6.1)
RBC # FLD: 23.2 % — HIGH (ref 11.5–14.5)
SODIUM SERPL-SCNC: 138 MMOL/L — SIGNIFICANT CHANGE UP (ref 135–146)
WBC # BLD: 8.41 K/UL — SIGNIFICANT CHANGE UP (ref 4.8–10.8)
WBC # FLD AUTO: 8.41 K/UL — SIGNIFICANT CHANGE UP (ref 4.8–10.8)

## 2025-08-21 PROCEDURE — 99232 SBSQ HOSP IP/OBS MODERATE 35: CPT

## 2025-08-21 PROCEDURE — 93306 TTE W/DOPPLER COMPLETE: CPT | Mod: 26

## 2025-08-21 RX ORDER — TORSEMIDE 20 MG/1
20 TABLET ORAL DAILY
Refills: 0 | Status: DISCONTINUED | OUTPATIENT
Start: 2025-08-22 | End: 2025-08-22

## 2025-08-21 RX ORDER — LANOLIN/MINERAL OIL/PETROLATUM
1 OINTMENT (GRAM) OPHTHALMIC (EYE) THREE TIMES A DAY
Refills: 0 | Status: DISCONTINUED | OUTPATIENT
Start: 2025-08-21 | End: 2025-08-27

## 2025-08-21 RX ORDER — NYSTATIN 100000 [USP'U]/G
1 CREAM TOPICAL THREE TIMES A DAY
Refills: 0 | Status: DISCONTINUED | OUTPATIENT
Start: 2025-08-21 | End: 2025-08-27

## 2025-08-21 RX ADMIN — Medication 81 MILLIGRAM(S): at 12:00

## 2025-08-21 RX ADMIN — APIXABAN 2.5 MILLIGRAM(S): 2.5 TABLET, FILM COATED ORAL at 17:07

## 2025-08-21 RX ADMIN — Medication 1 APPLICATION(S): at 12:00

## 2025-08-21 RX ADMIN — TAMSULOSIN HYDROCHLORIDE 0.4 MILLIGRAM(S): 0.4 CAPSULE ORAL at 21:21

## 2025-08-21 RX ADMIN — ATORVASTATIN CALCIUM 10 MILLIGRAM(S): 80 TABLET, FILM COATED ORAL at 21:20

## 2025-08-21 RX ADMIN — APIXABAN 2.5 MILLIGRAM(S): 2.5 TABLET, FILM COATED ORAL at 05:25

## 2025-08-21 RX ADMIN — Medication 1 DROP(S): at 21:20

## 2025-08-21 RX ADMIN — METOPROLOL SUCCINATE 25 MILLIGRAM(S): 50 TABLET, EXTENDED RELEASE ORAL at 05:25

## 2025-08-21 RX ADMIN — CEPHALEXIN 250 MILLIGRAM(S): 250 CAPSULE ORAL at 12:00

## 2025-08-21 RX ADMIN — CALCITRIOL 0.25 MICROGRAM(S): 0.5 CAPSULE, GELATIN COATED ORAL at 12:00

## 2025-08-21 RX ADMIN — FINASTERIDE 5 MILLIGRAM(S): 1 TABLET, FILM COATED ORAL at 12:00

## 2025-08-21 RX ADMIN — FUROSEMIDE 60 MILLIGRAM(S): 10 INJECTION INTRAMUSCULAR; INTRAVENOUS at 05:25

## 2025-08-22 ENCOUNTER — APPOINTMENT (OUTPATIENT)
Dept: CARDIOTHORACIC SURGERY | Facility: CLINIC | Age: 89
End: 2025-08-22

## 2025-08-22 ENCOUNTER — APPOINTMENT (OUTPATIENT)
Dept: OTOLARYNGOLOGY | Facility: CLINIC | Age: 89
End: 2025-08-22

## 2025-08-22 LAB
ANION GAP SERPL CALC-SCNC: 17 MMOL/L — HIGH (ref 7–14)
BUN SERPL-MCNC: 57 MG/DL — HIGH (ref 10–20)
CALCIUM SERPL-MCNC: 9.1 MG/DL — SIGNIFICANT CHANGE UP (ref 8.4–10.5)
CHLORIDE SERPL-SCNC: 98 MMOL/L — SIGNIFICANT CHANGE UP (ref 98–110)
CO2 SERPL-SCNC: 24 MMOL/L — SIGNIFICANT CHANGE UP (ref 17–32)
CREAT SERPL-MCNC: 2.1 MG/DL — HIGH (ref 0.7–1.5)
EGFR: 29 ML/MIN/1.73M2 — LOW
EGFR: 29 ML/MIN/1.73M2 — LOW
GLUCOSE SERPL-MCNC: 123 MG/DL — HIGH (ref 70–99)
HCT VFR BLD CALC: 36.7 % — LOW (ref 42–52)
HGB BLD-MCNC: 11.9 G/DL — LOW (ref 14–18)
MAGNESIUM SERPL-MCNC: 2.2 MG/DL — SIGNIFICANT CHANGE UP (ref 1.8–2.4)
MCHC RBC-ENTMCNC: 30.3 PG — SIGNIFICANT CHANGE UP (ref 27–31)
MCHC RBC-ENTMCNC: 32.4 G/DL — SIGNIFICANT CHANGE UP (ref 32–37)
MCV RBC AUTO: 93.4 FL — SIGNIFICANT CHANGE UP (ref 80–94)
NRBC BLD AUTO-RTO: 0 /100 WBCS — SIGNIFICANT CHANGE UP (ref 0–0)
PLATELET # BLD AUTO: 107 K/UL — LOW (ref 130–400)
PMV BLD: SIGNIFICANT CHANGE UP (ref 7.4–10.4)
POTASSIUM SERPL-MCNC: 4.4 MMOL/L — SIGNIFICANT CHANGE UP (ref 3.5–5)
POTASSIUM SERPL-SCNC: 4.4 MMOL/L — SIGNIFICANT CHANGE UP (ref 3.5–5)
RBC # BLD: 3.93 M/UL — LOW (ref 4.7–6.1)
RBC # FLD: 21.9 % — HIGH (ref 11.5–14.5)
SODIUM SERPL-SCNC: 139 MMOL/L — SIGNIFICANT CHANGE UP (ref 135–146)
WBC # BLD: 10.35 K/UL — SIGNIFICANT CHANGE UP (ref 4.8–10.8)
WBC # FLD AUTO: 10.35 K/UL — SIGNIFICANT CHANGE UP (ref 4.8–10.8)

## 2025-08-22 PROCEDURE — 99232 SBSQ HOSP IP/OBS MODERATE 35: CPT

## 2025-08-22 RX ORDER — TORSEMIDE 20 MG/1
20 TABLET ORAL ONCE
Refills: 0 | Status: COMPLETED | OUTPATIENT
Start: 2025-08-22 | End: 2025-08-22

## 2025-08-22 RX ORDER — TORSEMIDE 20 MG/1
20 TABLET ORAL
Refills: 0 | Status: DISCONTINUED | OUTPATIENT
Start: 2025-08-23 | End: 2025-08-27

## 2025-08-22 RX ADMIN — Medication 1 DROP(S): at 13:18

## 2025-08-22 RX ADMIN — NYSTATIN 1 APPLICATION(S): 100000 CREAM TOPICAL at 13:18

## 2025-08-22 RX ADMIN — ATORVASTATIN CALCIUM 10 MILLIGRAM(S): 80 TABLET, FILM COATED ORAL at 21:33

## 2025-08-22 RX ADMIN — FINASTERIDE 5 MILLIGRAM(S): 1 TABLET, FILM COATED ORAL at 11:32

## 2025-08-22 RX ADMIN — Medication 81 MILLIGRAM(S): at 11:32

## 2025-08-22 RX ADMIN — NYSTATIN 1 APPLICATION(S): 100000 CREAM TOPICAL at 21:14

## 2025-08-22 RX ADMIN — CEPHALEXIN 250 MILLIGRAM(S): 250 CAPSULE ORAL at 11:32

## 2025-08-22 RX ADMIN — APIXABAN 2.5 MILLIGRAM(S): 2.5 TABLET, FILM COATED ORAL at 05:03

## 2025-08-22 RX ADMIN — TAMSULOSIN HYDROCHLORIDE 0.4 MILLIGRAM(S): 0.4 CAPSULE ORAL at 21:14

## 2025-08-22 RX ADMIN — CALCITRIOL 0.25 MICROGRAM(S): 0.5 CAPSULE, GELATIN COATED ORAL at 11:32

## 2025-08-22 RX ADMIN — NYSTATIN 1 APPLICATION(S): 100000 CREAM TOPICAL at 05:03

## 2025-08-22 RX ADMIN — Medication 1 APPLICATION(S): at 11:32

## 2025-08-22 RX ADMIN — APIXABAN 2.5 MILLIGRAM(S): 2.5 TABLET, FILM COATED ORAL at 17:04

## 2025-08-22 RX ADMIN — Medication 1 DROP(S): at 21:14

## 2025-08-22 RX ADMIN — Medication 1 DROP(S): at 05:03

## 2025-08-22 RX ADMIN — TORSEMIDE 20 MILLIGRAM(S): 20 TABLET ORAL at 05:03

## 2025-08-22 RX ADMIN — TORSEMIDE 20 MILLIGRAM(S): 20 TABLET ORAL at 17:04

## 2025-08-22 RX ADMIN — METOPROLOL SUCCINATE 25 MILLIGRAM(S): 50 TABLET, EXTENDED RELEASE ORAL at 05:03

## 2025-08-23 LAB
ANION GAP SERPL CALC-SCNC: 12 MMOL/L — SIGNIFICANT CHANGE UP (ref 7–14)
ANION GAP SERPL CALC-SCNC: 9 MMOL/L — SIGNIFICANT CHANGE UP (ref 7–14)
BUN SERPL-MCNC: 58 MG/DL — HIGH (ref 10–20)
BUN SERPL-MCNC: 61 MG/DL — CRITICAL HIGH (ref 10–20)
CALCIUM SERPL-MCNC: 8.6 MG/DL — SIGNIFICANT CHANGE UP (ref 8.4–10.5)
CALCIUM SERPL-MCNC: 8.9 MG/DL — SIGNIFICANT CHANGE UP (ref 8.4–10.5)
CHLORIDE SERPL-SCNC: 103 MMOL/L — SIGNIFICANT CHANGE UP (ref 98–110)
CHLORIDE SERPL-SCNC: 103 MMOL/L — SIGNIFICANT CHANGE UP (ref 98–110)
CO2 SERPL-SCNC: 27 MMOL/L — SIGNIFICANT CHANGE UP (ref 17–32)
CO2 SERPL-SCNC: 29 MMOL/L — SIGNIFICANT CHANGE UP (ref 17–32)
CREAT SERPL-MCNC: 2 MG/DL — HIGH (ref 0.7–1.5)
CREAT SERPL-MCNC: 2.1 MG/DL — HIGH (ref 0.7–1.5)
CULTURE RESULTS: SIGNIFICANT CHANGE UP
CULTURE RESULTS: SIGNIFICANT CHANGE UP
EGFR: 29 ML/MIN/1.73M2 — LOW
EGFR: 29 ML/MIN/1.73M2 — LOW
EGFR: 31 ML/MIN/1.73M2 — LOW
EGFR: 31 ML/MIN/1.73M2 — LOW
GLUCOSE SERPL-MCNC: 104 MG/DL — HIGH (ref 70–99)
GLUCOSE SERPL-MCNC: 135 MG/DL — HIGH (ref 70–99)
HCT VFR BLD CALC: 30.9 % — LOW (ref 42–52)
HGB BLD-MCNC: 10 G/DL — LOW (ref 14–18)
MAGNESIUM SERPL-MCNC: 2.2 MG/DL — SIGNIFICANT CHANGE UP (ref 1.8–2.4)
MAGNESIUM SERPL-MCNC: 2.2 MG/DL — SIGNIFICANT CHANGE UP (ref 1.8–2.4)
MCHC RBC-ENTMCNC: 30.1 PG — SIGNIFICANT CHANGE UP (ref 27–31)
MCHC RBC-ENTMCNC: 32.4 G/DL — SIGNIFICANT CHANGE UP (ref 32–37)
MCV RBC AUTO: 93.1 FL — SIGNIFICANT CHANGE UP (ref 80–94)
NRBC BLD AUTO-RTO: 0 /100 WBCS — SIGNIFICANT CHANGE UP (ref 0–0)
PLATELET # BLD AUTO: 115 K/UL — LOW (ref 130–400)
PMV BLD: SIGNIFICANT CHANGE UP (ref 7.4–10.4)
POTASSIUM SERPL-MCNC: 3.8 MMOL/L — SIGNIFICANT CHANGE UP (ref 3.5–5)
POTASSIUM SERPL-MCNC: 4.1 MMOL/L — SIGNIFICANT CHANGE UP (ref 3.5–5)
POTASSIUM SERPL-SCNC: 3.8 MMOL/L — SIGNIFICANT CHANGE UP (ref 3.5–5)
POTASSIUM SERPL-SCNC: 4.1 MMOL/L — SIGNIFICANT CHANGE UP (ref 3.5–5)
RBC # BLD: 3.32 M/UL — LOW (ref 4.7–6.1)
RBC # FLD: 22.6 % — HIGH (ref 11.5–14.5)
SODIUM SERPL-SCNC: 141 MMOL/L — SIGNIFICANT CHANGE UP (ref 135–146)
SODIUM SERPL-SCNC: 142 MMOL/L — SIGNIFICANT CHANGE UP (ref 135–146)
SPECIMEN SOURCE: SIGNIFICANT CHANGE UP
SPECIMEN SOURCE: SIGNIFICANT CHANGE UP
WBC # BLD: 7.91 K/UL — SIGNIFICANT CHANGE UP (ref 4.8–10.8)
WBC # FLD AUTO: 7.91 K/UL — SIGNIFICANT CHANGE UP (ref 4.8–10.8)

## 2025-08-23 PROCEDURE — 99232 SBSQ HOSP IP/OBS MODERATE 35: CPT

## 2025-08-23 RX ADMIN — TORSEMIDE 20 MILLIGRAM(S): 20 TABLET ORAL at 05:13

## 2025-08-23 RX ADMIN — FINASTERIDE 5 MILLIGRAM(S): 1 TABLET, FILM COATED ORAL at 11:32

## 2025-08-23 RX ADMIN — ATORVASTATIN CALCIUM 10 MILLIGRAM(S): 80 TABLET, FILM COATED ORAL at 21:12

## 2025-08-23 RX ADMIN — Medication 81 MILLIGRAM(S): at 11:33

## 2025-08-23 RX ADMIN — Medication 1 DROP(S): at 05:14

## 2025-08-23 RX ADMIN — TAMSULOSIN HYDROCHLORIDE 0.4 MILLIGRAM(S): 0.4 CAPSULE ORAL at 21:12

## 2025-08-23 RX ADMIN — Medication 1 DROP(S): at 21:11

## 2025-08-23 RX ADMIN — APIXABAN 2.5 MILLIGRAM(S): 2.5 TABLET, FILM COATED ORAL at 05:12

## 2025-08-23 RX ADMIN — NYSTATIN 1 APPLICATION(S): 100000 CREAM TOPICAL at 05:12

## 2025-08-23 RX ADMIN — METOPROLOL SUCCINATE 25 MILLIGRAM(S): 50 TABLET, EXTENDED RELEASE ORAL at 05:12

## 2025-08-23 RX ADMIN — APIXABAN 2.5 MILLIGRAM(S): 2.5 TABLET, FILM COATED ORAL at 17:11

## 2025-08-23 RX ADMIN — NYSTATIN 1 APPLICATION(S): 100000 CREAM TOPICAL at 13:13

## 2025-08-23 RX ADMIN — CALCITRIOL 0.25 MICROGRAM(S): 0.5 CAPSULE, GELATIN COATED ORAL at 11:33

## 2025-08-23 RX ADMIN — NYSTATIN 1 APPLICATION(S): 100000 CREAM TOPICAL at 21:11

## 2025-08-23 RX ADMIN — Medication 1 APPLICATION(S): at 11:32

## 2025-08-23 RX ADMIN — Medication 1 DROP(S): at 13:13

## 2025-08-23 RX ADMIN — TORSEMIDE 20 MILLIGRAM(S): 20 TABLET ORAL at 13:13

## 2025-08-23 RX ADMIN — CEPHALEXIN 250 MILLIGRAM(S): 250 CAPSULE ORAL at 11:32

## 2025-08-24 LAB
ANION GAP SERPL CALC-SCNC: 13 MMOL/L — SIGNIFICANT CHANGE UP (ref 7–14)
BUN SERPL-MCNC: 58 MG/DL — HIGH (ref 10–20)
CALCIUM SERPL-MCNC: 8.7 MG/DL — SIGNIFICANT CHANGE UP (ref 8.4–10.5)
CHLORIDE SERPL-SCNC: 104 MMOL/L — SIGNIFICANT CHANGE UP (ref 98–110)
CO2 SERPL-SCNC: 28 MMOL/L — SIGNIFICANT CHANGE UP (ref 17–32)
CREAT SERPL-MCNC: 1.9 MG/DL — HIGH (ref 0.7–1.5)
EGFR: 32 ML/MIN/1.73M2 — LOW
EGFR: 32 ML/MIN/1.73M2 — LOW
GLUCOSE SERPL-MCNC: 103 MG/DL — HIGH (ref 70–99)
HCT VFR BLD CALC: 33.9 % — LOW (ref 42–52)
HGB BLD-MCNC: 10.7 G/DL — LOW (ref 14–18)
MAGNESIUM SERPL-MCNC: 2.5 MG/DL — HIGH (ref 1.8–2.4)
MCHC RBC-ENTMCNC: 29.9 PG — SIGNIFICANT CHANGE UP (ref 27–31)
MCHC RBC-ENTMCNC: 31.6 G/DL — LOW (ref 32–37)
MCV RBC AUTO: 94.7 FL — HIGH (ref 80–94)
NRBC BLD AUTO-RTO: 0 /100 WBCS — SIGNIFICANT CHANGE UP (ref 0–0)
PLATELET # BLD AUTO: 127 K/UL — LOW (ref 130–400)
PMV BLD: SIGNIFICANT CHANGE UP (ref 7.4–10.4)
POTASSIUM SERPL-MCNC: 3.8 MMOL/L — SIGNIFICANT CHANGE UP (ref 3.5–5)
POTASSIUM SERPL-SCNC: 3.8 MMOL/L — SIGNIFICANT CHANGE UP (ref 3.5–5)
RBC # BLD: 3.58 M/UL — LOW (ref 4.7–6.1)
RBC # FLD: 22.5 % — HIGH (ref 11.5–14.5)
SODIUM SERPL-SCNC: 145 MMOL/L — SIGNIFICANT CHANGE UP (ref 135–146)
WBC # BLD: 7.98 K/UL — SIGNIFICANT CHANGE UP (ref 4.8–10.8)
WBC # FLD AUTO: 7.98 K/UL — SIGNIFICANT CHANGE UP (ref 4.8–10.8)

## 2025-08-24 PROCEDURE — 99232 SBSQ HOSP IP/OBS MODERATE 35: CPT

## 2025-08-24 RX ADMIN — NYSTATIN 1 APPLICATION(S): 100000 CREAM TOPICAL at 21:15

## 2025-08-24 RX ADMIN — NYSTATIN 1 APPLICATION(S): 100000 CREAM TOPICAL at 05:02

## 2025-08-24 RX ADMIN — TORSEMIDE 20 MILLIGRAM(S): 20 TABLET ORAL at 05:02

## 2025-08-24 RX ADMIN — TAMSULOSIN HYDROCHLORIDE 0.4 MILLIGRAM(S): 0.4 CAPSULE ORAL at 21:15

## 2025-08-24 RX ADMIN — Medication 1 DROP(S): at 05:02

## 2025-08-24 RX ADMIN — ATORVASTATIN CALCIUM 10 MILLIGRAM(S): 80 TABLET, FILM COATED ORAL at 21:15

## 2025-08-24 RX ADMIN — Medication 1 DROP(S): at 21:15

## 2025-08-24 RX ADMIN — Medication 1 APPLICATION(S): at 11:42

## 2025-08-24 RX ADMIN — Medication 81 MILLIGRAM(S): at 11:42

## 2025-08-24 RX ADMIN — CEPHALEXIN 250 MILLIGRAM(S): 250 CAPSULE ORAL at 11:42

## 2025-08-24 RX ADMIN — APIXABAN 2.5 MILLIGRAM(S): 2.5 TABLET, FILM COATED ORAL at 05:02

## 2025-08-24 RX ADMIN — Medication 2 TABLET(S): at 21:15

## 2025-08-24 RX ADMIN — METOPROLOL SUCCINATE 25 MILLIGRAM(S): 50 TABLET, EXTENDED RELEASE ORAL at 05:02

## 2025-08-24 RX ADMIN — Medication 1 DROP(S): at 13:14

## 2025-08-24 RX ADMIN — CALCITRIOL 0.25 MICROGRAM(S): 0.5 CAPSULE, GELATIN COATED ORAL at 11:42

## 2025-08-24 RX ADMIN — FINASTERIDE 5 MILLIGRAM(S): 1 TABLET, FILM COATED ORAL at 11:42

## 2025-08-24 RX ADMIN — NYSTATIN 1 APPLICATION(S): 100000 CREAM TOPICAL at 13:15

## 2025-08-24 RX ADMIN — APIXABAN 2.5 MILLIGRAM(S): 2.5 TABLET, FILM COATED ORAL at 17:20

## 2025-08-24 RX ADMIN — TORSEMIDE 20 MILLIGRAM(S): 20 TABLET ORAL at 13:14

## 2025-08-25 LAB
ANION GAP SERPL CALC-SCNC: 13 MMOL/L — SIGNIFICANT CHANGE UP (ref 7–14)
BUN SERPL-MCNC: 58 MG/DL — HIGH (ref 10–20)
CALCIUM SERPL-MCNC: 8.7 MG/DL — SIGNIFICANT CHANGE UP (ref 8.4–10.5)
CHLORIDE SERPL-SCNC: 102 MMOL/L — SIGNIFICANT CHANGE UP (ref 98–110)
CO2 SERPL-SCNC: 28 MMOL/L — SIGNIFICANT CHANGE UP (ref 17–32)
CREAT SERPL-MCNC: 1.9 MG/DL — HIGH (ref 0.7–1.5)
EGFR: 32 ML/MIN/1.73M2 — LOW
EGFR: 32 ML/MIN/1.73M2 — LOW
GLUCOSE SERPL-MCNC: 108 MG/DL — HIGH (ref 70–99)
HCT VFR BLD CALC: 34.8 % — LOW (ref 42–52)
HGB BLD-MCNC: 10.9 G/DL — LOW (ref 14–18)
MAGNESIUM SERPL-MCNC: 2.3 MG/DL — SIGNIFICANT CHANGE UP (ref 1.8–2.4)
MCHC RBC-ENTMCNC: 29.9 PG — SIGNIFICANT CHANGE UP (ref 27–31)
MCHC RBC-ENTMCNC: 31.3 G/DL — LOW (ref 32–37)
MCV RBC AUTO: 95.6 FL — HIGH (ref 80–94)
NRBC BLD AUTO-RTO: 0 /100 WBCS — SIGNIFICANT CHANGE UP (ref 0–0)
PLATELET # BLD AUTO: 116 K/UL — LOW (ref 130–400)
PMV BLD: SIGNIFICANT CHANGE UP (ref 7.4–10.4)
POTASSIUM SERPL-MCNC: 3.9 MMOL/L — SIGNIFICANT CHANGE UP (ref 3.5–5)
POTASSIUM SERPL-SCNC: 3.9 MMOL/L — SIGNIFICANT CHANGE UP (ref 3.5–5)
RBC # BLD: 3.64 M/UL — LOW (ref 4.7–6.1)
RBC # FLD: 22.5 % — HIGH (ref 11.5–14.5)
SODIUM SERPL-SCNC: 143 MMOL/L — SIGNIFICANT CHANGE UP (ref 135–146)
WBC # BLD: 7.74 K/UL — SIGNIFICANT CHANGE UP (ref 4.8–10.8)
WBC # FLD AUTO: 7.74 K/UL — SIGNIFICANT CHANGE UP (ref 4.8–10.8)

## 2025-08-25 PROCEDURE — 99232 SBSQ HOSP IP/OBS MODERATE 35: CPT

## 2025-08-25 PROCEDURE — 93010 ELECTROCARDIOGRAM REPORT: CPT

## 2025-08-25 RX ORDER — IRON SUCROSE 20 MG/ML
200 INJECTION, SOLUTION INTRAVENOUS EVERY 24 HOURS
Refills: 0 | Status: DISCONTINUED | OUTPATIENT
Start: 2025-08-25 | End: 2025-08-27

## 2025-08-25 RX ADMIN — Medication 1 APPLICATION(S): at 11:22

## 2025-08-25 RX ADMIN — Medication 1 DROP(S): at 13:52

## 2025-08-25 RX ADMIN — FINASTERIDE 5 MILLIGRAM(S): 1 TABLET, FILM COATED ORAL at 11:21

## 2025-08-25 RX ADMIN — TAMSULOSIN HYDROCHLORIDE 0.4 MILLIGRAM(S): 0.4 CAPSULE ORAL at 21:48

## 2025-08-25 RX ADMIN — Medication 2 TABLET(S): at 21:49

## 2025-08-25 RX ADMIN — APIXABAN 2.5 MILLIGRAM(S): 2.5 TABLET, FILM COATED ORAL at 05:15

## 2025-08-25 RX ADMIN — NYSTATIN 1 APPLICATION(S): 100000 CREAM TOPICAL at 13:51

## 2025-08-25 RX ADMIN — CEPHALEXIN 250 MILLIGRAM(S): 250 CAPSULE ORAL at 11:22

## 2025-08-25 RX ADMIN — METOPROLOL SUCCINATE 25 MILLIGRAM(S): 50 TABLET, EXTENDED RELEASE ORAL at 05:15

## 2025-08-25 RX ADMIN — APIXABAN 2.5 MILLIGRAM(S): 2.5 TABLET, FILM COATED ORAL at 17:56

## 2025-08-25 RX ADMIN — ATORVASTATIN CALCIUM 10 MILLIGRAM(S): 80 TABLET, FILM COATED ORAL at 21:48

## 2025-08-25 RX ADMIN — Medication 1 DROP(S): at 21:53

## 2025-08-25 RX ADMIN — IRON SUCROSE 100 MILLIGRAM(S): 20 INJECTION, SOLUTION INTRAVENOUS at 17:56

## 2025-08-25 RX ADMIN — CALCITRIOL 0.25 MICROGRAM(S): 0.5 CAPSULE, GELATIN COATED ORAL at 11:22

## 2025-08-25 RX ADMIN — TORSEMIDE 20 MILLIGRAM(S): 20 TABLET ORAL at 13:51

## 2025-08-25 RX ADMIN — NYSTATIN 1 APPLICATION(S): 100000 CREAM TOPICAL at 21:51

## 2025-08-25 RX ADMIN — Medication 20 MILLIEQUIVALENT(S): at 13:51

## 2025-08-25 RX ADMIN — TORSEMIDE 20 MILLIGRAM(S): 20 TABLET ORAL at 05:15

## 2025-08-25 RX ADMIN — Medication 1 DROP(S): at 05:15

## 2025-08-25 RX ADMIN — NYSTATIN 1 APPLICATION(S): 100000 CREAM TOPICAL at 05:15

## 2025-08-25 RX ADMIN — Medication 81 MILLIGRAM(S): at 11:21

## 2025-08-26 LAB
ANION GAP SERPL CALC-SCNC: 14 MMOL/L — SIGNIFICANT CHANGE UP (ref 7–14)
BUN SERPL-MCNC: 53 MG/DL — HIGH (ref 10–20)
CALCIUM SERPL-MCNC: 8.5 MG/DL — SIGNIFICANT CHANGE UP (ref 8.4–10.5)
CHLORIDE SERPL-SCNC: 105 MMOL/L — SIGNIFICANT CHANGE UP (ref 98–110)
CO2 SERPL-SCNC: 23 MMOL/L — SIGNIFICANT CHANGE UP (ref 17–32)
CREAT SERPL-MCNC: 1.9 MG/DL — HIGH (ref 0.7–1.5)
EGFR: 32 ML/MIN/1.73M2 — LOW
EGFR: 32 ML/MIN/1.73M2 — LOW
FERRITIN SERPL-MCNC: 189 NG/ML — SIGNIFICANT CHANGE UP (ref 30–400)
GLUCOSE SERPL-MCNC: 92 MG/DL — SIGNIFICANT CHANGE UP (ref 70–99)
HCT VFR BLD CALC: 34.6 % — LOW (ref 42–52)
HGB BLD-MCNC: 10.8 G/DL — LOW (ref 14–18)
IRON SATN MFR SERPL: 289 UG/DL — HIGH (ref 35–150)
IRON SATN MFR SERPL: 91 % — HIGH (ref 15–50)
MAGNESIUM SERPL-MCNC: 2.3 MG/DL — SIGNIFICANT CHANGE UP (ref 1.8–2.4)
MCHC RBC-ENTMCNC: 30 PG — SIGNIFICANT CHANGE UP (ref 27–31)
MCHC RBC-ENTMCNC: 31.2 G/DL — LOW (ref 32–37)
MCV RBC AUTO: 96.1 FL — HIGH (ref 80–94)
NRBC BLD AUTO-RTO: 0 /100 WBCS — SIGNIFICANT CHANGE UP (ref 0–0)
NT-PROBNP SERPL-SCNC: 3812 PG/ML — HIGH (ref 0–300)
PLATELET # BLD AUTO: 124 K/UL — LOW (ref 130–400)
PMV BLD: SIGNIFICANT CHANGE UP (ref 7.4–10.4)
POTASSIUM SERPL-MCNC: 4.5 MMOL/L — SIGNIFICANT CHANGE UP (ref 3.5–5)
POTASSIUM SERPL-SCNC: 4.5 MMOL/L — SIGNIFICANT CHANGE UP (ref 3.5–5)
RBC # BLD: 3.6 M/UL — LOW (ref 4.7–6.1)
RBC # FLD: 22.5 % — HIGH (ref 11.5–14.5)
SODIUM SERPL-SCNC: 142 MMOL/L — SIGNIFICANT CHANGE UP (ref 135–146)
TIBC SERPL-MCNC: 319 UG/DL — SIGNIFICANT CHANGE UP (ref 220–430)
UIBC SERPL-MCNC: 30 UG/DL — LOW (ref 110–370)
WBC # BLD: 6.94 K/UL — SIGNIFICANT CHANGE UP (ref 4.8–10.8)
WBC # FLD AUTO: 6.94 K/UL — SIGNIFICANT CHANGE UP (ref 4.8–10.8)

## 2025-08-26 PROCEDURE — 99231 SBSQ HOSP IP/OBS SF/LOW 25: CPT

## 2025-08-26 RX ORDER — LACTULOSE 10 G/15ML
10 SOLUTION ORAL ONCE
Refills: 0 | Status: DISCONTINUED | OUTPATIENT
Start: 2025-08-26 | End: 2025-08-26

## 2025-08-26 RX ORDER — POLYETHYLENE GLYCOL 3350 17 G/17G
17 POWDER, FOR SOLUTION ORAL DAILY
Refills: 0 | Status: DISCONTINUED | OUTPATIENT
Start: 2025-08-26 | End: 2025-08-27

## 2025-08-26 RX ADMIN — CEPHALEXIN 250 MILLIGRAM(S): 250 CAPSULE ORAL at 11:30

## 2025-08-26 RX ADMIN — Medication 1 APPLICATION(S): at 11:36

## 2025-08-26 RX ADMIN — Medication 81 MILLIGRAM(S): at 11:30

## 2025-08-26 RX ADMIN — ATORVASTATIN CALCIUM 10 MILLIGRAM(S): 80 TABLET, FILM COATED ORAL at 21:31

## 2025-08-26 RX ADMIN — FINASTERIDE 5 MILLIGRAM(S): 1 TABLET, FILM COATED ORAL at 11:31

## 2025-08-26 RX ADMIN — TORSEMIDE 20 MILLIGRAM(S): 20 TABLET ORAL at 05:43

## 2025-08-26 RX ADMIN — Medication 1 DROP(S): at 13:09

## 2025-08-26 RX ADMIN — APIXABAN 2.5 MILLIGRAM(S): 2.5 TABLET, FILM COATED ORAL at 17:11

## 2025-08-26 RX ADMIN — Medication 1 DROP(S): at 21:30

## 2025-08-26 RX ADMIN — NYSTATIN 1 APPLICATION(S): 100000 CREAM TOPICAL at 21:31

## 2025-08-26 RX ADMIN — APIXABAN 2.5 MILLIGRAM(S): 2.5 TABLET, FILM COATED ORAL at 05:42

## 2025-08-26 RX ADMIN — NYSTATIN 1 APPLICATION(S): 100000 CREAM TOPICAL at 13:09

## 2025-08-26 RX ADMIN — POLYETHYLENE GLYCOL 3350 17 GRAM(S): 17 POWDER, FOR SOLUTION ORAL at 13:08

## 2025-08-26 RX ADMIN — TORSEMIDE 20 MILLIGRAM(S): 20 TABLET ORAL at 13:09

## 2025-08-26 RX ADMIN — METOPROLOL SUCCINATE 25 MILLIGRAM(S): 50 TABLET, EXTENDED RELEASE ORAL at 05:43

## 2025-08-26 RX ADMIN — Medication 1 DROP(S): at 05:42

## 2025-08-26 RX ADMIN — IRON SUCROSE 100 MILLIGRAM(S): 20 INJECTION, SOLUTION INTRAVENOUS at 17:12

## 2025-08-26 RX ADMIN — TAMSULOSIN HYDROCHLORIDE 0.4 MILLIGRAM(S): 0.4 CAPSULE ORAL at 21:30

## 2025-08-26 RX ADMIN — NYSTATIN 1 APPLICATION(S): 100000 CREAM TOPICAL at 05:43

## 2025-08-26 RX ADMIN — CALCITRIOL 0.25 MICROGRAM(S): 0.5 CAPSULE, GELATIN COATED ORAL at 11:30

## 2025-08-27 ENCOUNTER — TRANSCRIPTION ENCOUNTER (OUTPATIENT)
Age: 89
End: 2025-08-27

## 2025-08-27 VITALS
SYSTOLIC BLOOD PRESSURE: 133 MMHG | DIASTOLIC BLOOD PRESSURE: 60 MMHG | RESPIRATION RATE: 17 BRPM | TEMPERATURE: 98 F | HEART RATE: 77 BPM | OXYGEN SATURATION: 98 %

## 2025-08-27 LAB — FERRITIN SERPL-MCNC: 221 NG/ML — SIGNIFICANT CHANGE UP (ref 30–400)

## 2025-08-27 PROCEDURE — 99239 HOSP IP/OBS DSCHRG MGMT >30: CPT

## 2025-08-27 RX ORDER — SENNA 187 MG
2 TABLET ORAL
Qty: 0 | Refills: 0 | DISCHARGE
Start: 2025-08-27

## 2025-08-27 RX ORDER — TORSEMIDE 20 MG/1
20 TABLET ORAL DAILY
Refills: 0 | Status: DISCONTINUED | OUTPATIENT
Start: 2025-08-28 | End: 2025-08-27

## 2025-08-27 RX ORDER — TORSEMIDE 20 MG/1
1 TABLET ORAL
Qty: 60 | Refills: 0
Start: 2025-08-27 | End: 2025-09-25

## 2025-08-27 RX ORDER — NYSTATIN 100000 [USP'U]/G
1 CREAM TOPICAL
Qty: 1 | Refills: 0
Start: 2025-08-27 | End: 2025-09-02

## 2025-08-27 RX ORDER — METOPROLOL SUCCINATE 50 MG/1
1 TABLET, EXTENDED RELEASE ORAL
Qty: 30 | Refills: 0
Start: 2025-08-27 | End: 2025-09-25

## 2025-08-27 RX ORDER — LANOLIN/MINERAL OIL/PETROLATUM
1 OINTMENT (GRAM) OPHTHALMIC (EYE)
Qty: 0 | Refills: 0 | DISCHARGE
Start: 2025-08-27

## 2025-08-27 RX ADMIN — METOPROLOL SUCCINATE 25 MILLIGRAM(S): 50 TABLET, EXTENDED RELEASE ORAL at 05:14

## 2025-08-27 RX ADMIN — Medication 1 APPLICATION(S): at 11:39

## 2025-08-27 RX ADMIN — TORSEMIDE 20 MILLIGRAM(S): 20 TABLET ORAL at 05:14

## 2025-08-27 RX ADMIN — NYSTATIN 1 APPLICATION(S): 100000 CREAM TOPICAL at 05:15

## 2025-08-27 RX ADMIN — Medication 81 MILLIGRAM(S): at 11:38

## 2025-08-27 RX ADMIN — APIXABAN 2.5 MILLIGRAM(S): 2.5 TABLET, FILM COATED ORAL at 17:23

## 2025-08-27 RX ADMIN — NYSTATIN 1 APPLICATION(S): 100000 CREAM TOPICAL at 14:06

## 2025-08-27 RX ADMIN — Medication 1 DROP(S): at 05:15

## 2025-08-27 RX ADMIN — CALCITRIOL 0.25 MICROGRAM(S): 0.5 CAPSULE, GELATIN COATED ORAL at 11:38

## 2025-08-27 RX ADMIN — FINASTERIDE 5 MILLIGRAM(S): 1 TABLET, FILM COATED ORAL at 11:38

## 2025-08-27 RX ADMIN — CEPHALEXIN 250 MILLIGRAM(S): 250 CAPSULE ORAL at 11:39

## 2025-08-27 RX ADMIN — POLYETHYLENE GLYCOL 3350 17 GRAM(S): 17 POWDER, FOR SOLUTION ORAL at 11:39

## 2025-08-27 RX ADMIN — Medication 1 DROP(S): at 14:05

## 2025-08-27 RX ADMIN — APIXABAN 2.5 MILLIGRAM(S): 2.5 TABLET, FILM COATED ORAL at 05:14

## 2025-09-02 DIAGNOSIS — J43.9 EMPHYSEMA, UNSPECIFIED: ICD-10-CM

## 2025-09-02 DIAGNOSIS — M48.061 SPINAL STENOSIS, LUMBAR REGION WITHOUT NEUROGENIC CLAUDICATION: ICD-10-CM

## 2025-09-02 DIAGNOSIS — L89.156 PRESSURE-INDUCED DEEP TISSUE DAMAGE OF SACRAL REGION: ICD-10-CM

## 2025-09-02 DIAGNOSIS — E78.5 HYPERLIPIDEMIA, UNSPECIFIED: ICD-10-CM

## 2025-09-02 DIAGNOSIS — I48.21 PERMANENT ATRIAL FIBRILLATION: ICD-10-CM

## 2025-09-02 DIAGNOSIS — Z79.2 LONG TERM (CURRENT) USE OF ANTIBIOTICS: ICD-10-CM

## 2025-09-02 DIAGNOSIS — N17.9 ACUTE KIDNEY FAILURE, UNSPECIFIED: ICD-10-CM

## 2025-09-02 DIAGNOSIS — K59.00 CONSTIPATION, UNSPECIFIED: ICD-10-CM

## 2025-09-02 DIAGNOSIS — Z91.119 PATIENT'S NONCOMPLIANCE WITH DIETARY REGIMEN DUE TO UNSPECIFIED REASON: ICD-10-CM

## 2025-09-02 DIAGNOSIS — Z79.82 LONG TERM (CURRENT) USE OF ASPIRIN: ICD-10-CM

## 2025-09-02 DIAGNOSIS — T82.03XA LEAKAGE OF HEART VALVE PROSTHESIS, INITIAL ENCOUNTER: ICD-10-CM

## 2025-09-02 DIAGNOSIS — Z95.3 PRESENCE OF XENOGENIC HEART VALVE: ICD-10-CM

## 2025-09-02 DIAGNOSIS — C67.9 MALIGNANT NEOPLASM OF BLADDER, UNSPECIFIED: ICD-10-CM

## 2025-09-02 DIAGNOSIS — I34.0 NONRHEUMATIC MITRAL (VALVE) INSUFFICIENCY: ICD-10-CM

## 2025-09-02 DIAGNOSIS — E44.1 MILD PROTEIN-CALORIE MALNUTRITION: ICD-10-CM

## 2025-09-02 DIAGNOSIS — I13.0 HYPERTENSIVE HEART AND CHRONIC KIDNEY DISEASE WITH HEART FAILURE AND STAGE 1 THROUGH STAGE 4 CHRONIC KIDNEY DISEASE, OR UNSPECIFIED CHRONIC KIDNEY DISEASE: ICD-10-CM

## 2025-09-02 DIAGNOSIS — G47.00 INSOMNIA, UNSPECIFIED: ICD-10-CM

## 2025-09-02 DIAGNOSIS — Z88.2 ALLERGY STATUS TO SULFONAMIDES: ICD-10-CM

## 2025-09-02 DIAGNOSIS — G62.9 POLYNEUROPATHY, UNSPECIFIED: ICD-10-CM

## 2025-09-02 DIAGNOSIS — N39.0 URINARY TRACT INFECTION, SITE NOT SPECIFIED: ICD-10-CM

## 2025-09-02 DIAGNOSIS — N18.9 CHRONIC KIDNEY DISEASE, UNSPECIFIED: ICD-10-CM

## 2025-09-02 DIAGNOSIS — Z95.5 PRESENCE OF CORONARY ANGIOPLASTY IMPLANT AND GRAFT: ICD-10-CM

## 2025-09-02 DIAGNOSIS — Z79.01 LONG TERM (CURRENT) USE OF ANTICOAGULANTS: ICD-10-CM

## 2025-09-02 DIAGNOSIS — R33.9 RETENTION OF URINE, UNSPECIFIED: ICD-10-CM

## 2025-09-02 DIAGNOSIS — I50.21 ACUTE SYSTOLIC (CONGESTIVE) HEART FAILURE: ICD-10-CM

## 2025-09-02 DIAGNOSIS — I48.92 UNSPECIFIED ATRIAL FLUTTER: ICD-10-CM

## 2025-09-03 ENCOUNTER — TRANSCRIPTION ENCOUNTER (OUTPATIENT)
Age: 89
End: 2025-09-03

## 2025-09-09 ENCOUNTER — TRANSCRIPTION ENCOUNTER (OUTPATIENT)
Age: 89
End: 2025-09-09

## 2025-09-10 ENCOUNTER — NON-APPOINTMENT (OUTPATIENT)
Age: 89
End: 2025-09-10

## 2025-09-11 ENCOUNTER — NON-APPOINTMENT (OUTPATIENT)
Age: 89
End: 2025-09-11

## 2025-09-11 RX ORDER — TORSEMIDE 20 MG/1
20 TABLET ORAL
Refills: 0 | Status: ACTIVE | COMMUNITY
Start: 2025-09-11

## 2025-09-15 ENCOUNTER — APPOINTMENT (OUTPATIENT)
Dept: UROLOGY | Facility: CLINIC | Age: 89
End: 2025-09-15
Payer: MEDICARE

## 2025-09-15 ENCOUNTER — APPOINTMENT (OUTPATIENT)
Dept: CARDIOTHORACIC SURGERY | Facility: CLINIC | Age: 89
End: 2025-09-15
Payer: MEDICARE

## 2025-09-15 VITALS
DIASTOLIC BLOOD PRESSURE: 85 MMHG | SYSTOLIC BLOOD PRESSURE: 148 MMHG | WEIGHT: 175 LBS | OXYGEN SATURATION: 97 % | TEMPERATURE: 97.5 F | HEIGHT: 70 IN | RESPIRATION RATE: 16 BRPM | BODY MASS INDEX: 25.05 KG/M2 | HEART RATE: 85 BPM

## 2025-09-15 VITALS
OXYGEN SATURATION: 95 % | HEIGHT: 70 IN | TEMPERATURE: 98 F | DIASTOLIC BLOOD PRESSURE: 75 MMHG | HEART RATE: 96 BPM | SYSTOLIC BLOOD PRESSURE: 136 MMHG | WEIGHT: 165 LBS | RESPIRATION RATE: 18 BRPM | BODY MASS INDEX: 23.62 KG/M2

## 2025-09-15 DIAGNOSIS — N28.9 DISORDER OF KIDNEY AND URETER, UNSPECIFIED: ICD-10-CM

## 2025-09-15 DIAGNOSIS — I25.10 ATHEROSCLEROTIC HEART DISEASE OF NATIVE CORONARY ARTERY W/OUT ANGINA PECTORIS: ICD-10-CM

## 2025-09-15 PROCEDURE — 81003 URINALYSIS AUTO W/O SCOPE: CPT | Mod: QW

## 2025-09-15 PROCEDURE — 99213 OFFICE O/P EST LOW 20 MIN: CPT

## 2025-09-15 PROCEDURE — 99214 OFFICE O/P EST MOD 30 MIN: CPT | Mod: 25

## 2025-09-15 RX ORDER — ERYTHROPOIETIN 10000 [IU]/ML
10000 INJECTION, SOLUTION INTRAVENOUS; SUBCUTANEOUS
Qty: 8 | Refills: 0 | Status: ACTIVE | COMMUNITY
Start: 2025-08-12

## 2025-09-15 RX ORDER — CHLORHEXIDINE GLUCONATE 4 %
1000 LIQUID (ML) TOPICAL
Qty: 30 | Refills: 0 | Status: ACTIVE | COMMUNITY
Start: 2025-07-03

## 2025-09-15 RX ORDER — CEPHALEXIN 250 MG/1
250 CAPSULE ORAL
Qty: 90 | Refills: 2 | Status: ACTIVE | COMMUNITY
Start: 2025-09-15 | End: 1900-01-01

## 2025-09-15 RX ORDER — METOPROLOL SUCCINATE 25 MG/1
25 TABLET, EXTENDED RELEASE ORAL DAILY
Refills: 0 | Status: ACTIVE | COMMUNITY

## 2025-09-15 RX ORDER — SYRINGE AND NEEDLE,INSULIN,1ML 25GX1"
25G X 5/8" SYRINGE, EMPTY DISPOSABLE MISCELLANEOUS
Qty: 8 | Refills: 0 | Status: ACTIVE | COMMUNITY
Start: 2025-07-21

## 2025-09-16 ENCOUNTER — APPOINTMENT (OUTPATIENT)
Dept: HOME HEALTH SERVICES | Facility: HOME HEALTH | Age: 89
End: 2025-09-16
Payer: MEDICARE

## 2025-09-16 DIAGNOSIS — I49.5 SICK SINUS SYNDROME: ICD-10-CM

## 2025-09-16 DIAGNOSIS — S81.802A UNSPECIFIED OPEN WOUND, LEFT LOWER LEG, INITIAL ENCOUNTER: ICD-10-CM

## 2025-09-16 DIAGNOSIS — J44.9 CHRONIC OBSTRUCTIVE PULMONARY DISEASE, UNSPECIFIED: ICD-10-CM

## 2025-09-16 DIAGNOSIS — R06.09 OTHER FORMS OF DYSPNEA: ICD-10-CM

## 2025-09-16 DIAGNOSIS — I35.0 NONRHEUMATIC AORTIC (VALVE) STENOSIS: ICD-10-CM

## 2025-09-16 DIAGNOSIS — R60.0 LOCALIZED EDEMA: ICD-10-CM

## 2025-09-16 DIAGNOSIS — Z87.39 PERSONAL HISTORY OF OTHER DISEASES OF THE MUSCULOSKELETAL SYSTEM AND CONNECTIVE TISSUE: ICD-10-CM

## 2025-09-16 DIAGNOSIS — Z86.2 PERSONAL HISTORY OF DISEASES OF THE BLOOD AND BLOOD-FORMING ORGANS AND CERTAIN DISORDERS INVOLVING THE IMMUNE MECHANISM: ICD-10-CM

## 2025-09-16 DIAGNOSIS — I77.9 DISORDER OF ARTERIES AND ARTERIOLES, UNSPECIFIED: ICD-10-CM

## 2025-09-16 DIAGNOSIS — I48.91 UNSPECIFIED ATRIAL FIBRILLATION: ICD-10-CM

## 2025-09-16 DIAGNOSIS — Z79.899 ANEMIA, UNSPECIFIED: ICD-10-CM

## 2025-09-16 DIAGNOSIS — Z86.79 PERSONAL HISTORY OF OTHER DISEASES OF THE CIRCULATORY SYSTEM: ICD-10-CM

## 2025-09-16 DIAGNOSIS — D64.9 ANEMIA, UNSPECIFIED: ICD-10-CM

## 2025-09-16 DIAGNOSIS — I48.92 UNSPECIFIED ATRIAL FIBRILLATION: ICD-10-CM

## 2025-09-16 DIAGNOSIS — E78.5 HYPERLIPIDEMIA, UNSPECIFIED: ICD-10-CM

## 2025-09-16 DIAGNOSIS — S41.119A LACERATION W/OUT FOREIGN BODY OF UNSPECIFIED UPPER ARM, INITIAL ENCOUNTER: ICD-10-CM

## 2025-09-16 DIAGNOSIS — I50.9 HEART FAILURE, UNSPECIFIED: ICD-10-CM

## 2025-09-16 DIAGNOSIS — L85.3 XEROSIS CUTIS: ICD-10-CM

## 2025-09-16 LAB
BILIRUB UR QL STRIP: NORMAL
COLLECTION METHOD: NORMAL
GLUCOSE UR-MCNC: NORMAL
HCG UR QL: 0.2 EU/DL
HGB UR QL STRIP.AUTO: NORMAL
KETONES UR-MCNC: NORMAL
LEUKOCYTE ESTERASE UR QL STRIP: NORMAL
NITRITE UR QL STRIP: NORMAL
PH UR STRIP: 6
PROT UR STRIP-MCNC: 100
SP GR UR STRIP: NORMAL

## 2025-09-16 PROCEDURE — 99496 TRANSJ CARE MGMT HIGH F2F 7D: CPT

## 2025-09-16 RX ORDER — MUPIROCIN 20 MG/G
2 OINTMENT TOPICAL DAILY
Qty: 1 | Refills: 3 | Status: ACTIVE | COMMUNITY
Start: 2025-09-16 | End: 1900-01-01

## 2025-09-16 RX ORDER — HYDROCORTISONE 1 %
12 CREAM (GRAM) TOPICAL TWICE DAILY
Qty: 1 | Refills: 5 | Status: ACTIVE | COMMUNITY
Start: 2025-09-16 | End: 1900-01-01

## 2025-09-18 LAB — URINE CYTOLOGY: NORMAL

## 2025-09-24 PROBLEM — Z86.79 HISTORY OF CORONARY ARTERY DISEASE: Status: RESOLVED | Noted: 2022-12-20 | Resolved: 2025-09-24

## 2025-09-24 PROBLEM — I50.9 CONGESTIVE HEART FAILURE, UNSPECIFIED HF CHRONICITY, UNSPECIFIED HEART FAILURE TYPE: Status: ACTIVE | Noted: 2025-09-24

## 2025-09-24 PROBLEM — S81.802A WOUND OF LOWER EXTREMITY, LEFT, INITIAL ENCOUNTER: Status: RESOLVED | Noted: 2025-07-17 | Resolved: 2025-09-24

## 2025-09-24 PROBLEM — Z86.2 HISTORY OF ANEMIA: Status: RESOLVED | Noted: 2024-01-26 | Resolved: 2025-09-24

## 2025-09-24 PROBLEM — Z86.79 HISTORY OF ATRIAL FLUTTER: Status: RESOLVED | Noted: 2017-02-23 | Resolved: 2025-09-24

## 2025-09-24 PROBLEM — I77.9 BILATERAL CAROTID ARTERY DISEASE, UNSPECIFIED TYPE: Status: RESOLVED | Noted: 2019-04-02 | Resolved: 2025-09-24

## 2025-09-24 PROBLEM — Z87.39 HISTORY OF SPINAL STENOSIS: Status: RESOLVED | Noted: 2017-04-04 | Resolved: 2025-09-24
